# Patient Record
Sex: FEMALE | Race: WHITE | NOT HISPANIC OR LATINO | Employment: OTHER | URBAN - METROPOLITAN AREA
[De-identification: names, ages, dates, MRNs, and addresses within clinical notes are randomized per-mention and may not be internally consistent; named-entity substitution may affect disease eponyms.]

---

## 2017-01-03 ENCOUNTER — HOSPITAL ENCOUNTER (OUTPATIENT)
Dept: MAMMOGRAPHY | Facility: CLINIC | Age: 72
Discharge: HOME | End: 2017-01-03
Attending: FAMILY MEDICINE

## 2017-01-03 DIAGNOSIS — Z12.31 VISIT FOR SCREENING MAMMOGRAM: ICD-10-CM

## 2017-01-06 ENCOUNTER — COMMUNICATION - HEALTHEAST (OUTPATIENT)
Dept: FAMILY MEDICINE | Facility: CLINIC | Age: 72
End: 2017-01-06

## 2017-01-06 DIAGNOSIS — F41.9 ANXIETY: ICD-10-CM

## 2017-01-25 ENCOUNTER — OFFICE VISIT - HEALTHEAST (OUTPATIENT)
Dept: FAMILY MEDICINE | Facility: CLINIC | Age: 72
End: 2017-01-25

## 2017-01-25 DIAGNOSIS — F41.1 ANXIETY STATE: ICD-10-CM

## 2017-01-25 DIAGNOSIS — J02.0 STREP THROAT: ICD-10-CM

## 2017-01-25 DIAGNOSIS — J02.9 SORE THROAT: ICD-10-CM

## 2017-01-25 ASSESSMENT — MIFFLIN-ST. JEOR: SCORE: 1211.44

## 2017-02-13 ENCOUNTER — COMMUNICATION - HEALTHEAST (OUTPATIENT)
Dept: FAMILY MEDICINE | Facility: CLINIC | Age: 72
End: 2017-02-13

## 2017-02-13 DIAGNOSIS — K21.9 ESOPHAGEAL REFLUX: ICD-10-CM

## 2017-03-01 ENCOUNTER — OFFICE VISIT - HEALTHEAST (OUTPATIENT)
Dept: INTERNAL MEDICINE | Facility: CLINIC | Age: 72
End: 2017-03-01

## 2017-03-01 DIAGNOSIS — M81.0 OSTEOPOROSIS, SENILE: ICD-10-CM

## 2017-03-15 ENCOUNTER — COMMUNICATION - HEALTHEAST (OUTPATIENT)
Dept: FAMILY MEDICINE | Facility: CLINIC | Age: 72
End: 2017-03-15

## 2017-03-15 DIAGNOSIS — R10.9 STOMACH CRAMPS: ICD-10-CM

## 2017-04-23 ENCOUNTER — COMMUNICATION - HEALTHEAST (OUTPATIENT)
Dept: FAMILY MEDICINE | Facility: CLINIC | Age: 72
End: 2017-04-23

## 2017-04-23 DIAGNOSIS — F41.1 ANXIETY STATE: ICD-10-CM

## 2017-04-23 DIAGNOSIS — F33.9 MAJOR DEPRESSIVE DISORDER, RECURRENT EPISODE (H): ICD-10-CM

## 2017-05-14 ENCOUNTER — COMMUNICATION - HEALTHEAST (OUTPATIENT)
Dept: FAMILY MEDICINE | Facility: CLINIC | Age: 72
End: 2017-05-14

## 2017-05-14 DIAGNOSIS — K21.9 ESOPHAGEAL REFLUX: ICD-10-CM

## 2017-05-22 ENCOUNTER — COMMUNICATION - HEALTHEAST (OUTPATIENT)
Dept: FAMILY MEDICINE | Facility: CLINIC | Age: 72
End: 2017-05-22

## 2017-05-22 DIAGNOSIS — M79.89 LIMB SWELLING: ICD-10-CM

## 2017-06-09 ENCOUNTER — RECORDS - HEALTHEAST (OUTPATIENT)
Dept: ADMINISTRATIVE | Facility: OTHER | Age: 72
End: 2017-06-09

## 2017-06-26 ENCOUNTER — OFFICE VISIT - HEALTHEAST (OUTPATIENT)
Dept: FAMILY MEDICINE | Facility: CLINIC | Age: 72
End: 2017-06-26

## 2017-06-26 DIAGNOSIS — M79.89 LIMB SWELLING: ICD-10-CM

## 2017-06-26 DIAGNOSIS — M79.671 RIGHT FOOT PAIN: ICD-10-CM

## 2017-06-26 DIAGNOSIS — F41.9 ANXIETY: ICD-10-CM

## 2017-06-26 ASSESSMENT — MIFFLIN-ST. JEOR: SCORE: 1230.5

## 2017-07-20 ENCOUNTER — OFFICE VISIT - HEALTHEAST (OUTPATIENT)
Dept: PODIATRY | Age: 72
End: 2017-07-20

## 2017-07-20 DIAGNOSIS — M20.5X1 HALLUX LIMITUS, RIGHT: ICD-10-CM

## 2017-07-20 ASSESSMENT — MIFFLIN-ST. JEOR: SCORE: 1207.82

## 2017-07-26 ENCOUNTER — RECORDS - HEALTHEAST (OUTPATIENT)
Dept: ADMINISTRATIVE | Facility: OTHER | Age: 72
End: 2017-07-26

## 2017-07-27 ENCOUNTER — COMMUNICATION - HEALTHEAST (OUTPATIENT)
Dept: FAMILY MEDICINE | Facility: CLINIC | Age: 72
End: 2017-07-27

## 2017-07-27 DIAGNOSIS — F33.9 MAJOR DEPRESSIVE DISORDER, RECURRENT EPISODE (H): ICD-10-CM

## 2017-07-27 DIAGNOSIS — F41.1 ANXIETY STATE: ICD-10-CM

## 2017-08-22 ENCOUNTER — COMMUNICATION - HEALTHEAST (OUTPATIENT)
Dept: FAMILY MEDICINE | Facility: CLINIC | Age: 72
End: 2017-08-22

## 2017-08-22 DIAGNOSIS — M79.89 LIMB SWELLING: ICD-10-CM

## 2017-09-07 ENCOUNTER — AMBULATORY - HEALTHEAST (OUTPATIENT)
Dept: NURSING | Facility: CLINIC | Age: 72
End: 2017-09-07

## 2017-09-07 DIAGNOSIS — M81.0 OSTEOPOROSIS, SENILE: ICD-10-CM

## 2017-10-31 ENCOUNTER — AMBULATORY - HEALTHEAST (OUTPATIENT)
Dept: NURSING | Facility: CLINIC | Age: 72
End: 2017-10-31

## 2017-11-21 ENCOUNTER — COMMUNICATION - HEALTHEAST (OUTPATIENT)
Dept: FAMILY MEDICINE | Facility: CLINIC | Age: 72
End: 2017-11-21

## 2017-11-21 DIAGNOSIS — M79.89 LIMB SWELLING: ICD-10-CM

## 2017-12-13 ENCOUNTER — OFFICE VISIT - HEALTHEAST (OUTPATIENT)
Dept: PHYSICAL THERAPY | Facility: REHABILITATION | Age: 72
End: 2017-12-13

## 2017-12-13 ENCOUNTER — OFFICE VISIT - HEALTHEAST (OUTPATIENT)
Dept: FAMILY MEDICINE | Facility: CLINIC | Age: 72
End: 2017-12-13

## 2017-12-13 DIAGNOSIS — M25.511 ACUTE PAIN OF RIGHT SHOULDER: ICD-10-CM

## 2017-12-13 DIAGNOSIS — M79.672 LEFT FOOT PAIN: ICD-10-CM

## 2017-12-13 DIAGNOSIS — M25.511 RIGHT SHOULDER PAIN: ICD-10-CM

## 2017-12-13 DIAGNOSIS — R53.1 WEAKNESS: ICD-10-CM

## 2017-12-13 DIAGNOSIS — R29.3 POOR POSTURE: ICD-10-CM

## 2017-12-13 ASSESSMENT — MIFFLIN-ST. JEOR: SCORE: 1237.3

## 2017-12-15 ENCOUNTER — OFFICE VISIT - HEALTHEAST (OUTPATIENT)
Dept: PHYSICAL THERAPY | Facility: REHABILITATION | Age: 72
End: 2017-12-15

## 2017-12-15 DIAGNOSIS — R29.3 POOR POSTURE: ICD-10-CM

## 2017-12-15 DIAGNOSIS — R53.1 WEAKNESS: ICD-10-CM

## 2017-12-15 DIAGNOSIS — M25.511 ACUTE PAIN OF RIGHT SHOULDER: ICD-10-CM

## 2017-12-22 ENCOUNTER — OFFICE VISIT - HEALTHEAST (OUTPATIENT)
Dept: PHYSICAL THERAPY | Facility: REHABILITATION | Age: 72
End: 2017-12-22

## 2017-12-22 DIAGNOSIS — R53.1 WEAKNESS: ICD-10-CM

## 2017-12-22 DIAGNOSIS — R29.3 POOR POSTURE: ICD-10-CM

## 2017-12-22 DIAGNOSIS — M25.511 ACUTE PAIN OF RIGHT SHOULDER: ICD-10-CM

## 2017-12-29 ENCOUNTER — OFFICE VISIT - HEALTHEAST (OUTPATIENT)
Dept: PHYSICAL THERAPY | Facility: REHABILITATION | Age: 72
End: 2017-12-29

## 2017-12-29 DIAGNOSIS — M25.511 ACUTE PAIN OF RIGHT SHOULDER: ICD-10-CM

## 2017-12-29 DIAGNOSIS — R53.1 WEAKNESS: ICD-10-CM

## 2017-12-29 DIAGNOSIS — R29.3 POOR POSTURE: ICD-10-CM

## 2018-01-03 ENCOUNTER — OFFICE VISIT - HEALTHEAST (OUTPATIENT)
Dept: PHYSICAL THERAPY | Facility: REHABILITATION | Age: 73
End: 2018-01-03

## 2018-01-03 DIAGNOSIS — M25.511 ACUTE PAIN OF RIGHT SHOULDER: ICD-10-CM

## 2018-01-03 DIAGNOSIS — R53.1 WEAKNESS: ICD-10-CM

## 2018-01-03 DIAGNOSIS — R29.3 POOR POSTURE: ICD-10-CM

## 2018-01-04 ENCOUNTER — OFFICE VISIT - HEALTHEAST (OUTPATIENT)
Dept: PODIATRY | Age: 73
End: 2018-01-04

## 2018-01-04 DIAGNOSIS — G57.92 NEURITIS OF LEFT FOOT: ICD-10-CM

## 2018-01-04 ASSESSMENT — MIFFLIN-ST. JEOR: SCORE: 1207.82

## 2018-01-09 ENCOUNTER — HOSPITAL ENCOUNTER (OUTPATIENT)
Dept: MAMMOGRAPHY | Facility: CLINIC | Age: 73
Discharge: HOME | End: 2018-01-09
Attending: FAMILY MEDICINE

## 2018-01-09 DIAGNOSIS — Z12.31 VISIT FOR SCREENING MAMMOGRAM: ICD-10-CM

## 2018-01-12 ENCOUNTER — OFFICE VISIT - HEALTHEAST (OUTPATIENT)
Dept: PHYSICAL THERAPY | Facility: REHABILITATION | Age: 73
End: 2018-01-12

## 2018-01-12 DIAGNOSIS — M25.511 ACUTE PAIN OF RIGHT SHOULDER: ICD-10-CM

## 2018-01-12 DIAGNOSIS — R29.3 POOR POSTURE: ICD-10-CM

## 2018-01-12 DIAGNOSIS — R53.1 WEAKNESS: ICD-10-CM

## 2018-01-23 ENCOUNTER — COMMUNICATION - HEALTHEAST (OUTPATIENT)
Dept: FAMILY MEDICINE | Facility: CLINIC | Age: 73
End: 2018-01-23

## 2018-01-23 DIAGNOSIS — F41.9 ANXIETY: ICD-10-CM

## 2018-02-19 ENCOUNTER — COMMUNICATION - HEALTHEAST (OUTPATIENT)
Dept: FAMILY MEDICINE | Facility: CLINIC | Age: 73
End: 2018-02-19

## 2018-02-19 DIAGNOSIS — M79.89 LIMB SWELLING: ICD-10-CM

## 2018-03-07 ENCOUNTER — OFFICE VISIT - HEALTHEAST (OUTPATIENT)
Dept: INTERNAL MEDICINE | Facility: CLINIC | Age: 73
End: 2018-03-07

## 2018-03-07 ENCOUNTER — OFFICE VISIT - HEALTHEAST (OUTPATIENT)
Dept: FAMILY MEDICINE | Facility: CLINIC | Age: 73
End: 2018-03-07

## 2018-03-07 DIAGNOSIS — R63.5 WEIGHT GAIN: ICD-10-CM

## 2018-03-07 DIAGNOSIS — F33.9 MAJOR DEPRESSIVE DISORDER, RECURRENT EPISODE (H): ICD-10-CM

## 2018-03-07 DIAGNOSIS — M81.0 OSTEOPOROSIS, SENILE: ICD-10-CM

## 2018-03-07 DIAGNOSIS — M79.89 SWELLING OF LIMB: ICD-10-CM

## 2018-03-07 DIAGNOSIS — E83.42 HYPOMAGNESEMIA: ICD-10-CM

## 2018-03-07 DIAGNOSIS — F41.1 ANXIETY STATE: ICD-10-CM

## 2018-03-07 DIAGNOSIS — K21.9 ESOPHAGEAL REFLUX: ICD-10-CM

## 2018-03-07 DIAGNOSIS — G60.9 HEREDITARY AND IDIOPATHIC PERIPHERAL NEUROPATHY: ICD-10-CM

## 2018-03-07 LAB
ALBUMIN SERPL-MCNC: 4 G/DL (ref 3.5–5)
ALP SERPL-CCNC: 64 U/L (ref 45–120)
ALT SERPL W P-5'-P-CCNC: 22 U/L (ref 0–45)
ANION GAP SERPL CALCULATED.3IONS-SCNC: 10 MMOL/L (ref 5–18)
AST SERPL W P-5'-P-CCNC: 22 U/L (ref 0–40)
BILIRUB SERPL-MCNC: 1.4 MG/DL (ref 0–1)
BUN SERPL-MCNC: 13 MG/DL (ref 8–28)
CALCIUM SERPL-MCNC: 10.1 MG/DL (ref 8.5–10.5)
CHLORIDE BLD-SCNC: 102 MMOL/L (ref 98–107)
CO2 SERPL-SCNC: 28 MMOL/L (ref 22–31)
CREAT SERPL-MCNC: 0.69 MG/DL (ref 0.6–1.1)
GFR SERPL CREATININE-BSD FRML MDRD: >60 ML/MIN/1.73M2
GLUCOSE BLD-MCNC: 75 MG/DL (ref 70–125)
MAGNESIUM SERPL-MCNC: 2.3 MG/DL (ref 1.8–2.6)
POTASSIUM BLD-SCNC: 4.2 MMOL/L (ref 3.5–5)
PROT SERPL-MCNC: 7.6 G/DL (ref 6–8)
SODIUM SERPL-SCNC: 140 MMOL/L (ref 136–145)
TSH SERPL DL<=0.005 MIU/L-ACNC: 1.82 UIU/ML (ref 0.3–5)
VIT B12 SERPL-MCNC: 846 PG/ML (ref 213–816)

## 2018-03-08 LAB
25(OH)D3 SERPL-MCNC: 55.5 NG/ML (ref 30–80)
25(OH)D3 SERPL-MCNC: 55.5 NG/ML (ref 30–80)

## 2018-03-09 ENCOUNTER — COMMUNICATION - HEALTHEAST (OUTPATIENT)
Dept: INTERNAL MEDICINE | Facility: CLINIC | Age: 73
End: 2018-03-09

## 2018-04-09 ENCOUNTER — RECORDS - HEALTHEAST (OUTPATIENT)
Dept: ADMINISTRATIVE | Facility: OTHER | Age: 73
End: 2018-04-09

## 2018-04-12 ENCOUNTER — COMMUNICATION - HEALTHEAST (OUTPATIENT)
Dept: FAMILY MEDICINE | Facility: CLINIC | Age: 73
End: 2018-04-12

## 2018-04-12 DIAGNOSIS — K21.9 ESOPHAGEAL REFLUX: ICD-10-CM

## 2018-04-26 ENCOUNTER — RECORDS - HEALTHEAST (OUTPATIENT)
Dept: ADMINISTRATIVE | Facility: OTHER | Age: 73
End: 2018-04-26

## 2018-05-08 ENCOUNTER — OFFICE VISIT - HEALTHEAST (OUTPATIENT)
Dept: FAMILY MEDICINE | Facility: CLINIC | Age: 73
End: 2018-05-08

## 2018-05-08 DIAGNOSIS — F33.9 MAJOR DEPRESSIVE DISORDER, RECURRENT EPISODE (H): ICD-10-CM

## 2018-05-08 DIAGNOSIS — K58.9 IRRITABLE BOWEL SYNDROME: ICD-10-CM

## 2018-05-08 DIAGNOSIS — Z13.220 ENCOUNTER FOR SCREENING FOR LIPOID DISORDERS: ICD-10-CM

## 2018-05-08 DIAGNOSIS — R79.89 ELEVATED VITAMIN B12 LEVEL: ICD-10-CM

## 2018-05-08 DIAGNOSIS — K21.9 ESOPHAGEAL REFLUX: ICD-10-CM

## 2018-05-08 DIAGNOSIS — E83.42 HYPOMAGNESEMIA: ICD-10-CM

## 2018-05-08 DIAGNOSIS — M79.89 SWELLING OF LIMB: ICD-10-CM

## 2018-05-08 DIAGNOSIS — H91.90 HEARING LOSS: ICD-10-CM

## 2018-05-08 DIAGNOSIS — F41.1 ANXIETY STATE: ICD-10-CM

## 2018-05-08 DIAGNOSIS — R17 ELEVATED BILIRUBIN: ICD-10-CM

## 2018-05-08 DIAGNOSIS — M81.0 OSTEOPOROSIS, SENILE: ICD-10-CM

## 2018-05-08 DIAGNOSIS — Z00.00 ROUTINE GENERAL MEDICAL EXAMINATION AT A HEALTH CARE FACILITY: ICD-10-CM

## 2018-05-08 LAB
ALBUMIN SERPL-MCNC: 3.9 G/DL (ref 3.5–5)
ALBUMIN UR-MCNC: NEGATIVE MG/DL
ALP SERPL-CCNC: 60 U/L (ref 45–120)
ALT SERPL W P-5'-P-CCNC: 18 U/L (ref 0–45)
ANION GAP SERPL CALCULATED.3IONS-SCNC: 9 MMOL/L (ref 5–18)
APPEARANCE UR: CLEAR
AST SERPL W P-5'-P-CCNC: 24 U/L (ref 0–40)
BILIRUB SERPL-MCNC: 1.5 MG/DL (ref 0–1)
BILIRUB UR QL STRIP: NEGATIVE
BUN SERPL-MCNC: 10 MG/DL (ref 8–28)
CALCIUM SERPL-MCNC: 9.8 MG/DL (ref 8.5–10.5)
CHLORIDE BLD-SCNC: 101 MMOL/L (ref 98–107)
CHOLEST SERPL-MCNC: 205 MG/DL
CO2 SERPL-SCNC: 30 MMOL/L (ref 22–31)
COLOR UR AUTO: YELLOW
CREAT SERPL-MCNC: 0.65 MG/DL (ref 0.6–1.1)
ERYTHROCYTE [DISTWIDTH] IN BLOOD BY AUTOMATED COUNT: 12.2 % (ref 11–14.5)
FASTING STATUS PATIENT QL REPORTED: YES
GFR SERPL CREATININE-BSD FRML MDRD: >60 ML/MIN/1.73M2
GLUCOSE BLD-MCNC: 93 MG/DL (ref 70–125)
GLUCOSE UR STRIP-MCNC: NEGATIVE MG/DL
HCT VFR BLD AUTO: 43.2 % (ref 35–47)
HDLC SERPL-MCNC: 69 MG/DL
HGB BLD-MCNC: 14.5 G/DL (ref 12–16)
HGB UR QL STRIP: NEGATIVE
KETONES UR STRIP-MCNC: NEGATIVE MG/DL
LDLC SERPL CALC-MCNC: 112 MG/DL
LEUKOCYTE ESTERASE UR QL STRIP: NEGATIVE
MAGNESIUM SERPL-MCNC: 2 MG/DL (ref 1.8–2.6)
MCH RBC QN AUTO: 30.7 PG (ref 27–34)
MCHC RBC AUTO-ENTMCNC: 33.5 G/DL (ref 32–36)
MCV RBC AUTO: 92 FL (ref 80–100)
NITRATE UR QL: NEGATIVE
PH UR STRIP: 8.5 [PH] (ref 5–8)
PLATELET # BLD AUTO: 237 THOU/UL (ref 140–440)
PMV BLD AUTO: 7.3 FL (ref 7–10)
POTASSIUM BLD-SCNC: 4.3 MMOL/L (ref 3.5–5)
PROT SERPL-MCNC: 7.1 G/DL (ref 6–8)
RBC # BLD AUTO: 4.7 MILL/UL (ref 3.8–5.4)
SODIUM SERPL-SCNC: 140 MMOL/L (ref 136–145)
SP GR UR STRIP: 1.01 (ref 1–1.03)
TRIGL SERPL-MCNC: 120 MG/DL
UROBILINOGEN UR STRIP-ACNC: ABNORMAL
VIT B12 SERPL-MCNC: 688 PG/ML (ref 213–816)
WBC: 5 THOU/UL (ref 4–11)

## 2018-05-08 ASSESSMENT — MIFFLIN-ST. JEOR: SCORE: 1236.17

## 2018-05-09 LAB — HCV AB SERPL QL IA: NEGATIVE

## 2018-05-10 LAB — BILIRUB DIRECT SERPL-MCNC: 0.4 MG/DL

## 2018-05-20 ENCOUNTER — COMMUNICATION - HEALTHEAST (OUTPATIENT)
Dept: FAMILY MEDICINE | Facility: CLINIC | Age: 73
End: 2018-05-20

## 2018-05-20 DIAGNOSIS — M79.89 LIMB SWELLING: ICD-10-CM

## 2018-05-22 ENCOUNTER — OFFICE VISIT - HEALTHEAST (OUTPATIENT)
Dept: AUDIOLOGY | Facility: CLINIC | Age: 73
End: 2018-05-22

## 2018-05-22 DIAGNOSIS — Z71.1 PERSON WITH FEARED COMPLAINT IN WHOM NO DIAGNOSIS WAS MADE: ICD-10-CM

## 2018-07-11 ENCOUNTER — COMMUNICATION - HEALTHEAST (OUTPATIENT)
Dept: FAMILY MEDICINE | Facility: CLINIC | Age: 73
End: 2018-07-11

## 2018-07-11 DIAGNOSIS — F33.9 MAJOR DEPRESSIVE DISORDER, RECURRENT EPISODE (H): ICD-10-CM

## 2018-07-11 DIAGNOSIS — F41.1 ANXIETY STATE: ICD-10-CM

## 2018-07-31 ENCOUNTER — OFFICE VISIT - HEALTHEAST (OUTPATIENT)
Dept: FAMILY MEDICINE | Facility: CLINIC | Age: 73
End: 2018-07-31

## 2018-07-31 DIAGNOSIS — J06.9 URI (UPPER RESPIRATORY INFECTION): ICD-10-CM

## 2018-07-31 LAB
BASOPHILS # BLD AUTO: 0.1 THOU/UL (ref 0–0.2)
BASOPHILS NFR BLD AUTO: 1 % (ref 0–2)
EOSINOPHIL # BLD AUTO: 0.2 THOU/UL (ref 0–0.4)
EOSINOPHIL NFR BLD AUTO: 3 % (ref 0–6)
ERYTHROCYTE [DISTWIDTH] IN BLOOD BY AUTOMATED COUNT: 12.1 % (ref 11–14.5)
HCT VFR BLD AUTO: 45.9 % (ref 35–47)
HGB BLD-MCNC: 15 G/DL (ref 12–16)
LYMPHOCYTES # BLD AUTO: 2.4 THOU/UL (ref 0.8–4.4)
LYMPHOCYTES NFR BLD AUTO: 27 % (ref 20–40)
MCH RBC QN AUTO: 30.2 PG (ref 27–34)
MCHC RBC AUTO-ENTMCNC: 32.8 G/DL (ref 32–36)
MCV RBC AUTO: 92 FL (ref 80–100)
MONOCYTES # BLD AUTO: 0.6 THOU/UL (ref 0–0.9)
MONOCYTES NFR BLD AUTO: 6 % (ref 2–10)
NEUTROPHILS # BLD AUTO: 5.6 THOU/UL (ref 2–7.7)
NEUTROPHILS NFR BLD AUTO: 63 % (ref 50–70)
PLATELET # BLD AUTO: 307 THOU/UL (ref 140–440)
PMV BLD AUTO: 6.9 FL (ref 7–10)
RBC # BLD AUTO: 4.99 MILL/UL (ref 3.8–5.4)
WBC: 8.9 THOU/UL (ref 4–11)

## 2018-08-15 ENCOUNTER — RECORDS - HEALTHEAST (OUTPATIENT)
Dept: ADMINISTRATIVE | Facility: OTHER | Age: 73
End: 2018-08-15

## 2018-09-07 ENCOUNTER — AMBULATORY - HEALTHEAST (OUTPATIENT)
Dept: NURSING | Facility: CLINIC | Age: 73
End: 2018-09-07

## 2018-09-19 ENCOUNTER — RECORDS - HEALTHEAST (OUTPATIENT)
Dept: ADMINISTRATIVE | Facility: OTHER | Age: 73
End: 2018-09-19

## 2018-09-19 ENCOUNTER — RECORDS - HEALTHEAST (OUTPATIENT)
Dept: BONE DENSITY | Facility: CLINIC | Age: 73
End: 2018-09-19

## 2018-09-19 DIAGNOSIS — M81.0 AGE-RELATED OSTEOPOROSIS WITHOUT CURRENT PATHOLOGICAL FRACTURE: ICD-10-CM

## 2018-10-17 ENCOUNTER — AMBULATORY - HEALTHEAST (OUTPATIENT)
Dept: NURSING | Facility: CLINIC | Age: 73
End: 2018-10-17

## 2018-10-17 DIAGNOSIS — Z23 FLU VACCINE NEED: ICD-10-CM

## 2018-11-14 ENCOUNTER — OFFICE VISIT - HEALTHEAST (OUTPATIENT)
Dept: FAMILY MEDICINE | Facility: CLINIC | Age: 73
End: 2018-11-14

## 2018-11-14 DIAGNOSIS — F41.1 ANXIETY STATE: ICD-10-CM

## 2018-11-14 DIAGNOSIS — E78.00 ELEVATED CHOLESTEROL: ICD-10-CM

## 2018-11-14 DIAGNOSIS — M79.89 SWELLING OF LIMB: ICD-10-CM

## 2018-11-14 DIAGNOSIS — F33.9 EPISODE OF RECURRENT MAJOR DEPRESSIVE DISORDER, UNSPECIFIED DEPRESSION EPISODE SEVERITY (H): ICD-10-CM

## 2018-11-14 DIAGNOSIS — R10.9 STOMACH CRAMPS: ICD-10-CM

## 2018-11-14 DIAGNOSIS — K21.9 GASTROESOPHAGEAL REFLUX DISEASE WITHOUT ESOPHAGITIS: ICD-10-CM

## 2018-11-14 LAB
ALBUMIN SERPL-MCNC: 4.2 G/DL (ref 3.5–5)
ALP SERPL-CCNC: 60 U/L (ref 45–120)
ALT SERPL W P-5'-P-CCNC: 19 U/L (ref 0–45)
ANION GAP SERPL CALCULATED.3IONS-SCNC: 10 MMOL/L (ref 5–18)
AST SERPL W P-5'-P-CCNC: 25 U/L (ref 0–40)
BILIRUB SERPL-MCNC: 1.4 MG/DL (ref 0–1)
BUN SERPL-MCNC: 10 MG/DL (ref 8–28)
CALCIUM SERPL-MCNC: 10.4 MG/DL (ref 8.5–10.5)
CHLORIDE BLD-SCNC: 101 MMOL/L (ref 98–107)
CHOLEST SERPL-MCNC: 210 MG/DL
CO2 SERPL-SCNC: 28 MMOL/L (ref 22–31)
CREAT SERPL-MCNC: 0.69 MG/DL (ref 0.6–1.1)
FASTING STATUS PATIENT QL REPORTED: YES
GFR SERPL CREATININE-BSD FRML MDRD: >60 ML/MIN/1.73M2
GLUCOSE BLD-MCNC: 96 MG/DL (ref 70–125)
HDLC SERPL-MCNC: 81 MG/DL
LDLC SERPL CALC-MCNC: 107 MG/DL
POTASSIUM BLD-SCNC: 4.7 MMOL/L (ref 3.5–5)
PROT SERPL-MCNC: 7.5 G/DL (ref 6–8)
SODIUM SERPL-SCNC: 139 MMOL/L (ref 136–145)
TRIGL SERPL-MCNC: 111 MG/DL

## 2018-12-05 ENCOUNTER — OFFICE VISIT - HEALTHEAST (OUTPATIENT)
Dept: FAMILY MEDICINE | Facility: CLINIC | Age: 73
End: 2018-12-05

## 2018-12-05 DIAGNOSIS — F41.9 ANXIETY: ICD-10-CM

## 2018-12-05 DIAGNOSIS — M79.18 BUTTOCK PAIN: ICD-10-CM

## 2019-01-04 ENCOUNTER — OFFICE VISIT - HEALTHEAST (OUTPATIENT)
Dept: FAMILY MEDICINE | Facility: CLINIC | Age: 74
End: 2019-01-04

## 2019-01-04 DIAGNOSIS — F41.1 ANXIETY STATE: ICD-10-CM

## 2019-01-11 ENCOUNTER — HOSPITAL ENCOUNTER (OUTPATIENT)
Dept: MAMMOGRAPHY | Facility: CLINIC | Age: 74
Discharge: HOME | End: 2019-01-11
Attending: FAMILY MEDICINE

## 2019-01-11 DIAGNOSIS — Z12.31 VISIT FOR SCREENING MAMMOGRAM: ICD-10-CM

## 2019-01-21 ENCOUNTER — OFFICE VISIT - HEALTHEAST (OUTPATIENT)
Dept: FAMILY MEDICINE | Facility: CLINIC | Age: 74
End: 2019-01-21

## 2019-01-21 DIAGNOSIS — J40 BRONCHITIS: ICD-10-CM

## 2019-02-22 ENCOUNTER — OFFICE VISIT - HEALTHEAST (OUTPATIENT)
Dept: FAMILY MEDICINE | Facility: CLINIC | Age: 74
End: 2019-02-22

## 2019-02-22 ENCOUNTER — RECORDS - HEALTHEAST (OUTPATIENT)
Dept: GENERAL RADIOLOGY | Facility: CLINIC | Age: 74
End: 2019-02-22

## 2019-02-22 DIAGNOSIS — M79.674 GREAT TOE PAIN, RIGHT: ICD-10-CM

## 2019-02-22 DIAGNOSIS — M19.071 ARTHRITIS OF FIRST METATARSOPHALANGEAL (MTP) JOINT OF RIGHT FOOT: ICD-10-CM

## 2019-02-22 DIAGNOSIS — M79.674 PAIN IN RIGHT TOE(S): ICD-10-CM

## 2019-03-05 ENCOUNTER — OFFICE VISIT - HEALTHEAST (OUTPATIENT)
Dept: RHEUMATOLOGY | Facility: CLINIC | Age: 74
End: 2019-03-05

## 2019-03-05 DIAGNOSIS — M19.071 ARTHRITIS OF FIRST METATARSOPHALANGEAL (MTP) JOINT OF RIGHT FOOT: ICD-10-CM

## 2019-03-05 DIAGNOSIS — M15.0 PRIMARY OSTEOARTHRITIS INVOLVING MULTIPLE JOINTS: ICD-10-CM

## 2019-03-05 ASSESSMENT — MIFFLIN-ST. JEOR: SCORE: 1240.71

## 2019-03-07 ENCOUNTER — OFFICE VISIT - HEALTHEAST (OUTPATIENT)
Dept: INTERNAL MEDICINE | Facility: CLINIC | Age: 74
End: 2019-03-07

## 2019-03-07 DIAGNOSIS — M81.0 OSTEOPOROSIS, SENILE: ICD-10-CM

## 2019-03-20 ENCOUNTER — OFFICE VISIT - HEALTHEAST (OUTPATIENT)
Dept: FAMILY MEDICINE | Facility: CLINIC | Age: 74
End: 2019-03-20

## 2019-03-20 DIAGNOSIS — F33.0 MILD EPISODE OF RECURRENT MAJOR DEPRESSIVE DISORDER (H): ICD-10-CM

## 2019-03-20 DIAGNOSIS — F41.1 ANXIETY STATE: ICD-10-CM

## 2019-03-20 ASSESSMENT — MIFFLIN-ST. JEOR: SCORE: 1227.1

## 2019-04-07 ENCOUNTER — COMMUNICATION - HEALTHEAST (OUTPATIENT)
Dept: FAMILY MEDICINE | Facility: CLINIC | Age: 74
End: 2019-04-07

## 2019-04-07 DIAGNOSIS — K21.9 ESOPHAGEAL REFLUX: ICD-10-CM

## 2019-04-07 DIAGNOSIS — F33.9 MAJOR DEPRESSIVE DISORDER, RECURRENT EPISODE (H): ICD-10-CM

## 2019-04-07 DIAGNOSIS — F41.1 ANXIETY STATE: ICD-10-CM

## 2019-04-25 ENCOUNTER — COMMUNICATION - HEALTHEAST (OUTPATIENT)
Dept: SCHEDULING | Facility: CLINIC | Age: 74
End: 2019-04-25

## 2019-04-25 DIAGNOSIS — Z20.828 EXPOSURE TO THE FLU: ICD-10-CM

## 2019-05-06 ENCOUNTER — COMMUNICATION - HEALTHEAST (OUTPATIENT)
Dept: FAMILY MEDICINE | Facility: CLINIC | Age: 74
End: 2019-05-06

## 2019-05-13 ENCOUNTER — COMMUNICATION - HEALTHEAST (OUTPATIENT)
Dept: FAMILY MEDICINE | Facility: CLINIC | Age: 74
End: 2019-05-13

## 2019-05-13 DIAGNOSIS — R10.9 STOMACH CRAMPS: ICD-10-CM

## 2019-05-15 ENCOUNTER — COMMUNICATION - HEALTHEAST (OUTPATIENT)
Dept: FAMILY MEDICINE | Facility: CLINIC | Age: 74
End: 2019-05-15

## 2019-05-15 DIAGNOSIS — M79.89 LIMB SWELLING: ICD-10-CM

## 2019-05-31 ENCOUNTER — OFFICE VISIT - HEALTHEAST (OUTPATIENT)
Dept: FAMILY MEDICINE | Facility: CLINIC | Age: 74
End: 2019-05-31

## 2019-05-31 DIAGNOSIS — R20.2 PARESTHESIA: ICD-10-CM

## 2019-05-31 DIAGNOSIS — F41.1 ANXIETY STATE: ICD-10-CM

## 2019-05-31 DIAGNOSIS — G60.9 HEREDITARY AND IDIOPATHIC PERIPHERAL NEUROPATHY: ICD-10-CM

## 2019-06-17 ENCOUNTER — RECORDS - HEALTHEAST (OUTPATIENT)
Dept: ADMINISTRATIVE | Facility: OTHER | Age: 74
End: 2019-06-17

## 2019-06-21 ENCOUNTER — HOSPITAL ENCOUNTER (OUTPATIENT)
Dept: CT IMAGING | Facility: CLINIC | Age: 74
Discharge: HOME | End: 2019-06-21

## 2019-06-21 DIAGNOSIS — M54.81 OCCIPITAL NEURALGIA: ICD-10-CM

## 2019-06-21 DIAGNOSIS — M79.2 NEURALGIA: ICD-10-CM

## 2019-06-21 LAB
CREAT BLD-MCNC: 0.6 MG/DL (ref 0.6–1.1)
GFR SERPL CREATININE-BSD FRML MDRD: >60 ML/MIN/1.73M2

## 2019-07-01 ENCOUNTER — RECORDS - HEALTHEAST (OUTPATIENT)
Dept: ADMINISTRATIVE | Facility: OTHER | Age: 74
End: 2019-07-01

## 2019-07-15 ENCOUNTER — RECORDS - HEALTHEAST (OUTPATIENT)
Dept: ADMINISTRATIVE | Facility: OTHER | Age: 74
End: 2019-07-15

## 2019-07-15 ENCOUNTER — OFFICE VISIT - HEALTHEAST (OUTPATIENT)
Dept: RHEUMATOLOGY | Facility: CLINIC | Age: 74
End: 2019-07-15

## 2019-07-15 DIAGNOSIS — M19.071 ARTHRITIS OF FIRST METATARSOPHALANGEAL (MTP) JOINT OF RIGHT FOOT: ICD-10-CM

## 2019-07-15 DIAGNOSIS — M15.0 PRIMARY OSTEOARTHRITIS INVOLVING MULTIPLE JOINTS: ICD-10-CM

## 2019-08-05 ENCOUNTER — OFFICE VISIT - HEALTHEAST (OUTPATIENT)
Dept: FAMILY MEDICINE | Facility: CLINIC | Age: 74
End: 2019-08-05

## 2019-08-05 DIAGNOSIS — J02.9 SORE THROAT: ICD-10-CM

## 2019-08-05 DIAGNOSIS — J06.9 UPPER RESPIRATORY TRACT INFECTION, UNSPECIFIED TYPE: ICD-10-CM

## 2019-08-05 LAB
ANION GAP SERPL CALCULATED.3IONS-SCNC: 12 MMOL/L (ref 5–18)
BUN SERPL-MCNC: 11 MG/DL (ref 8–28)
CALCIUM SERPL-MCNC: 10.1 MG/DL (ref 8.5–10.5)
CHLORIDE BLD-SCNC: 101 MMOL/L (ref 98–107)
CO2 SERPL-SCNC: 26 MMOL/L (ref 22–31)
CREAT SERPL-MCNC: 0.72 MG/DL (ref 0.6–1.1)
DEPRECATED S PYO AG THROAT QL EIA: NORMAL
ERYTHROCYTE [DISTWIDTH] IN BLOOD BY AUTOMATED COUNT: 13 % (ref 11–14.5)
GFR SERPL CREATININE-BSD FRML MDRD: >60 ML/MIN/1.73M2
GLUCOSE BLD-MCNC: 89 MG/DL (ref 70–125)
HCT VFR BLD AUTO: 42 % (ref 35–47)
HGB BLD-MCNC: 14.5 G/DL (ref 12–16)
MCH RBC QN AUTO: 31.6 PG (ref 27–34)
MCHC RBC AUTO-ENTMCNC: 34.5 G/DL (ref 32–36)
MCV RBC AUTO: 92 FL (ref 80–100)
PLATELET # BLD AUTO: 276 THOU/UL (ref 140–440)
PMV BLD AUTO: 7.8 FL (ref 7–10)
POTASSIUM BLD-SCNC: 4.1 MMOL/L (ref 3.5–5)
RBC # BLD AUTO: 4.58 MILL/UL (ref 3.8–5.4)
SODIUM SERPL-SCNC: 139 MMOL/L (ref 136–145)
WBC: 10.3 THOU/UL (ref 4–11)

## 2019-08-05 RX ORDER — MV/FA/DHA/EPA/FISH OIL/SAW/GNK 400MCG-200
1 COMBINATION PACKAGE (EA) ORAL DAILY
Status: SHIPPED | COMMUNITY
Start: 2019-08-05

## 2019-08-06 LAB — GROUP A STREP BY PCR: NORMAL

## 2019-08-10 ENCOUNTER — OFFICE VISIT - HEALTHEAST (OUTPATIENT)
Dept: FAMILY MEDICINE | Facility: CLINIC | Age: 74
End: 2019-08-10

## 2019-08-10 DIAGNOSIS — J06.9 UPPER RESPIRATORY TRACT INFECTION, UNSPECIFIED TYPE: ICD-10-CM

## 2019-08-10 DIAGNOSIS — H69.93 DYSFUNCTION OF BOTH EUSTACHIAN TUBES: ICD-10-CM

## 2019-08-10 ASSESSMENT — MIFFLIN-ST. JEOR: SCORE: 1208.96

## 2019-08-21 ENCOUNTER — RECORDS - HEALTHEAST (OUTPATIENT)
Dept: ADMINISTRATIVE | Facility: OTHER | Age: 74
End: 2019-08-21

## 2019-09-10 ENCOUNTER — AMBULATORY - HEALTHEAST (OUTPATIENT)
Dept: NURSING | Facility: CLINIC | Age: 74
End: 2019-09-10

## 2019-10-10 ENCOUNTER — AMBULATORY - HEALTHEAST (OUTPATIENT)
Dept: NURSING | Facility: CLINIC | Age: 74
End: 2019-10-10

## 2019-10-10 DIAGNOSIS — Z23 FLU VACCINE NEED: ICD-10-CM

## 2019-11-01 ENCOUNTER — OFFICE VISIT - HEALTHEAST (OUTPATIENT)
Dept: FAMILY MEDICINE | Facility: CLINIC | Age: 74
End: 2019-11-01

## 2019-11-01 DIAGNOSIS — K21.9 GASTROESOPHAGEAL REFLUX DISEASE WITHOUT ESOPHAGITIS: ICD-10-CM

## 2019-11-01 DIAGNOSIS — F33.0 MILD EPISODE OF RECURRENT MAJOR DEPRESSIVE DISORDER (H): ICD-10-CM

## 2019-11-01 DIAGNOSIS — H26.9 CATARACT OF BOTH EYES, UNSPECIFIED CATARACT TYPE: ICD-10-CM

## 2019-11-01 DIAGNOSIS — F41.1 ANXIETY STATE: ICD-10-CM

## 2019-11-01 DIAGNOSIS — Z01.818 PREOPERATIVE EXAMINATION: ICD-10-CM

## 2019-11-01 DIAGNOSIS — M79.89 SWELLING OF LIMB: ICD-10-CM

## 2019-11-01 LAB
ALBUMIN SERPL-MCNC: 4 G/DL (ref 3.5–5)
ALBUMIN UR-MCNC: NEGATIVE MG/DL
ALP SERPL-CCNC: 59 U/L (ref 45–120)
ALT SERPL W P-5'-P-CCNC: 18 U/L (ref 0–45)
ANION GAP SERPL CALCULATED.3IONS-SCNC: 8 MMOL/L (ref 5–18)
APPEARANCE UR: CLEAR
AST SERPL W P-5'-P-CCNC: 23 U/L (ref 0–40)
BILIRUB SERPL-MCNC: 1.1 MG/DL (ref 0–1)
BILIRUB UR QL STRIP: NEGATIVE
BUN SERPL-MCNC: 12 MG/DL (ref 8–28)
CALCIUM SERPL-MCNC: 9.8 MG/DL (ref 8.5–10.5)
CHLORIDE BLD-SCNC: 103 MMOL/L (ref 98–107)
CO2 SERPL-SCNC: 28 MMOL/L (ref 22–31)
COLOR UR AUTO: YELLOW
CREAT SERPL-MCNC: 0.66 MG/DL (ref 0.6–1.1)
GFR SERPL CREATININE-BSD FRML MDRD: >60 ML/MIN/1.73M2
GLUCOSE BLD-MCNC: 79 MG/DL (ref 70–125)
GLUCOSE UR STRIP-MCNC: NEGATIVE MG/DL
HGB BLD-MCNC: 13.2 G/DL (ref 12–16)
HGB UR QL STRIP: NEGATIVE
KETONES UR STRIP-MCNC: NEGATIVE MG/DL
LEUKOCYTE ESTERASE UR QL STRIP: NEGATIVE
NITRATE UR QL: NEGATIVE
PH UR STRIP: 6.5 [PH] (ref 5–8)
POTASSIUM BLD-SCNC: 4.5 MMOL/L (ref 3.5–5)
PROT SERPL-MCNC: 7.2 G/DL (ref 6–8)
SODIUM SERPL-SCNC: 139 MMOL/L (ref 136–145)
SP GR UR STRIP: 1.01 (ref 1–1.03)
UROBILINOGEN UR STRIP-ACNC: NORMAL

## 2019-11-01 ASSESSMENT — PATIENT HEALTH QUESTIONNAIRE - PHQ9: SUM OF ALL RESPONSES TO PHQ QUESTIONS 1-9: 2

## 2019-11-01 ASSESSMENT — ANXIETY QUESTIONNAIRES
7. FEELING AFRAID AS IF SOMETHING AWFUL MIGHT HAPPEN: NOT AT ALL
5. BEING SO RESTLESS THAT IT IS HARD TO SIT STILL: NOT AT ALL
6. BECOMING EASILY ANNOYED OR IRRITABLE: SEVERAL DAYS
GAD7 TOTAL SCORE: 3
4. TROUBLE RELAXING: SEVERAL DAYS
3. WORRYING TOO MUCH ABOUT DIFFERENT THINGS: NOT AT ALL
1. FEELING NERVOUS, ANXIOUS, OR ON EDGE: SEVERAL DAYS
IF YOU CHECKED OFF ANY PROBLEMS ON THIS QUESTIONNAIRE, HOW DIFFICULT HAVE THESE PROBLEMS MADE IT FOR YOU TO DO YOUR WORK, TAKE CARE OF THINGS AT HOME, OR GET ALONG WITH OTHER PEOPLE: NOT DIFFICULT AT ALL
2. NOT BEING ABLE TO STOP OR CONTROL WORRYING: NOT AT ALL

## 2019-11-01 ASSESSMENT — MIFFLIN-ST. JEOR: SCORE: 1240.59

## 2019-11-11 ENCOUNTER — COMMUNICATION - HEALTHEAST (OUTPATIENT)
Dept: FAMILY MEDICINE | Facility: CLINIC | Age: 74
End: 2019-11-11

## 2019-11-11 DIAGNOSIS — M79.89 LIMB SWELLING: ICD-10-CM

## 2019-11-20 ENCOUNTER — COMMUNICATION - HEALTHEAST (OUTPATIENT)
Dept: SCHEDULING | Facility: CLINIC | Age: 74
End: 2019-11-20

## 2020-01-11 ENCOUNTER — OFFICE VISIT - HEALTHEAST (OUTPATIENT)
Dept: FAMILY MEDICINE | Facility: CLINIC | Age: 75
End: 2020-01-11

## 2020-01-11 DIAGNOSIS — R07.0 THROAT PAIN: ICD-10-CM

## 2020-01-11 DIAGNOSIS — R05.9 COUGH: ICD-10-CM

## 2020-01-11 LAB
DEPRECATED S PYO AG THROAT QL EIA: NORMAL
FLUAV AG SPEC QL IA: NORMAL
FLUBV AG SPEC QL IA: NORMAL

## 2020-01-11 ASSESSMENT — MIFFLIN-ST. JEOR: SCORE: 1239.23

## 2020-01-12 LAB — GROUP A STREP BY PCR: NORMAL

## 2020-01-13 ENCOUNTER — OFFICE VISIT - HEALTHEAST (OUTPATIENT)
Dept: RHEUMATOLOGY | Facility: CLINIC | Age: 75
End: 2020-01-13

## 2020-01-13 DIAGNOSIS — M19.071 ARTHRITIS OF FIRST METATARSOPHALANGEAL (MTP) JOINT OF RIGHT FOOT: ICD-10-CM

## 2020-01-13 DIAGNOSIS — M15.0 PRIMARY OSTEOARTHRITIS INVOLVING MULTIPLE JOINTS: ICD-10-CM

## 2020-01-13 ASSESSMENT — MIFFLIN-ST. JEOR: SCORE: 1237.87

## 2020-01-17 ENCOUNTER — COMMUNICATION - HEALTHEAST (OUTPATIENT)
Dept: SCHEDULING | Facility: CLINIC | Age: 75
End: 2020-01-17

## 2020-01-22 ENCOUNTER — OFFICE VISIT - HEALTHEAST (OUTPATIENT)
Dept: FAMILY MEDICINE | Facility: CLINIC | Age: 75
End: 2020-01-22

## 2020-01-22 DIAGNOSIS — S39.012A BACK STRAIN, INITIAL ENCOUNTER: ICD-10-CM

## 2020-01-22 DIAGNOSIS — M81.0 OSTEOPOROSIS, SENILE: ICD-10-CM

## 2020-01-22 ASSESSMENT — MIFFLIN-ST. JEOR: SCORE: 1246.94

## 2020-01-23 ENCOUNTER — HOSPITAL ENCOUNTER (OUTPATIENT)
Dept: MAMMOGRAPHY | Facility: CLINIC | Age: 75
Discharge: HOME | End: 2020-01-23
Attending: FAMILY MEDICINE

## 2020-01-23 DIAGNOSIS — Z12.31 VISIT FOR SCREENING MAMMOGRAM: ICD-10-CM

## 2020-02-04 ENCOUNTER — COMMUNICATION - HEALTHEAST (OUTPATIENT)
Dept: FAMILY MEDICINE | Facility: CLINIC | Age: 75
End: 2020-02-04

## 2020-02-04 DIAGNOSIS — S39.012A BACK STRAIN, INITIAL ENCOUNTER: ICD-10-CM

## 2020-03-01 ENCOUNTER — COMMUNICATION - HEALTHEAST (OUTPATIENT)
Dept: FAMILY MEDICINE | Facility: CLINIC | Age: 75
End: 2020-03-01

## 2020-03-01 DIAGNOSIS — K21.9 ESOPHAGEAL REFLUX: ICD-10-CM

## 2020-03-04 ENCOUNTER — OFFICE VISIT - HEALTHEAST (OUTPATIENT)
Dept: FAMILY MEDICINE | Facility: CLINIC | Age: 75
End: 2020-03-04

## 2020-03-04 DIAGNOSIS — Z13.220 ENCOUNTER FOR SCREENING FOR LIPOID DISORDERS: ICD-10-CM

## 2020-03-04 DIAGNOSIS — G89.29 CHRONIC RIGHT-SIDED LOW BACK PAIN WITHOUT SCIATICA: ICD-10-CM

## 2020-03-04 DIAGNOSIS — F41.1 ANXIETY STATE: ICD-10-CM

## 2020-03-04 DIAGNOSIS — M79.89 SWELLING OF LIMB: ICD-10-CM

## 2020-03-04 DIAGNOSIS — M81.0 OSTEOPOROSIS, SENILE: ICD-10-CM

## 2020-03-04 DIAGNOSIS — K21.9 GASTROESOPHAGEAL REFLUX DISEASE WITHOUT ESOPHAGITIS: ICD-10-CM

## 2020-03-04 DIAGNOSIS — M54.50 CHRONIC RIGHT-SIDED LOW BACK PAIN WITHOUT SCIATICA: ICD-10-CM

## 2020-03-04 DIAGNOSIS — G60.9 HEREDITARY AND IDIOPATHIC PERIPHERAL NEUROPATHY: ICD-10-CM

## 2020-03-04 DIAGNOSIS — F33.0 MILD EPISODE OF RECURRENT MAJOR DEPRESSIVE DISORDER (H): ICD-10-CM

## 2020-03-04 DIAGNOSIS — Z00.01 ENCOUNTER FOR GENERAL ADULT MEDICAL EXAMINATION WITH ABNORMAL FINDINGS: ICD-10-CM

## 2020-03-04 LAB
ALBUMIN SERPL-MCNC: 4.1 G/DL (ref 3.5–5)
ALP SERPL-CCNC: 67 U/L (ref 45–120)
ALT SERPL W P-5'-P-CCNC: 23 U/L (ref 0–45)
ANION GAP SERPL CALCULATED.3IONS-SCNC: 13 MMOL/L (ref 5–18)
AST SERPL W P-5'-P-CCNC: 25 U/L (ref 0–40)
BILIRUB SERPL-MCNC: 1.1 MG/DL (ref 0–1)
BUN SERPL-MCNC: 15 MG/DL (ref 8–28)
CALCIUM SERPL-MCNC: 10.4 MG/DL (ref 8.5–10.5)
CHLORIDE BLD-SCNC: 101 MMOL/L (ref 98–107)
CHOLEST SERPL-MCNC: 203 MG/DL
CO2 SERPL-SCNC: 28 MMOL/L (ref 22–31)
CREAT SERPL-MCNC: 0.73 MG/DL (ref 0.6–1.1)
FASTING STATUS PATIENT QL REPORTED: YES
GFR SERPL CREATININE-BSD FRML MDRD: >60 ML/MIN/1.73M2
GLUCOSE BLD-MCNC: 103 MG/DL (ref 70–125)
HDLC SERPL-MCNC: 69 MG/DL
LDLC SERPL CALC-MCNC: 109 MG/DL
POTASSIUM BLD-SCNC: 4.8 MMOL/L (ref 3.5–5)
PROT SERPL-MCNC: 7.5 G/DL (ref 6–8)
SODIUM SERPL-SCNC: 142 MMOL/L (ref 136–145)
TRIGL SERPL-MCNC: 124 MG/DL

## 2020-03-04 ASSESSMENT — PATIENT HEALTH QUESTIONNAIRE - PHQ9: SUM OF ALL RESPONSES TO PHQ QUESTIONS 1-9: 6

## 2020-03-04 ASSESSMENT — ANXIETY QUESTIONNAIRES
4. TROUBLE RELAXING: NOT AT ALL
IF YOU CHECKED OFF ANY PROBLEMS ON THIS QUESTIONNAIRE, HOW DIFFICULT HAVE THESE PROBLEMS MADE IT FOR YOU TO DO YOUR WORK, TAKE CARE OF THINGS AT HOME, OR GET ALONG WITH OTHER PEOPLE: NOT DIFFICULT AT ALL
6. BECOMING EASILY ANNOYED OR IRRITABLE: MORE THAN HALF THE DAYS
5. BEING SO RESTLESS THAT IT IS HARD TO SIT STILL: NOT AT ALL
7. FEELING AFRAID AS IF SOMETHING AWFUL MIGHT HAPPEN: NOT AT ALL
3. WORRYING TOO MUCH ABOUT DIFFERENT THINGS: SEVERAL DAYS

## 2020-03-04 ASSESSMENT — MIFFLIN-ST. JEOR: SCORE: 1224.6

## 2020-03-05 ENCOUNTER — AMBULATORY - HEALTHEAST (OUTPATIENT)
Dept: INTERNAL MEDICINE | Facility: CLINIC | Age: 75
End: 2020-03-05

## 2020-03-05 DIAGNOSIS — M81.0 OSTEOPOROSIS, SENILE: ICD-10-CM

## 2020-03-05 DIAGNOSIS — Z92.29 PERSONAL HISTORY OF OTHER DRUG THERAPY: ICD-10-CM

## 2020-03-06 ENCOUNTER — HOSPITAL ENCOUNTER (OUTPATIENT)
Dept: PHYSICAL MEDICINE AND REHAB | Facility: CLINIC | Age: 75
Discharge: HOME | End: 2020-03-06
Attending: FAMILY MEDICINE

## 2020-03-06 DIAGNOSIS — G89.29 CHRONIC BILATERAL LOW BACK PAIN WITH RIGHT-SIDED SCIATICA: ICD-10-CM

## 2020-03-06 DIAGNOSIS — F40.240 CLAUSTROPHOBIA: ICD-10-CM

## 2020-03-06 DIAGNOSIS — M54.16 LUMBAR RADICULITIS: ICD-10-CM

## 2020-03-06 DIAGNOSIS — M54.41 CHRONIC BILATERAL LOW BACK PAIN WITH RIGHT-SIDED SCIATICA: ICD-10-CM

## 2020-03-12 ENCOUNTER — OFFICE VISIT - HEALTHEAST (OUTPATIENT)
Dept: INTERNAL MEDICINE | Facility: CLINIC | Age: 75
End: 2020-03-12

## 2020-03-12 DIAGNOSIS — M81.0 OSTEOPOROSIS, SENILE: ICD-10-CM

## 2020-03-13 ENCOUNTER — HOSPITAL ENCOUNTER (OUTPATIENT)
Dept: MRI IMAGING | Facility: CLINIC | Age: 75
Discharge: HOME | End: 2020-03-13
Attending: PHYSICIAN ASSISTANT

## 2020-03-13 DIAGNOSIS — M54.16 LUMBAR RADICULITIS: ICD-10-CM

## 2020-03-17 ENCOUNTER — COMMUNICATION - HEALTHEAST (OUTPATIENT)
Dept: PHYSICAL MEDICINE AND REHAB | Facility: CLINIC | Age: 75
End: 2020-03-17

## 2020-03-18 ENCOUNTER — OFFICE VISIT - HEALTHEAST (OUTPATIENT)
Dept: PHYSICAL THERAPY | Facility: REHABILITATION | Age: 75
End: 2020-03-18

## 2020-03-18 DIAGNOSIS — M54.41 CHRONIC BILATERAL LOW BACK PAIN WITH BILATERAL SCIATICA: ICD-10-CM

## 2020-03-18 DIAGNOSIS — M54.17 LUMBOSACRAL RADICULITIS: ICD-10-CM

## 2020-03-18 DIAGNOSIS — M54.42 CHRONIC BILATERAL LOW BACK PAIN WITH BILATERAL SCIATICA: ICD-10-CM

## 2020-03-18 DIAGNOSIS — G89.29 CHRONIC BILATERAL LOW BACK PAIN WITH BILATERAL SCIATICA: ICD-10-CM

## 2020-03-20 ENCOUNTER — OFFICE VISIT - HEALTHEAST (OUTPATIENT)
Dept: PHYSICAL THERAPY | Facility: REHABILITATION | Age: 75
End: 2020-03-20

## 2020-03-20 DIAGNOSIS — M54.17 LUMBOSACRAL RADICULITIS: ICD-10-CM

## 2020-03-20 DIAGNOSIS — G89.29 CHRONIC BILATERAL LOW BACK PAIN WITH BILATERAL SCIATICA: ICD-10-CM

## 2020-03-20 DIAGNOSIS — M54.41 CHRONIC BILATERAL LOW BACK PAIN WITH BILATERAL SCIATICA: ICD-10-CM

## 2020-03-20 DIAGNOSIS — M54.42 CHRONIC BILATERAL LOW BACK PAIN WITH BILATERAL SCIATICA: ICD-10-CM

## 2020-03-23 ENCOUNTER — OFFICE VISIT - HEALTHEAST (OUTPATIENT)
Dept: PHYSICAL THERAPY | Facility: REHABILITATION | Age: 75
End: 2020-03-23

## 2020-03-23 DIAGNOSIS — M54.41 CHRONIC BILATERAL LOW BACK PAIN WITH BILATERAL SCIATICA: ICD-10-CM

## 2020-03-23 DIAGNOSIS — M54.42 CHRONIC BILATERAL LOW BACK PAIN WITH BILATERAL SCIATICA: ICD-10-CM

## 2020-03-23 DIAGNOSIS — G89.29 CHRONIC BILATERAL LOW BACK PAIN WITH BILATERAL SCIATICA: ICD-10-CM

## 2020-03-23 DIAGNOSIS — M54.17 LUMBOSACRAL RADICULITIS: ICD-10-CM

## 2020-05-12 ENCOUNTER — OFFICE VISIT - HEALTHEAST (OUTPATIENT)
Dept: PHYSICAL THERAPY | Facility: REHABILITATION | Age: 75
End: 2020-05-12

## 2020-05-12 DIAGNOSIS — M54.41 CHRONIC BILATERAL LOW BACK PAIN WITH BILATERAL SCIATICA: ICD-10-CM

## 2020-05-12 DIAGNOSIS — M54.42 CHRONIC BILATERAL LOW BACK PAIN WITH BILATERAL SCIATICA: ICD-10-CM

## 2020-05-12 DIAGNOSIS — G89.29 CHRONIC BILATERAL LOW BACK PAIN WITH BILATERAL SCIATICA: ICD-10-CM

## 2020-05-12 DIAGNOSIS — M54.17 LUMBOSACRAL RADICULITIS: ICD-10-CM

## 2020-05-27 ENCOUNTER — OFFICE VISIT - HEALTHEAST (OUTPATIENT)
Dept: PHYSICAL THERAPY | Facility: REHABILITATION | Age: 75
End: 2020-05-27

## 2020-05-27 DIAGNOSIS — M54.42 CHRONIC BILATERAL LOW BACK PAIN WITH BILATERAL SCIATICA: ICD-10-CM

## 2020-05-27 DIAGNOSIS — M54.41 CHRONIC BILATERAL LOW BACK PAIN WITH BILATERAL SCIATICA: ICD-10-CM

## 2020-05-27 DIAGNOSIS — M54.17 LUMBOSACRAL RADICULITIS: ICD-10-CM

## 2020-05-27 DIAGNOSIS — G89.29 CHRONIC BILATERAL LOW BACK PAIN WITH BILATERAL SCIATICA: ICD-10-CM

## 2020-05-28 ENCOUNTER — HOSPITAL ENCOUNTER (OUTPATIENT)
Dept: PHYSICAL MEDICINE AND REHAB | Facility: CLINIC | Age: 75
Discharge: HOME | End: 2020-05-28
Attending: PHYSICIAN ASSISTANT

## 2020-05-28 DIAGNOSIS — M71.38 SYNOVIAL CYST OF LUMBAR FACET JOINT: ICD-10-CM

## 2020-05-28 DIAGNOSIS — M54.16 LUMBAR RADICULITIS: ICD-10-CM

## 2020-06-03 ENCOUNTER — OFFICE VISIT - HEALTHEAST (OUTPATIENT)
Dept: PHYSICAL THERAPY | Facility: REHABILITATION | Age: 75
End: 2020-06-03

## 2020-06-03 DIAGNOSIS — M54.42 CHRONIC BILATERAL LOW BACK PAIN WITH BILATERAL SCIATICA: ICD-10-CM

## 2020-06-03 DIAGNOSIS — M54.41 CHRONIC BILATERAL LOW BACK PAIN WITH BILATERAL SCIATICA: ICD-10-CM

## 2020-06-03 DIAGNOSIS — G89.29 CHRONIC BILATERAL LOW BACK PAIN WITH BILATERAL SCIATICA: ICD-10-CM

## 2020-06-03 DIAGNOSIS — M54.17 LUMBOSACRAL RADICULITIS: ICD-10-CM

## 2020-06-03 DIAGNOSIS — M79.89 SWELLING OF LIMB: ICD-10-CM

## 2020-06-04 ENCOUNTER — HOSPITAL ENCOUNTER (OUTPATIENT)
Dept: PHYSICAL MEDICINE AND REHAB | Facility: CLINIC | Age: 75
Discharge: HOME | End: 2020-06-04
Attending: PHYSICIAN ASSISTANT

## 2020-06-04 ENCOUNTER — COMMUNICATION - HEALTHEAST (OUTPATIENT)
Dept: PHYSICAL MEDICINE AND REHAB | Facility: CLINIC | Age: 75
End: 2020-06-04

## 2020-06-04 DIAGNOSIS — M54.16 LUMBAR RADICULITIS: ICD-10-CM

## 2020-06-09 ENCOUNTER — OFFICE VISIT - HEALTHEAST (OUTPATIENT)
Dept: PHYSICAL THERAPY | Facility: REHABILITATION | Age: 75
End: 2020-06-09

## 2020-06-09 DIAGNOSIS — M54.41 CHRONIC BILATERAL LOW BACK PAIN WITH BILATERAL SCIATICA: ICD-10-CM

## 2020-06-09 DIAGNOSIS — M54.17 LUMBOSACRAL RADICULITIS: ICD-10-CM

## 2020-06-09 DIAGNOSIS — G89.29 CHRONIC BILATERAL LOW BACK PAIN WITH BILATERAL SCIATICA: ICD-10-CM

## 2020-06-09 DIAGNOSIS — M79.89 SWELLING OF LIMB: ICD-10-CM

## 2020-06-09 DIAGNOSIS — M54.42 CHRONIC BILATERAL LOW BACK PAIN WITH BILATERAL SCIATICA: ICD-10-CM

## 2020-06-17 ENCOUNTER — OFFICE VISIT - HEALTHEAST (OUTPATIENT)
Dept: PHYSICAL THERAPY | Facility: REHABILITATION | Age: 75
End: 2020-06-17

## 2020-06-17 DIAGNOSIS — M79.89 SWELLING OF LIMB: ICD-10-CM

## 2020-06-17 DIAGNOSIS — M54.42 CHRONIC BILATERAL LOW BACK PAIN WITH BILATERAL SCIATICA: ICD-10-CM

## 2020-06-17 DIAGNOSIS — M54.17 LUMBOSACRAL RADICULITIS: ICD-10-CM

## 2020-06-17 DIAGNOSIS — R60.0 LOCALIZED EDEMA: ICD-10-CM

## 2020-06-17 DIAGNOSIS — G89.29 CHRONIC BILATERAL LOW BACK PAIN WITH BILATERAL SCIATICA: ICD-10-CM

## 2020-06-17 DIAGNOSIS — M54.41 CHRONIC BILATERAL LOW BACK PAIN WITH BILATERAL SCIATICA: ICD-10-CM

## 2020-06-18 ENCOUNTER — COMMUNICATION - HEALTHEAST (OUTPATIENT)
Dept: PHYSICAL MEDICINE AND REHAB | Facility: CLINIC | Age: 75
End: 2020-06-18

## 2020-06-23 ENCOUNTER — HOSPITAL ENCOUNTER (OUTPATIENT)
Dept: PHYSICAL MEDICINE AND REHAB | Facility: CLINIC | Age: 75
Discharge: HOME | End: 2020-06-23
Attending: PHYSICIAN ASSISTANT

## 2020-06-23 ENCOUNTER — OFFICE VISIT - HEALTHEAST (OUTPATIENT)
Dept: PHYSICAL THERAPY | Facility: REHABILITATION | Age: 75
End: 2020-06-23

## 2020-06-23 DIAGNOSIS — M54.16 LUMBAR RADICULITIS: ICD-10-CM

## 2020-06-23 DIAGNOSIS — M71.38 SYNOVIAL CYST OF LUMBAR FACET JOINT: ICD-10-CM

## 2020-06-23 DIAGNOSIS — R60.0 LOCALIZED EDEMA: ICD-10-CM

## 2020-06-23 DIAGNOSIS — M54.42 CHRONIC BILATERAL LOW BACK PAIN WITH BILATERAL SCIATICA: ICD-10-CM

## 2020-06-23 DIAGNOSIS — M54.17 LUMBOSACRAL RADICULITIS: ICD-10-CM

## 2020-06-23 DIAGNOSIS — T88.7XXA MEDICATION SIDE EFFECTS: ICD-10-CM

## 2020-06-23 DIAGNOSIS — G89.29 CHRONIC BILATERAL LOW BACK PAIN WITH BILATERAL SCIATICA: ICD-10-CM

## 2020-06-23 DIAGNOSIS — M79.89 SWELLING OF LIMB: ICD-10-CM

## 2020-06-23 DIAGNOSIS — M54.41 CHRONIC BILATERAL LOW BACK PAIN WITH BILATERAL SCIATICA: ICD-10-CM

## 2020-07-06 ENCOUNTER — COMMUNICATION - HEALTHEAST (OUTPATIENT)
Dept: FAMILY MEDICINE | Facility: CLINIC | Age: 75
End: 2020-07-06

## 2020-07-06 DIAGNOSIS — F41.1 ANXIETY STATE: ICD-10-CM

## 2020-07-09 ENCOUNTER — OFFICE VISIT - HEALTHEAST (OUTPATIENT)
Dept: PHYSICAL THERAPY | Facility: REHABILITATION | Age: 75
End: 2020-07-09

## 2020-07-09 DIAGNOSIS — G89.29 CHRONIC BILATERAL LOW BACK PAIN WITH BILATERAL SCIATICA: ICD-10-CM

## 2020-07-09 DIAGNOSIS — M54.41 CHRONIC BILATERAL LOW BACK PAIN WITH BILATERAL SCIATICA: ICD-10-CM

## 2020-07-09 DIAGNOSIS — R60.0 LOCALIZED EDEMA: ICD-10-CM

## 2020-07-09 DIAGNOSIS — M54.42 CHRONIC BILATERAL LOW BACK PAIN WITH BILATERAL SCIATICA: ICD-10-CM

## 2020-07-09 DIAGNOSIS — M54.17 LUMBOSACRAL RADICULITIS: ICD-10-CM

## 2020-07-09 DIAGNOSIS — M79.89 SWELLING OF LIMB: ICD-10-CM

## 2020-07-15 ENCOUNTER — OFFICE VISIT - HEALTHEAST (OUTPATIENT)
Dept: PHYSICAL THERAPY | Facility: REHABILITATION | Age: 75
End: 2020-07-15

## 2020-07-15 DIAGNOSIS — M54.17 LUMBOSACRAL RADICULITIS: ICD-10-CM

## 2020-07-15 DIAGNOSIS — G89.29 CHRONIC BILATERAL LOW BACK PAIN WITH BILATERAL SCIATICA: ICD-10-CM

## 2020-07-15 DIAGNOSIS — M54.41 CHRONIC BILATERAL LOW BACK PAIN WITH BILATERAL SCIATICA: ICD-10-CM

## 2020-07-15 DIAGNOSIS — M81.0 OSTEOPOROSIS, SENILE: ICD-10-CM

## 2020-07-15 DIAGNOSIS — M54.42 CHRONIC BILATERAL LOW BACK PAIN WITH BILATERAL SCIATICA: ICD-10-CM

## 2020-07-15 DIAGNOSIS — M79.89 SWELLING OF LIMB: ICD-10-CM

## 2020-07-22 ENCOUNTER — OFFICE VISIT - HEALTHEAST (OUTPATIENT)
Dept: PHYSICAL THERAPY | Facility: REHABILITATION | Age: 75
End: 2020-07-22

## 2020-07-22 ENCOUNTER — HOSPITAL ENCOUNTER (OUTPATIENT)
Dept: PHYSICAL MEDICINE AND REHAB | Facility: CLINIC | Age: 75
Discharge: HOME | End: 2020-07-22
Attending: PHYSICIAN ASSISTANT

## 2020-07-22 DIAGNOSIS — M54.16 LUMBAR RADICULITIS: ICD-10-CM

## 2020-07-22 DIAGNOSIS — M54.42 CHRONIC BILATERAL LOW BACK PAIN WITH BILATERAL SCIATICA: ICD-10-CM

## 2020-07-22 DIAGNOSIS — M71.38 SYNOVIAL CYST OF LUMBAR FACET JOINT: ICD-10-CM

## 2020-07-22 DIAGNOSIS — M79.89 SWELLING OF LIMB: ICD-10-CM

## 2020-07-22 DIAGNOSIS — G89.29 CHRONIC BILATERAL LOW BACK PAIN WITH BILATERAL SCIATICA: ICD-10-CM

## 2020-07-22 DIAGNOSIS — M54.41 CHRONIC BILATERAL LOW BACK PAIN WITH BILATERAL SCIATICA: ICD-10-CM

## 2020-07-22 DIAGNOSIS — M54.17 LUMBOSACRAL RADICULITIS: ICD-10-CM

## 2020-07-22 RX ORDER — ACETAMINOPHEN 500 MG
1000 TABLET ORAL EVERY 6 HOURS PRN
Status: SHIPPED | COMMUNITY
Start: 2020-07-22 | End: 2023-05-09

## 2020-07-29 ENCOUNTER — OFFICE VISIT - HEALTHEAST (OUTPATIENT)
Dept: PHYSICAL THERAPY | Facility: REHABILITATION | Age: 75
End: 2020-07-29

## 2020-07-29 DIAGNOSIS — R60.0 LOCALIZED EDEMA: ICD-10-CM

## 2020-07-29 DIAGNOSIS — G89.29 CHRONIC BILATERAL LOW BACK PAIN WITH BILATERAL SCIATICA: ICD-10-CM

## 2020-07-29 DIAGNOSIS — M54.42 CHRONIC BILATERAL LOW BACK PAIN WITH BILATERAL SCIATICA: ICD-10-CM

## 2020-07-29 DIAGNOSIS — M54.41 CHRONIC BILATERAL LOW BACK PAIN WITH BILATERAL SCIATICA: ICD-10-CM

## 2020-07-29 DIAGNOSIS — M54.17 LUMBOSACRAL RADICULITIS: ICD-10-CM

## 2020-07-29 DIAGNOSIS — M81.0 OSTEOPOROSIS, SENILE: ICD-10-CM

## 2020-08-05 ENCOUNTER — OFFICE VISIT - HEALTHEAST (OUTPATIENT)
Dept: PHYSICAL THERAPY | Facility: REHABILITATION | Age: 75
End: 2020-08-05

## 2020-08-05 DIAGNOSIS — G89.29 CHRONIC BILATERAL LOW BACK PAIN WITH BILATERAL SCIATICA: ICD-10-CM

## 2020-08-05 DIAGNOSIS — M79.89 SWELLING OF LIMB: ICD-10-CM

## 2020-08-05 DIAGNOSIS — M54.17 LUMBOSACRAL RADICULITIS: ICD-10-CM

## 2020-08-05 DIAGNOSIS — R60.0 LOCALIZED EDEMA: ICD-10-CM

## 2020-08-05 DIAGNOSIS — M54.42 CHRONIC BILATERAL LOW BACK PAIN WITH BILATERAL SCIATICA: ICD-10-CM

## 2020-08-05 DIAGNOSIS — M54.41 CHRONIC BILATERAL LOW BACK PAIN WITH BILATERAL SCIATICA: ICD-10-CM

## 2020-08-10 ENCOUNTER — OFFICE VISIT - HEALTHEAST (OUTPATIENT)
Dept: PHYSICAL THERAPY | Facility: REHABILITATION | Age: 75
End: 2020-08-10

## 2020-08-10 DIAGNOSIS — G89.29 CHRONIC BILATERAL LOW BACK PAIN WITH BILATERAL SCIATICA: ICD-10-CM

## 2020-08-10 DIAGNOSIS — M54.42 CHRONIC BILATERAL LOW BACK PAIN WITH BILATERAL SCIATICA: ICD-10-CM

## 2020-08-10 DIAGNOSIS — R60.0 LOCALIZED EDEMA: ICD-10-CM

## 2020-08-10 DIAGNOSIS — M54.17 LUMBOSACRAL RADICULITIS: ICD-10-CM

## 2020-08-10 DIAGNOSIS — M54.41 CHRONIC BILATERAL LOW BACK PAIN WITH BILATERAL SCIATICA: ICD-10-CM

## 2020-08-17 ENCOUNTER — OFFICE VISIT - HEALTHEAST (OUTPATIENT)
Dept: PHYSICAL THERAPY | Facility: REHABILITATION | Age: 75
End: 2020-08-17

## 2020-08-17 DIAGNOSIS — G60.9 HEREDITARY AND IDIOPATHIC PERIPHERAL NEUROPATHY: ICD-10-CM

## 2020-08-17 DIAGNOSIS — M54.42 CHRONIC BILATERAL LOW BACK PAIN WITH BILATERAL SCIATICA: ICD-10-CM

## 2020-08-17 DIAGNOSIS — M54.17 LUMBOSACRAL RADICULITIS: ICD-10-CM

## 2020-08-17 DIAGNOSIS — R60.0 LOCALIZED EDEMA: ICD-10-CM

## 2020-08-17 DIAGNOSIS — M54.41 CHRONIC BILATERAL LOW BACK PAIN WITH BILATERAL SCIATICA: ICD-10-CM

## 2020-08-17 DIAGNOSIS — G89.29 CHRONIC BILATERAL LOW BACK PAIN WITH BILATERAL SCIATICA: ICD-10-CM

## 2020-08-24 ENCOUNTER — OFFICE VISIT - HEALTHEAST (OUTPATIENT)
Dept: PHYSICAL THERAPY | Facility: REHABILITATION | Age: 75
End: 2020-08-24

## 2020-08-24 DIAGNOSIS — M79.89 SWELLING OF LIMB: ICD-10-CM

## 2020-08-24 DIAGNOSIS — G89.29 CHRONIC BILATERAL LOW BACK PAIN WITH BILATERAL SCIATICA: ICD-10-CM

## 2020-08-24 DIAGNOSIS — M54.41 CHRONIC BILATERAL LOW BACK PAIN WITH BILATERAL SCIATICA: ICD-10-CM

## 2020-08-24 DIAGNOSIS — M54.17 LUMBOSACRAL RADICULITIS: ICD-10-CM

## 2020-08-24 DIAGNOSIS — G60.9 HEREDITARY AND IDIOPATHIC PERIPHERAL NEUROPATHY: ICD-10-CM

## 2020-08-24 DIAGNOSIS — R60.0 LOCALIZED EDEMA: ICD-10-CM

## 2020-08-24 DIAGNOSIS — M54.42 CHRONIC BILATERAL LOW BACK PAIN WITH BILATERAL SCIATICA: ICD-10-CM

## 2020-08-26 ENCOUNTER — HOSPITAL ENCOUNTER (OUTPATIENT)
Dept: PHYSICAL MEDICINE AND REHAB | Facility: CLINIC | Age: 75
Discharge: HOME | End: 2020-08-26
Attending: PHYSICIAN ASSISTANT

## 2020-08-26 DIAGNOSIS — M48.061 SPINAL STENOSIS OF LUMBAR REGION, UNSPECIFIED WHETHER NEUROGENIC CLAUDICATION PRESENT: ICD-10-CM

## 2020-08-26 DIAGNOSIS — M71.38 SYNOVIAL CYST OF LUMBAR FACET JOINT: ICD-10-CM

## 2020-08-26 ASSESSMENT — MIFFLIN-ST. JEOR: SCORE: 1242.75

## 2020-08-27 ENCOUNTER — COMMUNICATION - HEALTHEAST (OUTPATIENT)
Dept: FAMILY MEDICINE | Facility: CLINIC | Age: 75
End: 2020-08-27

## 2020-08-28 ENCOUNTER — HOSPITAL ENCOUNTER (OUTPATIENT)
Dept: PHYSICAL MEDICINE AND REHAB | Facility: CLINIC | Age: 75
Discharge: HOME | End: 2020-08-28
Attending: PHYSICIAN ASSISTANT

## 2020-08-28 DIAGNOSIS — M48.061 SPINAL STENOSIS OF LUMBAR REGION, UNSPECIFIED WHETHER NEUROGENIC CLAUDICATION PRESENT: ICD-10-CM

## 2020-08-31 ENCOUNTER — OFFICE VISIT - HEALTHEAST (OUTPATIENT)
Dept: PHYSICAL THERAPY | Facility: REHABILITATION | Age: 75
End: 2020-08-31

## 2020-08-31 DIAGNOSIS — M54.42 CHRONIC BILATERAL LOW BACK PAIN WITH BILATERAL SCIATICA: ICD-10-CM

## 2020-08-31 DIAGNOSIS — M54.17 LUMBOSACRAL RADICULITIS: ICD-10-CM

## 2020-08-31 DIAGNOSIS — M79.89 SWELLING OF LIMB: ICD-10-CM

## 2020-08-31 DIAGNOSIS — G89.29 CHRONIC BILATERAL LOW BACK PAIN WITH BILATERAL SCIATICA: ICD-10-CM

## 2020-08-31 DIAGNOSIS — R60.0 LOCALIZED EDEMA: ICD-10-CM

## 2020-08-31 DIAGNOSIS — F41.1 ANXIETY STATE: ICD-10-CM

## 2020-08-31 DIAGNOSIS — M54.41 CHRONIC BILATERAL LOW BACK PAIN WITH BILATERAL SCIATICA: ICD-10-CM

## 2020-09-04 ENCOUNTER — OFFICE VISIT - HEALTHEAST (OUTPATIENT)
Dept: FAMILY MEDICINE | Facility: CLINIC | Age: 75
End: 2020-09-04

## 2020-09-04 DIAGNOSIS — G60.9 HEREDITARY AND IDIOPATHIC PERIPHERAL NEUROPATHY: ICD-10-CM

## 2020-09-04 DIAGNOSIS — M79.89 SWELLING OF LIMB: ICD-10-CM

## 2020-09-04 DIAGNOSIS — R63.5 WEIGHT GAIN: ICD-10-CM

## 2020-09-04 DIAGNOSIS — F41.1 ANXIETY STATE: ICD-10-CM

## 2020-09-04 DIAGNOSIS — M54.50 LOW BACK PAIN, UNSPECIFIED BACK PAIN LATERALITY, UNSPECIFIED CHRONICITY, UNSPECIFIED WHETHER SCIATICA PRESENT: ICD-10-CM

## 2020-09-04 DIAGNOSIS — M15.0 PRIMARY OSTEOARTHRITIS INVOLVING MULTIPLE JOINTS: ICD-10-CM

## 2020-09-04 DIAGNOSIS — K21.9 GASTROESOPHAGEAL REFLUX DISEASE WITHOUT ESOPHAGITIS: ICD-10-CM

## 2020-09-04 DIAGNOSIS — F33.0 MILD EPISODE OF RECURRENT MAJOR DEPRESSIVE DISORDER (H): ICD-10-CM

## 2020-09-04 LAB
ALBUMIN SERPL-MCNC: 4.2 G/DL (ref 3.5–5)
ALP SERPL-CCNC: 64 U/L (ref 45–120)
ALT SERPL W P-5'-P-CCNC: 20 U/L (ref 0–45)
ANION GAP SERPL CALCULATED.3IONS-SCNC: 12 MMOL/L (ref 5–18)
AST SERPL W P-5'-P-CCNC: 20 U/L (ref 0–40)
BILIRUB SERPL-MCNC: 1.2 MG/DL (ref 0–1)
BUN SERPL-MCNC: 15 MG/DL (ref 8–28)
CALCIUM SERPL-MCNC: 10 MG/DL (ref 8.5–10.5)
CHLORIDE BLD-SCNC: 100 MMOL/L (ref 98–107)
CO2 SERPL-SCNC: 27 MMOL/L (ref 22–31)
CREAT SERPL-MCNC: 0.74 MG/DL (ref 0.6–1.1)
GFR SERPL CREATININE-BSD FRML MDRD: >60 ML/MIN/1.73M2
GLUCOSE BLD-MCNC: 85 MG/DL (ref 70–125)
POTASSIUM BLD-SCNC: 4.7 MMOL/L (ref 3.5–5)
PROT SERPL-MCNC: 7.5 G/DL (ref 6–8)
SODIUM SERPL-SCNC: 139 MMOL/L (ref 136–145)
TSH SERPL DL<=0.005 MIU/L-ACNC: 1.52 UIU/ML (ref 0.3–5)

## 2020-09-04 ASSESSMENT — ANXIETY QUESTIONNAIRES
7. FEELING AFRAID AS IF SOMETHING AWFUL MIGHT HAPPEN: NOT AT ALL
6. BECOMING EASILY ANNOYED OR IRRITABLE: NOT AT ALL
2. NOT BEING ABLE TO STOP OR CONTROL WORRYING: NOT AT ALL
4. TROUBLE RELAXING: SEVERAL DAYS
IF YOU CHECKED OFF ANY PROBLEMS ON THIS QUESTIONNAIRE, HOW DIFFICULT HAVE THESE PROBLEMS MADE IT FOR YOU TO DO YOUR WORK, TAKE CARE OF THINGS AT HOME, OR GET ALONG WITH OTHER PEOPLE: NOT DIFFICULT AT ALL
5. BEING SO RESTLESS THAT IT IS HARD TO SIT STILL: SEVERAL DAYS
GAD7 TOTAL SCORE: 4
1. FEELING NERVOUS, ANXIOUS, OR ON EDGE: SEVERAL DAYS
3. WORRYING TOO MUCH ABOUT DIFFERENT THINGS: SEVERAL DAYS

## 2020-09-04 ASSESSMENT — PATIENT HEALTH QUESTIONNAIRE - PHQ9: SUM OF ALL RESPONSES TO PHQ QUESTIONS 1-9: 7

## 2020-09-11 ENCOUNTER — HOSPITAL ENCOUNTER (OUTPATIENT)
Dept: PHYSICAL MEDICINE AND REHAB | Facility: CLINIC | Age: 75
Discharge: HOME | End: 2020-09-11
Attending: PHYSICIAN ASSISTANT

## 2020-09-11 DIAGNOSIS — M48.061 SPINAL STENOSIS OF LUMBAR REGION, UNSPECIFIED WHETHER NEUROGENIC CLAUDICATION PRESENT: ICD-10-CM

## 2020-09-11 DIAGNOSIS — M71.38 SYNOVIAL CYST OF LUMBAR FACET JOINT: ICD-10-CM

## 2020-09-14 ENCOUNTER — OFFICE VISIT - HEALTHEAST (OUTPATIENT)
Dept: PHYSICAL THERAPY | Facility: REHABILITATION | Age: 75
End: 2020-09-14

## 2020-09-14 DIAGNOSIS — G89.29 CHRONIC BILATERAL LOW BACK PAIN WITH BILATERAL SCIATICA: ICD-10-CM

## 2020-09-14 DIAGNOSIS — M54.41 CHRONIC BILATERAL LOW BACK PAIN WITH BILATERAL SCIATICA: ICD-10-CM

## 2020-09-14 DIAGNOSIS — R60.0 LOCALIZED EDEMA: ICD-10-CM

## 2020-09-14 DIAGNOSIS — M54.17 LUMBOSACRAL RADICULITIS: ICD-10-CM

## 2020-09-14 DIAGNOSIS — M79.89 SWELLING OF LIMB: ICD-10-CM

## 2020-09-14 DIAGNOSIS — M15.0 PRIMARY OSTEOARTHRITIS INVOLVING MULTIPLE JOINTS: ICD-10-CM

## 2020-09-14 DIAGNOSIS — M54.42 CHRONIC BILATERAL LOW BACK PAIN WITH BILATERAL SCIATICA: ICD-10-CM

## 2020-09-14 DIAGNOSIS — M19.071 ARTHRITIS OF FIRST METATARSOPHALANGEAL (MTP) JOINT OF RIGHT FOOT: ICD-10-CM

## 2020-09-21 ENCOUNTER — AMBULATORY - HEALTHEAST (OUTPATIENT)
Dept: NURSING | Facility: CLINIC | Age: 75
End: 2020-09-21

## 2020-09-21 ENCOUNTER — RECORDS - HEALTHEAST (OUTPATIENT)
Dept: BONE DENSITY | Facility: CLINIC | Age: 75
End: 2020-09-21

## 2020-09-21 ENCOUNTER — RECORDS - HEALTHEAST (OUTPATIENT)
Dept: ADMINISTRATIVE | Facility: OTHER | Age: 75
End: 2020-09-21

## 2020-09-21 DIAGNOSIS — M81.0 AGE-RELATED OSTEOPOROSIS WITHOUT CURRENT PATHOLOGICAL FRACTURE: ICD-10-CM

## 2020-09-23 ENCOUNTER — OFFICE VISIT - HEALTHEAST (OUTPATIENT)
Dept: PHYSICAL THERAPY | Facility: REHABILITATION | Age: 75
End: 2020-09-23

## 2020-09-23 DIAGNOSIS — M15.0 PRIMARY OSTEOARTHRITIS INVOLVING MULTIPLE JOINTS: ICD-10-CM

## 2020-09-23 DIAGNOSIS — R60.0 LOCALIZED EDEMA: ICD-10-CM

## 2020-09-23 DIAGNOSIS — G89.29 CHRONIC BILATERAL LOW BACK PAIN WITH BILATERAL SCIATICA: ICD-10-CM

## 2020-09-23 DIAGNOSIS — M54.42 CHRONIC BILATERAL LOW BACK PAIN WITH BILATERAL SCIATICA: ICD-10-CM

## 2020-09-23 DIAGNOSIS — M54.41 CHRONIC BILATERAL LOW BACK PAIN WITH BILATERAL SCIATICA: ICD-10-CM

## 2020-09-23 DIAGNOSIS — M79.89 SWELLING OF LIMB: ICD-10-CM

## 2020-09-23 DIAGNOSIS — M54.17 LUMBOSACRAL RADICULITIS: ICD-10-CM

## 2020-09-26 ENCOUNTER — AMBULATORY - HEALTHEAST (OUTPATIENT)
Dept: NURSING | Facility: CLINIC | Age: 75
End: 2020-09-26

## 2020-09-28 ENCOUNTER — OFFICE VISIT - HEALTHEAST (OUTPATIENT)
Dept: PHYSICAL THERAPY | Facility: REHABILITATION | Age: 75
End: 2020-09-28

## 2020-09-28 DIAGNOSIS — M79.89 SWELLING OF LIMB: ICD-10-CM

## 2020-09-28 DIAGNOSIS — M54.17 LUMBOSACRAL RADICULITIS: ICD-10-CM

## 2020-09-28 DIAGNOSIS — M54.41 CHRONIC BILATERAL LOW BACK PAIN WITH BILATERAL SCIATICA: ICD-10-CM

## 2020-09-28 DIAGNOSIS — M54.42 CHRONIC BILATERAL LOW BACK PAIN WITH BILATERAL SCIATICA: ICD-10-CM

## 2020-09-28 DIAGNOSIS — G89.29 CHRONIC BILATERAL LOW BACK PAIN WITH BILATERAL SCIATICA: ICD-10-CM

## 2020-10-05 ENCOUNTER — OFFICE VISIT - HEALTHEAST (OUTPATIENT)
Dept: PHYSICAL THERAPY | Facility: REHABILITATION | Age: 75
End: 2020-10-05

## 2020-10-05 DIAGNOSIS — G60.9 HEREDITARY AND IDIOPATHIC PERIPHERAL NEUROPATHY: ICD-10-CM

## 2020-10-05 DIAGNOSIS — M79.89 SWELLING OF LIMB: ICD-10-CM

## 2020-10-05 DIAGNOSIS — M15.0 PRIMARY OSTEOARTHRITIS INVOLVING MULTIPLE JOINTS: ICD-10-CM

## 2020-10-12 ENCOUNTER — COMMUNICATION - HEALTHEAST (OUTPATIENT)
Dept: PHYSICAL MEDICINE AND REHAB | Facility: CLINIC | Age: 75
End: 2020-10-12

## 2020-10-13 ENCOUNTER — COMMUNICATION - HEALTHEAST (OUTPATIENT)
Dept: FAMILY MEDICINE | Facility: CLINIC | Age: 75
End: 2020-10-13

## 2020-10-19 ENCOUNTER — OFFICE VISIT - HEALTHEAST (OUTPATIENT)
Dept: FAMILY MEDICINE | Facility: CLINIC | Age: 75
End: 2020-10-19

## 2020-10-19 ENCOUNTER — OFFICE VISIT - HEALTHEAST (OUTPATIENT)
Dept: PHYSICAL THERAPY | Facility: REHABILITATION | Age: 75
End: 2020-10-19

## 2020-10-19 DIAGNOSIS — F41.1 ANXIETY STATE: ICD-10-CM

## 2020-10-19 DIAGNOSIS — G89.29 CHRONIC BILATERAL LOW BACK PAIN WITH SCIATICA, SCIATICA LATERALITY UNSPECIFIED: ICD-10-CM

## 2020-10-19 DIAGNOSIS — M54.41 CHRONIC BILATERAL LOW BACK PAIN WITH BILATERAL SCIATICA: ICD-10-CM

## 2020-10-19 DIAGNOSIS — G89.29 CHRONIC BILATERAL LOW BACK PAIN WITH BILATERAL SCIATICA: ICD-10-CM

## 2020-10-19 DIAGNOSIS — M54.17 LUMBOSACRAL RADICULITIS: ICD-10-CM

## 2020-10-19 DIAGNOSIS — M54.40 CHRONIC BILATERAL LOW BACK PAIN WITH SCIATICA, SCIATICA LATERALITY UNSPECIFIED: ICD-10-CM

## 2020-10-19 DIAGNOSIS — R29.6 RECURRENT FALLS: ICD-10-CM

## 2020-10-19 DIAGNOSIS — M54.42 CHRONIC BILATERAL LOW BACK PAIN WITH BILATERAL SCIATICA: ICD-10-CM

## 2020-10-19 ASSESSMENT — ANXIETY QUESTIONNAIRES
6. BECOMING EASILY ANNOYED OR IRRITABLE: SEVERAL DAYS
GAD7 TOTAL SCORE: 3
3. WORRYING TOO MUCH ABOUT DIFFERENT THINGS: NOT AT ALL
5. BEING SO RESTLESS THAT IT IS HARD TO SIT STILL: NOT AT ALL
2. NOT BEING ABLE TO STOP OR CONTROL WORRYING: NOT AT ALL
IF YOU CHECKED OFF ANY PROBLEMS ON THIS QUESTIONNAIRE, HOW DIFFICULT HAVE THESE PROBLEMS MADE IT FOR YOU TO DO YOUR WORK, TAKE CARE OF THINGS AT HOME, OR GET ALONG WITH OTHER PEOPLE: SOMEWHAT DIFFICULT
1. FEELING NERVOUS, ANXIOUS, OR ON EDGE: SEVERAL DAYS
7. FEELING AFRAID AS IF SOMETHING AWFUL MIGHT HAPPEN: SEVERAL DAYS
4. TROUBLE RELAXING: NOT AT ALL

## 2020-10-19 ASSESSMENT — PATIENT HEALTH QUESTIONNAIRE - PHQ9: SUM OF ALL RESPONSES TO PHQ QUESTIONS 1-9: 8

## 2020-10-23 ENCOUNTER — COMMUNICATION - HEALTHEAST (OUTPATIENT)
Dept: FAMILY MEDICINE | Facility: CLINIC | Age: 75
End: 2020-10-23

## 2020-10-23 DIAGNOSIS — F41.1 ANXIETY STATE: ICD-10-CM

## 2020-10-23 DIAGNOSIS — F33.9 MAJOR DEPRESSIVE DISORDER, RECURRENT EPISODE (H): ICD-10-CM

## 2020-11-04 ENCOUNTER — OFFICE VISIT - HEALTHEAST (OUTPATIENT)
Dept: PHYSICAL THERAPY | Facility: REHABILITATION | Age: 75
End: 2020-11-04

## 2020-11-04 DIAGNOSIS — M15.0 PRIMARY OSTEOARTHRITIS INVOLVING MULTIPLE JOINTS: ICD-10-CM

## 2020-11-04 DIAGNOSIS — M54.17 LUMBOSACRAL RADICULITIS: ICD-10-CM

## 2020-11-04 DIAGNOSIS — M54.41 CHRONIC BILATERAL LOW BACK PAIN WITH BILATERAL SCIATICA: ICD-10-CM

## 2020-11-04 DIAGNOSIS — G89.29 CHRONIC BILATERAL LOW BACK PAIN WITH BILATERAL SCIATICA: ICD-10-CM

## 2020-11-04 DIAGNOSIS — M54.42 CHRONIC BILATERAL LOW BACK PAIN WITH BILATERAL SCIATICA: ICD-10-CM

## 2020-11-05 ENCOUNTER — COMMUNICATION - HEALTHEAST (OUTPATIENT)
Dept: PHYSICAL MEDICINE AND REHAB | Facility: CLINIC | Age: 75
End: 2020-11-05

## 2020-11-06 ENCOUNTER — HOSPITAL ENCOUNTER (OUTPATIENT)
Dept: PHYSICAL MEDICINE AND REHAB | Facility: CLINIC | Age: 75
Discharge: HOME | End: 2020-11-06
Attending: PHYSICIAN ASSISTANT

## 2020-11-06 DIAGNOSIS — M48.061 SPINAL STENOSIS OF LUMBAR REGION, UNSPECIFIED WHETHER NEUROGENIC CLAUDICATION PRESENT: ICD-10-CM

## 2020-11-06 ASSESSMENT — MIFFLIN-ST. JEOR: SCORE: 1283.57

## 2020-11-09 ENCOUNTER — AMBULATORY - HEALTHEAST (OUTPATIENT)
Dept: NEUROSURGERY | Facility: CLINIC | Age: 75
End: 2020-11-09

## 2020-11-09 ENCOUNTER — COMMUNICATION - HEALTHEAST (OUTPATIENT)
Dept: PHYSICAL MEDICINE AND REHAB | Facility: CLINIC | Age: 75
End: 2020-11-09

## 2020-11-09 DIAGNOSIS — M54.9 BACK PAIN: ICD-10-CM

## 2020-11-09 DIAGNOSIS — M54.16 LUMBAR RADICULOPATHY: ICD-10-CM

## 2020-11-12 ENCOUNTER — OFFICE VISIT - HEALTHEAST (OUTPATIENT)
Dept: PHYSICAL THERAPY | Facility: REHABILITATION | Age: 75
End: 2020-11-12

## 2020-11-12 ENCOUNTER — COMMUNICATION - HEALTHEAST (OUTPATIENT)
Dept: FAMILY MEDICINE | Facility: CLINIC | Age: 75
End: 2020-11-12

## 2020-11-12 DIAGNOSIS — G89.29 CHRONIC BILATERAL LOW BACK PAIN WITH BILATERAL SCIATICA: ICD-10-CM

## 2020-11-12 DIAGNOSIS — M15.0 PRIMARY OSTEOARTHRITIS INVOLVING MULTIPLE JOINTS: ICD-10-CM

## 2020-11-12 DIAGNOSIS — R60.0 LOCALIZED EDEMA: ICD-10-CM

## 2020-11-12 DIAGNOSIS — M79.89 LIMB SWELLING: ICD-10-CM

## 2020-11-12 DIAGNOSIS — M54.17 LUMBOSACRAL RADICULITIS: ICD-10-CM

## 2020-11-12 DIAGNOSIS — M54.41 CHRONIC BILATERAL LOW BACK PAIN WITH BILATERAL SCIATICA: ICD-10-CM

## 2020-11-12 DIAGNOSIS — M54.42 CHRONIC BILATERAL LOW BACK PAIN WITH BILATERAL SCIATICA: ICD-10-CM

## 2020-11-16 RX ORDER — FUROSEMIDE 20 MG
TABLET ORAL
Qty: 90 TABLET | Refills: 2 | Status: SHIPPED | OUTPATIENT
Start: 2020-11-16 | End: 2021-08-03

## 2020-11-17 ENCOUNTER — RECORDS - HEALTHEAST (OUTPATIENT)
Dept: GENERAL RADIOLOGY | Facility: CLINIC | Age: 75
End: 2020-11-17

## 2020-11-17 ENCOUNTER — OFFICE VISIT - HEALTHEAST (OUTPATIENT)
Dept: NEUROSURGERY | Facility: CLINIC | Age: 75
End: 2020-11-17

## 2020-11-17 DIAGNOSIS — M54.9 DORSALGIA, UNSPECIFIED: ICD-10-CM

## 2020-11-17 DIAGNOSIS — M71.30 SYNOVIAL CYST: ICD-10-CM

## 2020-11-17 DIAGNOSIS — M54.16 LUMBAR RADICULOPATHY: ICD-10-CM

## 2020-11-17 ASSESSMENT — MIFFLIN-ST. JEOR: SCORE: 1251.82

## 2020-11-18 ENCOUNTER — OFFICE VISIT - HEALTHEAST (OUTPATIENT)
Dept: PHYSICAL THERAPY | Facility: REHABILITATION | Age: 75
End: 2020-11-18

## 2020-11-18 DIAGNOSIS — M54.42 CHRONIC BILATERAL LOW BACK PAIN WITH BILATERAL SCIATICA: ICD-10-CM

## 2020-11-18 DIAGNOSIS — M54.41 CHRONIC BILATERAL LOW BACK PAIN WITH BILATERAL SCIATICA: ICD-10-CM

## 2020-11-18 DIAGNOSIS — R60.0 LOCALIZED EDEMA: ICD-10-CM

## 2020-11-18 DIAGNOSIS — G89.29 CHRONIC BILATERAL LOW BACK PAIN WITH BILATERAL SCIATICA: ICD-10-CM

## 2020-11-18 DIAGNOSIS — M15.0 PRIMARY OSTEOARTHRITIS INVOLVING MULTIPLE JOINTS: ICD-10-CM

## 2020-11-18 DIAGNOSIS — M54.17 LUMBOSACRAL RADICULITIS: ICD-10-CM

## 2020-11-18 DIAGNOSIS — M79.89 SWELLING OF LIMB: ICD-10-CM

## 2020-11-20 ENCOUNTER — HOSPITAL ENCOUNTER (OUTPATIENT)
Dept: PHYSICAL MEDICINE AND REHAB | Facility: CLINIC | Age: 75
Discharge: HOME | End: 2020-11-20
Attending: PHYSICIAN ASSISTANT

## 2020-11-20 DIAGNOSIS — M54.16 LUMBAR RADICULOPATHY: ICD-10-CM

## 2020-12-04 ENCOUNTER — HOSPITAL ENCOUNTER (OUTPATIENT)
Dept: PHYSICAL MEDICINE AND REHAB | Facility: CLINIC | Age: 75
Discharge: HOME | End: 2020-12-04
Attending: PHYSICIAN ASSISTANT

## 2020-12-04 DIAGNOSIS — M48.061 SPINAL STENOSIS OF LUMBAR REGION, UNSPECIFIED WHETHER NEUROGENIC CLAUDICATION PRESENT: ICD-10-CM

## 2020-12-04 DIAGNOSIS — M71.38 SYNOVIAL CYST OF LUMBAR FACET JOINT: ICD-10-CM

## 2020-12-14 ENCOUNTER — SURGERY - HEALTHEAST (OUTPATIENT)
Dept: NEUROSURGERY | Facility: CLINIC | Age: 75
End: 2020-12-14

## 2020-12-14 ENCOUNTER — RECORDS - HEALTHEAST (OUTPATIENT)
Dept: ADMINISTRATIVE | Facility: OTHER | Age: 75
End: 2020-12-14

## 2020-12-14 DIAGNOSIS — M51.26 LUMBAR DISC HERNIATION: ICD-10-CM

## 2020-12-14 DIAGNOSIS — M48.062 SPINAL STENOSIS OF LUMBAR REGION WITH NEUROGENIC CLAUDICATION: ICD-10-CM

## 2020-12-14 DIAGNOSIS — M71.38 SYNOVIAL CYST OF LUMBAR FACET JOINT: ICD-10-CM

## 2020-12-23 ENCOUNTER — RECORDS - HEALTHEAST (OUTPATIENT)
Dept: ADMINISTRATIVE | Facility: OTHER | Age: 75
End: 2020-12-23

## 2020-12-24 ENCOUNTER — AMBULATORY - HEALTHEAST (OUTPATIENT)
Dept: SURGERY | Facility: HOSPITAL | Age: 75
End: 2020-12-24

## 2020-12-24 DIAGNOSIS — Z11.59 ENCOUNTER FOR SCREENING FOR OTHER VIRAL DISEASES: ICD-10-CM

## 2020-12-30 ENCOUNTER — AMBULATORY - HEALTHEAST (OUTPATIENT)
Dept: NEUROSURGERY | Facility: CLINIC | Age: 75
End: 2020-12-30

## 2020-12-30 DIAGNOSIS — Z01.818 PRE-OP EXAM: ICD-10-CM

## 2021-01-06 ENCOUNTER — COMMUNICATION - HEALTHEAST (OUTPATIENT)
Dept: NEUROSURGERY | Facility: CLINIC | Age: 76
End: 2021-01-06

## 2021-01-11 ENCOUNTER — OFFICE VISIT - HEALTHEAST (OUTPATIENT)
Dept: FAMILY MEDICINE | Facility: CLINIC | Age: 76
End: 2021-01-11

## 2021-01-11 DIAGNOSIS — R10.9 STOMACH CRAMPS: ICD-10-CM

## 2021-01-11 DIAGNOSIS — M81.0 OSTEOPOROSIS, SENILE: ICD-10-CM

## 2021-01-11 DIAGNOSIS — Z85.3 PERSONAL HISTORY OF MALIGNANT NEOPLASM OF BREAST: ICD-10-CM

## 2021-01-11 DIAGNOSIS — Z01.818 PREOPERATIVE EXAMINATION: ICD-10-CM

## 2021-01-11 DIAGNOSIS — M79.89 SWELLING OF LIMB: ICD-10-CM

## 2021-01-11 DIAGNOSIS — M71.38 SYNOVIAL CYST OF LUMBAR FACET JOINT: ICD-10-CM

## 2021-01-11 DIAGNOSIS — F41.1 ANXIETY STATE: ICD-10-CM

## 2021-01-11 DIAGNOSIS — R82.90 ABNORMAL URINALYSIS: ICD-10-CM

## 2021-01-11 DIAGNOSIS — M48.062 SPINAL STENOSIS OF LUMBAR REGION WITH NEUROGENIC CLAUDICATION: ICD-10-CM

## 2021-01-11 DIAGNOSIS — E83.42 HYPOMAGNESEMIA: ICD-10-CM

## 2021-01-11 DIAGNOSIS — F33.9 MAJOR DEPRESSIVE DISORDER, RECURRENT EPISODE (H): ICD-10-CM

## 2021-01-11 DIAGNOSIS — M51.26 LUMBAR DISC HERNIATION: ICD-10-CM

## 2021-01-11 DIAGNOSIS — K21.9 GASTROESOPHAGEAL REFLUX DISEASE WITHOUT ESOPHAGITIS: ICD-10-CM

## 2021-01-11 LAB
ALBUMIN UR-MCNC: NEGATIVE MG/DL
ANION GAP SERPL CALCULATED.3IONS-SCNC: 12 MMOL/L (ref 5–18)
APPEARANCE UR: CLEAR
APTT PPP: 26 SECONDS (ref 24–37)
ATRIAL RATE - MUSE: 87 BPM
BILIRUB UR QL STRIP: NEGATIVE
BUN SERPL-MCNC: 9 MG/DL (ref 8–28)
CALCIUM SERPL-MCNC: 9.7 MG/DL (ref 8.5–10.5)
CHLORIDE BLD-SCNC: 101 MMOL/L (ref 98–107)
CLOSURE TME COLL+EPINEP BLD: 121 SEC (ref 1–180)
CO2 SERPL-SCNC: 27 MMOL/L (ref 22–31)
COLOR UR AUTO: YELLOW
CREAT SERPL-MCNC: 0.69 MG/DL (ref 0.6–1.1)
DIASTOLIC BLOOD PRESSURE - MUSE: NORMAL
ERYTHROCYTE [DISTWIDTH] IN BLOOD BY AUTOMATED COUNT: 12 % (ref 11–14.5)
GFR SERPL CREATININE-BSD FRML MDRD: >60 ML/MIN/1.73M2
GLUCOSE BLD-MCNC: 94 MG/DL (ref 70–125)
GLUCOSE UR STRIP-MCNC: NEGATIVE MG/DL
HCT VFR BLD AUTO: 43.8 % (ref 35–47)
HGB BLD-MCNC: 14.4 G/DL (ref 12–16)
HGB UR QL STRIP: NEGATIVE
INR PPP: 0.91 (ref 0.9–1.1)
INTERPRETATION ECG - MUSE: NORMAL
KETONES UR STRIP-MCNC: NEGATIVE MG/DL
LEUKOCYTE ESTERASE UR QL STRIP: ABNORMAL
MAGNESIUM SERPL-MCNC: 2.1 MG/DL (ref 1.8–2.6)
MCH RBC QN AUTO: 31.3 PG (ref 27–34)
MCHC RBC AUTO-ENTMCNC: 32.9 G/DL (ref 32–36)
MCV RBC AUTO: 95 FL (ref 80–100)
NITRATE UR QL: NEGATIVE
P AXIS - MUSE: 1 DEGREES
PH UR STRIP: 7 [PH] (ref 5–8)
PLATELET # BLD AUTO: 294 THOU/UL (ref 140–440)
PMV BLD AUTO: 7.7 FL (ref 7–10)
POTASSIUM BLD-SCNC: 5 MMOL/L (ref 3.5–5)
PR INTERVAL - MUSE: 156 MS
QRS DURATION - MUSE: 76 MS
QT - MUSE: 382 MS
QTC - MUSE: 459 MS
R AXIS - MUSE: 16 DEGREES
RBC # BLD AUTO: 4.6 MILL/UL (ref 3.8–5.4)
SODIUM SERPL-SCNC: 140 MMOL/L (ref 136–145)
SP GR UR STRIP: 1.01 (ref 1–1.03)
SYSTOLIC BLOOD PRESSURE - MUSE: NORMAL
T AXIS - MUSE: 44 DEGREES
UROBILINOGEN UR STRIP-ACNC: ABNORMAL
VENTRICULAR RATE- MUSE: 87 BPM
WBC: 6.1 THOU/UL (ref 4–11)

## 2021-01-11 RX ORDER — DICYCLOMINE HYDROCHLORIDE 10 MG/1
CAPSULE ORAL
Qty: 30 CAPSULE | Refills: 1 | Status: SHIPPED | OUTPATIENT
Start: 2021-01-11 | End: 2021-08-04

## 2021-01-11 ASSESSMENT — ANXIETY QUESTIONNAIRES
2. NOT BEING ABLE TO STOP OR CONTROL WORRYING: SEVERAL DAYS
3. WORRYING TOO MUCH ABOUT DIFFERENT THINGS: SEVERAL DAYS
GAD7 TOTAL SCORE: 4
5. BEING SO RESTLESS THAT IT IS HARD TO SIT STILL: NOT AT ALL
1. FEELING NERVOUS, ANXIOUS, OR ON EDGE: SEVERAL DAYS
4. TROUBLE RELAXING: NOT AT ALL
6. BECOMING EASILY ANNOYED OR IRRITABLE: SEVERAL DAYS
IF YOU CHECKED OFF ANY PROBLEMS ON THIS QUESTIONNAIRE, HOW DIFFICULT HAVE THESE PROBLEMS MADE IT FOR YOU TO DO YOUR WORK, TAKE CARE OF THINGS AT HOME, OR GET ALONG WITH OTHER PEOPLE: NOT DIFFICULT AT ALL
7. FEELING AFRAID AS IF SOMETHING AWFUL MIGHT HAPPEN: NOT AT ALL

## 2021-01-11 ASSESSMENT — PATIENT HEALTH QUESTIONNAIRE - PHQ9: SUM OF ALL RESPONSES TO PHQ QUESTIONS 1-9: 9

## 2021-01-11 ASSESSMENT — MIFFLIN-ST. JEOR: SCORE: 1257.71

## 2021-01-12 LAB
25(OH)D3 SERPL-MCNC: 50.1 NG/ML (ref 30–80)
25(OH)D3 SERPL-MCNC: 50.1 NG/ML (ref 30–80)
BACTERIA SPEC CULT: NO GROWTH

## 2021-01-25 ENCOUNTER — AMBULATORY - HEALTHEAST (OUTPATIENT)
Dept: LAB | Facility: CLINIC | Age: 76
End: 2021-01-25

## 2021-01-25 ENCOUNTER — HOSPITAL ENCOUNTER (OUTPATIENT)
Dept: MAMMOGRAPHY | Facility: CLINIC | Age: 76
Discharge: HOME | End: 2021-01-25
Attending: FAMILY MEDICINE

## 2021-01-25 DIAGNOSIS — Z11.59 ENCOUNTER FOR SCREENING FOR OTHER VIRAL DISEASES: ICD-10-CM

## 2021-01-25 DIAGNOSIS — N63.0 BREAST LUMP: ICD-10-CM

## 2021-01-25 DIAGNOSIS — Z12.31 VISIT FOR SCREENING MAMMOGRAM: ICD-10-CM

## 2021-01-26 ENCOUNTER — COMMUNICATION - HEALTHEAST (OUTPATIENT)
Dept: SCHEDULING | Facility: CLINIC | Age: 76
End: 2021-01-26

## 2021-01-26 LAB
SARS-COV-2 PCR COMMENT: NORMAL
SARS-COV-2 RNA SPEC QL NAA+PROBE: NEGATIVE
SARS-COV-2 VIRUS SPECIMEN SOURCE: NORMAL

## 2021-01-28 ENCOUNTER — COMMUNICATION - HEALTHEAST (OUTPATIENT)
Dept: NEUROSURGERY | Facility: CLINIC | Age: 76
End: 2021-01-28

## 2021-01-28 ENCOUNTER — ANESTHESIA - HEALTHEAST (OUTPATIENT)
Dept: SURGERY | Facility: HOSPITAL | Age: 76
End: 2021-01-28

## 2021-01-29 ENCOUNTER — SURGERY - HEALTHEAST (OUTPATIENT)
Dept: SURGERY | Facility: HOSPITAL | Age: 76
End: 2021-01-29

## 2021-01-29 ASSESSMENT — MIFFLIN-ST. JEOR: SCORE: 1265.65

## 2021-01-30 ASSESSMENT — MIFFLIN-ST. JEOR: SCORE: 1262.02

## 2021-02-02 ENCOUNTER — COMMUNICATION - HEALTHEAST (OUTPATIENT)
Dept: NEUROSURGERY | Facility: CLINIC | Age: 76
End: 2021-02-02

## 2021-02-02 DIAGNOSIS — Z98.1 S/P LUMBAR FUSION: ICD-10-CM

## 2021-02-05 ENCOUNTER — COMMUNICATION - HEALTHEAST (OUTPATIENT)
Dept: NEUROSURGERY | Facility: CLINIC | Age: 76
End: 2021-02-05

## 2021-02-12 ENCOUNTER — AMBULATORY - HEALTHEAST (OUTPATIENT)
Dept: NEUROSURGERY | Facility: CLINIC | Age: 76
End: 2021-02-12

## 2021-02-12 ENCOUNTER — COMMUNICATION - HEALTHEAST (OUTPATIENT)
Dept: NEUROSURGERY | Facility: CLINIC | Age: 76
End: 2021-02-12

## 2021-02-23 ENCOUNTER — COMMUNICATION - HEALTHEAST (OUTPATIENT)
Dept: FAMILY MEDICINE | Facility: CLINIC | Age: 76
End: 2021-02-23

## 2021-02-23 DIAGNOSIS — N63.0 LUMP OR MASS IN BREAST: ICD-10-CM

## 2021-02-25 ENCOUNTER — COMMUNICATION - HEALTHEAST (OUTPATIENT)
Dept: FAMILY MEDICINE | Facility: CLINIC | Age: 76
End: 2021-02-25

## 2021-03-09 ENCOUNTER — HOSPITAL ENCOUNTER (OUTPATIENT)
Dept: SURGERY | Facility: CLINIC | Age: 76
Discharge: HOME | End: 2021-03-09
Attending: FAMILY MEDICINE

## 2021-03-09 DIAGNOSIS — Z85.3 PERSONAL HISTORY OF BREAST CANCER: ICD-10-CM

## 2021-03-09 DIAGNOSIS — N63.0 LUMP OR MASS IN BREAST: ICD-10-CM

## 2021-03-09 ASSESSMENT — MIFFLIN-ST. JEOR: SCORE: 1261.11

## 2021-03-16 ENCOUNTER — OFFICE VISIT - HEALTHEAST (OUTPATIENT)
Dept: NEUROSURGERY | Facility: CLINIC | Age: 76
End: 2021-03-16

## 2021-03-16 ENCOUNTER — RECORDS - HEALTHEAST (OUTPATIENT)
Dept: GENERAL RADIOLOGY | Facility: CLINIC | Age: 76
End: 2021-03-16

## 2021-03-16 DIAGNOSIS — Z98.1 ARTHRODESIS STATUS: ICD-10-CM

## 2021-03-16 DIAGNOSIS — Z98.1 S/P LUMBAR FUSION: ICD-10-CM

## 2021-03-16 ASSESSMENT — MIFFLIN-ST. JEOR: SCORE: 1242.97

## 2021-03-17 ENCOUNTER — COMMUNICATION - HEALTHEAST (OUTPATIENT)
Dept: FAMILY MEDICINE | Facility: CLINIC | Age: 76
End: 2021-03-17

## 2021-03-17 DIAGNOSIS — K21.9 GASTROESOPHAGEAL REFLUX DISEASE WITHOUT ESOPHAGITIS: ICD-10-CM

## 2021-03-18 ENCOUNTER — COMMUNICATION - HEALTHEAST (OUTPATIENT)
Dept: SURGERY | Facility: CLINIC | Age: 76
End: 2021-03-18

## 2021-03-29 ENCOUNTER — COMMUNICATION - HEALTHEAST (OUTPATIENT)
Dept: FAMILY MEDICINE | Facility: CLINIC | Age: 76
End: 2021-03-29

## 2021-03-29 DIAGNOSIS — F33.9 MAJOR DEPRESSIVE DISORDER, RECURRENT EPISODE (H): ICD-10-CM

## 2021-03-29 DIAGNOSIS — F41.1 ANXIETY STATE: ICD-10-CM

## 2021-04-01 ENCOUNTER — AMBULATORY - HEALTHEAST (OUTPATIENT)
Dept: INTERNAL MEDICINE | Facility: CLINIC | Age: 76
End: 2021-04-01

## 2021-04-01 DIAGNOSIS — M81.0 OSTEOPOROSIS, SENILE: ICD-10-CM

## 2021-04-01 DIAGNOSIS — Z92.29 PERSONAL HISTORY OF OTHER DRUG THERAPY: ICD-10-CM

## 2021-04-06 ENCOUNTER — OFFICE VISIT - HEALTHEAST (OUTPATIENT)
Dept: INTERNAL MEDICINE | Facility: CLINIC | Age: 76
End: 2021-04-06

## 2021-04-06 DIAGNOSIS — M81.0 OSTEOPOROSIS, SENILE: ICD-10-CM

## 2021-05-11 ENCOUNTER — OFFICE VISIT - HEALTHEAST (OUTPATIENT)
Dept: NEUROSURGERY | Facility: CLINIC | Age: 76
End: 2021-05-11

## 2021-05-11 ENCOUNTER — RECORDS - HEALTHEAST (OUTPATIENT)
Dept: GENERAL RADIOLOGY | Facility: CLINIC | Age: 76
End: 2021-05-11

## 2021-05-11 DIAGNOSIS — M48.062 SPINAL STENOSIS OF LUMBAR REGION WITH NEUROGENIC CLAUDICATION: ICD-10-CM

## 2021-05-11 DIAGNOSIS — M71.38 SYNOVIAL CYST OF LUMBAR FACET JOINT: ICD-10-CM

## 2021-05-11 DIAGNOSIS — Z98.1 S/P LUMBAR FUSION: ICD-10-CM

## 2021-05-11 DIAGNOSIS — Z98.1 ARTHRODESIS STATUS: ICD-10-CM

## 2021-05-11 ASSESSMENT — MIFFLIN-ST. JEOR: SCORE: 1256.58

## 2021-05-25 ENCOUNTER — RECORDS - HEALTHEAST (OUTPATIENT)
Dept: ADMINISTRATIVE | Facility: CLINIC | Age: 76
End: 2021-05-25

## 2021-05-26 ENCOUNTER — RECORDS - HEALTHEAST (OUTPATIENT)
Dept: ADMINISTRATIVE | Facility: CLINIC | Age: 76
End: 2021-05-26

## 2021-05-26 ASSESSMENT — PATIENT HEALTH QUESTIONNAIRE - PHQ9
SUM OF ALL RESPONSES TO PHQ QUESTIONS 1-9: 7
SUM OF ALL RESPONSES TO PHQ QUESTIONS 1-9: 2

## 2021-05-27 ENCOUNTER — RECORDS - HEALTHEAST (OUTPATIENT)
Dept: ADMINISTRATIVE | Facility: CLINIC | Age: 76
End: 2021-05-27

## 2021-05-27 VITALS
RESPIRATION RATE: 16 BRPM | HEART RATE: 92 BPM | HEIGHT: 64 IN | DIASTOLIC BLOOD PRESSURE: 80 MMHG | OXYGEN SATURATION: 98 % | BODY MASS INDEX: 28.85 KG/M2 | SYSTOLIC BLOOD PRESSURE: 122 MMHG | WEIGHT: 169 LBS

## 2021-05-27 ASSESSMENT — PATIENT HEALTH QUESTIONNAIRE - PHQ9
SUM OF ALL RESPONSES TO PHQ QUESTIONS 1-9: 8
SUM OF ALL RESPONSES TO PHQ QUESTIONS 1-9: 9
SUM OF ALL RESPONSES TO PHQ QUESTIONS 1-9: 6

## 2021-05-27 NOTE — PROGRESS NOTES
PROGRESS NOTE   3/20/2019    SUBJECTIVE:  Adelina Espinoza is a 73 y.o. female  who presents for   Chief Complaint   Patient presents with     Follow-up     anxiety , med check      Patient comes in today for recheck of her anxiety and depression.  She notes that she is had a lot more anxiety over this past month or so.  It started when she was in [place] on vacation.  She notes that they traveled with one another couple and that woman needed to take control of their entire vacation and that was very difficult for her.  She needs to feel like she is in control of everything and thus her anxiety increased quite significantly.  She knows that this loss of control caused her to have much more anxiety over the entire visit and the entire vacation and when she came home the increased anxiety has not improved.  She notes that she wants everything in its place and thus anything that is out of place is difficult for her at this point and her  is noticing that she is having a lot more anxiety and a lot more tension because things are somewhat out of place.  She notes that last week her  had a lot of obligations with the organizations that he belongs to and it was very very difficult for her.  She sat him down and told him that she needed him they are more and he and she needed him to assume more of the responsibilities around the house and that is been helpful but is also very anxiety provoking and create some tension.  She also notes that she feels like she is being treated like she is old and she does not feel old.  This is difficult for her as well.  Her  was hospitalized last week for an acute illness and they never did figure out what was wrong with him and that has been bothering her as well.  She was able to take control and drive him to the emergency room which helped a lot but still they never found out what was wrong.  She continues on Celexa 40 mg a day.  She also has lorazepam at home.  She  uses that very infrequently.  She has used 7 tablets since the beginning of January.  She notes that she did get a shot in her big toe from Dr. Borges last week and that seems to have helped quite a lot.  Please see PHQ 9 and DERIK which are both completed in their entirety today.  She is not suicidal or homicidal.    Patient Active Problem List   Diagnosis     Peripheral Neuropathy     Urticaria     Major Depression, Recurrent     Lump Or Mass In Breast     Anxiety     Limb Swelling     Irritable Bowel Syndrome     Primary osteoarthritis of right knee     Primary osteoarthritis of knee, left     Osteoporosis, senile     Esophageal reflux     Primary osteoarthritis involving multiple joints     Arthritis of first metatarsophalangeal (MTP) joint of right foot       Current Outpatient Medications   Medication Sig Dispense Refill     calcium citrate-vitamin D (CITRACAL+D) 315-200 mg-unit per tablet Take 1 tablet by mouth 2 (two) times a day.       citalopram (CELEXA) 40 MG tablet TAKE 1 TABLET DAILY 90 tablet 2     dicyclomine (BENTYL) 10 MG capsule Take 1 capsule (10 mg total) by mouth 4 (four) times a day as needed For cramps. 30 capsule 0     furosemide (LASIX) 20 MG tablet Take 1 tablet (20 mg total) by mouth daily. 90 tablet 3     magnesium 250 mg Tab Take 250 mg by mouth daily.        melatonin 5 mg Tab tablet Take 5 mg by mouth at bedtime as needed.       MULTIVIT &MINERALS/FERROUS FUM (MULTI VITAMIN ORAL) Take 1 tablet by mouth daily.       omeprazole (PRILOSEC) 20 MG capsule Take 1 capsule (20 mg total) by mouth daily before breakfast. 90 capsule 3     POLYETHYLENE GLYCOL 3350 (MIRALAX ORAL) Take 17 g by mouth daily.              LORazepam (ATIVAN) 0.5 MG tablet TAKE ONE TABLET BY MOUTH THREE TIMES DAILY as needed for anxiety 10 tablet 0     Current Facility-Administered Medications   Medication Dose Route Frequency Provider Last Rate Last Dose     denosumab 60 mg (PROLIA 60 mg/ml)  60 mg Subcutaneous Q6 Months  "Mone Blanchard MD   60 mg at 03/07/19 1334       Allergies   Allergen Reactions     Adhesive Rash     Green Pepper      Stomach upset     Lactose Diarrhea     Monosodium Glutamate      diarrhea       Past Medical History:   Diagnosis Date     Anxiety      Arthritis      Breast cancer (H) 2003     Depression      Esophageal reflux      History of anesthesia complications     goofy     Hx antineoplastic chemotherapy 2003    for breast cancer     Hx of radiation therapy 2004    for breast cancer     IBS (irritable bowel syndrome)      Neuropathy (H)      Osteopenia        Past Surgical History:   Procedure Laterality Date     APPENDECTOMY  1967     BACK SURGERY      cervical fusion     BREAST BIOPSY Right 2003     BREAST LUMPECTOMY  12/2003    breast cancer     CHOLECYSTECTOMY  1994     HYSTERECTOMY  1997    desmoid cyst     NISSEN FUNDOPLICATION       OOPHORECTOMY  1997     ME TOTAL KNEE ARTHROPLASTY Right 10/29/2015    Procedure: RIGHT TOTAL KNEE  ARTHROPLASTY;  Surgeon: Nitin White MD;  Location: [facility] Main OR;  Service: Orthopedics     ME TOTAL KNEE ARTHROPLASTY Left 4/21/2016    Procedure: LEFT TOTAL KNEE ARTHROPLASTY;  Surgeon: Nitin White MD;  Location: [facility] Main OR;  Service: Orthopedics       Social History     Tobacco Use   Smoking Status Never Smoker   Smokeless Tobacco Never Used       OBJECTIVE:     /68   Pulse 76   Ht 5' 3\" (1.6 m)   Wt 166 lb (75.3 kg)   LMP 01/11/1997   Breastfeeding? No   BMI 29.41 kg/m      Physical Exam:  GENERAL APPEARANCE: A&A, NAD, well hydrated, well nourished  SKIN:  Normal skin turgor, no lesions/rashes   CV: RRR, no M/G/R   LUNGS: CTAB   EXTREMITY: no swelling noted.  Full range of motion of all 4 extremities.   NEURO: no gross deficits   PSYCHIATRIC;  Mood appropriate, memory intact  A&O x3, thought processes congruent, non-tangential. No hallucinations/delusions. Insight/judgment: intact. Denies suicidal/homicidal " ideations.      ASSESSMENT/PLAN:     Anxiety state [F41.1]      1. Anxiety  - LORazepam (ATIVAN) 0.5 MG tablet; TAKE ONE TABLET BY MOUTH THREE TIMES DAILY as needed for anxiety  Dispense: 10 tablet; Refill: 0  - Ambulatory referral to Psychology    2. Mild episode of recurrent major depressive disorder (H)  - Ambulatory referral to Psychology    Patient overall seems to be doing okay.  She has noticed an increase in her anxiety recently.  She thinks is because of losing control in [place] and also she feels like she is a little bit more out of control with things at home recently.  She was supposed to work at the [employer] this next weekend but she backed out of that and she finds that she is backing out of several of her obligations.  She is having a hard time getting out and about and doing the extra things.  She is functioning just fine she is doing things that are critical but she is not doing the extras.  This is a bit distressing to her and she is wondering what we should do about it.  We talked about the possibility of changing to a different medication as she is on the maximum dose of Celexa versus having her see the counselor.  From discussion of what is going on currently I think is a situational depression.  There is nothing really that happened this made this worse in terms of her anxiety and I suspect that once the situations resolved that this will improve.  She is in total agreement with that.  I think she would be best served by seeing a counselor.  She has seen a counselor in the past but has not seen a counselor recently.  I am going to place an order for the psychologist and someone should help her get that appointment made.  I did refill the prescription for Ativan is fine for her to take on an occasional basis.  She should definitely continue on the Celexa 40 mg a day.  I like to see her back in about 2 months for follow-up.  If she has additional questions or concerns or is worsening of her  symptoms will let me know.  She can contract for safety today.  Migdalia Delvalle MD

## 2021-05-27 NOTE — TELEPHONE ENCOUNTER
Refill Approved    Rx renewed per Medication Renewal Policy. Medication was last renewed on 7/11/18. 4/12/18    Vicky De Souza, [facility] Triage/Med Refill 4/8/2019     Requested Prescriptions   Pending Prescriptions Disp Refills     citalopram (CELEXA) 40 MG tablet [Pharmacy Med Name: CITALOPRAM HBR TABS 40MG] 90 tablet 2     Sig: TAKE 1 TABLET DAILY       [facility] Refill Protocol  Passed - 4/7/2019  5:03 AM        Passed - PCP or prescribing provider visit in last year     Last office visit with prescriber/PCP: 3/20/2019 Migdalia Delvalle MD OR same dept: 3/20/2019 Migdalia Delvalle MD OR same specialty: 3/20/2019 Migdalia Delvalle MD  Last physical: 5/8/2018 Last MTM visit: Visit date not found   Next visit within 3 mo: Visit date not found  Next physical within 3 mo: Visit date not found  Prescriber OR PCP: Migdalia Delvalle MD  Last diagnosis associated with med order: 1. Major depressive disorder, recurrent episode (H)  - citalopram (CELEXA) 40 MG tablet [Pharmacy Med Name: CITALOPRAM HBR TABS 40MG]; TAKE 1 TABLET DAILY  Dispense: 90 tablet; Refill: 2    2. Anxiety  - citalopram (CELEXA) 40 MG tablet [Pharmacy Med Name: CITALOPRAM HBR TABS 40MG]; TAKE 1 TABLET DAILY  Dispense: 90 tablet; Refill: 2    3. Esophageal reflux  - omeprazole (PRILOSEC) 20 MG capsule [Pharmacy Med Name: OMEPRAZOLE DR CAPS 20MG]; TAKE 1 CAPSULE DAILY BEFORE BREAKFAST  Dispense: 90 capsule; Refill: 3    If protocol passes may refill for 12 months if within 3 months of last provider visit (or a total of 15 months).             omeprazole (PRILOSEC) 20 MG capsule [Pharmacy Med Name: OMEPRAZOLE DR CAPS 20MG] 90 capsule 3     Sig: TAKE 1 CAPSULE DAILY BEFORE BREAKFAST       GI Medications Refill Protocol Passed - 4/7/2019  5:03 AM        Passed - PCP or prescribing provider visit in last 12 or next 3 months.     Last office visit with prescriber/PCP: 3/20/2019 Migdalia Delvalle MD OR same dept: 3/20/2019 Migdalia Delvalle  MD OR same specialty: 3/20/2019 Migdalia Delvalle MD  Last physical: 5/8/2018 Last MTM visit: Visit date not found   Next visit within 3 mo: Visit date not found  Next physical within 3 mo: Visit date not found  Prescriber OR PCP: Migdalia Delvalle MD  Last diagnosis associated with med order: 1. Major depressive disorder, recurrent episode (H)  - citalopram (CELEXA) 40 MG tablet [Pharmacy Med Name: CITALOPRAM HBR TABS 40MG]; TAKE 1 TABLET DAILY  Dispense: 90 tablet; Refill: 2    2. Anxiety  - citalopram (CELEXA) 40 MG tablet [Pharmacy Med Name: CITALOPRAM HBR TABS 40MG]; TAKE 1 TABLET DAILY  Dispense: 90 tablet; Refill: 2    3. Esophageal reflux  - omeprazole (PRILOSEC) 20 MG capsule [Pharmacy Med Name: OMEPRAZOLE DR CAPS 20MG]; TAKE 1 CAPSULE DAILY BEFORE BREAKFAST  Dispense: 90 capsule; Refill: 3    If protocol passes may refill for 12 months if within 3 months of last provider visit (or a total of 15 months).

## 2021-05-28 ENCOUNTER — COMMUNICATION - HEALTHEAST (OUTPATIENT)
Dept: ADMINISTRATIVE | Facility: CLINIC | Age: 76
End: 2021-05-28

## 2021-05-28 ENCOUNTER — RECORDS - HEALTHEAST (OUTPATIENT)
Dept: ADMINISTRATIVE | Facility: CLINIC | Age: 76
End: 2021-05-28

## 2021-05-28 ASSESSMENT — ANXIETY QUESTIONNAIRES
GAD7 TOTAL SCORE: 4
GAD7 TOTAL SCORE: 4
GAD7 TOTAL SCORE: 3
GAD7 TOTAL SCORE: 3

## 2021-05-28 NOTE — TELEPHONE ENCOUNTER
Patient calling.  States she visited friends last night and found out they both have been diagnosed with influenza B today.    She currently has no symptoms but is asking about getting Tamiflu RX as prophylaxis.    Provider please advise.  Pharmacy confirmed with caller.  Ok to leave detailed message.    Cindy Oshea RN  Triage Nurse Advisor      Reason for Disposition    Influenza EXPOSURE within last 72 hours (3 days) and exposed person is HIGH RISK (e.g., age > 64 years, pregnant, HIV+, chronic medical condition)    Protocols used: INFLUENZA EXPOSURE-A-OH

## 2021-05-28 NOTE — TELEPHONE ENCOUNTER
Refill Approved    Rx renewed per Medication Renewal Policy. Medication was last renewed on 5/20/18.    Vicky De Souza, [facility] Triage/Med Refill 5/15/2019     Requested Prescriptions   Pending Prescriptions Disp Refills     furosemide (LASIX) 20 MG tablet [Pharmacy Med Name: FUROSEMIDE TABS 20MG] 90 tablet 3     Sig: TAKE 1 TABLET DAILY       Diuretics/Combination Diuretics Refill Protocol  Passed - 5/15/2019  2:12 AM        Passed - Visit with PCP or prescribing provider visit in past 12 months     Last office visit with prescriber/PCP: 3/20/2019 Migdalia Delvalle MD OR same dept: 3/20/2019 Migdalia Delvalle MD OR same specialty: 3/20/2019 Migdalia Delvalle MD  Last physical: 5/8/2018 Last MTM visit: Visit date not found   Next visit within 3 mo: Visit date not found  Next physical within 3 mo: Visit date not found  Prescriber OR PCP: Migdalia Delvalle MD  Last diagnosis associated with med order: 1. Limb swelling  - furosemide (LASIX) 20 MG tablet [Pharmacy Med Name: FUROSEMIDE TABS 20MG]; TAKE 1 TABLET DAILY  Dispense: 90 tablet; Refill: 3    If protocol passes may refill for 12 months if within 3 months of last provider visit (or a total of 15 months).             Passed - Serum Potassium in past 12 months      Lab Results   Component Value Date    Potassium 4.7 11/14/2018             Passed - Serum Sodium in past 12 months      Lab Results   Component Value Date    Sodium 139 11/14/2018             Passed - Blood pressure on file in past 12 months     BP Readings from Last 1 Encounters:   03/20/19 115/68             Passed - Serum Creatinine in past 12 months      Creatinine   Date Value Ref Range Status   11/14/2018 0.69 0.60 - 1.10 mg/dL Final

## 2021-05-28 NOTE — TELEPHONE ENCOUNTER
Patient Returning Call  Reason for call:  Patient calling again about the call she states to have received on 05/06/19  Information relayed to patient:  None.    Patient has additional questions:  Yes  If YES, what are your questions/concerns:  Patient called stating that someone had called her on Monday, 05/06 and left her a message to schedule an appointment with Dr. Delvalle.  Patient states that at the time she'd received this alleged call, she had, and still has, an appointment scheduled to see Dr. Delvalle on 05/20/19.  Patient doesn't know if clinic was calling to make a sooner or different appointment.  Writer can find no documentation of message left for patient from 05/06/19.  Please contact patient and document encounter.  Okay to leave a detailed message?: Yes

## 2021-05-28 NOTE — TELEPHONE ENCOUNTER
Refill Approved    Rx renewed per Medication Renewal Policy. Medication was last renewed on 11/14/18.    Vicky De Souza, [facility] Triage/Med Refill 5/14/2019     Requested Prescriptions   Pending Prescriptions Disp Refills     dicyclomine (BENTYL) 10 MG capsule [Pharmacy Med Name: DICYCLOMINE HCL CAPS 10MG] 30 capsule 0     Sig: TAKE 1 CAPSULE FOUR TIMES A DAY AS NEEDED FOR CRAMPS       GI Medications Refill Protocol Passed - 5/13/2019  6:56 PM        Passed - PCP or prescribing provider visit in last 12 or next 3 months.     Last office visit with prescriber/PCP: 3/20/2019 Migdalia Delvalle MD OR same dept: 3/20/2019 Migdalia Delvalle MD OR same specialty: 3/20/2019 Migdalia Delvalle MD  Last physical: 5/8/2018 Last MTM visit: Visit date not found   Next visit within 3 mo: Visit date not found  Next physical within 3 mo: Visit date not found  Prescriber OR PCP: Migdalia Delvalle MD  Last diagnosis associated with med order: 1. Stomach cramps  - dicyclomine (BENTYL) 10 MG capsule [Pharmacy Med Name: DICYCLOMINE HCL CAPS 10MG]; TAKE 1 CAPSULE FOUR TIMES A DAY AS NEEDED FOR CRAMPS  Dispense: 30 capsule; Refill: 0    If protocol passes may refill for 12 months if within 3 months of last provider visit (or a total of 15 months).

## 2021-05-28 NOTE — TELEPHONE ENCOUNTER
Patient Returning Call  Reason for call:  Missed call a few minutes ago  Information relayed to patient:  None to relay  Patient has additional questions:  No  If YES, what are your questions/concerns:  NA  Okay to leave a detailed message?: Yes   Results reviewed and treatment is appropriate.

## 2021-05-28 NOTE — TELEPHONE ENCOUNTER
Pt was on recall list. Pt contacted.   She will use this 5/20/19 appt for her anxiety f/u as well as ear pain.   H.DeGree, CMA

## 2021-05-29 ENCOUNTER — RECORDS - HEALTHEAST (OUTPATIENT)
Dept: ADMINISTRATIVE | Facility: CLINIC | Age: 76
End: 2021-05-29

## 2021-05-29 NOTE — PROGRESS NOTES
PROGRESS NOTE   5/31/2019    SUBJECTIVE:  Adelina Espinoza is a 73 y.o. female  who presents for   Chief Complaint   Patient presents with     Follow-up     anxiety and depression, left ear pain- wakes her up at night      Patient comes in today for follow-up of her anxiety and depression as well as pain behind her left ear.  In terms of her anxiety and depression she feels like she is overall doing better.  She is done a lot of reflecting recently and feels like that is been very helpful for her.  She is getting more help from her  at home and that is been helpful as well.  She has been seeing Dr. Guzman and that has been a good relationship.  She continues on the Celexa by Dr. Guzman would like to get her off of Celexa or at least decrease her dose of Celexa.  She is on Klonopin and she takes 0.5 mg of that once at night.  That is been helping her sleep better and she is feeling more like herself.  She has more energy and she is having fun again.  She is enjoying new appliances in the kitchen and is seeing a therapist and that seems to be going well as well.  Dr. Guzman has talked to her about the fact that he like me to take over prescribing her medications.  She has realized that seem to much of her middle sister causes her a lot of distress and causes her a lot of increased anxiety.  Her  apparently has been mentioning this for quite some time but now her friend also mentioned it in the last couple of months how when she sees her spends too much time with her middle sister she seems to get much more anxious.  Patient has reflected on this quite a lot and realizes that they are right and that she needs to try to control that more and she is working very hard to doing that.  She would like to continue to see the therapist that she has been seeing as is a very good relationship.  Times the pain that she has had behind her left ear is been there for quite some time.  At least the last few months that she  has had it.  When she does get this is a sharp shooting pain that comes in with about 3 cm behind the left ear.  Is really at the base of the scalp and is not in the ear itself.  Is not consistent it comes and goes that shoots and then it goes away.  She definitely gets it at night when she lies down in the pillow and sometimes it happens during the day as well.  She has no other symptoms that go on at the time of this.  The pain is so significant that it does stop her in her tracks when she gets it.  She does not have any ear pain as per se.  The entire episode lasts 20 to 25 seconds at the most.  She has no other symptoms that go along with this other than the zing of pain.    Patient Active Problem List   Diagnosis     Peripheral Neuropathy     Urticaria     Major Depression, Recurrent     Lump Or Mass In Breast     Anxiety     Limb Swelling     Irritable Bowel Syndrome     Primary osteoarthritis of right knee     Primary osteoarthritis of knee, left     Osteoporosis, senile     Esophageal reflux     Primary osteoarthritis involving multiple joints     Arthritis of first metatarsophalangeal (MTP) joint of right foot       Current Outpatient Medications   Medication Sig Dispense Refill     calcium citrate-vitamin D (CITRACAL+D) 315-200 mg-unit per tablet Take 1 tablet by mouth 2 (two) times a day.       citalopram (CELEXA) 40 MG tablet TAKE 1 TABLET DAILY 90 tablet 3     clonazePAM (KLONOPIN) 0.5 MG tablet        dicyclomine (BENTYL) 10 MG capsule TAKE 1 CAPSULE FOUR TIMES A DAY AS NEEDED FOR CRAMPS 30 capsule 3     furosemide (LASIX) 20 MG tablet TAKE 1 TABLET DAILY 90 tablet 1     magnesium 250 mg Tab Take 250 mg by mouth daily.        MULTIVIT &MINERALS/FERROUS FUM (MULTI VITAMIN ORAL) Take 1 tablet by mouth daily.       omeprazole (PRILOSEC) 20 MG capsule TAKE 1 CAPSULE DAILY BEFORE BREAKFAST 90 capsule 3     POLYETHYLENE GLYCOL 3350 (MIRALAX ORAL) Take 17 g by mouth daily.              melatonin 5 mg Tab  tablet Take 5 mg by mouth at bedtime as needed.       Current Facility-Administered Medications   Medication Dose Route Frequency Provider Last Rate Last Dose     denosumab 60 mg (PROLIA 60 mg/ml)  60 mg Subcutaneous Q6 Months Mone Blanchard MD   60 mg at 03/07/19 1334       Allergies   Allergen Reactions     Adhesive Rash     Green Pepper      Stomach upset     Lactose Diarrhea     Monosodium Glutamate      diarrhea       Past Medical History:   Diagnosis Date     Anxiety      Arthritis      Breast cancer (H) 2003     Depression      Esophageal reflux      History of anesthesia complications     goofy     Hx antineoplastic chemotherapy 2003    for breast cancer     Hx of radiation therapy 2004    for breast cancer     IBS (irritable bowel syndrome)      Neuropathy (H)      Osteopenia        Past Surgical History:   Procedure Laterality Date     APPENDECTOMY  1967     BACK SURGERY      cervical fusion     BREAST BIOPSY Right 2003     BREAST LUMPECTOMY  12/2003    breast cancer     CHOLECYSTECTOMY  1994     HYSTERECTOMY  1997    desmoid cyst     NISSEN FUNDOPLICATION       OOPHORECTOMY  1997     IL TOTAL KNEE ARTHROPLASTY Right 10/29/2015    Procedure: RIGHT TOTAL KNEE  ARTHROPLASTY;  Surgeon: Nitin White MD;  Location: [facility] Main OR;  Service: Orthopedics     IL TOTAL KNEE ARTHROPLASTY Left 4/21/2016    Procedure: LEFT TOTAL KNEE ARTHROPLASTY;  Surgeon: Nitin White MD;  Location: [facility] Main OR;  Service: Orthopedics       Social History     Tobacco Use   Smoking Status Never Smoker   Smokeless Tobacco Never Used       OBJECTIVE:     /78 (Patient Position: Sitting, Cuff Size: Adult Regular)   Pulse 99   Wt 166 lb (75.3 kg)   LMP 01/11/1997   SpO2 97%   BMI 29.41 kg/m      Physical Exam:  GENERAL APPEARANCE: A&A, NAD, well hydrated, well nourished  SKIN:  Normal skin turgor, no lesions/rashes   HEENT: moist mucous membranes, no rhinorrhea, PERRLA, TM's clear bilaterally, Throat  without significant erythema or exudate.  Patient points to the lower scalp region behind the left ear as to where the sharp shooting pains are coming from.  There is no redness warmth to the touch or other abnormality in that area.  The area that she is pointing to is at least 3 cm away from the back of the ear.  NECK: Supple, full ROM, no significant lymphadenopathy or thyromegaly  CV: RRR, no M/G/R   LUNGS: CTAB  EXTREMITY: no swelling noted.  Full range of motion of all 4 extremities.   NEURO: no gross deficits   PSYCHIATRIC;  Mood appropriate, memory intact  A&O x3, thought processes congruent, non-tangential. No hallucinations/delusions. Insight/judgment: intact. Denies suicidal/homicidal ideations.       ASSESSMENT/PLAN:     Anxiety state [F41.1]      1. Anxiety    2. Peripheral Neuropathy  - Ambulatory referral to Neurology    3. Paresthesia  - Ambulatory referral to Neurology    Patient overall seems to be doing well.  In terms of her anxiety she seems to be much improved.  She is on Klonopin 0.5 mg and she takes 1 tablet at night.  She does not use it any other time during the day.  She got this prescription for Dr. Guzman.  She is also on Celexa 40 mg a day.  She is hoping that she will be able to decrease the amount of her Celexa with time by working with Dr. Guzman.  She mentioned to me today that Dr. Guzman would like me to take over her medications.  We discussed at length today that I need her to be very stable on her medications before I can even think about taking over her medications.  I discussed with her that I am not incredibly comfortable with keeping her on a benzodiazepine on a nightly basis and if Dr. Guzman feels that that is necessary then I would feel more comfortable if he can prescribe that medication.  She voiced understanding of that.  At this point she is not stable on her medications they are still trying to make some medication changes and so certainly following up with Dr. Guzman is  appropriate at this time.  Please see PHQ 9 and DERIK which are both complete in their entirety today.  She is not suicidal or homicidal.  She does not need a refill of her Celexa today but when she does she will let me know.  In terms of the pain that she gets behind her left ear I think it would be best for her to be evaluated by the neurologist.  This sounds like this is a nerve irritation that comes with is eating in her scalp area and then goes away fairly quickly.  She has mentioned it now on several different visits and because it was not lasting all that long I was not overly concerned about it but now that is been there for probably the last 6 months or so I do think we need to evaluate further.  Referral was placed to neurology and someone from our office should help her get that scheduled.  If she has additional problems or concerns will let me know.  We should see her back in about 6 months for follow-up of her medications.  All of her questions were answered today. Total time spent with patient was at least 25 minutes,  of which greater than fifty percent was spent in counselling and coordination of care of the above medical problems.  Migdalia Delvalle MD

## 2021-05-30 ENCOUNTER — RECORDS - HEALTHEAST (OUTPATIENT)
Dept: ADMINISTRATIVE | Facility: CLINIC | Age: 76
End: 2021-05-30

## 2021-05-30 VITALS — BODY MASS INDEX: 27.45 KG/M2 | HEIGHT: 64 IN | WEIGHT: 160.8 LBS

## 2021-05-30 VITALS — WEIGHT: 162.3 LBS | BODY MASS INDEX: 28.3 KG/M2

## 2021-05-30 NOTE — PROGRESS NOTES
ASSESSMENT AND PLAN:  Adelina Espinoza 73 y.o. female is seen here on 07/15/19 for follow-up of osteoarthritis affecting predominantly and most symptomatically in her first MTPs worse on the right side, with appreciable improvement secondary to corticosteroid injections.  We discussed next steps.  She rather not take anything by mouth.  Like to proceed with local injection which is down to 20 mg of Kenalog under ultrasound guidance into the right first MTP.  She is to.  For follow-up here in the next 3 to 4 months or sooner.      Diagnoses and all orders for this visit:    Arthritis of first metatarsophalangeal (MTP) joint of right foot  -     triamcinolone acetonide 40 mg/mL injection 20 mg (KENALOG-40)    Primary osteoarthritis involving multiple joints      HISTORY OF PRESENTING ILLNESS:  Adelina Espinoza, 73 y.o., female is here for follow-up of osteoarthritis, she had corticosteroid injection into her right first MTP nearly 4 months ago, with good response, she has had recurrence of pain, she rather not take anything orally as she has had significant side effects with nonsteroidals in the past.  She noted the burning sharp pain has improved.  She still gets discomfort with now progressive with activity.  She is not woken up from sleep however because of the right first MTP area pain.  No other joint areas similarly affected.  Acetaminophen and ibuprofen have been a very little help..  She is also had peripheral neuropathy, secondary to chemotherapy for breast cancer 15 years ago.  At no point did she have features suggestive of an acute inflammatory arthropathy affecting her first MTPs.  She rated the pain level lower at 4.0/10.  Morning stiffness no more than 15 minutes.  There is no family or personal history of psoriasis except grandfather.  She is not a smoker alcohol 2-3 drinks per week.  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  Accompanied by her .  Further historical  information and ADL limitations as noted in the multidimensional health assessment questionnaire attached in the EMR. Rest of the 13 system ROS is negative.     ALLERGIES:Adhesive; Green pepper; Lactose; and Monosodium glutamate    PAST MEDICAL/ACTIVE PROBLEMS/MEDICATION/ FAMILY HISTORY/SOCIAL DATA:  The patient has a family history of  Past Medical History:   Diagnosis Date     Anxiety      Arthritis      Breast cancer (H) 2003     Depression      Esophageal reflux      History of anesthesia complications     goofy     Hx antineoplastic chemotherapy 2003    for breast cancer     Hx of radiation therapy 2004    for breast cancer     IBS (irritable bowel syndrome)      Neuropathy      Osteopenia      Social History     Tobacco Use   Smoking Status Never Smoker   Smokeless Tobacco Never Used     Patient Active Problem List   Diagnosis     Peripheral Neuropathy     Urticaria     Major Depression, Recurrent     Lump Or Mass In Breast     Anxiety     Limb Swelling     Irritable Bowel Syndrome     Primary osteoarthritis of right knee     Primary osteoarthritis of knee, left     Osteoporosis, senile     Esophageal reflux     Primary osteoarthritis involving multiple joints     Arthritis of first metatarsophalangeal (MTP) joint of right foot     Current Outpatient Medications   Medication Sig Dispense Refill     calcium citrate-vitamin D (CITRACAL+D) 315-200 mg-unit per tablet Take 1 tablet by mouth 2 (two) times a day.       citalopram (CELEXA) 40 MG tablet TAKE 1 TABLET DAILY 90 tablet 3     clonazePAM (KLONOPIN) 0.5 MG tablet        dicyclomine (BENTYL) 10 MG capsule TAKE 1 CAPSULE FOUR TIMES A DAY AS NEEDED FOR CRAMPS 30 capsule 3     furosemide (LASIX) 20 MG tablet TAKE 1 TABLET DAILY 90 tablet 1     magnesium 250 mg Tab Take 250 mg by mouth daily.        melatonin 5 mg Tab tablet Take 5 mg by mouth at bedtime as needed.       MULTIVIT &MINERALS/FERROUS FUM (MULTI VITAMIN ORAL) Take 1 tablet by mouth daily.        omeprazole (PRILOSEC) 20 MG capsule TAKE 1 CAPSULE DAILY BEFORE BREAKFAST 90 capsule 3     Current Facility-Administered Medications   Medication Dose Route Frequency Provider Last Rate Last Dose     denosumab 60 mg (PROLIA 60 mg/ml)  60 mg Subcutaneous Q6 Months Mone Blanchard MD   60 mg at 03/07/19 1334     DETAILED EXAMINATION  07/15/19  :  Vitals:    07/15/19 1225   BP: 118/70   Patient Site: Left Arm   Patient Position: Sitting   Cuff Size: Adult Regular   Pulse: 72   Weight: 166 lb (75.3 kg)     Alert oriented. Head including the face is examined for malar rash, heliotropes, scarring, lupus pernio. Eyes examined for redness such as in episcleritis/scleritis, periorbital lesions.   Neck/ Face examined for parotid gland swelling, range of motion of neck.  Left upper and lower and right upper and lower extremities examined for tenderness, swelling, warmth of the appendicular joints, range of motion, edema, rash.  Some of the important findings included:    She has tenderness in the first MTPs worse on the right side.  Bilateral TKAs.  She has minimal [place] such as in the index finger.  She has tiny squatting of the first CMC more so on the right side.  She does not have synovitis in any of the palpable joints.    LAB / IMAGING DATA:  ALT   Date Value Ref Range Status   11/14/2018 19 0 - 45 U/L Final   05/08/2018 18 0 - 45 U/L Final   03/07/2018 22 0 - 45 U/L Final     Albumin   Date Value Ref Range Status   11/14/2018 4.2 3.5 - 5.0 g/dL Final   05/08/2018 3.9 3.5 - 5.0 g/dL Final   03/07/2018 4.0 3.5 - 5.0 g/dL Final     Creatinine   Date Value Ref Range Status   11/14/2018 0.69 0.60 - 1.10 mg/dL Final   05/08/2018 0.65 0.60 - 1.10 mg/dL Final   03/07/2018 0.69 0.60 - 1.10 mg/dL Final       WBC   Date Value Ref Range Status   07/31/2018 8.9 4.0 - 11.0 thou/uL Final   05/08/2018 5.0 4.0 - 11.0 thou/uL Final     Hemoglobin   Date Value Ref Range Status   07/31/2018 15.0 12.0 - 16.0 g/dL Final   05/08/2018 14.5  12.0 - 16.0 g/dL Final   04/23/2016 11.9 (L) 12.0 - 16.0 g/dL Final     Platelets   Date Value Ref Range Status   07/31/2018 307 140 - 440 thou/uL Final   05/08/2018 237 140 - 440 thou/uL Final   04/21/2016 292 140 - 440 thou/uL Final       Lab Results   Component Value Date    SEDRATE 28 (H) 02/26/2010

## 2021-05-31 VITALS — BODY MASS INDEX: 28.42 KG/M2 | WEIGHT: 166.5 LBS | HEIGHT: 64 IN

## 2021-05-31 VITALS — HEIGHT: 64 IN | BODY MASS INDEX: 27.31 KG/M2 | WEIGHT: 160 LBS

## 2021-05-31 VITALS — BODY MASS INDEX: 28.17 KG/M2 | WEIGHT: 165 LBS | HEIGHT: 64 IN

## 2021-05-31 VITALS — BODY MASS INDEX: 29.68 KG/M2 | WEIGHT: 170.2 LBS

## 2021-05-31 VITALS — BODY MASS INDEX: 29.57 KG/M2 | WEIGHT: 169.6 LBS

## 2021-05-31 VITALS — WEIGHT: 160 LBS | HEIGHT: 64 IN | BODY MASS INDEX: 27.31 KG/M2

## 2021-05-31 NOTE — PROGRESS NOTES
SUBJECTIVE: Adelina Espinoza is a 73 y.o. White or  female who presents today with a complaint of cold symptoms for the last 6 days.  At first she had sore throat and she was feeling hot but did not have a fever.  She started with a dry cough which is now a bit more improved and is productive.  She has a frontal headache and buzzing in her ears.  She is very fatigued.  She has been taking NyQuil for the last for 5 days which helps her sore throat.  She tells me her  was diagnosed with pneumonia when [facility].  She is concerned she will have the same.    OBJECTIVE: /64 (Patient Site: Left Arm, Patient Position: Sitting, Cuff Size: Adult Regular)   Pulse 96   Wt 161 lb 11.2 oz (73.3 kg)   LMP 01/11/1997   SpO2 94%   Breastfeeding? No   BMI 28.64 kg/m    General: Mildly ill-appearing elderly female in no acute distress  HEENT: Eyes clear, nose without rhinorrhea, ears with fluid behind the TM but no sign of infection  Heart: Regular rate and rhythm without murmur  Lungs: Clear bilaterally  Abdomen: Soft  Extremities: Warm, dry    Rapid strep is negative    ASSESSMENT & PLAN:    1. Upper respiratory tract infection, unspecified type  Basic Metabolic Panel    HM2(CBC w/o Differential)   2. Sore throat  Rapid Strep A Screen- Throat Swab    Group A Strep, RNA Direct Detection, Throat     We discussed that this is likely viral in nature.  Since she seems to be improving as of the last day or so she might be able to get through this without antibiotic which would be more desirable.  I can check her electrolytes hemogram to make sure nothing is off with these things.  I will get back to her on those results by my chart.  We discussed that she can call if she is worsening or if there are other signs and symptoms of infection.  For now I suggested she treat herself symptomatically.  If all is well, she can return to clinic in November for her annual wellness visit.    Patient Active  Problem List   Diagnosis     Peripheral Neuropathy     Urticaria     Major Depression, Recurrent     Lump Or Mass In Breast     Anxiety     Limb Swelling     Irritable Bowel Syndrome     Primary osteoarthritis of right knee     Primary osteoarthritis of knee, left     Osteoporosis, senile     Esophageal reflux     Primary osteoarthritis involving multiple joints     Arthritis of first metatarsophalangeal (MTP) joint of right foot       Current Outpatient Medications on File Prior to Visit   Medication Sig Dispense Refill     calcium citrate-vitamin D (CITRACAL+D) 315-200 mg-unit per tablet Take 1 tablet by mouth 2 (two) times a day.       citalopram (CELEXA) 40 MG tablet TAKE 1 TABLET DAILY 90 tablet 3     clonazePAM (KLONOPIN) 0.5 MG tablet Take 0.5 mg by mouth at bedtime.              dicyclomine (BENTYL) 10 MG capsule TAKE 1 CAPSULE FOUR TIMES A DAY AS NEEDED FOR CRAMPS 30 capsule 3     furosemide (LASIX) 20 MG tablet TAKE 1 TABLET DAILY 90 tablet 1     magnesium 250 mg Tab Take 250 mg by mouth daily.        melatonin 5 mg Tab tablet Take 5 mg by mouth at bedtime as needed.       multivit-min-iron-FA-lutein (CENTRUM SILVER WOMEN) 8 mg iron-400 mcg-300 mcg Tab Take one tablet by mouth daily       omeprazole (PRILOSEC) 20 MG capsule TAKE 1 CAPSULE DAILY BEFORE BREAKFAST 90 capsule 3     Current Facility-Administered Medications on File Prior to Visit   Medication Dose Route Frequency Provider Last Rate Last Dose     denosumab 60 mg (PROLIA 60 mg/ml)  60 mg Subcutaneous Q6 Months Mone Blanchard MD   60 mg at 03/07/19 2424

## 2021-06-01 ENCOUNTER — RECORDS - HEALTHEAST (OUTPATIENT)
Dept: ADMINISTRATIVE | Facility: CLINIC | Age: 76
End: 2021-06-01

## 2021-06-01 VITALS — BODY MASS INDEX: 29.41 KG/M2 | WEIGHT: 166 LBS

## 2021-06-01 VITALS — WEIGHT: 168 LBS | BODY MASS INDEX: 29.77 KG/M2 | HEIGHT: 63 IN

## 2021-06-01 NOTE — PROGRESS NOTES
"Prolia Injection Phone Screen      Screening questions have been asked 2-3 days prior to administration visit for Prolia. If any questions are answered with \"Yes,\" this phone encounter were will routed to ordering provider for further evaluation.     1.  When was the last injection?  3/7/19    2.  Has insurance for this injection been verified?  Yes    3.  Did you experience any new onset achiness or rashes that lasted for over a month with your previous Prolia injection?   No    4.  Do you have a fever over 101?F or a new deep cough that is unusual for you today? No    5.  Have you started any new medications in the last 6 months that you were told could affect your immune system? These may have been prescribed by oncologist, transplant, rheumatology, or dermatology.   No    6.  In the last 6 months have you have gastric bypass or parathyroid surgery?   No    7.  Do you plan dental work requiring drilling into the bone such as implants/extractions or oral surgery in the next 2-3 months?   No    8. Do you have new insurance since the last injection? No    Patient informed if symptoms discussed above present prior to their administration appointment, they are to notify clinic immediately.     Dasia Stafford            "

## 2021-06-02 ENCOUNTER — RECORDS - HEALTHEAST (OUTPATIENT)
Dept: ADMINISTRATIVE | Facility: CLINIC | Age: 76
End: 2021-06-02

## 2021-06-02 VITALS — BODY MASS INDEX: 30.11 KG/M2 | WEIGHT: 170 LBS

## 2021-06-02 VITALS — WEIGHT: 168.9 LBS | BODY MASS INDEX: 29.92 KG/M2

## 2021-06-02 VITALS — BODY MASS INDEX: 29.95 KG/M2 | HEIGHT: 63 IN | WEIGHT: 169 LBS

## 2021-06-02 VITALS — BODY MASS INDEX: 30.06 KG/M2 | WEIGHT: 169.7 LBS

## 2021-06-02 VITALS — BODY MASS INDEX: 29.41 KG/M2 | WEIGHT: 166 LBS

## 2021-06-02 VITALS — WEIGHT: 166 LBS | BODY MASS INDEX: 29.41 KG/M2 | HEIGHT: 63 IN

## 2021-06-02 VITALS — BODY MASS INDEX: 30.29 KG/M2 | WEIGHT: 171 LBS

## 2021-06-02 VITALS — WEIGHT: 169 LBS | BODY MASS INDEX: 29.94 KG/M2

## 2021-06-02 NOTE — PROGRESS NOTES
Assessment/Plan:      Visit for Preoperative Exam.     Patient approved for surgery with general or local anesthesia. Postoperative pain to be managed by surgeon during post-operative [employer] [facility] timeframe, typically 30-60 days for major surgery, and less for others. Labs will be done as indicated. Above recommendations were reviewed with the patient. Copy of the pre-op was given to the patient to bring along on the day of surgery. Follow up as needed.     1. Preoperative examination  - Hemoglobin  - Comprehensive Metabolic Panel  - Urinalysis Macroscopic; Future  - Urinalysis Macroscopic    2. Cataract of both eyes, unspecified cataract type  - Hemoglobin  - Comprehensive Metabolic Panel  - Urinalysis Macroscopic; Future  - Urinalysis Macroscopic    3. Mild episode of recurrent major depressive disorder (H)    4. Anxiety    5. Gastroesophageal reflux disease without esophagitis  - Comprehensive Metabolic Panel    6. Limb Swelling  - Comprehensive Metabolic Panel          Assessment: Adelina Espinoza is a 73 y.o. female who presents for pre-op history and physical. She is scheduled to undergo a bilateral cataract surgery, left eye on 11/7/19 right eye on 11/21/19      Plan:    She appears to be medically optimized for the planned procedure.   . Labs were done as indicated below.  Recommendations were reviewed with the patient. Copy of the pre-op was given to the patient to bring along on the day of surgery.     The patient is approved for surgery and is considered to be low anesthetic risk.   Follow up as needed.  Patient has been taking 81 mg of aspirin on a daily basis. Will stop this now until both surgeries are done.   Patient will stop vitamins and supplements until after both surgeries are done.   PATIENT HAS HAD NAUSEA  AFTER ANESTHESIA IN THE PAST.   Patient does have a history of GE reflux and has had a Nissen fundoplication procedure done in the past.  Patient continues on Celexa as well as  Klonopin for her anxiety and depression.  She has been seeing a psychiatrist and asked today if I would assume prescribing these medications for her.  She has had lots of difficulty with anxiety in the past but lately these seem like they are doing a little bit better.  Please see PHQ 9 and DERIK which are both completed in their entirety today.  She is not suicidal or homicidal.  She feels like overall her anxiety is under fairly good control now.  She was referred to the psychiatrist because she was having a lot of problems with anxiety.  Psychiatrist started her on Klonopin and she takes 0.5 mg on a daily basis in addition to the Celexa 40 mg a day and that seems to be controlling her symptoms quite well.  She has not escalated the use of her Klonopin.  We did talk about the fact that I be happy to start prescribing this but if her symptoms seems to get worse or if this medication does not seem like it is controlling her symptoms she definitely will need to go back to see the psychiatrist.  She has previously seen Dr. Guzman.   Patient has had limb swelling in the past and uses Lasix.  We will check electrolytes due to the Lasix usage as well is continuing Prilosec usage.     Little interest or pleasure in doing things: Not at all  Feeling down, depressed, or hopeless: Not at all  Trouble falling or staying asleep, or sleeping too much: Several days  Feeling tired or having little energy: Not at all  Poor appetite or overeating: Not at all  Feeling bad about yourself - or that you are a failure or have let yourself or your family down: Not at all  Trouble concentrating on things, such as reading the newspaper or watching television: Not at all  Moving or speaking so slowly that other people could have noticed. Or the opposite - being so fidgety or restless that you have been moving around a lot more than usual: Not at all  Thoughts that you would be better off dead, or of hurting yourself in some way: Several  days  PHQ-9 Total Score: 2  If you checked off any problems, how difficult have these problems made it for you to do your work, take care of things at home, or get along with other people?: Somewhat difficult    Total DERIK 7 Score       11/1/2019             DERIK 7 Total Score:  3          Please page me at [phone] if you have any questions or concerns regarding the care of this patient.     APPROVAL GIVEN to proceed with proposed procedure, without further diagnostic evaluation      Subjective:     Scheduled Procedure: Bilateral Cataract Surgery  Surgery Date:  Left 11/7/19 and Right 11/21/19  Surgery Location:  [facility] Fax # [phone]  Surgeon:  Dr Walsh    Surgical Procedure Risk: Low (reported cardiac risk generally < 1%)  Have you had prior anesthesia?: Yes  Have you or any family members had a previous anesthesia reaction:  Yes: nausea  Do you or any family members have a history of a clotting or bleeding disorder?: No  Cardiac Risk Assessment: no increased risk for major cardiac complications    Pertinent History  Do you have difficulty breathing or chest pain after walking up a flight of stairs: no  History of obstructive sleep apnea: No  Steroid use in the last 6 months: Yes: predisone, february 2019  Frequent Aspirin/NSAID use: asa 81 mg daily, will stop now in anticipation of upcoming surgery  Prior Blood Transfusion: No  Prior Blood Transfusion Reaction: No  If for some reason prior to, during or after the procedure, if it is medically indicated, would you be willing to have a blood transfusion?:  There is no transfusion refusal.    Current Outpatient Medications   Medication Sig Dispense Refill     aspirin 81 MG EC tablet Take 81 mg by mouth daily.       calcium citrate-vitamin D (CITRACAL+D) 315-200 mg-unit per tablet Take 1 tablet by mouth 2 (two) times a day.       citalopram (CELEXA) 40 MG tablet TAKE 1 TABLET DAILY 90 tablet 3     clonazePAM (KLONOPIN) 0.5 MG tablet  Take 0.5 mg by mouth at bedtime.              dicyclomine (BENTYL) 10 MG capsule TAKE 1 CAPSULE FOUR TIMES A DAY AS NEEDED FOR CRAMPS 30 capsule 3     furosemide (LASIX) 20 MG tablet TAKE 1 TABLET DAILY 90 tablet 1     guaifenesin/pseudoephedrne HCl (MUCINEX D ORAL) Take by mouth.       magnesium 250 mg Tab Take 250 mg by mouth daily.        melatonin 5 mg Tab tablet Take 5 mg by mouth at bedtime as needed.       multivit-min-iron-FA-lutein (CENTRUM SILVER WOMEN) 8 mg iron-400 mcg-300 mcg Tab Take one tablet by mouth daily       omeprazole (PRILOSEC) 20 MG capsule TAKE 1 CAPSULE DAILY BEFORE BREAKFAST 90 capsule 3     vit C/E/Zn/coppr/lutein/zeaxan (PRESERVISION AREDS-2 ORAL) Take 2 tablets by mouth daily.       fluticasone propionate (FLONASE ALLERGY RELIEF) 50 mcg/actuation nasal spray 1 spray into each nostril daily. 16 g 0     Current Facility-Administered Medications   Medication Dose Route Frequency Provider Last Rate Last Dose     denosumab 60 mg (PROLIA 60 mg/ml)  60 mg Subcutaneous Q6 Months Mone Blanchard MD   60 mg at 09/10/19 0938       Allergies   Allergen Reactions     Adhesive Rash     Green Pepper      Stomach upset     Lactose Diarrhea     Monosodium Glutamate      diarrhea       Immunization History   Administered Date(s) Administered     DT (pediatric) 10/01/1993     Hep A, Adult IM (19yr & older) 06/21/2005     Hep A, historic 10/29/2007, 05/06/2008     Hep B, historic 06/05/2013, 08/20/2013, 12/10/2013     Influenza [id number], 01/05/2010     Influenza high dose,seasonal,PF, 65+ yrs 10/14/2016, 10/31/2017, 10/17/2018, 10/10/2019     Influenza, inj, historic,unspecified 09/16/2010     Influenza,seasonal quad, PF 10/01/2014     Influenza,seasonal, Inj IIV3 10/22/2004, 11/02/2005, 10/31/2006, 10/29/2007, 10/28/2008, 10/06/2011, 10/03/2012, 10/04/2013     Influenza,seasonal,quad inj =/> 6months 10/19/2015     Pneumo Conj 13-V (2010&after) 10/14/2016     Pneumo Polysac 23-V 10/03/2012     Td, Adult,  Absorbed 06/21/2005     Tdap 06/22/2012     ZOSTER, LIVE 05/06/2008       Patient Active Problem List   Diagnosis     Peripheral Neuropathy     Urticaria     Major Depression, Recurrent     Lump Or Mass In Breast     Anxiety     Limb Swelling     Irritable Bowel Syndrome     Primary osteoarthritis of right knee     Primary osteoarthritis of knee, left     Osteoporosis, senile     Esophageal reflux     Primary osteoarthritis involving multiple joints     Arthritis of first metatarsophalangeal (MTP) joint of right foot       Past Medical History:   Diagnosis Date     Anxiety      Arthritis      Breast cancer (H) 2003     Depression      Esophageal reflux      History of anesthesia complications     nausea     Hx antineoplastic chemotherapy 2003    for breast cancer     Hx of radiation therapy 2004    for breast cancer     IBS (irritable bowel syndrome)      Lactose intolerance     lactose breath test positive at GI, 7/2019     Macular degeneration     caught early, takes Areds 2 on a daily basis     Neuropathy      Osteopenia        Social History     Socioeconomic History     Marital status:      Spouse name: Fabiano     Number of children: 2     Years of education: Not on file     Highest education level: Not on file   Occupational History     Occupation: retired   Social Needs     Financial resource strain: Not on file     Food insecurity:     Worry: Not on file     Inability: Not on file     Transportation needs:     Medical: Not on file     Non-medical: Not on file   Tobacco Use     Smoking status: Never Smoker     Smokeless tobacco: Never Used   Substance and Sexual Activity     Alcohol use: Yes     Alcohol/week: 3.0 standard drinks     Types: 3 Standard drinks or equivalent per week     Frequency: 2-3 times a week     Drinks per session: 1 or 2     Binge frequency: Never     Comment: Occasional     Drug use: No     Sexual activity: Not Currently   Lifestyle     Physical activity:     Days per week: Not on  file     Minutes per session: Not on file     Stress: Not on file   Relationships     Social connections:     Talks on phone: Not on file     Gets together: Not on file     Attends Restorationism service: Not on file     Active member of club or organization: Not on file     Attends meetings of clubs or organizations: Not on file     Relationship status: Not on file     Intimate partner violence:     Fear of current or ex partner: Not on file     Emotionally abused: Not on file     Physically abused: Not on file     Forced sexual activity: Not on file   Other Topics Concern     Not on file   Social History Narrative     Not on file       Past Surgical History:   Procedure Laterality Date     APPENDECTOMY  1967     BACK SURGERY      cervical fusion     BREAST BIOPSY Right 2003     BREAST LUMPECTOMY  12/2003    breast cancer     CHOLECYSTECTOMY  1994     HYSTERECTOMY  1997    desmoid cyst     NISSEN FUNDOPLICATION       OOPHORECTOMY  1997     AZ TOTAL KNEE ARTHROPLASTY Right 10/29/2015    Procedure: RIGHT TOTAL KNEE  ARTHROPLASTY;  Surgeon: Nitin White MD;  Location: [facility];  Service: Orthopedics     AZ TOTAL KNEE ARTHROPLASTY Left 4/21/2016    Procedure: LEFT TOTAL KNEE ARTHROPLASTY;  Surgeon: Nitin White MD;  Location: [facility];  Service: Orthopedics       HPI: Adelina is a 73 y.o. female here for a pre-operative consultation. The exam is requested by Dr. Walsh in preparation for  Bilateral cataract surgery left on 11/7/19 and right on 11/21/19 to be performed at [facility]. Today s examination on 11/1/2019 is done to review the underlying surgical condition of bilateral cataracts as well as to clear for anesthesia and review her medical problems and make appropriate changes in medications. Patient was found to have cataracts for the past 6-7 years which have not been bothering too much however in the past 4 months she has experienced blurred vision and now presents for  "surgical removal of these cataracts.     Functional Status: Independent  Patient plans to recover at home with family.    History of Present Illness  Recent Health  Fever: no  Chills: no  Fatigue: no  Chest Pain: no  Cough: no  Dyspnea: no  Urinary Frequency: no  Nausea: no  Vomiting: no  Diarrhea: no  Abdominal Pain: no  Easy Bruising: no  Lower Extremity Swelling: no  Poor Exercise Tolerance: no        Pertinent History  Prior Anesthesia: yes  Previous Anesthesia Reaction:  yes, nausea after anesthesia in the past.   Diabetes: no  Cardiovascular Disease: no  Pulmonary Disease: no  Renal Disease: no  GI Disease: yes, esophageal reflux, has had Nissen in the past  Sleep Apnea: no  Thromboembolic Problems: no  Clotting Disorder: no  Bleeding Disorder: no  Transfusion Reaction: no  Impaired Immunity: no  Steroid use in the last 6 months: yes, PREDNISONE in February 2019  Frequent Aspirin use: Patient has been taking aspirin 81 mg a day. Will stop this now in anticipation of upcoming surgeries.     No family history of anesthesia reaction, MI, Aneurysm, sudden death, clotting disorder and bleeding disorder. Patient's father has had a stroke.     Social history of patient does not wear denture or partial plates, there is no transfusion refusal and there are no concerns regarding care after surgery    After surgery, the patient plans to recover at home with family.          Objective:         Vitals:    11/01/19 1123   BP: 92/60   Pulse: 84   Resp: 16   SpO2: 97%   Weight: 164 lb 9.6 oz (74.7 kg)   Height: 5' 4.25\" (1.632 m)          Review of Systems:  Denies fever, chills, visual changes, fatigue, myalgias, nasal congestion, rhinorrhea, ear pain or discharge, sore throat, swollen glands, breast mass, nipple discharge, breast changes, abdominal pain, nausea, vomiting, diarrhea, constipation, cough, shortness of breath, chest pain, weight change, change in bowel habits, melena, rectal bleeding, dysuria, frequency, " "urgency, hematuria, polyuria, polydipsia, polyphagia, joint pain or swelling or erythema, edema, rash, weakness, paresthesias, vaginal discharge or bleeding or mood changes.  Remainder of review of systems was negative.    Positives: feeling well      Objective:   BP 92/60 (Patient Site: Left Arm, Patient Position: Sitting, Cuff Size: Adult Regular)   Pulse 84   Resp 16   Ht 5' 4.25\" (1.632 m)   Wt 164 lb 9.6 oz (74.7 kg)   LMP 01/11/1997   SpO2 97%   BMI 28.03 kg/m    Weight: 164 lb 9.6 oz (74.7 kg)        Physical Exam:  General Appearance: Alert, cooperative, no distress, appears stated age  Head: Normocephalic, without obvious abnormality, atraumatic  Eyes: PERRL, conjunctiva/corneas clear, EOM's intact  Ears: Normal TM's and external ear canals, both ears  Nose: Nares normal, septum midline,mucosa normal, no drainage  Throat: Lips, mucosa, and tongue normal; teeth and gums normal  Neck: Supple, symmetrical, trachea midline, no adenopathy;  thyroid: not enlarged, symmetric, no tenderness/mass/nodules  Back: Symmetric, no curvature, ROM normal, no CVA tenderness  Lungs: Clear to auscultation bilaterally, respirations unlabored  Heart: Regular rate and rhythm, S1 and S2 normal, no murmur, rub, or gallop  Abdomen: Soft, non-tender, bowel sounds active all four quadrants,  no masses, no organomegaly  Extremities: Extremities normal, atraumatic, no cyanosis or edema  Skin: Skin color, texture, turgor normal, no rashes or lesions  Lymph nodes: Cervical and supraclavicular nodes normal  Neurologic: Normal         Recent Results (from the past 240 hour(s))   Hemoglobin   Result Value Ref Range    Hemoglobin 13.2 12.0 - 16.0 g/dL   Comprehensive Metabolic Panel   Result Value Ref Range    Sodium 139 136 - 145 mmol/L    Potassium 4.5 3.5 - 5.0 mmol/L    Chloride 103 98 - 107 mmol/L    CO2 28 22 - 31 mmol/L    Anion Gap, Calculation 8 5 - 18 mmol/L    Glucose 79 70 - 125 mg/dL    BUN 12 8 - 28 mg/dL    Creatinine " 0.66 0.60 - 1.10 mg/dL    GFR MDRD Af Amer >60 >60 mL/min/1.73m2    GFR MDRD Non Af Amer >60 >60 mL/min/1.73m2    Bilirubin, Total 1.1 (H) 0.0 - 1.0 mg/dL    Calcium 9.8 8.5 - 10.5 mg/dL    Protein, Total 7.2 6.0 - 8.0 g/dL    Albumin 4.0 3.5 - 5.0 g/dL    Alkaline Phosphatase 59 45 - 120 U/L    AST 23 0 - 40 U/L    ALT 18 0 - 45 U/L   Urinalysis Macroscopic   Result Value Ref Range    Color, UA Yellow Colorless, Yellow, Straw, Light Yellow    Clarity, UA Clear Clear    Glucose, UA Negative Negative    Bilirubin, UA Negative Negative    Ketones, UA Negative Negative    Specific Gravity, UA 1.015 1.005 - 1.030    Blood, UA Negative Negative    pH, UA 6.5 5.0 - 8.0    Protein, UA Negative Negative mg/dL    Urobilinogen, UA 0.2 E.U./dL 0.2 E.U./dL, 1.0 E.U./dL    Nitrite, UA Negative Negative    Leukocytes, UA Negative Negative       Migdalia Delvalle M.D.  [address]  [id number]  [place] [address]  Ph: [phone] Fax: [phone]  Pager [phone]

## 2021-06-03 VITALS
SYSTOLIC BLOOD PRESSURE: 92 MMHG | RESPIRATION RATE: 16 BRPM | HEART RATE: 84 BPM | HEIGHT: 64 IN | BODY MASS INDEX: 28.1 KG/M2 | WEIGHT: 164.6 LBS | DIASTOLIC BLOOD PRESSURE: 60 MMHG | OXYGEN SATURATION: 97 %

## 2021-06-03 VITALS — WEIGHT: 162 LBS | HEIGHT: 63 IN | BODY MASS INDEX: 28.7 KG/M2

## 2021-06-03 VITALS — BODY MASS INDEX: 29.41 KG/M2 | WEIGHT: 166 LBS

## 2021-06-03 VITALS — WEIGHT: 161.7 LBS | BODY MASS INDEX: 28.64 KG/M2

## 2021-06-03 NOTE — TELEPHONE ENCOUNTER
DXA should be done in 9/2020. Medicare pays for DXA every 2 years. If she wants DXA in March, we cannot guarantee that the Medicare will pay for it.

## 2021-06-03 NOTE — TELEPHONE ENCOUNTER
It appears that her last dexa scan was 9/2018.  Does Dr Blanchard recommend another dexa scan?  I would appreciate Dr Blanchard's opinion on dexa scan and timing of her next imaging given that she is on Prolia for osteoporosis.

## 2021-06-03 NOTE — TELEPHONE ENCOUNTER
Refill Approved    Rx renewed per Medication Renewal Policy. Medication was last renewed on 5/15/19.    Mary Jane Brandon, [facility] Connection Triage/Med Refill 11/11/2019     Requested Prescriptions   Pending Prescriptions Disp Refills     furosemide (LASIX) 20 MG tablet [Pharmacy Med Name: FUROSEMIDE TABS 20MG] 90 tablet 4     Sig: TAKE 1 TABLET DAILY       Diuretics/Combination Diuretics Refill Protocol  Passed - 11/11/2019  1:21 AM        Passed - Visit with PCP or prescribing provider visit in past 12 months     Last office visit with prescriber/PCP: 5/31/2019 Migdalia Delvalle MD OR same dept: 8/5/2019 Lisa Galloway MD OR same specialty: 8/5/2019 Lisa Galloway MD  Last physical: 11/1/2019 Last MTM visit: Visit date not found   Next visit within 3 mo: Visit date not found  Next physical within 3 mo: Visit date not found  Prescriber OR PCP: Migdalia Delvalle MD  Last diagnosis associated with med order: 1. Limb swelling  - furosemide (LASIX) 20 MG tablet [Pharmacy Med Name: FUROSEMIDE TABS 20MG]; TAKE 1 TABLET DAILY  Dispense: 90 tablet; Refill: 4    If protocol passes may refill for 12 months if within 3 months of last provider visit (or a total of 15 months).             Passed - Serum Potassium in past 12 months      Lab Results   Component Value Date    Potassium 4.5 11/01/2019             Passed - Serum Sodium in past 12 months      Lab Results   Component Value Date    Sodium 139 11/01/2019             Passed - Blood pressure on file in past 12 months     BP Readings from Last 1 Encounters:   11/01/19 92/60             Passed - Serum Creatinine in past 12 months      Creatinine   Date Value Ref Range Status   11/01/2019 0.66 0.60 - 1.10 mg/dL Final

## 2021-06-03 NOTE — TELEPHONE ENCOUNTER
Who is calling:  Patient  Reason for Call:  Patient is coming in in 3/2020 for visit, will be scheduling her Dexa Scan hopefully for the same date.  Wanted PCP to know, in case needed to place order in chart.  Date of last appointment with primary care: 11/1/19  Okay to leave a detailed message: Yes

## 2021-06-03 NOTE — TELEPHONE ENCOUNTER
Please call patient and let her know that her last DEXA scan (bone density scan) was in September of 2018 and thus she would not be due to a repeat dexa scan until September 2020. Insurance will likely not cover a dexa scan until then.   I did ask Dr Blanchard and she agrees with the recommendation to repeat this in 2 years which would be 9/2020.

## 2021-06-04 VITALS
WEIGHT: 167.4 LBS | HEART RATE: 87 BPM | OXYGEN SATURATION: 98 % | SYSTOLIC BLOOD PRESSURE: 117 MMHG | BODY MASS INDEX: 29.01 KG/M2 | DIASTOLIC BLOOD PRESSURE: 64 MMHG

## 2021-06-04 VITALS
OXYGEN SATURATION: 97 % | DIASTOLIC BLOOD PRESSURE: 72 MMHG | BODY MASS INDEX: 29.98 KG/M2 | WEIGHT: 173 LBS | SYSTOLIC BLOOD PRESSURE: 112 MMHG | HEART RATE: 87 BPM

## 2021-06-04 VITALS
WEIGHT: 164.3 LBS | TEMPERATURE: 99.8 F | HEIGHT: 64 IN | DIASTOLIC BLOOD PRESSURE: 80 MMHG | HEART RATE: 92 BPM | BODY MASS INDEX: 28.05 KG/M2 | OXYGEN SATURATION: 95 % | SYSTOLIC BLOOD PRESSURE: 126 MMHG | RESPIRATION RATE: 20 BRPM

## 2021-06-04 VITALS
WEIGHT: 163 LBS | OXYGEN SATURATION: 98 % | BODY MASS INDEX: 27.83 KG/M2 | DIASTOLIC BLOOD PRESSURE: 65 MMHG | SYSTOLIC BLOOD PRESSURE: 105 MMHG | HEIGHT: 64 IN | HEART RATE: 103 BPM

## 2021-06-04 VITALS
DIASTOLIC BLOOD PRESSURE: 72 MMHG | HEIGHT: 64 IN | WEIGHT: 164 LBS | BODY MASS INDEX: 28 KG/M2 | SYSTOLIC BLOOD PRESSURE: 124 MMHG

## 2021-06-04 VITALS — WEIGHT: 176 LBS | HEIGHT: 64 IN | BODY MASS INDEX: 30.05 KG/M2

## 2021-06-04 VITALS
OXYGEN SATURATION: 96 % | WEIGHT: 166 LBS | DIASTOLIC BLOOD PRESSURE: 82 MMHG | SYSTOLIC BLOOD PRESSURE: 118 MMHG | HEIGHT: 64 IN | BODY MASS INDEX: 28.34 KG/M2 | TEMPERATURE: 98.1 F | HEART RATE: 90 BPM

## 2021-06-04 VITALS — BODY MASS INDEX: 28.51 KG/M2 | HEIGHT: 64 IN | WEIGHT: 167 LBS

## 2021-06-04 VITALS
SYSTOLIC BLOOD PRESSURE: 136 MMHG | HEIGHT: 64 IN | RESPIRATION RATE: 16 BRPM | OXYGEN SATURATION: 98 % | HEART RATE: 84 BPM | WEIGHT: 169 LBS | DIASTOLIC BLOOD PRESSURE: 80 MMHG | BODY MASS INDEX: 28.85 KG/M2

## 2021-06-04 VITALS
BODY MASS INDEX: 30.5 KG/M2 | DIASTOLIC BLOOD PRESSURE: 70 MMHG | SYSTOLIC BLOOD PRESSURE: 110 MMHG | WEIGHT: 176 LBS | OXYGEN SATURATION: 97 % | HEART RATE: 96 BPM

## 2021-06-04 VITALS — WEIGHT: 166 LBS | BODY MASS INDEX: 28.76 KG/M2

## 2021-06-05 VITALS
HEART RATE: 93 BPM | SYSTOLIC BLOOD PRESSURE: 122 MMHG | BODY MASS INDEX: 29.19 KG/M2 | RESPIRATION RATE: 18 BRPM | DIASTOLIC BLOOD PRESSURE: 74 MMHG | OXYGEN SATURATION: 96 % | WEIGHT: 171 LBS | HEIGHT: 64 IN | TEMPERATURE: 98.8 F

## 2021-06-05 VITALS
BODY MASS INDEX: 28.34 KG/M2 | RESPIRATION RATE: 16 BRPM | HEIGHT: 64 IN | OXYGEN SATURATION: 97 % | DIASTOLIC BLOOD PRESSURE: 88 MMHG | HEART RATE: 100 BPM | SYSTOLIC BLOOD PRESSURE: 120 MMHG | WEIGHT: 166 LBS

## 2021-06-05 VITALS
WEIGHT: 172.9 LBS | SYSTOLIC BLOOD PRESSURE: 124 MMHG | OXYGEN SATURATION: 95 % | HEART RATE: 81 BPM | DIASTOLIC BLOOD PRESSURE: 82 MMHG | BODY MASS INDEX: 29.68 KG/M2

## 2021-06-05 VITALS — WEIGHT: 170 LBS | BODY MASS INDEX: 29.02 KG/M2 | HEIGHT: 64 IN

## 2021-06-05 VITALS — WEIGHT: 170.2 LBS | HEIGHT: 64 IN | BODY MASS INDEX: 29.06 KG/M2

## 2021-06-05 NOTE — PROGRESS NOTES
Chief Complaint   Patient presents with     Back Pain     Started about a week ago on and off. On right lower back side. Then jumps down to the calf. If she lays down or sits for a few mintues it goes away.        HPI: Patient presents today with 1 week of intermittent right lower back pain that is exacerbated with standing and walking and gets better with rest.  Sometimes the pain will radiate down into the calf.  She does have a known history of sciatica, but usually that would occur in her left lower back.  She has been doing physical therapy exercises she has learned in the past.  She did have a slip and fall 2 weeks ago where she slipped and fell forwards bracing with her hands.  Known history of arthritis.  She is going to be heading to [place] in a couple weeks and would like to get this taken care of before then.  She has tried both topical heat and cold.  No saddle anesthesia.  No loss of control of bowel or bladder.  Past surgical history includes a cervical fusion.  No urinary symptoms.    ROS:Review of Systems - negative except for what's listed in the HPI    SH: The Patient's  reports that she has never smoked. She has never used smokeless tobacco. She reports current alcohol use of about 3.0 standard drinks of alcohol per week. She reports that she does not use drugs.      FH: The Patient's family history includes Alzheimer's disease in her father; Aortic stenosis in her father; Atrial fibrillation in her father; Breast cancer (age of onset: 46) in her maternal aunt; Depression in her father; Heart disease in her mother; Hypertension in her mother; Kidney cancer (age of onset: 67) in her mother; Lung cancer in her paternal grandfather; Skin cancer in her father; Stroke in her father.     Meds:    Current Outpatient Medications on File Prior to Visit   Medication Sig Dispense Refill     aspirin 81 MG EC tablet Take 81 mg by mouth daily.       calcium citrate-vitamin D (CITRACAL+D) 315-200 mg-unit per  "tablet Take 1 tablet by mouth 2 (two) times a day.       citalopram (CELEXA) 40 MG tablet TAKE 1 TABLET DAILY 90 tablet 3     clonazePAM (KLONOPIN) 0.5 MG tablet Take 0.5 mg by mouth at bedtime.              furosemide (LASIX) 20 MG tablet TAKE 1 TABLET DAILY 90 tablet 3     guaifenesin/pseudoephedrne HCl (MUCINEX D ORAL) Take by mouth.       magnesium 250 mg Tab Take 250 mg by mouth daily.        multivit-min-iron-FA-lutein (CENTRUM SILVER WOMEN) 8 mg iron-400 mcg-300 mcg Tab Take one tablet by mouth daily       omeprazole (PRILOSEC) 20 MG capsule TAKE 1 CAPSULE DAILY BEFORE BREAKFAST 90 capsule 3     vit C/E/Zn/coppr/lutein/zeaxan (PRESERVISION AREDS-2 ORAL) Take 2 tablets by mouth daily.       dicyclomine (BENTYL) 10 MG capsule TAKE 1 CAPSULE FOUR TIMES A DAY AS NEEDED FOR CRAMPS 30 capsule 3     fluticasone propionate (FLONASE ALLERGY RELIEF) 50 mcg/actuation nasal spray 1 spray into each nostril daily. 16 g 0     melatonin 5 mg Tab tablet Take 5 mg by mouth at bedtime as needed.       Current Facility-Administered Medications on File Prior to Visit   Medication Dose Route Frequency Provider Last Rate Last Dose     denosumab 60 mg (PROLIA 60 mg/ml)  60 mg Subcutaneous Q6 Months Mone Blanchard MD   60 mg at 09/10/19 0938       O:  /82   Pulse 90   Temp 98.1  F (36.7  C) (Oral)   Ht 5' 4.25\" (1.632 m)   Wt 166 lb (75.3 kg)   LMP 01/11/1997   SpO2 96%   BMI 28.27 kg/m      Physical Examination:   General appearance - alert, well appearing, and in no distress  Mental status - alert, oriented to person, place, and time   lymphadenopathy, no hepatosplenomegaly  Chest - clear to auscultation, no wheezes, rales or rhonchi, symmetric air entry  Heart - normal rate and regular rhythm, S1 and S2 normal, no murmurs noted  Abdomen - soft, nontender  Neurological - motor and sensory grossly normal bilaterally, normal muscle tone, no tremors, strength 5/5  Musculoskeletal -no pain with palpation along the " thoracic or lumbar spine.  Left supraspinous muscles normal.  No significant pain with palpation of the right supraspinous muscles.  Increased pain with flexion of the right hip.  Extremities - peripheral pulses normal, no pedal edema, no cyanosis  Skin - normal coloration and turgor.      A/P:     Problem List Items Addressed This Visit     None      Visit Diagnoses     Back strain, initial encounter    -  Primary    Relevant Medications    cyclobenzaprine (FLEXERIL) 5 MG tablet    methylPREDNISolone (MEDROL DOSEPACK) 4 mg tablet            1. Back strain, initial encounter  Continue with exercises.  Utilize topical cold as it seems to help more than anything.  Trial of cyclobenzaprine which she has used in the past.  Start Medrol Dosepak tomorrow.      - cyclobenzaprine (FLEXERIL) 5 MG tablet; Take 1 tablet (5 mg total) by mouth 3 (three) times a day as needed for muscle spasms.  Dispense: 20 tablet; Refill: 0  - methylPREDNISolone (MEDROL DOSEPACK) 4 mg tablet; Take 1 tablet (4 mg total) by mouth daily for 6 days. Follow package directions  Dispense: 21 tablet; Refill: 0    2.  Osteoporosis, senile  Known history of osteoporosis and we discussed how continual steroid use can impact bone health. Continues with prolia injections.    Total time spent with patient was at least 25 minutes, all of which was spent in counseling and coordination of care regarding their back pain and osteoporosis.      Addy Espana, CNP

## 2021-06-05 NOTE — TELEPHONE ENCOUNTER
"    She is calling to report that [facility], and he has a mask on due to a diagnosis of RSV.    She is asking if she needs to wear a mask as well.  She was advsied that , \"yes\" ok to wear a mask to prevent RSV in herself.    JULIANO.    Fany Higgins RN  [facility] Triage/refill nurse    "

## 2021-06-05 NOTE — TELEPHONE ENCOUNTER
Refill sent to hold onto.  Let me know if persisting when getting back and we can re-start physical therapy to make sure she's doing the right exercises.    Addy Espana, CNP

## 2021-06-05 NOTE — PATIENT INSTRUCTIONS - HE
You likely have a viral illness.    Advised fluids, rest, Tylenol or Ibuprofen as needed for pain or fever as you can tolerate.

## 2021-06-05 NOTE — PROGRESS NOTES
ASSESSMENT AND PLAN:  Adelina Espinoza 74 y.o. female is seen here on 01/13/20 for follow-up of osteoarthritis affecting predominantly and most symptomatically in her first MTPs worse on the right side, with appreciable improvement secondary to corticosteroid injections.  We discussed next steps, since there is no significant pain no indications for repeat injection with the corticosteroid in the MTPs #1.  She is planning on going to [place] for the next 4 weeks, topical capsaicin discussed.  She is to follow-up in 6 months or sooner, cancel the next appointment if she is continued to as well as she is doing         Diagnoses and all orders for this visit:    Arthritis of first metatarsophalangeal (MTP) joint of right foot    Primary osteoarthritis involving multiple joints      HISTORY OF PRESENTING ILLNESS:  Adelina Espinoza, 74 y.o., female is here for follow-up of osteoarthritis, she had corticosteroid injection into her right first MTP last year, with good response, she has had recurrence of pain, she rather not take anything orally as she has had significant side effects with nonsteroidals in the past.  She noted the burning sharp pain has improved.  She still gets discomfort with now progressive with activity.  She is not woken up from sleep however because of the right first MTP area pain.  No other joint areas similarly affected.  Acetaminophen and ibuprofen have been a very little help..  She is also had peripheral neuropathy, secondary to chemotherapy for breast cancer 15 years ago.  At no point did she have features suggestive of an acute inflammatory arthropathy affecting her first MTPs.  She rated the pain level lower at 4.0/10.  Morning stiffness no more than 15 minutes.  There is no family or personal history of psoriasis except grandfather.  She is not a smoker alcohol 2-3 drinks per week.  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  Accompanied by her .  Further  historical information and ADL limitations as noted in the multidimensional health assessment questionnaire attached in the EMR. Rest of the 13 system ROS is negative.     ALLERGIES:Adhesive; Green pepper; Lactose; and Monosodium glutamate    PAST MEDICAL/ACTIVE PROBLEMS/MEDICATION/ FAMILY HISTORY/SOCIAL DATA:  The patient has a family history of  Past Medical History:   Diagnosis Date     Anxiety      Arthritis      Breast cancer (H) 2003     Depression      Esophageal reflux      History of anesthesia complications     nausea     Hx antineoplastic chemotherapy 2003    for breast cancer     Hx of radiation therapy 2004    for breast cancer     IBS (irritable bowel syndrome)      Lactose intolerance     lactose breath test positive at GI, 7/2019     Macular degeneration     caught early, takes Areds 2 on a daily basis     Neuropathy      Osteopenia      Social History     Tobacco Use   Smoking Status Never Smoker   Smokeless Tobacco Never Used     Patient Active Problem List   Diagnosis     Peripheral Neuropathy     Urticaria     Major Depression, Recurrent     Lump Or Mass In Breast     Anxiety     Limb Swelling     Irritable Bowel Syndrome     Primary osteoarthritis of right knee     Primary osteoarthritis of knee, left     Osteoporosis, senile     Esophageal reflux     Primary osteoarthritis involving multiple joints     Arthritis of first metatarsophalangeal (MTP) joint of right foot     Current Outpatient Medications   Medication Sig Dispense Refill     aspirin 81 MG EC tablet Take 81 mg by mouth daily.       calcium citrate-vitamin D (CITRACAL+D) 315-200 mg-unit per tablet Take 1 tablet by mouth 2 (two) times a day.       citalopram (CELEXA) 40 MG tablet TAKE 1 TABLET DAILY 90 tablet 3     clonazePAM (KLONOPIN) 0.5 MG tablet Take 0.5 mg by mouth at bedtime.              dicyclomine (BENTYL) 10 MG capsule TAKE 1 CAPSULE FOUR TIMES A DAY AS NEEDED FOR CRAMPS 30 capsule 3     fluticasone propionate (FLONASE  "ALLERGY RELIEF) 50 mcg/actuation nasal spray 1 spray into each nostril daily. 16 g 0     furosemide (LASIX) 20 MG tablet TAKE 1 TABLET DAILY 90 tablet 3     guaifenesin/pseudoephedrne HCl (MUCINEX D ORAL) Take by mouth.       magnesium 250 mg Tab Take 250 mg by mouth daily.        melatonin 5 mg Tab tablet Take 5 mg by mouth at bedtime as needed.       multivit-min-iron-FA-lutein (CENTRUM SILVER WOMEN) 8 mg iron-400 mcg-300 mcg Tab Take one tablet by mouth daily       omeprazole (PRILOSEC) 20 MG capsule TAKE 1 CAPSULE DAILY BEFORE BREAKFAST 90 capsule 3     vit C/E/Zn/coppr/lutein/zeaxan (PRESERVISION AREDS-2 ORAL) Take 2 tablets by mouth daily.       Current Facility-Administered Medications   Medication Dose Route Frequency Provider Last Rate Last Dose     denosumab 60 mg (PROLIA 60 mg/ml)  60 mg Subcutaneous Q6 Months Mone Blanchard MD   60 mg at 09/10/19 0938     DETAILED EXAMINATION  01/13/20  :  Vitals:    01/13/20 1620   BP: 124/72   Patient Site: Right Arm   Patient Position: Sitting   Cuff Size: Adult Regular   Weight: 164 lb (74.4 kg)   Height: 5' 4.25\" (1.632 m)     Alert oriented. Head including the face is examined for malar rash, heliotropes, scarring, lupus pernio. Eyes examined for redness such as in episcleritis/scleritis, periorbital lesions.   Neck/ Face examined for parotid gland swelling, range of motion of neck.  Left upper and lower and right upper and lower extremities examined for tenderness, swelling, warmth of the appendicular joints, range of motion, edema, rash.  Some of the important findings included:   Minimal tenderness of the MTP joint line bilaterally, bilateral TKAs.    LAB / IMAGING DATA:  ALT   Date Value Ref Range Status   11/01/2019 18 0 - 45 U/L Final   11/14/2018 19 0 - 45 U/L Final   05/08/2018 18 0 - 45 U/L Final     Albumin   Date Value Ref Range Status   11/01/2019 4.0 3.5 - 5.0 g/dL Final   11/14/2018 4.2 3.5 - 5.0 g/dL Final   05/08/2018 3.9 3.5 - 5.0 g/dL Final "     Creatinine   Date Value Ref Range Status   11/01/2019 0.66 0.60 - 1.10 mg/dL Final   08/05/2019 0.72 0.60 - 1.10 mg/dL Final   11/14/2018 0.69 0.60 - 1.10 mg/dL Final       WBC   Date Value Ref Range Status   08/05/2019 10.3 4.0 - 11.0 thou/uL Final   07/31/2018 8.9 4.0 - 11.0 thou/uL Final     Hemoglobin   Date Value Ref Range Status   11/01/2019 13.2 12.0 - 16.0 g/dL Final   08/05/2019 14.5 12.0 - 16.0 g/dL Final   07/31/2018 15.0 12.0 - 16.0 g/dL Final     Platelets   Date Value Ref Range Status   08/05/2019 276 140 - 440 thou/uL Final   07/31/2018 307 140 - 440 thou/uL Final   05/08/2018 237 140 - 440 thou/uL Final       Lab Results   Component Value Date    SEDRATE 28 (H) 02/26/2010

## 2021-06-05 NOTE — TELEPHONE ENCOUNTER
Medication Request  Medication name:   cyclobenzaprine (FLEXERIL) 5 MG tablet 20 tablet 0 1/22/2020     Sig - Route: Take 1 tablet (5 mg total) by mouth 3 (three) times a day as needed for muscle spasms. - Oral    Sent to pharmacy as: cyclobenzaprine 5 mg tablet (FLEXERIL)        Requested [facility]: [facility]  Reason for request: Patient states she just was seen by Addy Espana NP on 1/22/2020 for back pain that goes down her right leg to the knee and ankle. Is able to walk for a bit, but has to sit down for a few minutes to have the pain lessen.  Patient is going on vacation this Thursday and would like to bring this medication with her.  Please advise today.    Okay to leave a detailed message: yes

## 2021-06-05 NOTE — PATIENT INSTRUCTIONS - HE
I have sent the muscle relaxant cyclobenzaprine (flexeril) to your pharmacy. Avoid drinking alcohol and driving while using this medicine.    I also sent the steroid called a medrol dose pack. I would start this TOMORROW morning to decrease the risk of sleeping issues tonight.    Keep me updated if things aren't going well.    Thank you for coming in today!    If you receive a survey from [email or web address] about your experience today, it would be very helpful if you could fill it out to let us know what went well and what we can improve!    General Information:    Today you had your appointment with Addy Espana NP    My hours are:    Monday : Out of clinic  Tuesday : 8:00AM - 5:00 PM  Wednesday: 8:00AM - 5:00 PM  Thursday: 8:00AM - 5:00 PM  Friday: 8:00AM - 5:00 PM    I am not in the office Mondays. Therefore non-urgent calls and medical messages received on Monday will be addressed when I am back in the office on Tuesday. Urgent matters will be reviewed and addressed by one of my partners in the office as needed.    If lab work was done today as part of your evaluation you will generally be contacted via [employer], mail, or phone with the results within 1-5 days. If there is an alarming result we will contact you by phone. Lab results come back at varying times, I generally wait until all lab results are available before making comments on the results.     If you need refills please contact your pharmacy. They will send a refill request to me to review. Please allow 3-5 business days for us to process all refill requests.     My Clinical Assistant is Emy. Please call us at [phone] or send a medical message with any questions or concerns.

## 2021-06-05 NOTE — PROGRESS NOTES
Chief Complaint   Patient presents with     Cough     X11 days, gotten worse in the last 2 to 3 days      Fever     Temp of 100.3 this morning 1/11      Headache     Sore Throat     for 2-3 days, better now          HPI      Patient is here for 11 days of nonproductive cough, followed by a few days of moderate sore throat, and headache. Today she had fever to 100.3, treated with Ibuprofen at 9 AM. No chest pain, shortness of breath, body aches.     ROS: Pertinent ROS noted in HPI.     Allergies   Allergen Reactions     Adhesive Rash     Green Pepper      Stomach upset     Lactose Diarrhea     Monosodium Glutamate      diarrhea       Patient Active Problem List   Diagnosis     Peripheral Neuropathy     Urticaria     Major Depression, Recurrent     Lump Or Mass In Breast     Anxiety     Limb Swelling     Irritable Bowel Syndrome     Primary osteoarthritis of right knee     Primary osteoarthritis of knee, left     Osteoporosis, senile     Esophageal reflux     Primary osteoarthritis involving multiple joints     Arthritis of first metatarsophalangeal (MTP) joint of right foot       Family History   Problem Relation Age of Onset     Breast cancer Maternal Aunt 46     Kidney cancer Mother 67     Hypertension Mother      Heart disease Mother      Alzheimer's disease Father      Atrial fibrillation Father      Aortic stenosis Father      Skin cancer Father      Stroke Father      Depression Father      Lung cancer Paternal Grandfather        Social History     Socioeconomic History     Marital status:      Spouse name: Fabiano     Number of children: 2     Years of education: Not on file     Highest education level: Not on file   Occupational History     Occupation: retired   Social Needs     Financial resource strain: Not on file     Food insecurity:     Worry: Not on file     Inability: Not on file     Transportation needs:     Medical: Not on file     Non-medical: Not on file   Tobacco Use     Smoking status: Never  Smoker     Smokeless tobacco: Never Used   Substance and Sexual Activity     Alcohol use: Yes     Alcohol/week: 3.0 standard drinks     Types: 3 Standard drinks or equivalent per week     Frequency: 2-3 times a week     Drinks per session: 1 or 2     Binge frequency: Never     Comment: Occasional     Drug use: No     Sexual activity: Not Currently   Lifestyle     Physical activity:     Days per week: Not on file     Minutes per session: Not on file     Stress: Not on file   Relationships     Social connections:     Talks on phone: Not on file     Gets together: Not on file     Attends Nondenominational service: Not on file     Active member of club or organization: Not on file     Attends meetings of clubs or organizations: Not on file     Relationship status: Not on file     Intimate partner violence:     Fear of current or ex partner: Not on file     Emotionally abused: Not on file     Physically abused: Not on file     Forced sexual activity: Not on file   Other Topics Concern     Not on file   Social History Narrative     Not on file         Objective:    Vitals:    01/11/20 1037   BP: 126/80   Pulse: 92   Resp: 20   Temp: 99.8  F (37.7  C)   SpO2: 95%       Gen:NAD  Throat: oropharynx clear, tonsils normal  Ears: TMs clear without effusions, ear canals normal with small cerumen  Nose: No discharge  Neck: No adenopathy  CV: RRR, normal S1S2, no M, R, G  Pulm: CTAB, normal effort    Recent Results (from the past 24 hour(s))   Rapid Strep A Screen-Throat swab   Result Value Ref Range    Rapid Strep A Antigen No Group A Strep detected, presumptive negative No Group A Strep detected, presumptive negative   Influenza A/B Rapid Test- Nasal Swab   Result Value Ref Range    Influenza  A, Rapid Antigen No Influenza A antigen detected No Influenza A antigen detected    Influenza B, Rapid Antigen No Influenza B antigen detected No Influenza B antigen detected       CXR - no evidence of pneumonia per my interpretation, discussed  during visit.        Cough  -     Influenza A/B Rapid Test- Nasal Swab  -     XR Chest 2 Views    Throat pain  -     Rapid Strep A Screen-Throat swab  -     Group A Strep, RNA Direct Detection, Throat    Likely viral illness. Supportive/symptomatic cares as directed.

## 2021-06-06 NOTE — TELEPHONE ENCOUNTER
Spoke to patient, provided results and recommendations. Patient understood and stated that she would like to proceed with the injection and PT. (Patient cancelled initial PT eval due to current health concerns.)   Informed patient that she would be contacted by our scheduling staff to schedule the injection but due to current conditions she may be scheduled out as far as 3-4 weeks. Patient understood and call was ended.

## 2021-06-06 NOTE — TELEPHONE ENCOUNTER
Refill Approved    Rx renewed per Medication Renewal Policy. Medication was last renewed on 4/8/2019.    Tho Hoff, [facility] Triage/Med Refill 3/2/2020     Requested Prescriptions   Pending Prescriptions Disp Refills     omeprazole (PRILOSEC) 20 MG capsule [Pharmacy Med Name: OMEPRAZOLE DR CAPS 20MG] 90 capsule 4     Sig: TAKE 1 CAPSULE DAILY BEFORE BREAKFAST       GI Medications Refill Protocol Passed - 3/1/2020  7:51 PM        Passed - PCP or prescribing provider visit in last 12 or next 3 months.     Last office visit with prescriber/PCP: 5/31/2019 Migdalia Delvalle MD OR same dept: 1/22/2020 Addy Espana CNP OR same specialty: 1/22/2020 Addy Espana CNP  Last physical: 11/1/2019 Last MTM visit: Visit date not found   Next visit within 3 mo: Visit date not found  Next physical within 3 mo: Visit date not found  Prescriber OR PCP: Migdalia Delvalle MD  Last diagnosis associated with med order: 1. Esophageal reflux  - omeprazole (PRILOSEC) 20 MG capsule [Pharmacy Med Name: OMEPRAZOLE DR CAPS 20MG]; TAKE 1 CAPSULE DAILY BEFORE BREAKFAST  Dispense: 90 capsule; Refill: 4    If protocol passes may refill for 12 months if within 3 months of last provider visit (or a total of 15 months).

## 2021-06-06 NOTE — PATIENT INSTRUCTIONS - HE
Gabapentin 100mg Dosing Chart    DATE  MORNING AFTERNOON BEDTIME    Day 1 0 0 1    Day 2 0 0 1    Day 3 0 0 1    Day 4 1 0 1    Day 5 1 0 1    Day 6 1 0 1    Day 7 1 1 1    Day 8 1 1 1    Day 9 1 1 1    Day 10 1 1 2    Day 11 1 1 2    Day 12 1 1 2    Day 13 2 1 2    Day 14 2 1 2    Day 15 2 1 2    Day 16 2 2 2    Day 17 2 2 2    Day 18 2 2 2    Day 19 2 2 3    Day 20 2 2 3    Day 21 2 2 3    Day 22 3 2 3    Day 23 3 2 3    Day 24 3 2 3    Day 25 3 3 3    Day 26 3 3 3    Day 27 3 3 3     Continue medication, taking 3 capsules three times daily    Please call if you have any questions regarding how to take your medication  Clinic Phone # [phone]

## 2021-06-06 NOTE — PROGRESS NOTES
Assessment and Plan:     ASSESSMENT: 74 y.o. female physical exam.     PLAN:     Encounter for general adult medical examination with abnormal findings [Z00.01]    1. Encounter for general adult medical examination with abnormal findings    2. Mild episode of recurrent major depressive disorder (H)    3. Anxiety  - clonazePAM (KLONOPIN) 0.5 MG tablet; Take 1 tablet (0.5 mg total) by mouth at bedtime.  Dispense: 90 tablet; Refill: 0    4. Peripheral Neuropathy    5. Osteoporosis, senile    6. Gastroesophageal reflux disease without esophagitis  - Comprehensive Metabolic Panel    7. Limb Swelling  - Comprehensive Metabolic Panel    8. Encounter for screening for lipoid disorders  - Lipid Cascade, RANDOM    9. Chronic right-sided low back pain without sciatica  - Ambulatory referral to Spine Care      Patient is a 74 y.o. female who is overall doing well.  She seems to be doing well in terms of her anxiety and depression.  She continues on Celexa 40 mg a day and seems to be tolerating that well.  It does seem to be controlling her anxiety quite well.  Please see PHQ 9 and DERIK which are both complete in their entirety today.  She is not suicidal or homicidal.  She does have enough of the Celexa currently but when she needs more we will certainly let me know.  She does also have some Klonopin at home that she can use on an as-needed basis.  She did need a refill of that which was given.  She is using Klonopin 1 tablet at bedtime every night to help her sleep.  She seems to be relatively stable in terms of her neuropathy.  She will talk with her specialist in regards to the osteoporosis and when another bone density test to be done.  She continues on Prolia and is due for her next dose next week.  She seems to be relatively stable on Prilosec for her gastroesophageal reflux disease.  She continues to take Lasix on a daily basis for her limb swelling.  We will go ahead and check metabolic panel to follow-up on both the  Prilosec as well as Lasix usage.  We will check cholesterol today as that has been borderline in the past.  In terms of her right-sided low back pain and pain in her right thigh I do think she would benefit from being evaluated by the spine clinic.  She may need some physical therapy but at this point I think the spine clinic is the best answer currently.  Referral was placed and someone should contact you regarding getting that appointment set up.  [facility]ingles vaccination as well.  All of her questions and concerns were addressed today.  If she has additional problems or concerns should let me know.    Will contact her with the results of the labs when available.  Patient should follow-up in about 6 months for recheck of her anxiety and depression.  She should certainly follow-up sooner than that if she has additional problems or concerns.  Migdalia Delvalle M.D.       The patient's current medical problems were reviewed.    I have had an Advance Directives discussion with the patient.  The following are part of a depression follow up plan for the patient:  patient follow-up to return when and if necessary  The following health maintenance schedule was reviewed with the patient and provided in printed form in the after visit summary:   Health Maintenance   Topic Date Due     DEPRESSION ACTION PLAN  1945     ZOSTER VACCINES (2 of 3) 07/01/2008     PNEUMOCOCCAL IMMUNIZATION 65+ LOW/MEDIUM RISK (2 of 2 - PPSV23) 10/14/2017     DXA SCAN  09/19/2020     MEDICARE ANNUAL WELLNESS VISIT  03/04/2021     FALL RISK ASSESSMENT  03/04/2021     MAMMOGRAM  01/23/2022     TD 18+ HE  06/22/2022     COLORECTAL CANCER SCREENING  01/13/2024     LIPID  03/04/2025     ADVANCE CARE PLANNING  03/04/2025     HEPATITIS C SCREENING  Completed     INFLUENZA VACCINE RULE BASED  Completed        Subjective:   Chief Complaint: Adelina Espinoza is an 74 y.o. female here for an Annual Wellness visit.      HPI: Patient comes in today for physical exam.  Overall she seems to be doing fine.  She continues on Celexa for anxiety.  That seems to be working fine.  She is on 40 mg of Celexa on a daily basis.  Please see PHQ 9 and DERIK which are both completed in their entirety today.  She is not any side effects to this medication and seems to be working well.  She is not suicidal or homicidal.  She did check with her insurance and needs to get shingles vaccination at [facility].  She will get that set up in the near future.  She does have a history of breast cancer many years ago.  She was following with the oncologist but no longer needs to follow with them because is been long enough since her diagnosis.  She overall is feeling well.  She just recently had a mammogram and declines breast exam today.  She also declines Pap smear and pelvic exam today.  She does have a history of osteoporosis and does see her specialist next week.  She continues on Prolia on a six-month basis.  She will talk with her specialist on when she needs to have a next bone density scan.  It has been about 2 years since she has had her last one that did show stable bone density.  She continues to take Lasix on a daily basis because of limb swelling.  She continues on Prilosec for her esophageal reflux and that seems to be working well.  She does have a history of neuropathy thought to be secondary to the treatment for cancer that she had but again that seems to be relatively stable.  Her main concern today is that she continues to have right-sided back pain.  This was so bad that she had to stop walking when she was in [place] for a month and is wondering what else can be done about that.  She started having low back pain when she fell at the end of December.  She tripped and fell on her right side at that point.  It was a witnessed fall and it was with good reason that she fell.  She has not had any other falls that she is aware of.  She has had  troubles with that right side in her lower back since that time.  She feels like there is a knot in her right thigh often in the right lower back seems to have problems as well.  Sometimes the pain will go down her right leg as well.  She did come in to be seen and she did get a muscle relaxant.  She is been using that occasionally and that seems to help.  She also has been using 600 mg of ibuprofen and then 3 hours later she takes some Tylenol.  That helps but it bothers her stomach and so she is wondering what else can be done for this.  She is tried some exercises and they helped a little bit.  When she was seen in December she did get a prednisone taper but that really did not seem to help at all.        Patient Care Team:  Migdalia Delvalle MD as PCP - General  Geoffrey Ogden MD as Physician (Gastroenterology)  Migdalia Delvalle MD as Assigned PCP  Willis Walsh MD as Physician (Ophthalmology)  Nestor Calzada MD (Ophthalmology)     Patient Active Problem List   Diagnosis     Peripheral Neuropathy     Urticaria     Major Depression, Recurrent     Lump Or Mass In Breast     Anxiety     Limb Swelling     Irritable Bowel Syndrome     Primary osteoarthritis of right knee     Primary osteoarthritis of knee, left     Osteoporosis, senile     Esophageal reflux     Primary osteoarthritis involving multiple joints     Arthritis of first metatarsophalangeal (MTP) joint of right foot     Past Medical History:   Diagnosis Date     Anxiety      Arthritis      Breast cancer (H) 2003     Depression      Esophageal reflux      History of anesthesia complications     nausea     Hx antineoplastic chemotherapy 2003    for breast cancer     Hx of radiation therapy 2004    for breast cancer     IBS (irritable bowel syndrome)      Lactose intolerance     lactose breath test positive at GI, 7/2019     Macular degeneration     caught early, takes Areds 2 on a daily basis, seeing opthamology     Neuropathy       Osteopenia       Past Surgical History:   Procedure Laterality Date     APPENDECTOMY  1967     BACK SURGERY      cervical fusion     BREAST BIOPSY Right 2003     BREAST LUMPECTOMY  12/2003    breast cancer     CHOLECYSTECTOMY  1994     HYSTERECTOMY  1997    desmoid cyst     NISSEN FUNDOPLICATION       OOPHORECTOMY  1997     AR TOTAL KNEE ARTHROPLASTY Right 10/29/2015    Procedure: RIGHT TOTAL KNEE  ARTHROPLASTY;  Surgeon: Nitin White MD;  Location: [facility] Main OR;  Service: Orthopedics     AR TOTAL KNEE ARTHROPLASTY Left 4/21/2016    Procedure: LEFT TOTAL KNEE ARTHROPLASTY;  Surgeon: Nitin White MD;  Location: [facility] Main OR;  Service: Orthopedics      Family History   Problem Relation Age of Onset     Breast cancer Maternal Aunt 46     Kidney cancer Mother 67     Hypertension Mother      Heart disease Mother      Alzheimer's disease Father      Atrial fibrillation Father      Aortic stenosis Father      Skin cancer Father      Stroke Father      Depression Father      Lung cancer Paternal Grandfather       Social History     Socioeconomic History     Marital status:      Spouse name: Fabiano     Number of children: 2     Years of education: Not on file     Highest education level: Not on file   Occupational History     Occupation: retired   Social Needs     Financial resource strain: Not on file     Food insecurity     Worry: Not on file     Inability: Not on file     Transportation needs     Medical: Not on file     Non-medical: Not on file   Tobacco Use     Smoking status: Never Smoker     Smokeless tobacco: Never Used   Substance and Sexual Activity     Alcohol use: Yes     Alcohol/week: 3.0 standard drinks     Types: 3 Standard drinks or equivalent per week     Frequency: 2-3 times a week     Drinks per session: 1 or 2     Binge frequency: Never     Comment: Occasional     Drug use: No     Sexual activity: Not Currently   Lifestyle     Physical activity     Days per week: Not on  file     Minutes per session: Not on file     Stress: Not on file   Relationships     Social connections     Talks on phone: Not on file     Gets together: Not on file     Attends Church service: Not on file     Active member of club or organization: Not on file     Attends meetings of clubs or organizations: Not on file     Relationship status: Not on file     Intimate partner violence     Fear of current or ex partner: Not on file     Emotionally abused: Not on file     Physically abused: Not on file     Forced sexual activity: Not on file   Other Topics Concern     Not on file   Social History Narrative     Not on file      Current Outpatient Medications   Medication Sig Dispense Refill     aspirin 81 MG EC tablet Take 81 mg by mouth daily.       calcium citrate-vitamin D (CITRACAL+D) 315-200 mg-unit per tablet Take 1 tablet by mouth 2 (two) times a day.       citalopram (CELEXA) 40 MG tablet TAKE 1 TABLET DAILY 90 tablet 3     clonazePAM (KLONOPIN) 0.5 MG tablet Take 1 tablet (0.5 mg total) by mouth at bedtime. 90 tablet 0     dicyclomine (BENTYL) 10 MG capsule TAKE 1 CAPSULE FOUR TIMES A DAY AS NEEDED FOR CRAMPS 30 capsule 3     furosemide (LASIX) 20 MG tablet TAKE 1 TABLET DAILY 90 tablet 3     gabapentin (NEURONTIN) 100 MG capsule Take 100 mg by mouth daily. Increase dose as directed by chart.  May increase to 3 tabs 3 times daily 270 capsule 2     LORazepam (ATIVAN) 1 MG tablet Fill at preferred pharmacy and bring to MRI appt. Do not take prior to arriving. You will need a  to bring you home after your appt. 1 tablet 0     magnesium 250 mg Tab Take 250 mg by mouth daily.        melatonin 5 mg Tab tablet Take 5 mg by mouth at bedtime as needed.       multivit-min-iron-FA-lutein (CENTRUM SILVER WOMEN) 8 mg iron-400 mcg-300 mcg Tab Take one tablet by mouth daily       omeprazole (PRILOSEC) 20 MG capsule TAKE 1 CAPSULE DAILY BEFORE BREAKFAST 90 capsule 2     vit C/E/Zn/coppr/lutein/zeaxan (PRESERVISION  "AREDS-2 ORAL) Take 2 tablets by mouth daily.       Current Facility-Administered Medications   Medication Dose Route Frequency Provider Last Rate Last Dose     denosumab 60 mg (PROLIA 60 mg/ml)  60 mg Subcutaneous Q6 Months Mone Blanchard MD   60 mg at 09/10/19 0938     denosumab 60 mg (PROLIA 60 mg/ml)  60 mg Subcutaneous Q6 Months Mone Blanchard MD            Little interest or pleasure in doing things: Several days  Feeling down, depressed, or hopeless: Several days  Trouble falling or staying asleep, or sleeping too much: Not at all  Feeling tired or having little energy: Nearly every day  Poor appetite or overeating: Several days  Feeling bad about yourself - or that you are a failure or have let yourself or your family down: Not at all  Trouble concentrating on things, such as reading the newspaper or watching television: Not at all  Moving or speaking so slowly that other people could have noticed. Or the opposite - being so fidgety or restless that you have been moving around a lot more than usual: Not at all  Thoughts that you would be better off dead, or of hurting yourself in some way: Not at all  PHQ-9 Total Score: 6  If you checked off any problems, how difficult have these problems made it for you to do your work, take care of things at home, or get along with other people?: Not difficult at all      Objective:   Vital Signs:   Visit Vitals  /65   Pulse (!) 103   Ht 5' 3.7\" (1.618 m)   Wt 163 lb (73.9 kg)   LMP 01/11/1997   SpO2 98%   BMI 28.24 kg/m         VisionScreening:  No exam data present     REVIEW OF SYSTEMS:   Denies fever, chills, visual changes, fatigue, myalgias, nasal congestion, rhinorrhea, ear pain or discharge, sore throat, swollen glands, breast mass, nipple discharge, breast changes, abdominal pain, nausea, vomiting, diarrhea, constipation, cough, shortness of breath, chest pain, weight change, change in bowel habits, melena, rectal bleeding, dysuria, frequency, urgency, " "hematuria, polyuria, polydipsia, polyphagia, joint pain or swelling or erythema, edema, rash, weakness, paresthesias, vaginal discharge or bleeding or mood changes.  Remainder of review of systems was negative.      PHYSICAL EXAM:  On exam, patient is a WD, WN 74 y.o. female in NAD.  /65   Pulse (!) 103   Ht 5' 3.7\" (1.618 m)   Wt 163 lb (73.9 kg)   LMP 01/11/1997   SpO2 98%   BMI 28.24 kg/m    Head normocephalic, atraumatic.  Eyes PERRL, ears TM s clear bilaterally.  Throat without significant erythemia or exudate.  Neck was supple, full range of motion. No significant lymphadenopathy or thyromegaly was appreciated.  Lungs clear to auscultation  Heart regular rate and rhythm.  Patient declined breast exam.  She just recently had a mammogram.  Abdomen was soft, nontender, nondistended. No masses or organomegaly were palpated. Positive bowel sounds were appreciated.  Extremities with full range of motion of all 4 extremities were noted.  Deep tendon reflexes were equal and symmetrical. Motor and sensation were intact to both the upper and lower extremities.  Cranial nerves 2 through 12 were grossly intact.  EOM were intact.  Specifically on exam of her right lower back she has no tenderness palpation.  Straight leg lifts were negative bilaterally.  Neurological exam was completely within normal limits.   Patient declined pelvic exam and Pap smear.      Assessment Results 3/4/2020   Activities of Daily Living No help needed   Instrumental Activities of Daily Living 1 - Full function   Get Up and Go Score Less than 12 seconds   Mini Cog Total Score 4   Some recent data might be hidden     A Mini-Cog score of 0-2 suggests the possibility of dementia, score of 3-5 suggests no dementia    Identified Health Risks:     She is at risk for lack of exercise and has been provided with information to increase physical activity for the benefit of her well-being.  The patient's PHQ-9 score is consistent with mild " depression and anxiety.  Patient has a history of anxiety and depression is currently on Celexa 40 mg a day.  This seems to be working well.  She does not need a refill of this today but when she does she certainly will let me know.  She is not suicidal or homicidal.  She is at risk for falling and has been provided with information to reduce the risk of falling at home.  Patient's advanced directive was discussed and I am comfortable with the patient's wishes.

## 2021-06-06 NOTE — PROGRESS NOTES
1. Osteoporosis, senile  DXA Bone Density Scan     Patient was educated on safety of Prolia utilizing Patient Counseling Chart for Healthcare Providers, as outlined by the Prolia REMS progam.     Return in about 6 months (around 9/12/2020) for Prolia injection.    Patient Instructions   Prolia 10th today.  Prolia 11th in 6 months with my nurse. I will see you in 1 year.    DXA in 9/2020 .   Phone number to schedule [phone].    Daily calcium need is 3835-6300 mg a day from the diet and supplements.  Calcium citrate is easier to digest.  Vitamin D 2000 IU daily recommended.      Chief Complaint   Patient presents with     Osteoporosis Follow Up     Osteo F/U       /64   Pulse 87   Wt 167 lb 6.4 oz (75.9 kg)   LMP 01/11/1997   SpO2 98%   BMI 29.01 kg/m        Did you experience any problems with previous Prolia injection? no  Any medication change in the last 6 months? no  Did you take prednisone or other immunosupressant drugs in the last 6 months   (chemo, transplant, rheum, dermatology conditions)? no  Did you have any serious infection in the last 6 months?no  Any recent hospitalizations?no  Do you plan any dental work in the next 2-3 months?no  How much calcium do you take daily from the diet and supplements?1200 mg  How much vit D do you take daily? 2000 IU  Last DXA?  9/2018,Reviewed and discussed      Patient is here today for the 10th Prolia injection. Patient tolerated previous injections well.   We discussed calcium and vit D daily needs today.   Next Prolia injection will be in 6 months.     Patient was educated on safety of Prolia utilizing Patient Counseling Chart for Healthcare Providers, as outlined by the Prolia REMS progam.     15 minutes spent with the patient and more then 50 % of the time in counseling about osteoporosis, available osteoporosis treatment options, medication side effects and contraindications, supplement use and weightbearing exercise.    This note has been  dictated using voice recognition software. Any grammatical or context distortions are unintentional and inherent to the software      Patient Active Problem List   Diagnosis     Peripheral Neuropathy     Urticaria     Major Depression, Recurrent     Lump Or Mass In Breast     Anxiety     Limb Swelling     Irritable Bowel Syndrome     Primary osteoarthritis of right knee     Primary osteoarthritis of knee, left     Osteoporosis, senile     Esophageal reflux     Primary osteoarthritis involving multiple joints     Arthritis of first metatarsophalangeal (MTP) joint of right foot       Current Outpatient Medications on File Prior to Visit   Medication Sig Dispense Refill     aspirin 81 MG EC tablet Take 81 mg by mouth daily.       calcium citrate-vitamin D (CITRACAL+D) 315-200 mg-unit per tablet Take 1 tablet by mouth 2 (two) times a day.       citalopram (CELEXA) 40 MG tablet TAKE 1 TABLET DAILY 90 tablet 3     clonazePAM (KLONOPIN) 0.5 MG tablet Take 1 tablet (0.5 mg total) by mouth at bedtime. 90 tablet 0     dicyclomine (BENTYL) 10 MG capsule TAKE 1 CAPSULE FOUR TIMES A DAY AS NEEDED FOR CRAMPS 30 capsule 3     furosemide (LASIX) 20 MG tablet TAKE 1 TABLET DAILY 90 tablet 3     gabapentin (NEURONTIN) 100 MG capsule Take 100 mg by mouth daily. Increase dose as directed by chart.  May increase to 3 tabs 3 times daily 270 capsule 2     magnesium 250 mg Tab Take 250 mg by mouth daily.        melatonin 5 mg Tab tablet Take 5 mg by mouth at bedtime as needed.       multivit-min-iron-FA-lutein (CENTRUM SILVER WOMEN) 8 mg iron-400 mcg-300 mcg Tab Take one tablet by mouth daily       omeprazole (PRILOSEC) 20 MG capsule TAKE 1 CAPSULE DAILY BEFORE BREAKFAST 90 capsule 2     vit C/E/Zn/coppr/lutein/zeaxan (PRESERVISION AREDS-2 ORAL) Take 2 tablets by mouth daily.       LORazepam (ATIVAN) 1 MG tablet Fill at preferred pharmacy and bring to MRI appt. Do not take prior to arriving. You will need a  to bring you home after  your appt. 1 tablet 0     Current Facility-Administered Medications on File Prior to Visit   Medication Dose Route Frequency Provider Last Rate Last Dose     denosumab 60 mg (PROLIA 60 mg/ml)  60 mg Subcutaneous Q6 Months Mone Blanchard MD   60 mg at 03/12/20 1359     [DISCONTINUED] denosumab 60 mg (PROLIA 60 mg/ml)  60 mg Subcutaneous Q6 Months Mone Blanchard MD   60 mg at 09/10/19 0948

## 2021-06-06 NOTE — PROGRESS NOTES
ASSESSMENT: Adelina Espinoza is a 74 y.o. female with past medical history significant for peripheral neuropathy, depression, anxiety , osteoporosis (on Prolia), GERD who presents today for new patient evaluation of a greater than 2-month history of right greater than left low back pain with radiation into the right lower extremity.  Patient has not had any advanced imaging of the lumbar spine.  I am concerned that the patient may have a disc bulge or foraminal stenosis resulting in right L5 (or possibly L4) compromise.  Pain has been persistent despite medical treatment including corticosteroids, NSAIDs, Tylenol, muscle relaxers.  She was neurologically intact on exam other than numbness and tingling in the feet which is related to peripheral neuropathy from chemotherapy 15 years ago.    BOONE: 26  Who 5:15    PLAN:  A shared decision making model was used.  The patient's values and choices were respected.  The following represents what was discussed and decided upon by the physician assistant and the patient.      1.  DIAGNOSTIC TESTS: I ordered an MRI lumbar spine for further evaluation.    2.  PHYSICAL THERAPY:  Discussed the importance of core strengthening, ROM, stretching exercises with the patient and how each of these entities is important in decreasing pain.  Explained to the patient that the purpose of physical therapy is to teach the patient a home exercise program.  These exercises need to be performed every day in order to decrease pain and prevent future occurrences of pain.  Entered a referral for the patient begin physical therapy.    3.  MEDICATIONS:    -Gabapentin 100 mg was prescribed.  She was given the dosage titration chart.  She may increase her dose up to 300 g 3 times daily.  --Ativan 1 mg, #1 with no refills was prescribed for claustrophobia for the MRI scan.    4.  INTERVENTIONS: No interventions were ordered.  Patient may benefit from interventional pain management if she fails improve  conservative treatment, depending on the results of advanced imaging studies.    5.  PATIENT EDUCATION: Patient is in agreement the above plan.  All questions were answered.    6.  FOLLOW-UP:   A nurse will call the patient with the results of her MRI.  At that time I will likely recommend that she continue with physical therapy and follow-up with me in 4 weeks.  If she has questions or concerns in the meantime, she should not hesitate to call.      SUBJECTIVE:  Adelina Espinoza  Is a 74 y.o. female who presents today in consultation request of Dr. Delvalle for new patient evaluation of low back pain with radiation into the right lower extremity.  Patient reports that she began to have pain after a fall in December 2019.  She states that she was wearing reading glasses at the time and misjudged her step because of her change in depth perception.  She fell, landing on her right side.  She has had persistent pain ever since then.    Patient complains of bilateral low back pain.  Pain spans across low back and a broadband distribution at the belt line.  Pain is worse on the right than the left.  Pain extends into the buttocks and she has mild pain in the groin bilaterally as well.  Pain radiates on the right lateral thigh to the knee.  She symptoms feel significant pain right in the knee.  Pain then radiates down the lateral calf to the ankle.  Pain does not reach the foot.  She describes the back and buttock pain as an aching pain.  She describes the leg pain as a stabbing pain.  Pain is aggravated with walking.  She was recently in [place] and her walking was limited to 1 mile.  She previously could walk 5 to 7 miles without difficulty.  Pain tends to be worse in the morning.  It is also bad at the end of the day.  Patient states the other day the pain became so severe that she was crying out in pain.  Pain is alleviated with applying Biofreeze.  Patient has numbness and tingling in both feet which is chronic and  stable and related to chemotherapy for breast cancer 15 years ago.  She denies weakness on the legs.  She denies any loss of bowel or bladder control.  Denies any recent fevers, chills, or sweats.    Patient has not had any treatments for this.  She has not had any physical therapy for low back.  She does not go to a chiropractor.  She has never had any lumbar spine injections or lumbar spine surgery.  Patient is currently using ibuprofen 600 g twice daily and Tylenol 2 tabs twice daily.  She completed a course of prednisone and did not like the way it made her feel.  She does not recall if it helped.  She used cyclobenzaprine which helped with her pain but upset her stomach.  She applies Biofreeze which does help.  She tried CBD, pain relieving patches, heat, and ice, all without significant improvement.    Current Outpatient Medications on File Prior to Visit   Medication Sig Dispense Refill     aspirin 81 MG EC tablet Take 81 mg by mouth daily.       calcium citrate-vitamin D (CITRACAL+D) 315-200 mg-unit per tablet Take 1 tablet by mouth 2 (two) times a day.       citalopram (CELEXA) 40 MG tablet TAKE 1 TABLET DAILY 90 tablet 3     clonazePAM (KLONOPIN) 0.5 MG tablet Take 1 tablet (0.5 mg total) by mouth at bedtime. 90 tablet 0     cyclobenzaprine (FLEXERIL) 5 MG tablet Take 1 tablet (5 mg total) by mouth 3 (three) times a day as needed for muscle spasms. 20 tablet 0     dicyclomine (BENTYL) 10 MG capsule TAKE 1 CAPSULE FOUR TIMES A DAY AS NEEDED FOR CRAMPS 30 capsule 3     fluticasone propionate (FLONASE ALLERGY RELIEF) 50 mcg/actuation nasal spray 1 spray into each nostril daily. 16 g 0     furosemide (LASIX) 20 MG tablet TAKE 1 TABLET DAILY 90 tablet 3     magnesium 250 mg Tab Take 250 mg by mouth daily.        melatonin 5 mg Tab tablet Take 5 mg by mouth at bedtime as needed.       multivit-min-iron-FA-lutein (CENTRUM SILVER WOMEN) 8 mg iron-400 mcg-300 mcg Tab Take one tablet by mouth daily       omeprazole  (PRILOSEC) 20 MG capsule TAKE 1 CAPSULE DAILY BEFORE BREAKFAST 90 capsule 2     vit C/E/Zn/coppr/lutein/zeaxan (PRESERVISION AREDS-2 ORAL) Take 2 tablets by mouth daily.       Current Facility-Administered Medications on File Prior to Visit   Medication Dose Route Frequency Provider Last Rate Last Dose     denosumab 60 mg (PROLIA 60 mg/ml)  60 mg Subcutaneous Q6 Months Mone Blanchard MD   60 mg at 09/10/19 0938     denosumab 60 mg (PROLIA 60 mg/ml)  60 mg Subcutaneous Q6 Months Mone Blanchard MD           Allergies   Allergen Reactions     Adhesive Rash     Green Pepper      Stomach upset     Lactose Diarrhea     Monosodium Glutamate      diarrhea       Past Medical History:   Diagnosis Date     Anxiety      Arthritis      Breast cancer (H) 2003     Depression      Esophageal reflux      History of anesthesia complications     nausea     Hx antineoplastic chemotherapy 2003    for breast cancer     Hx of radiation therapy 2004    for breast cancer     IBS (irritable bowel syndrome)      Lactose intolerance     lactose breath test positive at GI, 7/2019     Macular degeneration     caught early, takes Areds 2 on a daily basis, seeing opthamology     Neuropathy      Osteopenia       Patient Active Problem List   Diagnosis     Peripheral Neuropathy     Urticaria     Major Depression, Recurrent     Lump Or Mass In Breast     Anxiety     Limb Swelling     Irritable Bowel Syndrome     Primary osteoarthritis of right knee     Primary osteoarthritis of knee, left     Osteoporosis, senile     Esophageal reflux     Primary osteoarthritis involving multiple joints     Arthritis of first metatarsophalangeal (MTP) joint of right foot         Past Surgical History:   Procedure Laterality Date     APPENDECTOMY  1967     BACK SURGERY      cervical fusion     BREAST BIOPSY Right 2003     BREAST LUMPECTOMY  12/2003    breast cancer     CHOLECYSTECTOMY  1994     HYSTERECTOMY  1997    desmoid cyst     NISSEN FUNDOPLICATION        OOPHORECTOMY  1997     PA TOTAL KNEE ARTHROPLASTY Right 10/29/2015    Procedure: RIGHT TOTAL KNEE  ARTHROPLASTY;  Surgeon: Nitin Whtie MD;  Location: [facility] Main OR;  Service: Orthopedics     PA TOTAL KNEE ARTHROPLASTY Left 4/21/2016    Procedure: LEFT TOTAL KNEE ARTHROPLASTY;  Surgeon: Nitin White MD;  Location: [facility] Main OR;  Service: Orthopedics       Family History   Problem Relation Age of Onset     Breast cancer Maternal Aunt 46     Kidney cancer Mother 67     Hypertension Mother      Heart disease Mother      Alzheimer's disease Father      Atrial fibrillation Father      Aortic stenosis Father      Skin cancer Father      Stroke Father      Depression Father      Lung cancer Paternal Grandfather        Social history: Patient is .  She is retired.  She taught  and second grade.  She denies tobacco use.  She drinks 2-3 alcoholic beverages per week.  She denies illicit drug use.      ROS: Positive for hoarseness, difficulty swallowing, change in the vision, feet/leg swelling, diarrhea, constipation, reflux, joint pain, muscle pain, muscle fatigue, sciatica, imbalance, falls, excessive tiredness, anxiety.  Specifically negative for bowel/bladder dysfunction, fevers,chills, appetite changes, unexplained weight loss.   Otherwise 13 systems reviewed are negative.  Please see the patient's intake questionnaire from today for details.      OBJECTIVE:  PHYSICAL EXAMINATION:    CONSTITUTIONAL:  Vital signs as above.  No acute distress.  The patient is well nourished and well groomed.  PSYCHIATRIC:  The patient is awake, alert, oriented to person, place, time and answering questions appropriately with clear speech.    HEENT: Normocephalic, atraumatic.  Sclera clear.  Neck is supple.  SKIN:  Skin over the face, bilateral lower extremities, and posterior torso is clean, dry, intact without rashes.    GAIT:  Gait is non-antalgic.  The patient is able to heel and toe walk without  significant difficulty.    STANDING EXAMINATION: Tender to palpation right lower lumbar paraspinous muscles.  Lumbar flexion mildly restricted.  Lumbar extension mildly restricted.  MUSCLE STRENGTH:  The patient has 5/5 strength for the bilateral hip flexors, knee flexors/extensors, ankle dorsiflexors/plantar flexors, great toe extensors, ankle evertors/invertors.  NEUROLOGICAL: 1+ patellar, and achilles reflexes bilaterally.  Negative Babinski's bilaterally.  No ankle clonus bilaterally.  Subjective diminished sensation both feet in a stocking distribution.  VASCULAR:  2/4 dorsalis pedis pulses bilaterally.  Bilateral lower extremities are warm.  There is 1+ pitting edema of the bilateral lower extremities.  ABDOMINAL:  Soft, non-distended, non-tender throughout all quadrants.  No pulsatile mass palpated in the left lower quadrant.  LYMPH NODES:  No palpable or tender inguinal lymph nodes.  MUSCULOSKELETAL: Straight leg raise positive on the right, negative on the left.

## 2021-06-06 NOTE — TELEPHONE ENCOUNTER
----- Message from Mary Daniels PA-C sent at 3/17/2020  9:56 AM CDT -----  Please call the patient and let her know that I reviewed her MRI lumbar spine.  This does show severe narrowing around the nerves at L4-5 related to a disc bulge as well as a right-sided synovial cyst and advanced arthritis.  Due to the severity of the stenosis, I think it would be reasonable to try a right L4-5, L5-S1 transforaminal epidural steroid injection, along with the physical therapy.  Ultimately, she may require surgery for this problem, but we will trial conservative treatments first.  If she fails to improve, I will refer her to surgery.

## 2021-06-06 NOTE — PATIENT INSTRUCTIONS - HE
Prolia 10th today.  Prolia 11th in 6 months with my nurse. I will see you in 1 year.    DXA in 9/2020 .   Phone number to schedule [phone].    Daily calcium need is 0170-1808 mg a day from the diet and supplements.  Calcium citrate is easier to digest.  Vitamin D 2000 IU daily recommended.

## 2021-06-07 ENCOUNTER — COMMUNICATION - HEALTHEAST (OUTPATIENT)
Dept: FAMILY MEDICINE | Facility: CLINIC | Age: 76
End: 2021-06-07

## 2021-06-07 DIAGNOSIS — F33.9 MAJOR DEPRESSIVE DISORDER, RECURRENT EPISODE (H): ICD-10-CM

## 2021-06-07 DIAGNOSIS — F41.1 ANXIETY STATE: ICD-10-CM

## 2021-06-07 RX ORDER — CITALOPRAM HYDROBROMIDE 40 MG/1
TABLET ORAL
Qty: 90 TABLET | Refills: 3 | Status: SHIPPED | OUTPATIENT
Start: 2021-06-07 | End: 2022-06-03

## 2021-06-07 NOTE — PROGRESS NOTES
[facility] Daily Progress     Patient Name: Adelina Espinoza  Date: 3/20/2020  Visit #:   PTA visit #:0  Referral Diagnosis: Lumbar Radiclitis  Referring provider: Mary Daniels PA-C  Visit Diagnosis:     ICD-10-CM    1. Chronic bilateral low back pain with bilateral sciatica  M54.42     M54.41     G89.29    2. Lumbosacral radiculitis  M54.17          Assessment:     Results of Treatment      Tender points resolved   SI dysf resolved   Pain reduced 2-3/10    HEP/POC compliance is  good .  Patient demonstrates understanding/independence with home program.    Goal Status:  No data recorded  Pt will: be able to change sleep positions without pain  Pt will: beable to bend forward to dress , bath put on shoes and socks without pain  Pt will: Be able to reach above her head to wash, fix hair and reach into a shelf with decreased pain  Pt will: vianey be able to carry groceries, laundry with decreased pain      Plan / Patient Education:     Continue with initial plan of care.  Progress with home program as tolerated.    Subjective:     Pain Ratin/10  LBP    Buttock Pain R  5/10   From pulling off my shoe        Objective:     L SI Downslip    4 sec,   Sphenoid restriction.   Dural restrictions C4,7    Treatment Today     TREATMENT MINUTES COMMENTS   Evaluation     Self-care/ Home management     Manual therapy 60 MFR with OA, L5-SI and SI joint releases, Dural Tube Pull.  Sphenoid release SCS to PGL!-N  R PGLS-N,   SCS to PS2-N R,    R PUD-N,   R OBTL3-N,   R SCIL5-N,   R VNR1,2-N,    SCS to L FEML2-N,   L VNRS2-N,       Neuromuscular Re-education     Therapeutic Activity     Therapeutic Exercises     Gait training     Modality__________________                Total 60    Blank areas are intentional and mean the treatment did not include these items.       Rahul Deutsch  3/20/2020

## 2021-06-07 NOTE — PROGRESS NOTES
[facility] Daily Progress     Patient Name: Adelina Espinoza  Date: 3/23/2020  Visit #: 3/12  PTA visit #:0  Referral Diagnosis: Lumbar Radiculitis  Referring provider: Mary Daniels PA-C  Visit Diagnosis:     ICD-10-CM    1. Chronic bilateral low back pain with bilateral sciatica  M54.42     M54.41     G89.29    2. Lumbosacral radiculitis  M54.17          Assessment:     Results of Treatment     Neuro and Periosteal releases achieved.   Pain levels to be observed by the pt   MF tone to the sacruim , Illums and femurs normalizing     HEP/POC compliance is  good .  Patient demonstrates understanding/independence with home program.    Goal Status:  No data recorded  Pt will: be able to change sleep positions without pain  Pt will: beable to bend forward to dress , bath put on shoes and socks without pain  Pt will: Be able to reach above her head to wash, fix hair and reach into a shelf with decreased pain  Pt will: vianey be able to carry groceries, laundry with decreased pain      Plan / Patient Education:     Continue with initial plan of care.  Progress with home program as tolerated.    Subjective:     Pain Rating: 3/10  LBP    R Buttock Pain R  3/10          Objective:     SI's are level   + Scan Ant Neuro  Pelvis,   Periosteal Pelvis, Sacrum,  LE's    Treatment Today     TREATMENT MINUTES COMMENTS   Evaluation     Self-care/ Home management     Manual therapy 60 MFR with OA, L5-SI and SI joint releases, Dural Tube Pull.  SCS to SACE-P,   BST-P R,   CX1,3,5-P R.   TUBAB-P bilat,   SGL-N L,   ILOF-P,   ISCHO-P,   Temporal Bone release.  FVAR-P L,   FEMF-P  FEMIT-P,    FEMET-P,   Frontal Nasal release.   ISCHO-P R    IHYP-N   Neuromuscular Re-education     Therapeutic Activity     Therapeutic Exercises     Gait training     Modality__________________                Total 60    Blank areas are intentional and mean the treatment did not include these items.       Rahul Deutsch  3/23/2020

## 2021-06-08 NOTE — TELEPHONE ENCOUNTER
"PSP:  Mary DIAZ  Last clinic visit:  5/28/2020 Video visit; Inj 6/4/2020  Reason for call: Gabapentin side effects  Clinical information:  Pt calling to report after increasing her dose to gabapentin 300 2-2-2 she now feels \"off balance and out of it\". Pt reports she lost her balance on Sunday and fell on her neighbors dock. She states she has a large bruise on her thigh. She went to physical therapy yesterday \"and they taped me up\".   Advised pt to decrease her dose of gabapentin. She will go back to 1-1-1 and see how she does at this dose as she was not experiencing these side effects at this dose previously.   Pt also has video visit follow-up appt with Mary DIAZ next week.   Advice given to patient: Advised pt to decrease dose of gabapentin to 1-1-1 and monitor her symptoms. She will call the nurse navigation line with questions or concerns.   Informed her that Mary would be updated with her status and if she has further/different recommendations then pt will be called back.   Provider to address: JULIANO. Pt will be called back if you have further recommendations.     "

## 2021-06-08 NOTE — PROGRESS NOTES
[facility] Daily Progress     Patient Name: Adelina Espinoza  Date: 6/3/2020  Visit #: 6/12   PTA visit #:0    Referral Diagnosis: Lumbar Radiculitis  Referring provider: Mary Daniels PA-C  Visit Diagnosis:     ICD-10-CM    1. Chronic bilateral low back pain with bilateral sciatica  M54.42     M54.41     G89.29    2. Lumbosacral radiculitis  M54.17          Assessment:     Treatment Results  Pain to the R LE and Low Back reducing   Pt scheduled for an lumbar injection 6/4/20  To address the L4 cyst   LBP reduced 3/10 to 2/10    HEP/POC compliance is  good .  Patient demonstrates understanding/independence with home program.    Goal Status:  No data recorded  Pt will: be able to change sleep positions without pain  Pt will: beable to bend forward to dress , bath put on shoes and socks without pain  Pt will: Be able to reach above her head to wash, fix hair and reach into a shelf with decreased pain  Pt will: vianey be able to carry groceries, laundry with decreased pain      Plan / Patient Education:     Continue with initial plan of care.  Progress with home program as tolerated.    Subjective:     Pain Rating:3/10 now but will increase to 8/10 when prolonged standing   Taking 1500 mg of Gabapentin per day    Objective:     + Scan Lymphatics   Ant NeuroPelvis,       LE 4 sec  Edema to lower leg    Treatment Today     TREATMENT MINUTES COMMENTS   Evaluation     Self-care/ Home management     Manual therapy 58 MFR with OA, L5-SI and SI joint releases, Dural Tube Pull  Sphenoid release.  R  EIL-LV,   R LUM-LV   L PUD-A,   SCS to PEPIL4,3,2,1,T12,11,10,9,8,-LV   OI-N,   L SGL-N,   IGL-N,      Neuromuscular Re-education     Therapeutic Activity     Therapeutic Exercises     Gait training     Modality__________________                Total 58    Blank areas are intentional and mean the treatment did not include these items.       Rahul Deutsch  6/3/2020

## 2021-06-08 NOTE — PROGRESS NOTES
PROGRESS NOTE   1/25/2017    SUBJECTIVE:  Adelina Espinoza is a 71 y.o. female  who presents for   Chief Complaint   Patient presents with     Anxiety     Recheck     Sore Throat     Check sore throat, swollen glands, headache x 4 days     Patient comes in today for recheck of her anxiety.  She has been on Celexa for several years and is to working well for her.  Please see PHQ 9 and DERIK which were both completed in their entirety today.  She feels like this medication is working well and has no concerns regarding that.  She continues to be worried about a lot of things but they seem more like the appropriate things for her to be worried about.  She continues on 40 mg of Celexa and does not have any side effects from that.  She does have lorazepam that she uses on an as-needed basis.  She admits that she had use it more in December than she has prior to that but now that [place] is over she is doing better and has not had to use lorazepam again.  She feels like overall things are very stable.  She is not suicidal or homicidal.  They are leaving to go to [place] next week and she is very excited about that.  She would like to continue on this medication.  Her other concern is that she has been sick since Friday.  She started out having some hot flashes in her eyes were watery.  She really did not think that much of it but then on Sunday she started with a sore throat and sore throat is just gradually getting worse.  She is also had a headache as well as the watery eyes and a low-grade fever.  She has had a little bit of a cough which has been nonproductive in nature.  She really has not had any other symptoms.  As mentioned above she is going to [place] next week and wants to make sure that she gets better before then.    Patient Active Problem List   Diagnosis     Peripheral Neuropathy     Urticaria     Major Depression, Recurrent     Ecchymosis     Skin: A Rash     Lump Or Mass In Breast     Anxiety     Limb  Swelling     Irritable Bowel Syndrome     Primary osteoarthritis of right knee     Primary osteoarthritis of knee, left     Osteoporosis, senile       Current Outpatient Prescriptions   Medication Sig Dispense Refill     calcium citrate-vitamin D (CITRACAL+D) 315-200 mg-unit per tablet Take 1 tablet by mouth 2 (two) times a day.       citalopram (CELEXA) 40 MG tablet TAKE 1 TABLET DAILY 90 tablet 1     cyanocobalamin (VITAMIN B-12) 1000 MCG tablet Take 1,000 mcg by mouth daily.       dicyclomine (BENTYL) 10 MG capsule Take 1 capsule (10 mg total) by mouth 4 (four) times a day as needed. For cramps 90 capsule 1     docusate (STOOL SOFTENER) 60 mg/15 mL syrup Take 60 mg by mouth daily.       furosemide (LASIX) 20 MG tablet TAKE 1 TABLET DAILY AS DIRECTED 90 tablet 1     LORazepam (ATIVAN) 0.5 MG tablet TAKE ONE TABLET BY MOUTH THREE TIMES DAILY as needed for anxiety 10 tablet 0     magnesium 250 mg Tab Take 250 mg by mouth daily.        MULTIVIT &MINERALS/FERROUS FUM (MULTI VITAMIN ORAL) Take 1 tablet by mouth daily.       omeprazole (PRILOSEC) 20 MG capsule TAKE 1 CAPSULE DAILY 90 capsule 0     tretinoin (RETIN-A) 0.025 % cream Apply 1 application topically daily as needed.       amoxicillin (AMOXIL) 500 MG tablet Take 1 tablet (500 mg total) by mouth 3 (three) times a day for 10 days. May substitute capsules 30 tablet 0     Current Facility-Administered Medications   Medication Dose Route Frequency Provider Last Rate Last Dose     denosumab 60 mg (PROLIA 60 mg/ml)  60 mg Subcutaneous Q6 Months Anjali Ramires MD   60 mg at 03/10/16 1440       Allergies   Allergen Reactions     Adhesive Rash     Green Pepper      Stomach upset     Monosodium Glutamate      diarrhea     Peanut Other (See Comments)     Sore mouth       Past Medical History   Diagnosis Date     Anxiety      Arthritis      Breast cancer 2003     Depression      Esophageal reflux      History of anesthesia complications      goofy     Hx  "antineoplastic chemotherapy      for breast cancer     Hx of radiation therapy      for breast cancer     IBS (irritable bowel syndrome)      Neuropathy      Osteopenia        Past Surgical History   Procedure Laterality Date     Cholecystectomy  1994     Appendectomy  1967     Hysterectomy  1997     desmoid cyst     Oophorectomy  1997     Breast lumpectomy  12/2003     breast cancer     Breast biopsy Right 2003     Back surgery       cervical fusion     Nissen fundoplication       Pr total knee arthroplasty Right 10/29/2015     Procedure: RIGHT TOTAL KNEE  ARTHROPLASTY;  Surgeon: Nitin White MD;  Location: [facility] Main OR;  Service: Orthopedics     Joint replacement Right      knee     Pr total knee arthroplasty Left 4/21/2016     Procedure: LEFT TOTAL KNEE ARTHROPLASTY;  Surgeon: Nitin White MD;  Location: [facility] Main OR;  Service: Orthopedics       History   Smoking Status     Never Smoker   Smokeless Tobacco     Never Used       OBJECTIVE:     Visit Vitals     /72 (Patient Site: Left Arm, Patient Position: Sitting, Cuff Size: Adult Regular)     Pulse 70  Comment: regular     Temp 98.8  F (37.1  C) (Oral)     Resp 14  Comment: regular     Ht 5' 3.5\" (1.613 m)     Wt 160 lb 12.8 oz (72.9 kg)     BMI 28.04 kg/m2       Physical Exam:  GENERAL APPEARANCE: A&A, NAD, well hydrated, well nourished  SKIN:  Normal skin turgor, no lesions/rashes   HEENT: moist mucous membranes, no rhinorrhea, PERRLA, TM's clear bilaterally, Throat without significant erythema or exudate.  NECK: Supple, full ROM, no significant lymphadenopathy or thyromegaly  CV: RRR, no M/G/R   LUNGS: CTAB  EXTREMITY: no edema   NEURO: no gross deficits   PSYCHIATRIC;  Mood appropriate, memory intact  A&O x3, thought processes congruent, non-tangential. No hallucinations/delusions. Insight/judgment: intact. Denies suicidal/homicidal ideations.      LABS:     Recent Results (from the past 240 hour(s))   Rapid Strep A Screen-Throat "   Result Value Ref Range    Rapid Strep A Antigen Group A Strep detected (!) No Group A Strep detected       ASSESSMENT/PLAN:     Anxiety state [F41.1]      1. Anxiety    2. Strep throat  - amoxicillin (AMOXIL) 500 MG tablet; Take 1 tablet (500 mg total) by mouth 3 (three) times a day for 10 days. May substitute capsules  Dispense: 30 tablet; Refill: 0    3. Sore throat  - Rapid Strep A Screen-Throat    Patient overall seems to be doing okay.  In terms of her anxiety seems like things are doing well.  She is tolerating the 40 mg of Celexa without problems.  She did not need a refill today as she just recently got a small refill as she was due to come in for recheck.  When she does need a refill of her Celexa she certainly will let me know.  It seems to working well and she is not suicidal or homicidal.  She does have lorazepam at home that she can use as needed for increased anxiety but it sounds like she is using that very responsibly and has not had to use that recently.  If she has increasing problems or concerns or has any changes in her symptoms she should let me know.  Otherwise we will see her in about 6 months for follow-up of her anxiety since she is doing so well.  In terms of her sore throat she did test positive for strep.  I am going to go ahead and start her on amoxicillin for 10 days.  If she has increasing problems or concerns or does not have gradual improvement in her symptoms she certainly should let me know.  Otherwise we will see her on an as-needed basis.  She initially made this appointment for some left foot pain that she was having but that seems to have completely resolved.  She has increasing problems with that will let me know as well.  Migdalia Delvalle MD

## 2021-06-08 NOTE — PATIENT INSTRUCTIONS - HE
Please follow up two weeks post procedure with Mary via virtual visit to evaluate your plan of care.       DISCHARGE INSTRUCTIONS    During office hours (8:00 a.m.- 4:00 p.m.) questions or concerns may be answered  by calling [facility] Navigation Nurses at  [phone].  Messages received after hours will be returned the following business day.      In the case of an emergency, please dial 911 or seek assistance at the nearest Emergency Room/Urgent Care facility.     All Patients:    ? You may experience an increase in your symptoms for the first 2 days (It may take anywhere between 2 days- 2 weeks for the steroid to have maximum effect).    ? You may use ice on the injection site, as frequently as 20 minutes each hour if needed.    ? You may take your pain medicine.    ? You may continue taking your regular medication after your injection. If you have had a Medial Branch Block you may resume pain medication once your pain diary is completed.    ? You may shower. No swimming, tub bath or hot tub for 48 hours.  You may remove your bandaid/bandage as soon as you are home.    ? You may resume light activities, as tolerated.    ? Resume your usual diet as tolerated.    ? It is strongly advised that you do not drive for 1-3 hours post injection.    ? If you have had oral sedation:  Do not drive for 8 hours post injection.      ? If you have had IV sedation:  Do not drive for 24 hours post injection.  Do not operate hazardous machinery or make important personal/business decisions for 24 hours.      POSSIBLE STEROID SIDE EFFECTS (If steroid/cortisone was used for your procedure)    -If you experience these symptoms, it should only last for a short period      Swelling of the legs                Skin redness (flushing)       Mouth (oral) irritation     Blood sugar (glucose) levels              Sweats                      Mood changes    Headache    Sleeplessness         POSSIBLE PROCEDURE SIDE EFFECTS  -Call the Spine  Center if you are concerned    Increased Pain             Increased numbness/tingling        Nausea/Vomiting            Bruising/bleeding at site        Redness or swelling                                                Difficulty walking        Weakness             Fever greater than 100.5    *In the event of a severe headache after an epidural steroid injection that is relieved by lying down, please call the [facility] to speak with a clinical staff member*

## 2021-06-08 NOTE — PROGRESS NOTES
[facility] Daily Progress     Patient Name: Adelina Espinoza  Date: 5/12/2020  Visit #: 4/12  PTA visit #:0  Referral Diagnosis: Lumbar Radiculitis  Referring provider: Migdalia Delvalle MD  Visit Diagnosis:     ICD-10-CM    1. Chronic bilateral low back pain with bilateral sciatica  M54.42     M54.41     G89.29    2. Lumbosacral radiculitis  M54.17          Assessment:     There was good release of fascial tensions treated today. This should improve her LBP and and pain into her leg.     HEP/POC compliance is  good .  Patient demonstrates understanding/independence with home program.    Goal Status:  No data recorded  Pt will: be able to change sleep positions without pain  Pt will: beable to bend forward to dress , bath put on shoes and socks without pain  Pt will: Be able to reach above her head to wash, fix hair and reach into a shelf with decreased pain  Pt will: vianey be able to carry groceries, laundry with decreased pain      Plan / Patient Education:     Continue with initial plan of care.  Progress with home program as tolerated.    Subjective:     Pain Rating: 3/10    She did just take the Gabapentin and Advil today and it is feeling pretty good now.   Most of the time there is a sharp pain on the outside of the leg on the R lower leg.   She did feel pretty good after the previous treatments. She did notice that she hadn't had any IBS symptoms until 2 weeks ago.      Objective:     Neural, visc fascial tensions.     Treatment Today     TREATMENT MINUTES COMMENTS   Evaluation     Self-care/ Home management     Manual therapy 55 Fascial release using Strain-Counterstrain of B abd pre-gang-N, B lower thor/lumbar post-gang-N, B lower abd-V   Neuromuscular Re-education     Therapeutic Activity     Therapeutic Exercises     Gait training     Modality__________________                Total 55    Blank areas are intentional and mean the treatment did not include these items.       Carson  Priscilla  5/12/2020

## 2021-06-08 NOTE — PROGRESS NOTES
[facility] Daily Progress     Patient Name: Adelina Espinoza  Date: 2020  Visit #:    PTA visit #:   0     Referral Diagnosis: Lumbar Radiculitis  Referring provider: Mary Daniels PA-C  Visit Diagnosis:     ICD-10-CM    1. Chronic bilateral low back pain with bilateral sciatica  M54.42     M54.41     G89.29    2. Lumbosacral radiculitis  M54.17    3. Swelling of limb  M79.89          Assessment:     Results of Treatment   Lymphatic flow to the T duct, [place] channels, R LE normalized   Pt ind in the wearing of KT.   Handout provided    HEP/POC compliance is  good .  Patient demonstrates understanding/independence with home program.    Goal Status:  No data recorded  No data recorded      Plan / Patient Education:     Continue with initial plan of care.  Progress with home program as tolerated.    Subjective:     Pain Ratin-5/10 to the Low Back.   [place]  Large hematoma to the lateral posterior thigh and medial knee       Objective:     R SI upslip,    4 sec  Lymphatic restrictions T Duct 4 sec   R Groin  And thigh 10 sec.       Treatment Today     TREATMENT MINUTES COMMENTS   Evaluation     Self-care/ Home management     Manual therapy 47 SCS to FEMET-P R,   MFR with OA, L5-SI and SI joint releases, Dural Tube Pull.   SCS to R DLFC-LV,   SAPH-LV,  LUM-LV,   IIL-LV,   Lymphatics of the abdomen and R LE.       Neuromuscular Re-education 15 KTing of R Lat-Post thigh and R Knee hematomas   Therapeutic Activity     Therapeutic Exercises     Gait training     Modality__________________                Total 62    Blank areas are intentional and mean the treatment did not include these items.       Rahul Deutsch  2020

## 2021-06-08 NOTE — PROGRESS NOTES
[facility] Daily Progress     Patient Name: Adelina Espinoza  Date: 6/9/2020  Visit #: 7/12   PTA visit #:         Referral Diagnosis: Lumbar Radiculitis  Referring provider: Migdalia Delvalle MD  Visit Diagnosis:     ICD-10-CM    1. Chronic bilateral low back pain with bilateral sciatica  M54.42     M54.41     G89.29    2. Lumbosacral radiculitis  M54.17    3. Swelling of limb  M79.89          Assessment:     There was good release of fascial tensions with treatment today which should con't to improve her overall sx. There was a good decrease of sx with her injection which has allowed her for function.     HEP/POC compliance is  good .  Patient demonstrates understanding/independence with home program.    Goal Status:  No data recorded  Pt will: be able to change sleep positions without pain  Pt will: beable to bend forward to dress , bath put on shoes and socks without pain  Pt will: Be able to reach above her head to wash, fix hair and reach into a shelf with decreased pain  Pt will: vianey be able to carry groceries, laundry with decreased pain      Plan / Patient Education:     Continue with initial plan of care.  Progress with home program as tolerated.    Subjective:     Pain Rating:3/10   She did have an injection last week. She is feeling very good at the moment. She is sleeping better as well. She is trying to do more in the yard as well.   She is trying not to take Ibuprofen as well.  She is also cutting back on Tylenol as well.     Objective:     Art, visc fascial tensions.     Treatment Today     TREATMENT MINUTES COMMENTS   Evaluation     Self-care/ Home management     Manual therapy 55 Fascial release using Strain-Counterstrain of BLE/lumbar-A, B lower abd-V    Neuromuscular Re-education     Therapeutic Activity     Therapeutic Exercises     Gait training     Modality__________________                Total 55    Blank areas are intentional and mean the treatment did not include these  items.       Carson Wells  6/9/2020

## 2021-06-08 NOTE — TELEPHONE ENCOUNTER
PHONE CONSENT:    Due to the COVID-19 pandemic, consents are being performed by phone in order to decrease face-to-face time in order to protect patients and staff.  The patient has been offered other conservative treatment (physical therapy, medications, etc.) but has opted to proceed with an injection.  The risks and benefits of the injection (L4-5 and L5-S1) were discussed with the patient over the phone on 6-4-20.  The patient was told that the corticosteroid injection will somewhat suppress the immune system.  This could potentially increase the chance of catching the virus if exposed.  If the patient already has the virus but is asymptomatic, having a corticosteroid injection could potentially worsen the course of the virus.  These are theoretical risks.  The patient opted to proceed with the injection at this time.   Other risks of the injection include infection, bleeding, damage to surrounding structures, nerve injury, permanent weakness, permanent paralysis, worsened pain, soreness, headache, allergic reaction, no change in pain.      The patient understands that they cannot have symptoms of an infection or be on antibiotics at the time of the injection as this would increase their risk of infection. If they start antibiotics prior to the procedure, they should call the clinic to see if the procedure will need to be rescheduled.  The patient understands that if they start any blood thinners prior to the injection, the provider must be notified immediately.  The patient denies any allergies to iodine contrast dye or iodine products.  The patient denies any symptoms of an active infection (cough, fevers, myalgias) and denies taking antibiotics.  The patient knows not to come in to clinic if they have any symptoms of an active infection, particularly cough, fevers, myalgias.   The patient denies taking any prescription blood thinning medications. The patient denies any allergies to iodine or iodine contrast.        The patient verbalized understanding of the information given. The patient was given the opportunity to ask questions of the physician.  The patient elected to proceed and gave consent over the phone with Jey Pro DO as a witness.  Informed consent form was signed by the physician and the witness.

## 2021-06-08 NOTE — PATIENT INSTRUCTIONS - HE
Gabapentin 300mg Dosing Chart    DATE  MORNING AFTERNOON BEDTIME                                              Day 7 1 1 1    Day 8 1 1 1    Day 9 1 1 1    Day 10 1 1 2    Day 11 1 1 2    Day 12 1 1 2    Day 13 2 1 2    Day 14 2 1 2    Day 15 2 1 2    Day 16 2 2 2    Day 17 2 2 2    Day 18 2 2 2     Continue medication, taking 2 capsules three times daily    Please call if you have any questions regarding how to take your medication  Clinic Phone # [phone]

## 2021-06-09 NOTE — PROGRESS NOTES
"Adelina Espinoza is a 74 y.o. female who is being evaluated via a billable video visit.      The patient has been notified of following:     \"This video visit will be conducted via a call between you and your physician/provider. We have found that certain health care needs can be provided without the need for an in-person physical exam.  This service lets us provide the care you need with a video conversation.  If a prescription is necessary we can send it directly to your pharmacy.  If lab work is needed we can place an order for that and you can then stop by our lab to have the test done at a later time.    Video visits are billed at different rates depending on your insurance coverage. Please reach out to your insurance provider with any questions.    If during the course of the call the physician/provider feels a video visit is not appropriate, you will not be charged for this service.\"    Patient has given verbal consent to a Video visit? Yes    Patient would like to receive their AVS by AVS Preference: E-Mail (Inform patient AVS not encrypted with this option).    Patient would like the video invitation sent by: Text to cell phone: [phone]    Will anyone else be joining your video visit? Yes: spouse.         Video Start Time: 1:05 PM    Additional provider notes:   Assessment:   Adelina Espinoza is a 74 y.o. y.o. female with past medical history significant for peripheral neuropathy, depression, anxiety, osteoporosis (on Prolia), GERD who presents today for follow-up regarding chronic and recurring left low back pain with radiation into the right lower extremity.  MRI lumbar spine shows severe spinal stenosis at L4-5 with moderate right lateral recess stenosis at this level related to a broad-based disc bulge and a right synovial cyst and advanced facet arthropathy.  Patient is status post a right L4-5, L5-S1 transforaminal epidural steroid injection June 4, 2020 which has provided 70% relief of her " "pain.  -Patient had a fall on June 14, 2020 her having balance difficulties due to increasing her gabapentin dose up to 600 mg 3 times daily.  She fell while stepping onto a deck and landed on her right buttock/hip.  She has some ongoing low back and right lower extremity pain which is likely a combination of residual lumbar radicular pain and pain related to the fall.  She states that she has significant bruising which is resolving.       Plan:     A shared decision making plan was used.  The patient's values and choices were respected.  The following represents what was discussed and decided upon by the physician assistant and the patient.      1.  DIAGNOSTIC TESTS: I reviewed the MRI lumbar spine in detail with the patient with help of a spine model.  No further diagnostic tests were ordered.    2.  PHYSICAL THERAPY: Patient is currently in physical therapy.  She has had 8 sessions of far.  She will continue with physical therapy and the home exercises.    3.  MEDICATIONS:    -Patient experienced excellent pain relief but significant side effects of gabapentin when she was taking 600 mg 3 times daily.  She states that she felt \"off balance and out of it.\"  She had a fall.  She decreased her dose back to gabapentin 300 mg 3 times daily.  She states that she feels \"back to normal\" at this dose.  We will have her continue at this dose for now.  I would not recommend titrating her dose higher than 300 mg 3 times daily in the future.  -Patient can continue on Tylenol 1000 mg daily.  -Patient can continue using ibuprofen 400-600 mg daily.      4.  INTERVENTIONS:    -No further interventions were ordered.  Patient is pleased with the relief that she had following her right L4-5 and L5-S1 transforaminal epidural steroid injection.  I would like to see how her pain is in 1 month after she completes her course of physical therapy and after the soft tissue pain related to her fall has resolved.    -If she still has " "significant pain in 4 weeks, I would recommend trying a right L4-5 facet joint injection/synovial cyst aspiration.  -Otherwise, we could repeat the right L4-5, L5-S1 transforaminal epidural steroid injection when/if pain returns.    5.  PATIENT EDUCATION: surgery  -If the patient fails to have lasting relief with nonsurgical treatments, I would recommend a referral to neurosurgery.  -Patient is in agreement the above plan.  All questions were answered.    6.  FOLLOW-UP: I will have a video follow-up with the patient in 4 weeks.  If she has questions or concerns in the meantime, she should not hesitate to call.    Subjective:     Adelina Espinoza is a 74 y.o. female who presents today for follow-up regarding regarding left low back pain with radiation into the right lower extremity.  Patient status post a right L4-5, L5-S1 transforaminal epidural steroid injection June 4, 2020.  Patient reports 70% relief of her pain overall.  She has some residual right low back and right lower extremity pain, but she attributes at least some of this pain to a fall that happened on June 14, 2020.  Patient states that after our visit on May 28, 2020 she gradually titrated her gabapentin dose from 300 mg 3 times daily up to 600 mg 3 times daily.  When she reached the dose of 600 mg 3 times daily she had excellent relief of her pain (she states that the pain was gone) but she began to experience balance difficulties and she felt \"out of it\" as if there was a fog in front of her face.  She states that on June 14 she was stepping up onto a deck when she lost her balance.  She grabbed for a swivel chair to catch herself, but that caused her to twist and fall onto the ground, landing on her right buttock/hip.  She developed significant bruising from the fall.  She continues to have some right low back, buttock, and thigh pain.  She states the bruising is resolving.  She weaned back down to gabapentin 300 mg 3 times daily.  She states that " "she feels \"back to normal\" in terms of her balance and her cognition.  However, she notes that pain control is not as good as it was when she was taking the higher dose.    Patient complains of centralized low back pain.  She has pain that radiates into the right buttock and proximal posterior lateral thigh.  She states that she also feels an occasional twinge on the right lateral calf, residential between the right knee and right ankle.  She denies any numbness, tingling, or weakness down the leg.  She rates her pain today is a 3 out of 10.  At its worst it is a 6 out of 10.  At its best it is a 1-10.  Pain is aggravated standing too long.  Pain is alleviated with walking.    Patient has had 8 sessions of physical therapy.  She is taking gabapentin 300 mg 3 times daily.  She is ibuprofen 400 to 6 mg daily.  She uses Tylenol 1000 mg daily.    Past medical history is reviewed and is unchanged.    Review of Systems:  Positive for headache, falls.  Negative for numbness/tingling, weakness, loss of bowel/bladder control, inability to urinate, pain much worse at night, difficulty swallowing, difficulty with hand skills, fevers, unintentional weight loss.     Objective:   CONSTITUTIONAL:  Vital signs as above.  No acute distress.  The patient is well nourished and well groomed.    PSYCHIATRIC:  The patient is awake, alert, oriented to person, place and time.  The patient is answering questions appropriately with clear speech.  Normal affect.  HEENT: Normocephalic, atraumatic.  Sclera clear.    SKIN: Exposed skin is clean, dry, intact without rashes.  MUSCULOSKELETAL:  Gait is non-antalgic.  The patient is able to heel and toe walk without any difficulty.    Lumbar range of motion is within normal limits for flexion and extension.       RESULTS: I reviewed the MRI lumbar spine from [facility] dated March 13, 2020.  At L4-5 there is a broad-based disc bulge and superimposed central disc protrusion.  There is a 7.8 x 8.1 mm " medial right-sided synovial cyst and severe bilateral facet arthropathy.  There is also a small amount of edema within the soft tissues adjacent to the facet joints.  There is severe spinal canal stenosis.  There is moderate right lateral recess stenosis.  Patient also has mild bilateral lateral recess gnosis L4-5.  There is severe right and moderate left facet arthropathy at L5-S1 but no neural compromise.  Please see report for further details.          Video-Visit Details    Type of service:  Video Visit    Video End Time (time video stopped): 1:18 PM  Originating Location (pt. Location): Home    Distant Location (provider location):  [facility]     Platform used for Video Visit: Herb Daniels PA-C

## 2021-06-09 NOTE — PROGRESS NOTES
[facility] Daily Progress     Patient Name: Adelina Espinoza  Date: 2020  Visit #:    PTA visit #:   0     Referral Diagnosis: Lumbar Radiculitis  Referring provider: Mary Daniels PA-C  Visit Diagnosis:     ICD-10-CM    1. Chronic bilateral low back pain with bilateral sciatica  M54.42     M54.41     G89.29    2. Lumbosacral radiculitis  M54.17          Assessment:     Results of Treatment   Pt able to releases sphenoid with breathing ex.   Pt ind with ball exercises for postural and core strengthening   Handout provided,   Lymphatic flow to the LE's normalized    HEP/POC compliance is  good .  Patient demonstrates understanding/independence with home program.    Goal Status:  No data recorded  No data recorded      Plan / Patient Education:     Continue with initial plan of care.  Progress with home program as tolerated.    Subjective:     Pain Ratin/10 to the Low Back.  Pain is much improved       Objective:       3 sec,   Dural restrictions T7,12       Treatment Today     TREATMENT MINUTES COMMENTS   Evaluation     Self-care/ Home management     Manual therapy 20 MFR with OA, L5-SI and SI joint releases, Dural Tube Pull,       Neuromuscular Re-education     Therapeutic Activity     Therapeutic Exercises 40 Balloon breathing exercise 2 sets of 4 breaths  Increase frequency to 2x/day   Swiss Ball postural , core strengthening exercises per hand out Lumbar-Thoracic mobs and stretching on ball,   Bridging ex on ball.   Pelvic tilts on ball   Gait training     Modality__________________                Total 60    Blank areas are intentional and mean the treatment did not include these items.       Rahul Deutsch  2020

## 2021-06-09 NOTE — TELEPHONE ENCOUNTER
Last Med Check: 3/4/20.    Next med check due on: 9/2020.    CSA on File: None on file.     Future Appointment Scheduled ? No.    Last Med Refill? 3/4/2020.    Emy Barrera, CMA

## 2021-06-09 NOTE — PROGRESS NOTES
1. Osteoporosis, senile         Return in about 6 months (around 9/1/2017) for Recheck.    Patient Instructions   Prolia 4th today.  Prolia 5th in 6 months with my nurse. I will see you in 1 year.  DXA in Sep 2018.   Phone number to schedule [phone].  Please avoid any extensive dental work as implants and teeth extractions for the next 1-2 months.  Daily calcium need is 5001-2193 mg a day from the diet and supplements.  Calcium citrate is easier to digest.  Vitamin D 2000 IU daily recommended.      Chief Complaint   Patient presents with     Osteoporosis Follow Up       Visit Vitals     /80     Pulse 68     Wt 162 lb 4.8 oz (73.6 kg)     BMI 28.3 kg/m2         Did you experience any problems with previous Prolia injection? no  Any medication change in the last 6 months? no  Did you take prednisone or other immunosupressant drugs in the last 6 months   (chemo, transplant, rheum, dermatology conditions)? no  Did you have any serious infection in the last 6 months?no  Any recent hospitalizations?no  Do you plan any dental work in the next 2-3 months?no  How much calcium do you take daily from the diet and supplements?1200 mg  How much vit D do you take daily? 2000 IU  Last DXA? 9/2016      Patient is here today for the 4th Prolia injection. Patient tolerated previous injections well.   We discussed calcium and vit D daily needs today.   Next Prolia injection will be in 6 months.     15 minutes spent with the patient and more then 50 % of the time in counseling.  This note has been dictated using voice recognition software. Any grammatical or context distortions are unintentional and inherent to the software      Patient Active Problem List   Diagnosis     Peripheral Neuropathy     Urticaria     Major Depression, Recurrent     Ecchymosis     Skin: A Rash     Lump Or Mass In Breast     Anxiety     Limb Swelling     Irritable Bowel Syndrome     Primary osteoarthritis of right knee     Primary osteoarthritis  of knee, left     Osteoporosis, senile     Esophageal reflux       Current Outpatient Prescriptions on File Prior to Visit   Medication Sig Dispense Refill     calcium citrate-vitamin D (CITRACAL+D) 315-200 mg-unit per tablet Take 1 tablet by mouth 2 (two) times a day.       citalopram (CELEXA) 40 MG tablet TAKE 1 TABLET DAILY 90 tablet 1     cyanocobalamin (VITAMIN B-12) 1000 MCG tablet Take 1,000 mcg by mouth daily.       dicyclomine (BENTYL) 10 MG capsule Take 1 capsule (10 mg total) by mouth 4 (four) times a day as needed. For cramps 90 capsule 1     docusate (STOOL SOFTENER) 60 mg/15 mL syrup Take 60 mg by mouth daily.       furosemide (LASIX) 20 MG tablet TAKE 1 TABLET DAILY AS DIRECTED 90 tablet 1     LORazepam (ATIVAN) 0.5 MG tablet TAKE ONE TABLET BY MOUTH THREE TIMES DAILY as needed for anxiety 10 tablet 0     magnesium 250 mg Tab Take 250 mg by mouth daily.        MULTIVIT &MINERALS/FERROUS FUM (MULTI VITAMIN ORAL) Take 1 tablet by mouth daily.       omeprazole (PRILOSEC) 20 MG capsule Take 1 capsule (20 mg total) by mouth Daily before breakfast. 90 capsule 0     tretinoin (RETIN-A) 0.025 % cream Apply 1 application topically daily as needed.       Current Facility-Administered Medications on File Prior to Visit   Medication Dose Route Frequency Provider Last Rate Last Dose     denosumab 60 mg (PROLIA 60 mg/ml)  60 mg Subcutaneous Q6 Months Anjali Ramires MD   60 mg at 03/10/16 1440

## 2021-06-09 NOTE — PROGRESS NOTES
"Adelina Espinoza is a 74 y.o. female who is being evaluated via a billable video visit.      The patient has been notified of following:     \"This video visit will be conducted via a call between you and your physician/provider. We have found that certain health care needs can be provided without the need for an in-person physical exam.  This service lets us provide the care you need with a video conversation.  If a prescription is necessary we can send it directly to your pharmacy.  If lab work is needed we can place an order for that and you can then stop by our lab to have the test done at a later time.    Video visits are billed at different rates depending on your insurance coverage. Please reach out to your insurance provider with any questions.    If during the course of the call the physician/provider feels a video visit is not appropriate, you will not be charged for this service.\"    Patient has given verbal consent to a Video visit? Yes    Patient would like to receive their AVS by AVS Preference: E-Mail (Inform patient AVS not encrypted with this option).    Patient would like the video invitation sent by: Text to cell phone: [phone]    Will anyone else be joining your video visit? Yes: spouse.         Video Start Time: 1:20 PM    Additional provider notes:   Assessment:   Adelina Espinoza is a 74 y.o. y.o. female with past medical history significant for peripheral neuropathy, depression, anxiety, osteoporosis (on Prolia), GERD who presents today for follow-up regarding chronic and recurring left low back pain with radiation into the right lower extremity.  MRI lumbar spine shows severe spinal stenosis at L4-5 with moderate right lateral recess stenosis at this level related to a broad-based disc bulge and a right synovial cyst and advanced facet arthropathy.  Patient is status post a right L4-5, L5-S1 transforaminal epidural steroid injection June 4, 2020 which has provided 70% relief of her pain.  " "She has had additional improvement in her pain over the past 4 weeks with physical therapy.         Plan:     A shared decision making plan was used.  The patient's values and choices were respected.  The following represents what was discussed and decided upon by the physician assistant and the patient.      1.  DIAGNOSTIC TESTS: I reviewed the MRI lumbar spine.  No further diagnostic tests were ordered.    2.  PHYSICAL THERAPY: Patient completed 12 sessions of PT .  She will continue with the home exercises.    3.  MEDICATIONS:    -Patient experienced excellent pain relief but significant side effects of gabapentin when she was taking 600 mg 3 times daily.  She states that she felt \"off balance and out of it.\"  She had a fall.  She decreased her dose back to gabapentin 300 mg 3 times daily.  She states that she feels \"back to normal\" at this dose.  We will have her continue at this dose for now.  I would not recommend titrating her dose higher than 300 mg 3 times daily in the future.  -Patient can continue on Tylenol 1000 mg daily.  -Patient can continue using ibuprofen 400-600 mg daily.      4.  INTERVENTIONS:    -No additional interventions were ordered.  -If pain were to flare back up, would recommend repeating the right L4-5, L5-S1 transforaminal epidural steroid injection.  -If pain returns in the near future, or if an epidural steroid injection fails to provide adequate relief in the future, we could try a right L4-5 facet joint injection/synovial cyst aspiration.    5.  PATIENT EDUCATION:   -If the patient fails to have lasting relief with nonsurgical treatments, I would recommend a referral to neurosurgery.  -Patient is in agreement the above plan.  All questions were answered.    6.  FOLLOW-UP: I will see the patient back in the clinic for an in person follow-up visit in 6 weeks.  If she has questions or concerns in the meantime, she should not hesitate to call.    Subjective:     Adelina Espinoza is a 74 " y.o. female who presents today for follow-up regarding regarding left low back pain with radiation into the right lower extremity.  I last spoke with the patient June 23, 2020.  At that time she reported 70% improvement in her pain following a right L4-5, L5-S1 transforaminal epidural steroid injection performed on June 4, 2020.  She had some residual right low back and lower extremity pain, but only some of that was thought to be related to a fall which occurred on June 14, 2020.  I recommended that the patient continue with physical therapy.  She has had 3 additional sessions of physical therapy.  She reports the pain from the fall has resolved.  She notes that she has had additional improvement in her pain with physical therapy.  She states that over the past 2 days she has essentially no leg pain.    Patient complains of midline pain at the tailbone.  She states the pain radiates into the buttocks to the bottom of the cheeks bilaterally.  She denies any pain radiating further down the leg.  Tailbone pain is aggravated with sitting on hard chairs.  Standing also aggravates her pain.  Pain is alleviated with lying down.  She rates her pain today as a 1 out of 10.  At its worst it is a 7 out of 10.  At its best it is a 1 out of 10.  Patient denies any numbness, tingling, or weakness down the legs.  She denies any new symptoms since she was last seen.    Patient has had 12 sessions of physical therapy.  She is doing home exercises.  She is taking gabapentin 300 mg 3 times daily.  This is helpful and she denies significant side effects.  She is ibuprofen as needed.  She uses Tylenol 1000 mg daily.    Past medical history is reviewed and is unchanged.    Review of Systems:  Positive for headache, falls.  Negative for numbness/tingling, weakness, loss of bowel/bladder control, inability to urinate, pain much worse at night, difficulty swallowing, difficulty with hand skills, fevers, unintentional weight loss.      Objective:   CONSTITUTIONAL:  Vital signs as above.  No acute distress.  The patient is well nourished and well groomed.    PSYCHIATRIC:  The patient is awake, alert, oriented to person, place and time.  The patient is answering questions appropriately with clear speech.  Normal affect.  HEENT: Normocephalic, atraumatic.  Sclera clear.    SKIN: Exposed skin is clean, dry, intact without rashes.  MUSCULOSKELETAL:  Gait is non-antalgic.  The patient is able to heel and toe walk without any difficulty.    Lumbar range of motion is within normal limits for flexion and extension.       RESULTS: I reviewed the MRI lumbar spine from [facility] dated March 13, 2020.  At L4-5 there is a broad-based disc bulge and superimposed central disc protrusion.  There is a 7.8 x 8.1 mm medial right-sided synovial cyst and severe bilateral facet arthropathy.  There is also a small amount of edema within the soft tissues adjacent to the facet joints.  There is severe spinal canal stenosis.  There is moderate right lateral recess stenosis.  Patient also has mild bilateral lateral recess gnosis L4-5.  There is severe right and moderate left facet arthropathy at L5-S1 but no neural compromise.  Please see report for further details.          Video-Visit Details    Type of service:  Video Visit    Video End Time (time video stopped): 1:31 PM  Originating Location (pt. Location): Home    Distant Location (provider location):  [facility]     Platform used for Video Visit: Herb Daniels PA-C

## 2021-06-09 NOTE — PROGRESS NOTES
[facility] Daily Progress     Patient Name: Adelina Espinoza  Date: 7/9/2020  Visit #: 10/12   PTA visit #:   0     Referral Diagnosis: Lumbar Radiculitis  Referring provider: Mary Daniels PA-C  Visit Diagnosis:     ICD-10-CM    1. Chronic bilateral low back pain with bilateral sciatica  M54.42     M54.41     G89.29    2. Lumbosacral radiculitis  M54.17    3. Swelling of limb  M79.89    4. Localized edema  R60.0          Assessment:     Results of Treatment   LBP reduced 3/10 to 0/10   Lymphatic flow to the calves normalized   Pt to observe effect on edema of the lower legs.   SI dysf resolved    HEP/POC compliance is  good .  Patient demonstrates understanding/independence with home program.    Goal Status:  No data recorded  No data recorded      Plan / Patient Education:     Continue with initial plan of care.  Progress with home program as tolerated.    Subjective:     Pain Rating:3/10 to the Low Back.         Objective:       3 sec,   Dural restrictions T7,12       Treatment Today     TREATMENT MINUTES COMMENTS   Evaluation     Self-care/ Home management     Manual therapy 60 MFR with OA, L5-SI and SI joint releases, Dural Tube Pull,   SCS to PGL1,2-N,   R PGS2,3,4-N,    PUD-N,   SCS to OBTL2,3-N,   SCS to SAPH-LV,    LSUR-LV,   MSUR-LV,   POP-LV,   DFEM-LV,      Neuromuscular Re-education     Therapeutic Activity     Therapeutic Exercises     Gait training     Modality__________________                Total 60    Blank areas are intentional and mean the treatment did not include these items.       Rahul Deutsch  7/9/2020

## 2021-06-10 NOTE — PROGRESS NOTES
[facility] Daily Progress     Patient Name: Adelina Espinoza  Date: 8/17/2020  Visit #: 8/12  + 8  PTA visit #:   0     Referral Diagnosis: Lumbar Radiculitis  Referring provider: Mary Daniels PA-C  Visit Diagnosis:     ICD-10-CM    1. Chronic bilateral low back pain with bilateral sciatica  M54.42     M54.41     G89.29    2. Lumbosacral radiculitis  M54.17    3. Localized edema  R60.0          Assessment:     Results of Treatment    LBP reduced to 0/10   Ligamentum Flavum releases achieved to the lumbar spine achieved    HEP/POC compliance is  good .  Patient demonstrates understanding/independence with home program.    Goal Status:  No data recorded  No data recorded      Plan / Patient Education:     Continue with initial plan of care.  Progress with home program as tolerated.    Subjective:   Taping makes a difference.   Notice pain increase day afterward  LBP 2/10    Objective:      + Scan    Arterial  Head, Neck  Thorax,  UE's   Lig Lumbar, Sacral.           Treatment Today     TREATMENT MINUTES COMMENTS   Evaluation     Self-care/ Home management     Manual therapy 50 MFR with OA, L5-SI and SI joint releases, Dural Tube Pull     SCS to R  Med Colateral Lig  LF R T11,12,L1,2,3,4-MS   SCS to L Trochanteric bursa   Neuromuscular Re-education 10 KTing of SI ligament to L 5 Lunbars . SI's and L5-S1      Therapeutic Activity     Therapeutic Exercises     Gait training     Modality__________________                Total 60    Blank areas are intentional and mean the treatment did not include these items.       Rahul Deutsch  8/17/2020

## 2021-06-10 NOTE — PROGRESS NOTES
[facility] Daily Progress     Patient Name: Adelina Espinoza  Date: 8/5/2020  Visit #: 6/12  + 8  PTA visit #:   0     Referral Diagnosis: Lumbar Radiculitis  Referring provider: Mary Daniels PA-C  Visit Diagnosis:     ICD-10-CM    1. Chronic bilateral low back pain with bilateral sciatica  M54.42     M54.41     G89.29    2. Lumbosacral radiculitis  M54.17    3. Localized edema  R60.0    4. Swelling of limb  M79.89          Assessment:     Results of Treatment   SI dysf resolved.   LBP reduced 3/10 to 1/10   Pt able to bend over and tie her shoes    HEP/POC compliance is  good .  Patient demonstrates understanding/independence with home program.    Goal Status:  No data recorded  No data recorded      Plan / Patient Education:     Continue with initial plan of care.  Progress with home program as tolerated.    Subjective:   Had 5 good days and then yesterday after I removed the tape the pain got bad.  Hard to straighten up or bend forward  Pain Rating:3/10 to the Low Back.  Yesterday AM 10/10 after the removal of the tape      Objective:      L SI downslip,    + Scan   Periostium, Discs  Lumbar,   Arterial Lumbars  Ligamentous Lumbar spine  [place]ck    Treatment Today     TREATMENT MINUTES COMMENTS   Evaluation     Self-care/ Home management     Manual therapy 50 MFR with OA, L5-SI and SI joint releases, Dural Tube SCS to SGL4-A,   R RADL4-A   SCSD to LF T12,L1,2,3,4-MS   LTAM-SF  R SML-MS   Neuromuscular Re-education 10 KTing of L1 to T12 Lunbars . SI's and L5-S1   Therapeutic Activity     Therapeutic Exercises     Gait training     Modality__________________                Total 60    Blank areas are intentional and mean the treatment did not include these items.       Rahul Deutsch  8/5/2020

## 2021-06-10 NOTE — TELEPHONE ENCOUNTER
Will send to osteoporosis team. Pt wants to know if she can still have her prolia injection and bone density test next month if she gets her cortisone injection tomorrow.kevan cuevas

## 2021-06-10 NOTE — PROGRESS NOTES
[facility] Daily Progress     Patient Name: Adelina Espinoza  Date: 8/10/2020  Visit #: 7/12  + 8  PTA visit #:   0     Referral Diagnosis: Lumbar Radiculitis  Referring provider: Migdalia Delvalle MD  Visit Diagnosis:     ICD-10-CM    1. Chronic bilateral low back pain with bilateral sciatica  M54.42     M54.41     G89.29    2. Lumbosacral radiculitis  M54.17    3. Localized edema  R60.0          Assessment:     Results of Treatment    Pain to the LB reduced 1/10  To 0/10  Low back feeling looser with Ant Long Ligament releases    HEP/POC compliance is  good .  Patient demonstrates understanding/independence with home program.    Goal Status:  No data recorded  No data recorded      Plan / Patient Education:     Continue with initial plan of care.  Progress with home program as tolerated.    Subjective:   Did well for as long as I wear the tape.        Objective:      L SI downslip,    + Scan   Ant Neuro,  ALL Thoracic spine.       Treatment Today     TREATMENT MINUTES COMMENTS   Evaluation     Self-care/ Home management     Manual therapy 31 MFR with OA, L5-SI and SI joint releases, Dural Tube Pull  SCSD to ALL T6,8,9,12, L1,2,3,4   Neuromuscular Re-education 15 KTing of SI ligament to L 5 Lunbars . SI's and L5-S1   SCS to Temporal Nerve   Therapeutic Activity     Therapeutic Exercises 15 Review pelvic tilt exercise with progression into the mini crunch   Gait training     Modality__________________                Total 61    Blank areas are intentional and mean the treatment did not include these items.       Rahul Deutsch  8/10/2020

## 2021-06-10 NOTE — TELEPHONE ENCOUNTER
Notified patient she should keep her appointment for her DEXA scan and prolia injection per Dr. Blanchard.

## 2021-06-10 NOTE — PROGRESS NOTES
[facility] Daily Progress     Patient Name: Adelina Espinoza  Date: 2020  Visit #:   + 8  PTA visit #:   0     Referral Diagnosis: Lumbar Radiculitis  Referring provider: Mary Daniels PA-C  Visit Diagnosis:     ICD-10-CM    1. Chronic bilateral low back pain with bilateral sciatica  M54.42     M54.41     G89.29    2. Lumbosacral radiculitis  M54.17    3. Osteoporosis, senile  M81.0    4. Localized edema  R60.0          Assessment:     Results of Treatment   Pain levels reduced to 0/10   SI dysf resolved   Pt is having difficulty with performance of closed chain exercise for her core strengthening due to a long period of deconditioning.   Will need to focus on this issue to maintain the reduction in pain that she has achieved in therapy    HEP/POC compliance is  good .  Patient demonstrates understanding/independence with home program.    Goal Status:  No data recorded  No data recorded      Plan / Patient Education:     Continue with initial plan of care.  Progress with home program as tolerated.    Subjective:     Pain Ratin/10 to the Low Back.  Yesterday       Objective:      L SI downslip,    + Scan  Venous Lumbar,  Periosteal  Lumbars   Myochains Lumbar    Treatment Today     TREATMENT MINUTES COMMENTS   Evaluation     Self-care/ Home management     Manual therapy 50 MFR with OA, L5-SI and SI joint releases, Dural Tube Pull,  SCS to Bilat LEL1,2,3,4,5,T12-MS     Neuromuscular Re-education 10 KTing of L1 to T12 Lunbars . SI's and L5-S1   Therapeutic Activity     Therapeutic Exercises     Gait training     Modality__________________                Total 60    Blank areas are intentional and mean the treatment did not include these items.       Rahul Deutsch  2020

## 2021-06-10 NOTE — PROGRESS NOTES
Assessment:   Adelina Espinoza is a 74 y.o. y.o. female with past medical history significant for peripheral neuropathy, depression, anxiety, osteoporosis (on Prolia), GERD who presents today for follow-up regarding a severe flare of chronic low back pain with radiation into the bilateral lower extremities with associated numbness and tingling and weakness.  MRI lumbar spine shows severe spinal stenosis at L4-5 with moderate right lateral recess stenosis at this level related to a broad-based disc bulge and a right synovial cyst and advanced facet arthropathy.  Patient is status post a right L4-5, L5-S1 transforaminal epidural steroid injection June 4, 2020 which has provided 70% relief of her pain and with physical therapy she felt like she is making even more progress.  Unfortunately, pain flared up 6 days ago and is now affecting the bilateral lower extremities.  She is neurologically intact on exam.         Plan:     A shared decision making plan was used.  The patient's values and choices were respected.  The following represents what was discussed and decided upon by the physician assistant and the patient.      1.  DIAGNOSTIC TESTS: I reviewed the MRI lumbar spine.  No further diagnostic tests were ordered.    2.  PHYSICAL THERAPY: Patient completed 17 sessions of PT .  She will continue with the home exercises.    3.  MEDICATIONS:  - I provided a prescription for oxycodone 5 mg twice daily as needed, #14 with no refills.  I checked the [place] prescription monitoring database.  Patient has not had any opiate prescriptions within the past 1 year.  Patient does take clonazepam 0.5 mg at bedtime.  I told the patient that she cannot take the clonazepam and oxycodone together.  She voiced understanding to this.  I reviewed the risks of oxycodone.  I will not provide this medication long-term.  I will not provide telephone refills.  -Patient experienced excellent pain relief but significant side effects of  "gabapentin when she was taking 600 mg 3 times daily.  She states that she felt \"off balance and out of it.\"  She had a fall.  She decreased her dose back to gabapentin 300 mg 3 times daily.  She states that she feels \"back to normal\" at this dose.  We will have her continue at this dose for now.  I would not recommend titrating her dose higher than 300 mg 3 times daily in the future.  -Patient can continue on Tylenol 1000 mg 3 times daily as needed.  -Patient can continue using ibuprofen 400-600 mg 3 times daily as needed.      4.  INTERVENTIONS:    -I offer the patient a bilateral L4-5 transforaminal epidural steroid injection.  I chose this injection because she is now having bilateral lower extremity pain.  Patient indicated she would like to proceed and an order was placed.  -If this does not help, we could try an L5-S1 interlaminar epidural steroid injection.    5.  PATIENT EDUCATION:   -If the patient fails to have lasting relief with nonsurgical treatments, I would recommend a referral to neurosurgery.  -Patient is in agreement the above plan.  All questions were answered.    6.  FOLLOW-UP: I will see the patient back in the clinic for 2-week post procedure follow-up after her bilateral L4-5 transforaminal epidural steroid injection.  If she has questions or concerns in the meantime, she should not hesitate to call.    Subjective:     Adelina Espinoza is a 74 y.o. female who presents today for follow-up regarding regarding left low back pain with radiation into the right lower extremity.  I last spoke with the patient July 22, 2020.  At that time she reported 70% improvement in her pain following a right L4-5, L5-S1 transforaminal epidural steroid injection performed on June 4, 2020 and additional improvement with physical therapy.  I recommended she continue with physical therapy.  She has not had 17 sessions.  Patient reports that she felt like she was doing very well up until 6 days ago.  Patient reports " that there was no injury or event to cause recurrent pain, but 6 days ago pain returned.  It has been severe and is getting progressively worse.    Patient complains of mild bilateral low back pain.  Pain radiates into the right hip area.  The most severe area of pain is at the bottom of the buttocks/proximal posterior thighs.  This area of pain is equally severe on both sides.  She denies any pain radiating distal to this.  However, she has numbness and tingling in the right anterolateral shin.  At  night she has intermittent numbness in both legs where it feels like her legs are not even there.  She rates her pain today as an 8 of 10.  At its worst it is a 10-10.  At its best it is a 1-10.  Pain is aggravated with standing.  Pain is also aggravated with transitioning in and out of bed.  She states that her legs feel weak when she first gets out of bed in the morning and she is unable to stand up straight.  Patient reports that she has had to have her  help her into bed and she screams out in pain because it is so severe.  Sitting provides some relief of her pain.    Patient has had 17 sessions of physical therapy.  She is doing home exercises.  She is taking gabapentin 300 mg 3 times daily.  .  She is ibuprofen as needed.  She uses Tylenol 1000 mg daily.  Patient reports that she feels like she is taking too much Tylenol and ibuprofen.  Medications are somewhat helpful.    Past medical history is reviewed and is unchanged.    Review of Systems:  Positive for numbness/tingling, weakness, wetting pants (chronic, cannot make it to the bathroom in time), headache, pain much worse at night, trip/stumbles, difficulty with hand skills.  Negative for loss of bowel control, inability to urinate, difficulty swallowing, fevers, unintentional weight loss.     Objective:   CONSTITUTIONAL:  Vital signs as above.  No acute distress.  The patient is well nourished and well groomed.    PSYCHIATRIC:  The patient is awake,  alert, oriented to person, place and time.  The patient is answering questions appropriately with clear speech.  Normal affect.  HEENT: Normocephalic, atraumatic.  Sclera clear.    SKIN:  Skin over the face, posterior torso, bilateral upper and lower extremities is clean, dry, intact without rashes.  MUSCULOSKELETAL:  Gait is guarded and antalgic.  She transitions from seated to standing slowly.  Lumbar flexion and extension both mildly restricted.   The patient has 5/5 strength for the bilateral hip flexors, knee flexors/extensors, ankle dorsiflexors/plantar flexors, ankle evertors/invertors.    NEUROLOGICAL: 1-2+ patellar, achilles reflexes which are symmetric bilaterally.  No ankle clonus bilaterally.  Sensation to light touch is intact in the bilateral L4, L5, and S1 dermatomes.       RESULTS: I reviewed the MRI lumbar spine from [facility] dated March 13, 2020.  At L4-5 there is a broad-based disc bulge and superimposed central disc protrusion.  There is a 7.8 x 8.1 mm medial right-sided synovial cyst and severe bilateral facet arthropathy.  There is also a small amount of edema within the soft tissues adjacent to the facet joints.  There is severe spinal canal stenosis.  There is moderate right lateral recess stenosis.  Patient also has mild bilateral lateral recess gnosis L4-5.  There is severe right and moderate left facet arthropathy at L5-S1 but no neural compromise.  Please see report for further details.

## 2021-06-10 NOTE — PROGRESS NOTES
[facility] Daily Progress     Patient Name: Adelina Espinoza  Date: 8/24/2020  Visit # 9/12  + 8  PTA visit #:   0     Referral Diagnosis: Lumbar Radiculitis  Referring provider: Migdalia Delvalle MD  Visit Diagnosis:     ICD-10-CM    1. Chronic bilateral low back pain with bilateral sciatica  M54.42     M54.41     G89.29    2. Lumbosacral radiculitis  M54.17    3. Localized edema  R60.0    4. Hereditary and idiopathic peripheral neuropathy  G60.9    5. Swelling of limb  M79.89          Assessment:     Results of Treatment   Pain to the Lumbar spine reduced 40 % but residual remaining due to lumbar periosteal involvement    HEP/POC compliance is  good .  Patient demonstrates understanding/independence with home program.    Goal Status:  No data recorded  No data recorded      Plan / Patient Education:     Continue with initial plan of care.  Progress with home program as tolerated.    Subjective:   Taping makes a difference.   Notice pain increase day afterward   Pain returned to the R buttocks  LBP 5/10    Objective:      L SI downslip. + Scan   Ant Neuro  Pelvis,             Treatment Today     TREATMENT MINUTES COMMENTS   Evaluation     Self-care/ Home management     Manual therapy 50 MFR with OA, L5-SI and SI joint releases, Dural Tube Sphenoid release.   SCS to the R Lyn Rami R ,8,9,10,11,12-N,  R EPIL1,2,3,4,5-LV    L EPIL4,5-LV   Neuromuscular Re-education 10 KTing of SI ligament to L 5 Lunbars . SI's and L5-S1      Therapeutic Activity     Therapeutic Exercises     Gait training     Modality__________________                Total 60    Blank areas are intentional and mean the treatment did not include these items.       Rahul Deutsch  8/24/2020

## 2021-06-10 NOTE — TELEPHONE ENCOUNTER
Who is calling:  Patient   Reason for Call:  Patient stated she is scheduled to have a prolia injection and bone density 9/25. Patient is questioning if she should keep this appointment or not as she will be getting a cortisone injection in the back tomorrow. Patient would like a call back.  Date of last appointment with primary care: n/a  Okay to leave a detailed message: Yes  [phone]

## 2021-06-11 NOTE — PROGRESS NOTES
"ASSESSMENT: Right first metatarsal phalangeal joint arthritis    PLAN: We reviewed options for managing this including relative rest, anti-inflammatory medication and cortisone injection.  She will consider a shot if pain worsens.  There are surgical options, but she has no interest.  Return to clinic as needed.      SUBJECTIVE: New patient visit at the [facility] regarding pain intermittently at the base of the right great toe.  Generally worse with increased activities.  Sometimes paresthesias there.  Symptoms have been present for a few months.  No known recent injury.  She thinks she might have broken the toe years ago.  Symptoms are gradually improving over the past week or so.  She denies any open wound.    PHYSICAL EXAM:  Pulse 60  Resp 16  Ht 5' 3.5\" (1.613 m)  Wt 160 lb (72.6 kg)  BMI 27.9 kg/m2  General: Pleasant 71 y.o. female in no acute distress.  Vascular: DP pulses are palpable. PT pulses are palpable. Pedal hair is present. Feet are warm to the touch.  Cardiac: Pulse is regular.  Lymphatic: No edema at the ankles.  Neuro: Sensation in the feet is grossly intact to light touch.  Negative Tinel sign over the deep peroneal nerve.  Derm: No open lesions.  Musculoskeletal: Bony ridging around the dorsum of the right first metatarsal phalangeal joint suggesting degenerative arthritic changes.  Decreased plantar flexion at that joint.  No pain with range of motion today.    Past Medical History:   Diagnosis Date     Anxiety      Arthritis      Breast cancer 2003     Depression      Esophageal reflux      History of anesthesia complications      Hx antineoplastic chemotherapy      Hx of radiation therapy      IBS (irritable bowel syndrome)      Neuropathy      Osteopenia         has a past surgical history that includes Cholecystectomy (1994); Appendectomy (1967); Hysterectomy (1997); Oophorectomy (1997); Breast lumpectomy (12/2003); Breast biopsy (Right, 2003); Back surgery; Nissen " fundoplication; total knee arthroplasty (Right, 10/29/2015); and total knee arthroplasty (Left, 4/21/2016).    Allergies   Allergen Reactions     Adhesive Rash     Green Pepper      Stomach upset     Monosodium Glutamate      diarrhea     Peanut Other (See Comments)     Sore mouth       Current Outpatient Prescriptions   Medication Sig Dispense Refill     calcium citrate-vitamin D (CITRACAL+D) 315-200 mg-unit per tablet Take 1 tablet by mouth 2 (two) times a day.       citalopram (CELEXA) 40 MG tablet TAKE 1 TABLET DAILY 90 tablet 0     cyanocobalamin (VITAMIN B-12) 1000 MCG tablet Take 1,000 mcg by mouth daily.       dicyclomine (BENTYL) 10 MG capsule Take 1 capsule (10 mg total) by mouth 4 (four) times a day as needed. For cramps 90 capsule 0     furosemide (LASIX) 20 MG tablet TAKE 1 TABLET DAILY AS DIRECTED 90 tablet 0     LORazepam (ATIVAN) 0.5 MG tablet TAKE ONE TABLET BY MOUTH THREE TIMES DAILY as needed for anxiety 10 tablet 0     magnesium 250 mg Tab Take 250 mg by mouth daily.        MULTIVIT &MINERALS/FERROUS FUM (MULTI VITAMIN ORAL) Take 1 tablet by mouth daily.       omeprazole (PRILOSEC) 20 MG capsule TAKE 1 CAPSULE DAILY BEFORE BREAKFAST 90 capsule 3     POLYETHYLENE GLYCOL 3350 (MIRALAX ORAL) Take by mouth.       tretinoin (RETIN-A) 0.025 % cream Apply 1 application topically daily as needed.       Current Facility-Administered Medications   Medication Dose Route Frequency Provider Last Rate Last Dose     denosumab 60 mg (PROLIA 60 mg/ml)  60 mg Subcutaneous Q6 Months Anjali Ramires MD   60 mg at 03/10/16 1440       Family History:  family history includes Alzheimer's disease in her father; Aortic stenosis in her father; Atrial fibrillation in her father; Breast cancer (age of onset: 46) in her maternal aunt; Depression in her father; Heart disease in her mother; Hypertension in her mother; Kidney cancer (age of onset: 67) in her mother; Lung cancer in her paternal grandfather; Skin cancer in  her father; Stroke in her father.    Social History:  Reviewed, and she reports that she has never smoked. She has never used smokeless tobacco. She reports that she drinks about 1.0 oz of alcohol per week  She reports that she does not use illicit drugs.    Review of Systems:  A 12 point comprehensive review of systems was negative except as noted.

## 2021-06-11 NOTE — PROGRESS NOTES
PROGRESS NOTE   6/26/2017    SUBJECTIVE:  Adelina Espinoza is a 71 y.o. female  who presents for   Chief Complaint   Patient presents with     Anxiety     recheck/refill     Toe Pain     Check right big toe pain, getting worse, no known injury     Patient comes in today because she has been having right toe pain for about the last 6-8 weeks.  She does not remember injuring it at all but it just started hurting.  She tried taping it together thinking that maybe that would help but she is continued to have pain when she walks any significant distance.  She notes that she was on a trip last week with a friend and they were doing a bit more walking than she had been doing at home and she started limping because it was hurting is so bad.  There is no swelling and there is no redness at all that she is noticed.  If she takes ibuprofen it seems like it helps but she does not like to take it very frequently.  She notes that the pain is primarily at the base of the toe but it does not extend into the medial side of the foot at all.  She also notes that she would like a refill of her lorazepam.  I have given her 10 tablets of lorazepam to help with her anxiety in January and she is running out of that now.  She continues on Celexa which seems to working well for her.  Please see PHQ 9 and DERIK which were both completed in their entirety today.  She is not having side effects to the Celexa.  She has may be needed to take lorazepam once every couple of weeks but recently she has been more stressed due to her grandson and her daughter.  Later this week they are going up to the cabin for 2 weeks to relax and get away from everything and she is hopeful that that will help with her anxiety levels.  She does note that she was on vacation last week with her friend and all of her stress went away until she came back and then it was immediately there again.  She just needs to take 1 lorazepam and then she is completely fine.  Her   will ask her to take lorazepam when he feels like her anxiety is getting out of control and again that is not too frequently maybe once every couple of weeks.  She feels like she can get panicky in her chest and definitely her  that identifies it at the same time and if she takes lorazepam although symptoms go away.  She does need a refill of Celexa at the end of July and since she has been here today I will be happy to refill that when that is needed.    Patient Active Problem List   Diagnosis     Peripheral Neuropathy     Urticaria     Major Depression, Recurrent     Ecchymosis     Skin: A Rash     Lump Or Mass In Breast     Anxiety     Limb Swelling     Irritable Bowel Syndrome     Primary osteoarthritis of right knee     Primary osteoarthritis of knee, left     Osteoporosis, senile     Esophageal reflux       Current Outpatient Prescriptions   Medication Sig Dispense Refill     calcium citrate-vitamin D (CITRACAL+D) 315-200 mg-unit per tablet Take 1 tablet by mouth 2 (two) times a day.       citalopram (CELEXA) 40 MG tablet TAKE 1 TABLET DAILY 90 tablet 0     cyanocobalamin (VITAMIN B-12) 1000 MCG tablet Take 1,000 mcg by mouth daily.       dicyclomine (BENTYL) 10 MG capsule Take 1 capsule (10 mg total) by mouth 4 (four) times a day as needed. For cramps 90 capsule 0     docusate (STOOL SOFTENER) 60 mg/15 mL syrup Take 60 mg by mouth daily.       furosemide (LASIX) 20 MG tablet TAKE 1 TABLET DAILY AS DIRECTED 90 tablet 0     LORazepam (ATIVAN) 0.5 MG tablet TAKE ONE TABLET BY MOUTH THREE TIMES DAILY as needed for anxiety 10 tablet 0     magnesium 250 mg Tab Take 250 mg by mouth daily.        MULTIVIT &MINERALS/FERROUS FUM (MULTI VITAMIN ORAL) Take 1 tablet by mouth daily.       omeprazole (PRILOSEC) 20 MG capsule TAKE 1 CAPSULE DAILY BEFORE BREAKFAST 90 capsule 3     tretinoin (RETIN-A) 0.025 % cream Apply 1 application topically daily as needed.       Current Facility-Administered Medications  "  Medication Dose Route Frequency Provider Last Rate Last Dose     denosumab 60 mg (PROLIA 60 mg/ml)  60 mg Subcutaneous Q6 Months Anjali Ramires MD   60 mg at 03/10/16 1440       Allergies   Allergen Reactions     Adhesive Rash     Green Pepper      Stomach upset     Monosodium Glutamate      diarrhea     Peanut Other (See Comments)     Sore mouth       Past Medical History:   Diagnosis Date     Anxiety      Arthritis      Breast cancer 2003     Depression      Esophageal reflux      History of anesthesia complications     goofy     Hx antineoplastic chemotherapy     for breast cancer     Hx of radiation therapy     for breast cancer     IBS (irritable bowel syndrome)      Neuropathy      Osteopenia        Past Surgical History:   Procedure Laterality Date     APPENDECTOMY  1967     BACK SURGERY      cervical fusion     BREAST BIOPSY Right 2003     BREAST LUMPECTOMY  12/2003    breast cancer     CHOLECYSTECTOMY  1994     HYSTERECTOMY  1997    desmoid cyst     NISSEN FUNDOPLICATION       OOPHORECTOMY  1997     OR TOTAL KNEE ARTHROPLASTY Right 10/29/2015    Procedure: RIGHT TOTAL KNEE  ARTHROPLASTY;  Surgeon: Nitin White MD;  Location: [facility] Main OR;  Service: Orthopedics     OR TOTAL KNEE ARTHROPLASTY Left 4/21/2016    Procedure: LEFT TOTAL KNEE ARTHROPLASTY;  Surgeon: Nitin White MD;  Location: [facility] OR;  Service: Orthopedics       History   Smoking Status     Never Smoker   Smokeless Tobacco     Never Used       OBJECTIVE:     /74 (Patient Site: Right Arm, Patient Position: Sitting, Cuff Size: Adult Regular)  Pulse 74 Comment: regular  Temp 98.2  F (36.8  C) (Oral)   Resp 14 Comment: reguar  Ht 5' 3.5\" (1.613 m)  Wt 165 lb (74.8 kg)  BMI 28.77 kg/m2    Physical Exam:  GENERAL APPEARANCE: A&A, NAD, well hydrated, well nourished  SKIN:  Normal skin turgor, no lesions/rashes   CV: RRR, no M/G/R   LUNGS: CTAB  EXTREMITY: no edema noted.  On exam specifically of the right " foot there is no swelling there is no warmth to the touch there is no redness appreciated.  She has tenderness at the base of the first metatarsal bone.  There is no tenderness just simply to mild palpation which would be indicative of gout.  She has full range of motion of the toes without difficulty.  The pain is more located between the first and the second toe then it is toward the medial side of the foot.  NEURO: no gross deficits   PSYCHIATRIC;  Mood appropriate, memory intact  A&O x3, thought processes congruent, non-tangential. No hallucinations/delusions. Insight/judgment: intact. Denies suicidal/homicidal ideations.      ASSESSMENT/PLAN:     Right foot pain [M79.671]      1. Right foot pain  -Dr Hyman  -ibuprofen 600 mg three times a day for 5 days    2. Anxiety  - LORazepam (ATIVAN) 0.5 MG tablet; TAKE ONE TABLET BY MOUTH THREE TIMES DAILY as needed for anxiety  Dispense: 10 tablet; Refill: 0      Patient overall seems to be doing okay.  In terms of the right foot pain I certainly do not see any evidence for any abnormalities based on her toe exam today.  She does not have any redness there is no warmth to the touch there is no swelling.  We did talk about doing an x-ray but at this point she has not had any injury at all and she does not feel like it is broken.  I suspect that she strained this somehow and I am not sure exactly how and is taking a while to get better.  I do think since it has been there for 6-8 weeks that she would be best to be evaluated by the podiatrist.  I did give her the card for Dr. Hyman and she will call and make her own appointment.  She has difficulties between now and then will certainly let me know.  We also discussed trying some ibuprofen 600 mg 3 times a day for the next 5 days to see if consistent use of an anti-inflammatory might be helpful for her as well.  In terms of her anxiety she overall seems to be doing okay.  I did refill her lorazepam today and I did give her  another 10 tablets.  She is used 10 tablets since January so certainly she is not overusing this drug.  She is aware that she needs to use it very sparingly and should not use it unless she absolutely needs to.  The Celexa that she is on seems to working well as well.  When she needs a refill of that will certainly let me know as well. All of her questions were answered today.  If she has difficulty getting in to see Dr. Hyman she should let me know.  If she continues to have difficulties with increased anxiety she should let me know.  Otherwise I would anticipate that we will refill her Celexa at the end of the month in July and will see her in about 6 months for follow-up of the anxiety. Total time spent with patient was at least 25 minutes,  of which greater than fifty percent was spent in counselling and coordination of care of the above medical problems.    Migdalia Delvalle MD

## 2021-06-11 NOTE — PATIENT INSTRUCTIONS - HE
Follow-up visit with Mary Daniels in 2 weeks to discuss injection outcome and determine care plan going forward.       DISCHARGE INSTRUCTIONS    During office hours (8:00 a.m.- 4:00 p.m.) questions or concerns may be answered  by calling [facility] Navigation Nurses at  [phone].  Messages received after hours will be returned the following business day.      In the case of an emergency, please dial 911 or seek assistance at the nearest Emergency Room/Urgent Care facility.     All Patients:    ? You may experience an increase in your symptoms for the first 2 days (It may take anywhere between 2 days- 2 weeks for the steroid to have maximum effect).    ? You may use ice on the injection site, as frequently as 20 minutes each hour if needed.    ? You may take your pain medicine.    ? You may continue taking your regular medication after your injection. If you have had a Medial Branch Block you may resume pain medication once your pain diary is completed.    ? You may shower. No swimming, tub bath or hot tub for 48 hours.  You may remove your bandaid/bandage as soon as you are home.    ? You may resume light activities, as tolerated.    ? Resume your usual diet as tolerated.    ? It is strongly advised that you do not drive for 1-3 hours post injection.    ? If you have had oral sedation:  Do not drive for 8 hours post injection.      ? If you have had IV sedation:  Do not drive for 24 hours post injection.  Do not operate hazardous machinery or make important personal/business decisions for 24 hours.      POSSIBLE STEROID SIDE EFFECTS (If steroid/cortisone was used for your procedure)    -If you experience these symptoms, it should only last for a short period      Swelling of the legs                Skin redness (flushing)       Mouth (oral) irritation     Blood sugar (glucose) levels              Sweats                      Mood changes    Headache    Sleeplessness         POSSIBLE PROCEDURE SIDE EFFECTS  -Call  the Spine Center if you are concerned    Increased Pain             Increased numbness/tingling        Nausea/Vomiting            Bruising/bleeding at site        Redness or swelling                                                Difficulty walking        Weakness             Fever greater than 100.5    *In the event of a severe headache after an epidural steroid injection that is relieved by lying down, please call the [facility] to speak with a clinical staff member*

## 2021-06-11 NOTE — PROGRESS NOTES
PROGRESS NOTE   9/4/2020    SUBJECTIVE:  Adelina Espinoza is a 74 y.o. female  who presents for   Chief Complaint   Patient presents with     Medication Management     Med Check     Patient comes in today for follow-up of her chronic medical problems.  She has a history of anxiety and depression as well as GE reflux limb swelling peripheral neuropathy osteoarthritis low back pain and a new concern today is that of weight gain.  She overall feels like things are doing well.  In terms of her anxiety and depression things seemed a lot better.  She cannot believe how much better she feels after they sold her cabin.  They sold their cabin in 3 weeks and it was about 3 weeks ago that they closed.  This is been a tremendous weight off of her and she is feeling so much better than she has in quite some time.  Please see PHQ 9 and DERIK which are both complete in their entirety today.  She continues on her Celexa which seems to be working well.  Is not any side effects to that medication.  She does have Klonopin at home that she uses on a nightly basis to help her with her sleep.  She is not suicidal or homicidal.  She notes that her  has been congratulating her on how much better she is in terms of her anxiety.  In terms of her heartburn she seems to be doing very well as well.  She has changed her diet and that seems to be helping.  She continues on Prilosec 20 mg a day.  We will go ahead and check metabolic panel today to follow-up on that usage.  She notes that her back pain seems to be doing okay.  She continues to follow with the pain clinic.  She did have an injection last week which seemed to help tremendously.  She was told very clearly not to have a flu shot because she just had this injection and so we will delay the flu vaccination for a few more weeks.  She continues on gabapentin 300 mg on a 3 times a day basis and that seems to be helping.  She continues on Lasix for her limb swelling which seems to be  helping.  She is having no problems or complications as a result of that.  The gabapentin seems to help her with her sleep in addition to the Klonopin.  She feels as if she is gaining weight.  She not really changed anything that she is eating her exercise level but she feels like she is gaining weight.  We will go ahead and check a thyroid level as well as metabolic panel to follow-up and make sure that there is no other obvious source of weight gain.  She admits that surely in the past she has had problems with eating because of her anxiety and depression but currently that does not seem like it is an issue.    Patient Active Problem List   Diagnosis     Peripheral Neuropathy     Urticaria     Major Depression, Recurrent     Lump Or Mass In Breast     Anxiety     Limb Swelling     Irritable Bowel Syndrome     Primary osteoarthritis of right knee     Primary osteoarthritis of knee, left     Osteoporosis, senile     Esophageal reflux     Primary osteoarthritis involving multiple joints     Arthritis of first metatarsophalangeal (MTP) joint of right foot       Current Outpatient Medications   Medication Sig Dispense Refill     acetaminophen (TYLENOL) 500 MG tablet Take 1,000 mg by mouth every 6 (six) hours as needed for pain.       aspirin 81 MG EC tablet Take 81 mg by mouth daily.       calcium citrate-vitamin D (CITRACAL+D) 315-200 mg-unit per tablet Take 1 tablet by mouth 2 (two) times a day.       citalopram (CELEXA) 40 MG tablet TAKE 1 TABLET DAILY 90 tablet 3     clonazePAM (KLONOPIN) 0.5 MG tablet Take 1 tablet (0.5 mg total) by mouth at bedtime. 90 tablet 0     dicyclomine (BENTYL) 10 MG capsule TAKE 1 CAPSULE FOUR TIMES A DAY AS NEEDED FOR CRAMPS 30 capsule 3     furosemide (LASIX) 20 MG tablet TAKE 1 TABLET DAILY 90 tablet 3     gabapentin (NEURONTIN) 300 MG capsule Take 1 cap three times daily, increase by one cap every three days to max dose of 600 mg tid (Patient taking differently: Take 300 mg by mouth  3 (three) times a day. Take 1 cap three times daily, increase by one cap every three days to max dose of 600 mg tid) 540 capsule 0     ibuprofen 200 mg cap Take 800 mg by mouth as needed.       LORazepam (ATIVAN) 1 MG tablet Fill at preferred pharmacy and bring to MRI appt. Do not take prior to arriving. You will need a  to bring you home after your appt. 1 tablet 0     magnesium 250 mg Tab Take 250 mg by mouth daily.        multivit-min-iron-FA-lutein (CENTRUM SILVER WOMEN) 8 mg iron-400 mcg-300 mcg Tab Take one tablet by mouth daily       vit C/E/Zn/coppr/lutein/zeaxan (PRESERVISION AREDS-2 ORAL) Take 2 tablets by mouth daily.       omeprazole (PRILOSEC) 20 MG capsule TAKE 1 CAPSULE DAILY BEFORE BREAKFAST 90 capsule 1     oxyCODONE (ROXICODONE) 5 MG immediate release tablet Take 1 tablet (5 mg total) by mouth 2 (two) times a day as needed for pain. 14 tablet 0     Current Facility-Administered Medications   Medication Dose Route Frequency Provider Last Rate Last Dose     denosumab 60 mg (PROLIA 60 mg/ml)  60 mg Subcutaneous Q6 Months Mone Blanchard MD   60 mg at 03/12/20 1359       Allergies   Allergen Reactions     Adhesive Rash     Green Pepper      Stomach upset     Lactose Diarrhea     Monosodium Glutamate      diarrhea       Past Medical History:   Diagnosis Date     Anxiety      Arthritis      Breast cancer (H) 2003     Depression      Esophageal reflux      History of anesthesia complications     nausea     Hx antineoplastic chemotherapy 2003    for breast cancer     Hx of radiation therapy 2004    for breast cancer     IBS (irritable bowel syndrome)      Lactose intolerance     lactose breath test positive at GI, 7/2019     Macular degeneration     caught early, takes Areds 2 on a daily basis, seeing opthamology     Neuropathy      Osteopenia        Past Surgical History:   Procedure Laterality Date     APPENDECTOMY  1967     BACK SURGERY      cervical fusion     BREAST BIOPSY Right 2003     BREAST  LUMPECTOMY  12/2003    breast cancer     CHOLECYSTECTOMY  1994     HYSTERECTOMY  1997    desmoid cyst     NISSEN FUNDOPLICATION       OOPHORECTOMY  1997     CT TOTAL KNEE ARTHROPLASTY Right 10/29/2015    Procedure: RIGHT TOTAL KNEE  ARTHROPLASTY;  Surgeon: Nitin White MD;  Location: [facility] Main OR;  Service: Orthopedics     CT TOTAL KNEE ARTHROPLASTY Left 4/21/2016    Procedure: LEFT TOTAL KNEE ARTHROPLASTY;  Surgeon: Nitin White MD;  Location: [facility] Main OR;  Service: Orthopedics       Social History     Tobacco Use   Smoking Status Never Smoker   Smokeless Tobacco Never Used       OBJECTIVE:     /72   Pulse 87   Wt 173 lb (78.5 kg)   LMP 01/11/1997   SpO2 97%   BMI 29.98 kg/m      Physical Exam:  GENERAL APPEARANCE: A&A, NAD, well hydrated, well nourished  SKIN:  Normal skin turgor, no lesions/rashes   HEENT: moist mucous membranes, no rhinorrhea, PERRLA, TM's clear bilaterally, Throat without significant erythema or exudate.  NECK: Supple, full ROM, no significant lymphadenopathy or thyromegaly  CV: RRR, no M/G/R   LUNGS: CTAB  ABDOMEN: S&NT, no masses or organomegaly   EXTREMITY: no swelling noted.  Full range of motion of all 4 extremities.   NEURO: no gross deficits   PSYCHIATRIC;  Mood appropriate, memory intact  A&O x3, thought processes congruent, non-tangential. No hallucinations/delusions. Insight/judgment: intact. Denies suicidal/homicidal ideations.    Little interest or pleasure in doing things: Several days  Feeling down, depressed, or hopeless: Not at all  Trouble falling or staying asleep, or sleeping too much: Several days  Feeling tired or having little energy: More than half the days  Poor appetite or overeating: Nearly every day  Feeling bad about yourself - or that you are a failure or have let yourself or your family down: Not at all  Trouble concentrating on things, such as reading the newspaper or watching television: Not at all  Moving or speaking so slowly  that other people could have noticed. Or the opposite - being so fidgety or restless that you have been moving around a lot more than usual: Not at all  Thoughts that you would be better off dead, or of hurting yourself in some way: Not at all  PHQ-9 Total Score: 7  If you checked off any problems, how difficult have these problems made it for you to do your work, take care of things at home, or get along with other people?: Not difficult at all    Total DERIK 7 Score       9/4/2020             DERIK 7 Total Score:  4          LABS:     Recent Results (from the past 240 hour(s))   Comprehensive Metabolic Panel   Result Value Ref Range    Sodium 139 136 - 145 mmol/L    Potassium 4.7 3.5 - 5.0 mmol/L    Chloride 100 98 - 107 mmol/L    CO2 27 22 - 31 mmol/L    Anion Gap, Calculation 12 5 - 18 mmol/L    Glucose 85 70 - 125 mg/dL    BUN 15 8 - 28 mg/dL    Creatinine 0.74 0.60 - 1.10 mg/dL    GFR MDRD Af Amer >60 >60 mL/min/1.73m2    GFR MDRD Non Af Amer >60 >60 mL/min/1.73m2    Bilirubin, Total 1.2 (H) 0.0 - 1.0 mg/dL    Calcium 10.0 8.5 - 10.5 mg/dL    Protein, Total 7.5 6.0 - 8.0 g/dL    Albumin 4.2 3.5 - 5.0 g/dL    Alkaline Phosphatase 64 45 - 120 U/L    AST 20 0 - 40 U/L    ALT 20 0 - 45 U/L   Thyroid Cascade   Result Value Ref Range    TSH 1.52 0.30 - 5.00 uIU/mL       ASSESSMENT/PLAN:     Anxiety state [F41.1]      1. Anxiety    2. Mild episode of recurrent major depressive disorder (H)    3. Gastroesophageal reflux disease without esophagitis  - Comprehensive Metabolic Panel  - omeprazole (PRILOSEC) 20 MG capsule; TAKE 1 CAPSULE DAILY BEFORE BREAKFAST  Dispense: 90 capsule; Refill: 1    4. Limb Swelling  - Comprehensive Metabolic Panel    5. Peripheral Neuropathy    6. Primary osteoarthritis involving multiple joints    7. Low back pain, unspecified back pain laterality, unspecified chronicity, unspecified whether sciatica present    8. Weight gain  - Thyroid Means    She comes in today for follow-up of her chronic  medical problems.  She continues on Celexa for anxiety and depression.  Celexa 40 mg a day seems to be helping tremendously.  She is not suicidal or homicidal.  Please see PHQ 9 and DERIK which are both complete in their entirety today.  Her anxiety level is down significantly because they sold her cabin about 3 weeks ago when she is feeling so good about that.  She feels like a tremendous load has been lifted off of her by selling the cabin and is quite excited about the future for that.  Her  is congratulating her on how well she is doing in terms of her depression and anxiety.  She continues on Prilosec for reflux disease and that seems again to be working well.  We will go ahead and check metabolic panel to follow-up on that.  She continues on Lasix for her limb swelling and will go ahead and check metabolic panel to follow-up on her Lasix use as well.  Feels like this is controlling the symptoms quite well.  She does have peripheral neuropathy from breast cancer treatment but that seems to be relatively stable.  She continues to follow with the pain clinic in terms of her low back pain.  She just had an injection at the spine center last week and is hoping that that will help with some of the low back pain that she has been having.  As a result of the injection they told her not to have a flu shot for several weeks and will go ahead and delay that and she will make an appointment for the end of September for that.  We did check a thyroid level as well as metabolic panel to follow-up on weight gain that she has been experiencing.  We will contact her with the results of those when they return.  I suspect the weight gain is probably due a little bit to age and also to decrease in activity especially due to the pandemic.  We discussed that at length today.  She will continue to monitor her symptoms if she has additional questions or concerns should certainly let me know.  All of her questions were answered  today. Total time spent with patient was at least 25 minutes,  of which greater than fifty percent was spent in counselling and coordination of care of the above medical problems.  Migdalia Delvalle MD

## 2021-06-11 NOTE — PROGRESS NOTES
[facility] Daily Progress     Patient Name: Adelina Espinoza  Date: 8/31/2020  Visit # 10/12  + 8  PTA visit #:   0     Referral Diagnosis: Lumbar Radiculitis  Referring provider: Mary Daniels PA-C  Visit Diagnosis:     ICD-10-CM    1. Chronic bilateral low back pain with bilateral sciatica  M54.42     M54.41     G89.29    2. Lumbosacral radiculitis  M54.17    3. Localized edema  R60.0          Assessment:     Results of Treatment   Pt states she feels more relaxed through the upper and mid thoracic spine after treatment.  Releases to the Pre and post ganglionic nerves achieved.       HEP/POC compliance is  good .  Patient demonstrates understanding/independence with home program.    Goal Status:  No data recorded  No data recorded      Plan / Patient Education:     Continue with initial plan of care.  Progress with home program as tolerated.    Subjective:   Had increased pain on [facility] and [facility] and feel much better 0-1/10 level of pain    Objective:      L SI downslip. + Scan   Ant Neuro  Pelvis,             Treatment Today     TREATMENT MINUTES COMMENTS   Evaluation     Self-care/ Home management     Manual therapy 61 MFR with OA, L5-SI and SI joint releases, Dural Tube Sphenoid release.   SCS to bilat BPA-N,   Right BPP-A   SCS to CULN-N,   CRAD-N,   CMED-N   SCS to R  HYPO-N,  IMES-N,   AREN-N,   SMES-N,   SCS to PG T3,5,6,7-N   Neuromuscular Re-education     Therapeutic Activity     Therapeutic Exercises     Gait training     Modality__________________                Total 61    Blank areas are intentional and mean the treatment did not include these items.       Rahul Deutsch  8/31/2020

## 2021-06-11 NOTE — PROGRESS NOTES
[facility] Daily Progress     Patient Name: Adelina Espinoza  Date: 9/28/2020  Visit # 3/12  + 18   Recert 09/14/20  PTA visit #:   0     Referral Diagnosis: Lumbar Radiculitis  Referring provider: Mary Daniels PA-C  Visit Diagnosis:     ICD-10-CM    1. Chronic bilateral low back pain with bilateral sciatica  M54.42     M54.41     G89.29    2. Lumbosacral radiculitis  M54.17    3. Swelling of limb  M79.89          Assessment:     Results of Treatment   Pain to low back and sacrum reduced to 0-1/10   SI tension resolved    HEP/POC compliance is  good .  Patient demonstrates understanding/independence with home program.    Goal Status:  No data recorded  No data recorded      Plan / Patient Education:     Continue with initial plan of care.  Progress with home program as tolerated.    Subjective:   Gluteal and posterior thigh pain is better.   Pain reduced 4-5/10.     SI pain is less   Last session pain reduction laste Wed through Sat    Objective:     RISA's level       45 sec,   Dural restrictions at C3,T4,12, L3.    + Scan  Ligaments  LF Lumbar - sacral          Treatment Today     TREATMENT MINUTES COMMENTS   Evaluation     Self-care/ Home management     Manual therapy 53 MFR with OA, L5-SI and SI joint releases, Dural Tube Pull.  SCS to  R  LFT11,12,L1,2,3,4,SR 1,2,3,5.  L L5     Neuromuscular Re-education 10 KTing of  SI's,   L5-S1   Therapeutic Activity     Therapeutic Exercises     Gait training     Modality__________________                Total 63    Blank areas are intentional and mean the treatment did not include these items.       Rahul Deutsch  9/28/2020

## 2021-06-11 NOTE — PROGRESS NOTES
"Adelina Espinoza is a 74 y.o. female who is being evaluated via a billable video visit.      The patient has been notified of following:     \"This video visit will be conducted via a call between you and your physician/provider. We have found that certain health care needs can be provided without the need for an in-person physical exam.  This service lets us provide the care you need with a video conversation.  If a prescription is necessary we can send it directly to your pharmacy.  If lab work is needed we can place an order for that and you can then stop by our lab to have the test done at a later time.    Video visits are billed at different rates depending on your insurance coverage. Please reach out to your insurance provider with any questions.    If during the course of the call the physician/provider feels a video visit is not appropriate, you will not be charged for this service.\"    Patient has given verbal consent to a Video visit? Yes  How would you like to obtain your AVS? AVS Preference: [email or web address].  If dropped by the video visit, the video invitation should be sent to: Text to cell phone: [phone]  Will anyone else be joining your video visit? Yes: spouse.        Video Start Time: 10:56 AM    Additional provider notes:   Assessment:   Adelina Espinoza is a 74 y.o. y.o. female with past medical history significant for peripheral neuropathy, depression, anxiety, osteoporosis (on Prolia), GERD who presents today for follow-up regarding a severe flare of chronic low back pain with radiation into the bilateral lower extremities with associated numbness and tingling and weakness.  MRI lumbar spine shows severe spinal stenosis at L4-5 with moderate right lateral recess stenosis at this level related to a broad-based disc bulge and a right synovial cyst and advanced facet arthropathy.  Patient is status post bilateral L4-5 transforaminal epidural steroid injections August 28, 2020 which provided 80% relief " "of her pain.  She has only mild residual axial low back pain which likely is related to lumbar facet arthropathy.        Plan:      A shared decision making plan was used.  The patient's values and choices were respected.  The following represents what was discussed and decided upon by the physician assistant and the patient.       1.  DIAGNOSTIC TESTS: I reviewed the MRI lumbar spine.  No further diagnostic tests were ordered.     2.  PHYSICAL THERAPY: Patient completed 18 sessions of PT .  She has 3 sessions remaining. She will continue with physical therapy and the home exercises.     3.  MEDICATIONS:  -Patient experienced excellent pain relief but significant side effects of gabapentin when she was taking 600 mg 3 times daily.  She states that she felt \"off balance and out of it.\"  She had a fall.  She decreased her dose back to gabapentin 300 mg 3 times daily.    Earlier this week she had an episode where she fell out of bed.  She then had an episode where she woke up and she was standing next to her bed.  She wonders if this could be related to gabapentin.  We will have her wean off the medication.  She can on a 1 capsule every other day.  I think we should use this medication with caution in the future due to side effects.  -Patient can continue on Tylenol 1000 mg 3 times daily as needed.  -Patient can continue using ibuprofen 400-600 mg 3 times daily as needed.       4.  INTERVENTIONS:   No further interventions were ordered.  If pain were to return, we could repeat the bilateral L4-5 transforaminal epidural steroid injection.  I did tell the patient that she can have up to 4 injections/year.    5.  PATIENT EDUCATION:   -If the patient fails to have lasting relief with nonsurgical treatments, I would recommend a referral to neurosurgery.  -Patient asked if she could try riding in a boat.  I suggested that the patient avoid choppy water which could cause a jarring motion to her back.  She should also be " cautious with getting on and off the boat to limit twisting and avoid falls.  I think she is safe to ride on the boat if she can do these things.  -Patient is in agreement the above plan.  All questions were answered.     6.  FOLLOW-UP:  Patient will follow-up with me as needed.  If she has questions or concerns, she should not hesitate to call.     Subjective:      Adelina Espinoza is a 74 y.o. female who presents today for follow-up regarding regarding bilateral low back pain and bilateral lower extremity pain.   Patient is status post a bilateral L4-5 transforaminal epidural steroid injection August 28, 2020.  Patient reports the injection provided 80% relief of her pain.     Patient complains of  only mild residual bilateral low back pain.  She denies any significant pain radiating down the legs.  Back pain is aggravated with gardening.  Back pain is alleviated with taking ibuprofen.  She rates her pain today as a 3 out of 10.  At its worst it is a 4 or 5 out of 10.  At its worst it is a 2 out of 10.    Patient did have a fall out of bed earlier this week.  She hit her head on her nightstand and had a bump on her head.  She has had some neck stiffness since then.  She reports that she also had an incident where she woke up and she was standing next to her bed.  She wonders if the gabapentin may be affecting her sleep.     Patient has had 18 sessions of physical therapy.  She is doing home exercises.  She is taking gabapentin 300 mg 3 times daily.    Again, she is concerned this is causing side effects at night. She is ibuprofen as needed.  She uses Tylenol 1000 mg daily.    Medications are helpful.     Past medical history is reviewed and is unchanged.     Review of Systems:  Positive for numbness/tingling (chronic in fingers and toes), headache (from fall 9 8), trip/stumble/falls.  Negative for weakness, loss of bowel/bladder control, inability to urinate, pain much worse in neck, difficulty swallowing,  difficulty with hand skills, fevers, unintentional weight loss.     Objective:   CONSTITUTIONAL:  Vital signs as above.  No acute distress.  The patient is well nourished and well groomed.    PSYCHIATRIC:  The patient is awake, alert, oriented to person, place and time.  The patient is answering questions appropriately with clear speech.  Normal affect.  HEENT: Normocephalic, atraumatic.  Sclera clear.    SKIN:   Exposed skin is clean, dry, intact without rashes.  MUSCULOSKELETAL:   Patient ambulates with a narrow-base, nonantalgic gait.  Able to ambulate on toes and heels bilaterally without difficulty.  Lumbar range of motion is full.     RESULTS: I reviewed the MRI lumbar spine from [facility] dated March 13, 2020.  At L4-5 there is a broad-based disc bulge and superimposed central disc protrusion.  There is a 7.8 x 8.1 mm medial right-sided synovial cyst and severe bilateral facet arthropathy.  There is also a small amount of edema within the soft tissues adjacent to the facet joints.  There is severe spinal canal stenosis.  There is moderate right lateral recess stenosis.  Patient also has mild bilateral lateral recess gnosis L4-5.  There is severe right and moderate left facet arthropathy at L5-S1 but no neural compromise.  Please see report for further details.           Video-Visit Details    Type of service:  Video Visit    Video End Time (time video stopped): 11:07 AM  Originating Location (pt. Location): Home    Distant Location (provider location):  [facility]     Platform used for Video Visit: Doximjhoana (Amwell failed)      Mary Daniels PA-C

## 2021-06-11 NOTE — PROGRESS NOTES
[facility] Daily Progress     Patient Name: Adelina Espinoza  Date: 9/23/2020  Visit # 2/12  + 18   Recert 09/14/20  PTA visit #:   0     Referral Diagnosis: Lumbar Radiculitis  Referring provider: Mary Daniels PA-C  Visit Diagnosis:     ICD-10-CM    1. Chronic bilateral low back pain with bilateral sciatica  M54.42     M54.41     G89.29    2. Lumbosacral radiculitis  M54.17    3. Localized edema  R60.0    4. Swelling of limb  M79.89          Assessment:     Results of Treatment    Leg and low Back pain reduced 5/10 to 1/10   SI dysf resolved   Pain when walking greatly reduced    HEP/POC compliance is  good .  Patient demonstrates understanding/independence with home program.    Goal Status:  No data recorded  No data recorded      Plan / Patient Education:     Continue with initial plan of care.  Progress with home program as tolerated.    Subjective:   My back of the thighs hurt  7/10   The pain has reduced after advil 5/10   Sleeping is OK   Walking is the worst    Objective:      L SI downslip.  5 sec  + Scan Lig,   Neuro  Coccyx,                  Treatment Today     TREATMENT MINUTES COMMENTS   Evaluation     Self-care/ Home management     Manual therapy 58 SCS to SASCF-P,   SSS-P,   ISS-P,   CX L1,R2, L3,4,5-P.   AR3-MS,  MCLUN-N R,   PS2,3-N L,      Neuromuscular Re-education     Therapeutic Activity     Therapeutic Exercises     Gait training     Modality__________________                Total 58    Blank areas are intentional and mean the treatment did not include these items.       Rahul Deutsch  9/23/2020

## 2021-06-11 NOTE — PROGRESS NOTES
"Prolia Injection Phone Screen      Screening questions have been asked 2-3 days prior to administration visit for Prolia. If any questions are answered with \"Yes,\" this phone encounter were will routed to ordering provider for further evaluation.     1.  When was the last injection?  3/12/20    2.  Has insurance for this injection been verified?  Yes    3.  Did you experience any new onset achiness or rashes that lasted for over a month with your previous Prolia injection?   No    4.  Do you have a fever over 101?F or a new deep cough that is unusual for you today? No    5.  Have you started any new medications in the last 6 months that you were told could affect your immune system? These may have been prescribed by oncologist, transplant, rheumatology, or dermatology.   Yes Prednisone.     6.  In the last 6 months have you have gastric bypass or parathyroid surgery?   No    7.  Do you plan dental work requiring drilling into the bone such as implants/extractions or oral surgery in the next 2-3 months?   No    8. Do you have new insurance since the last injection? no    Patient informed if symptoms discussed above present prior to their administration appointment, they are to notify clinic immediately. yes  [place]            "

## 2021-06-12 NOTE — TELEPHONE ENCOUNTER
PSP:  Mary DIAZ  Last clinic visit:  9/11/2020 Video visit  Reason for call: New symptoms  Clinical information:  Pt calling to report she has been experiencing new symptoms that began a couple of weeks ago. She has been doing more around the house as her  recently had hip surgery so she feels this may have caused this.   Pt is experiencing bilateral buttock pain. Her pain radiates down her right leg to her ankle, and down her leg leg just below the knee. She experiences pain when sitting down and her pain is relieved when she sits for a long period of time.   She has been going to PT as ordered and they have been working with her on this, however, she feels that things are worsening for her.   Advice given to patient: Advised follow-up with provider to evaluate new symptoms. Pt agreeable to this plan. Assisted pt with scheduling appt.   Provider to address: JULIANO

## 2021-06-12 NOTE — TELEPHONE ENCOUNTER
Last Med Check: 10/19/20    Next med check due on: 10/26/20     CSA on File: NO     Future Appointment Scheduled ? YES     Last Med Refill? 7/7/20     Nia Fox, CMA

## 2021-06-12 NOTE — TELEPHONE ENCOUNTER
Refill Request  Did you contact pharmacy: Yes  Medication name:   Requested Prescriptions     Pending Prescriptions Disp Refills     clonazePAM (KLONOPIN) 0.5 MG tablet 90 tablet 0     Sig: Take 1 tablet (0.5 mg total) by mouth at bedtime.     Who prescribed the medication: Dr. Delvalle  Requested Pharmacy: [facility]  Is patient out of medication: No  Patient notified refills processed in 3 business days:  n/a  Okay to leave a detailed message: yes      Dr. Delvalle is weaning patient off this medication.  She needs 10 more tablets to complete weaning process.  Please send 10 tablets to [facility].

## 2021-06-12 NOTE — TELEPHONE ENCOUNTER
FYI - Status Update  Who is Calling: Patient  Update: She will also need citalopram sent to [facility] while she waits from her supply from [facility].  Please send 10 tablets to [facility].  Okay to leave a detailed message?:  No return call needed

## 2021-06-12 NOTE — TELEPHONE ENCOUNTER
----- Message from Mary Daniels PA-C sent at 11/9/2020  3:36 PM CST -----  Regarding: injection  Please call the patient let her know that Dr. Mcnair and I reviewed her case.  We recommend an L4-5 interlaminar epidural steroid injection as the next step. Thanks!

## 2021-06-12 NOTE — PROGRESS NOTES
Assessment:   Adelina Espinoza is a 74 y.o. y.o. female with past medical history significant for peripheral neuropathy, depression, anxiety, osteoporosis (on Prolia), GERD who presents today for follow-up regarding  acute on chronic low back pain with radiation into the bilateral lower extremities with associated numbness and tingling.  MRI lumbar spine shows severe spinal stenosis at L4-5 with moderate right lateral recess stenosis at this level related to a broad-based disc bulge and a right synovial cyst and advanced facet arthropathy.  Patient is status post bilateral L4-5 transforaminal epidural steroid injections August 28, 2020 which provided 80% relief of her pain but only lasted about 4 weeks.  Over the past 6 weeks, the same pain has returned.        Plan:      A shared decision making plan was used.  The patient's values and choices were respected.  The following represents what was discussed and decided upon by the physician assistant and the patient.       1.  DIAGNOSTIC TESTS: I reviewed the MRI lumbar spine.  No further diagnostic tests were ordered.     2.  PHYSICAL THERAPY: Patient is currently in physical therapy.  She has had 24 sessions.  She does find physical therapy beneficial.  She will continue with the therapy and the home exercises.     3.  MEDICATIONS:  -Patient did not tolerate gabapentin due to cognitive changes and falls.  -Patient can continue on Tylenol 1000 mg 3 times daily as needed.  -Patient can continue using ibuprofen 400-600 mg 3 times daily as needed.       4.  INTERVENTIONS:   I told the patient that based on the severity of her stenosis I am not overly optimistic that another epidural steroid injection would provide lasting relief of her pain.  However, I did offer an interlaminar epidural steroid injection to see if that may last longer than the transforaminal epidural steroid injections.  She would like to try this.  I am going to review the case with Dr. Mcnair to  determine if she thinks she would have better access at the L4-5 versus L5-S1 level.  A nurse will call the patient with the injection plan after Dr. Mcnair has reviewed her case.    5.  REFERRALS: I did enter a referral to neurosurgery as well.     6.  FOLLOW-UP:  A nurse will call the patient with the plan for the injection after I have spoken with Dr. Mcnair.  If she has questions or concerns in the meantime, she should not hesitate to call.     Subjective:      Adelina Espinoza is a 74 y.o. female who presents today for follow-up regarding regarding an acute flare of chronic bilateral low back pain and bilateral lower extremity pain.   Patient is status post a bilateral L4-5 transforaminal epidural steroid injection August 28, 2020.  Patient reports the injection provided 80% relief of her pain but only lasted about 4 weeks.  Patient states about 6 weeks ago she began to experience pain again.  It is getting gradually worse.  She denies any injury or event to cause the pain.     Patient complains of  pain which begins in the tailbone area.  On the left the pain radiates into the lateral hip and down the lateral thigh to the knee.  She describes a stabbing type pain in the left lateral thigh which can be so severe that it takes her breath away.  On the right, pain radiates down the posterior lateral thigh to the knee and then down the lateral calf to the ankle.  She has numbness and tingling in the toes of the left foot.  She denies weakness.  She rates her pain today as a 6 out of 10.  At its worst it is a 9 out of 10.  At its best it is a 2 out of 10.  Pain is aggravated with rolling over in bed, prolonged sitting.  Pain is alleviated with applying icy hot and applying Biofreeze.     Patient has had 24 sessions of physical therapy.  She is doing home exercises.  She is taking ibuprofen as needed.  She uses Tylenol 1000 mg daily.    Medications are helpful.     Past medical history is reviewed and is  unchanged.     Review of Systems:  Positive for numbness/tingling (chronic in fingers and toes), wetting pants, pain worse at night, trip/stumble/falls.  Negative for weakness, loss of bowel/bladder control, inability to urinate, headache, difficulty swallowing, difficulty with hand skills, fevers, unintentional weight loss.     Objective:   CONSTITUTIONAL:  Vital signs as above.  No acute distress.  The patient is well nourished and well groomed.    PSYCHIATRIC:  The patient is awake, alert, oriented to person, place and time.  The patient is answering questions appropriately with clear speech.  Normal affect.  HEENT: Normocephalic, atraumatic.  Sclera clear.    SKIN:  Skin over the face, posterior torso, bilateral upper and lower extremities is clean, dry, intact without rashes.  MUSCULOSKELETAL:  Gait is guarded and antalgic.  She transitions from seated to standing slowly.  Lumbar flexion and extension both mildly restricted.   No significant tenderness palpation bilateral lower lumbar paraspinous muscles.  Patient did have mild tenderness palpation bilateral sacroiliac joints.  The patient has 5/5 strength for the bilateral hip flexors, knee flexors/extensors, ankle dorsiflexors/plantar flexors, ankle evertors/invertors.    NEUROLOGICAL: 1-2+ patellar, achilles reflexes which are symmetric bilaterally.  No ankle clonus bilaterally.  Sensation to light touch is intact in the bilateral L4, L5, and S1 dermatomes.       RESULTS: I reviewed the MRI lumbar spine from [facility] dated March 13, 2020.  At L4-5 there is a broad-based disc bulge and superimposed central disc protrusion.  There is a 7.8 x 8.1 mm medial right-sided synovial cyst and severe bilateral facet arthropathy.  There is also a small amount of edema within the soft tissues adjacent to the facet joints.  There is severe spinal canal stenosis.  There is moderate right lateral recess stenosis.  Patient also has mild bilateral lateral recess gnosis L4-5.   There is severe right and moderate left facet arthropathy at L5-S1 but no neural compromise.  Please see report for further details.               Mary Daniels PA-C

## 2021-06-12 NOTE — PROGRESS NOTES
[facility] Daily Progress     Patient Name: Adelina Espinoza  Date: 10/5/2020  Visit # 4/12  + 18   Recert 09/14/20  PTA visit #:   0     Referral Diagnosis: Lumbar Radiculitis  Referring provider: Mary Daniels PA-C  Visit Diagnosis:   No diagnosis found.      Assessment:     Results of Treatment   R LBP reduced 4/10 to 2/10.   Ant pelvic and distal sciatic nerve pain reduced    HEP/POC compliance is  good .  Patient demonstrates understanding/independence with home program.    Goal Status:  No data recorded  No data recorded      Plan / Patient Education:     Continue with initial plan of care.  Progress with home program as tolerated.    Subjective:   Gluteal and posterior thigh pain is better.   Pain at 4/10    Objective:     SI's level       6 sec,      + Scan  Ligaments  Ant Pelvis          Treatment Today     TREATMENT MINUTES COMMENTS   Evaluation     Self-care/ Home management     Manual therapy 59 MFR with OA, L5-SI and SI joint releases, Dural Tube   SCS to R OBT-N   HAML-SF,   CORNELIUS-SF,  AOBT-N,   LFC-N,  IHYP-N GFEM-N,   ABELINO-N   DSCI-N,   CFIB-N,   SAPH-LV,   LFL1-MS,   TIB-N,   SFIB-N,   DFIB-N.     Neuromuscular Re-education     Therapeutic Activity     Therapeutic Exercises     Gait training     Modality__________________                Total 59    Blank areas are intentional and mean the treatment did not include these items.       Rahul Deutsch  10/5/2020

## 2021-06-12 NOTE — TELEPHONE ENCOUNTER
Recommend she discuss with PCP.  Recent fall would not be related to gabapentin since it was discontinued last month.

## 2021-06-12 NOTE — PROGRESS NOTES
PROGRESS NOTE   10/19/2020    SUBJECTIVE:  Adelina Espinoza is a 74 y.o. female  who presents for   Chief Complaint   Patient presents with     Fall     Has fallen out of bed 4 times in the last 3 months      Patient comes in today for evaluation and discussion regarding falls that she is taken recently.  She has been seen in the spine clinic for her low back pain.  She was started on gabapentin by them and when she was on gabapentin she had several falls.  It was thought that it was probably due to the gabapentin and so she has been weaned off of the gabapentin by the spine clinic and now has been off of it for about the last month or so but she had a fall again and the spine clinic thought that she better come in for further evaluation by myself.  She notes that initially she was on gabapentin 100 mg 3 times a day.  They then went up to 300 mg 3 times a day and then up to 600 mg 3 times a day.  That is when she started rolling out of bed and falling on the floor.  It happened 3 times when she was on gabapentin.  She had no idea that that had happened at all.  She just woke up and the only thing she remembers is being on the floor.  She also notes that while she was on this medication her sister-in-law as well as her  both said that she was taking unnecessary chances and they would not let her drive because of their concern over the unnecessary chances that she was having.  They also noted that her memory and her concentration were very bad as well.  She notes that she gradually weaned off the gabapentin because of the above issues.  This past week now she fell of the bed again and so that that is when she called and they asked her to come in for evaluation.  She also notes that she is fell in the [facility] weather up [place] at their cabin in [place].  She went to grab for a chair and it was a swing a chair and so that could not stabilize her and she fell.  She notes that she never has any problems during the day at  all and she feels absolutely fine.  She continues to have back pain issues.  She has been going to physical therapy and it is helping some but now her back pain seems to have moved to a different area as well.  She feels like her cognition is fine since she went off of the gabapentin and does not feel like she is having any other issues.  She does have a history of anxiety and that seems to be doing well.  She noticed that she had more anxiety when she was on the gabapentin but now that is gone she is feeling more like her normal self again.  Her  is having surgery tomorrow for a hip replacement and so she notes that she has a bit more anxiety but overall she feels like her anxiety is doing much better.  She was previously seen Dr. Guzman for her anxiety however he has retired.  Dr. Guzman had given her Klonopin that she has been using at night and has been on that about the last year and a half or so.  Patient also notes that she has been walking into walls if she moves too fast or she tries to get to many things done at one time.    Patient Active Problem List   Diagnosis     Peripheral Neuropathy     Urticaria     Major Depression, Recurrent     Lump Or Mass In Breast     Anxiety     Limb Swelling     Irritable Bowel Syndrome     Primary osteoarthritis of right knee     Primary osteoarthritis of knee, left     Osteoporosis, senile     Esophageal reflux     Primary osteoarthritis involving multiple joints     Arthritis of first metatarsophalangeal (MTP) joint of right foot       Current Outpatient Medications   Medication Sig Dispense Refill     acetaminophen (TYLENOL) 500 MG tablet Take 1,000 mg by mouth every 6 (six) hours as needed for pain.       aspirin 81 MG EC tablet Take 81 mg by mouth daily.       calcium citrate-vitamin D (CITRACAL+D) 315-200 mg-unit per tablet Take 1 tablet by mouth 2 (two) times a day.       furosemide (LASIX) 20 MG tablet TAKE 1 TABLET DAILY 90 tablet 3     ibuprofen 200 mg cap  Take 800 mg by mouth as needed.       magnesium 250 mg Tab Take 250 mg by mouth daily.        multivit-min-iron-FA-lutein (CENTRUM SILVER WOMEN) 8 mg iron-400 mcg-300 mcg Tab Take one tablet by mouth daily       omeprazole (PRILOSEC) 20 MG capsule TAKE 1 CAPSULE DAILY BEFORE BREAKFAST 90 capsule 1     vit C/E/Zn/coppr/lutein/zeaxan (PRESERVISION AREDS-2 ORAL) Take 2 tablets by mouth daily.       citalopram (CELEXA) 40 MG tablet TAKE 1 TABLET DAILY 90 tablet 1     citalopram (CELEXA) 40 MG tablet Take 1 tablet (40 mg total) by mouth daily. 30 tablet 0     clonazePAM (KLONOPIN) 0.5 MG tablet Take 1 tablet (0.5 mg total) by mouth at bedtime. 10 tablet 0     dicyclomine (BENTYL) 10 MG capsule TAKE 1 CAPSULE FOUR TIMES A DAY AS NEEDED FOR CRAMPS 30 capsule 3     No current facility-administered medications for this visit.        Allergies   Allergen Reactions     Adhesive Rash     Gabapentin      Has dizziness, spaced out when on it. Has been on it twice and both times had this type of reaction. Should not be on it in the future.      Green Pepper      Stomach upset     Lactose Diarrhea     Monosodium Glutamate      diarrhea       Past Medical History:   Diagnosis Date     Anxiety      Arthritis      Breast cancer (H) 2003     Depression      Esophageal reflux      History of anesthesia complications     nausea     Hx antineoplastic chemotherapy 2003    for breast cancer     Hx of radiation therapy 2004    for breast cancer     IBS (irritable bowel syndrome)      Lactose intolerance     lactose breath test positive at GI, 7/2019     Macular degeneration     caught early, takes Areds 2 on a daily basis, seeing opthamology     Neuropathy      Osteopenia        Past Surgical History:   Procedure Laterality Date     APPENDECTOMY  1967     BACK SURGERY      cervical fusion     BREAST BIOPSY Right 2003     BREAST LUMPECTOMY  12/2003    breast cancer     CHOLECYSTECTOMY  1994     HYSTERECTOMY  1997    desmoid cyst     NISSEN  FUNDOPLICATION       OOPHORECTOMY  1997     ID TOTAL KNEE ARTHROPLASTY Right 10/29/2015    Procedure: RIGHT TOTAL KNEE  ARTHROPLASTY;  Surgeon: Nitin White MD;  Location: [facility] Main OR;  Service: Orthopedics     ID TOTAL KNEE ARTHROPLASTY Left 4/21/2016    Procedure: LEFT TOTAL KNEE ARTHROPLASTY;  Surgeon: Nitin Whiet MD;  Location: [facility] Main OR;  Service: Orthopedics       Social History     Tobacco Use   Smoking Status Never Smoker   Smokeless Tobacco Never Used       OBJECTIVE:     /70   Pulse 96   Wt 176 lb (79.8 kg)   LMP 01/11/1997   SpO2 97%   BMI 30.50 kg/m      Physical Exam:  GENERAL APPEARANCE: A&A, NAD, well hydrated, well nourished  SKIN:  Normal skin turgor, no lesions/rashes   CV: RRR, no M/G/R   LUNGS: CTAB  EXTREMITY: no swelling noted.  Full range of motion of all 4 extremities.   NEURO: no gross deficits.  Neurological exam was complete within normal limits.  Deep tendon reflexes were equal and symmetrical.  Motor and sensation were intact about the upper and lower extremities.  Cranial nerves II through XII are grossly intact.  PSYCHIATRIC;  Mood appropriate, memory intact  A&O x3, thought processes congruent, non-tangential. No hallucinations/delusions. Insight/judgment: intact. Denies suicidal/homicidal ideations.    Little interest or pleasure in doing things: Several days  Feeling down, depressed, or hopeless: Several days  Trouble falling or staying asleep, or sleeping too much: More than half the days  Feeling tired or having little energy: More than half the days  Poor appetite or overeating: Not at all  Feeling bad about yourself - or that you are a failure or have let yourself or your family down: Not at all  Trouble concentrating on things, such as reading the newspaper or watching television: Not at all  Moving or speaking so slowly that other people could have noticed. Or the opposite - being so fidgety or restless that you have been moving around a  lot more than usual: More than half the days  Thoughts that you would be better off dead, or of hurting yourself in some way: Not at all  PHQ-9 Total Score: 8  If you checked off any problems, how difficult have these problems made it for you to do your work, take care of things at home, or get along with other people?: Not difficult at all    Total DERIK 7 Score       10/19/2020             DERIK 7 Total Score:  3          ASSESSMENT/PLAN:     Recurrent falls [R29.6]      1. Recurrent falls    2. Chronic bilateral low back pain with sciatica, sciatica laterality unspecified    3. Anxiety    Patient comes in today because of recurrent falls.  Initially when she started to have recurrent falls they thought it was probably due to the gabapentin.  She is weaned off the gabapentin per the spine clinic and her cognition is better and she seems to be functioning better and she is not taking the unnecessary chances that she was while she was on the medication but she still had troubles with falling out of bed.  She is now fallen out of bed 3 times in the past 3 months and most recently was last week and she had been off of the gabapentin for a month.  The spine clinic rightly so said that it probably was not due to the gabapentin because she has been off of it for a month and asked her to come in for evaluation.  I reviewed all of her medications with her.  She is on aspirin Tylenol Celexa Klonopin and Lasix.  She notes that she takes the Klonopin in the evening every night before she goes to bed.  I am wondering if perhaps the Klonopin is causing her difficulties.  She has been on this for the last year and a half and did not really have any problems with it however now she is having these issues where she is falling out of bed and she does not remember falling out of bed the only thing she remembers is hitting the floor.  She has not yet significantly injured herself although she has had some minor injuries as a result of  these falls.  This morning to have her stop the Klonopin and see if that is helpful.  She has been on the Klonopin for the last year and a half and so I have asked her to slowly wean off of that.  She is to take a half a tablet a day for the next 2 weeks and then a half a tablet every other day for a week then a half a tablet every third day for a week and then a half a tablet every fifth day for a week then she can take 2 doses once a week.  That should be a very slow taper for her and hopefully she will react to being off of the Klonopin.  I am hopeful that that will help with her cognition and with her ability to control her body at night.  She is absolutely not having any problems during the day is only at night that she has these falls or that she falls out of bed.  We will need to monitor how she does in terms of her anxiety when she is off the Klonopin but she already is on Celexa and that seems to be working fairly well.  She overall feels like her anxiety is under fairly good control which is great.  She is already had a flu vaccination this season.  In terms of the low back pain she feels like the back pain is moved to a different area and is wondering what else she can be doing for that.  We talked about using ibuprofen 600 mg 3 times a day consistently and then occasionally using some Tylenol in between and seeing if that will be more helpful for her in terms of her back pain.  She did just recently have metabolic panel drawn and her kidney functions as well as her liver functions are normal.  She will keep me up-to-date as to how things are going in terms of the falling as well as her back pain.  All of her questions were answered today.  We will see her back in about 4 months for follow-up of all of her chronic medical problems.Total time spent with patient was at least 25 minutes,  of which greater than fifty percent was spent in counselling and coordination of care of the above medical  problems.  Migdalia Delvalle MD    This note was dictated using voice recognition software. Any grammatical errors, context distortions, or spelling errors are not intentional

## 2021-06-12 NOTE — TELEPHONE ENCOUNTER
Please assist patient with scheduling an appointment for evaluation of falling.   This should be a long appointment (40 minute) so we have adequate time to discuss this.   I think she needs to be seen soonish and thus she may need to see one of my partners for evaluation as I am not sure I have any appointments available currently.

## 2021-06-12 NOTE — TELEPHONE ENCOUNTER
RN cannot approve Refill Request    RN can NOT refill this medication allergy/contraindication. Last office visit: 10/19/2020 Migdalia Delvalle MD Last Physical: 3/4/2020 Last MTM visit: Visit date not found Last visit same specialty: 10/19/2020 Migdalia Delvalle MD.  Next visit within 3 mo: Visit date not found  Next physical within 3 mo: Visit date not found      Alaina Araya, [facility] Triage/Med Refill 10/25/2020    Requested Prescriptions   Pending Prescriptions Disp Refills     citalopram (CELEXA) 40 MG tablet 90 tablet 3     Si tablet (40 mg total) daily.       SSRI Refill Protocol  Passed - 10/23/2020  8:57 AM        Passed - PCP or prescribing provider visit in last year     Last office visit with prescriber/PCP: 10/19/2020 Migdalia Delvalle MD OR same dept: 10/19/2020 Migdaila Delvalle MD OR same specialty: 10/19/2020 Migdalia Delvalle MD  Last physical: 3/4/2020 Last MTM visit: Visit date not found   Next visit within 3 mo: Visit date not found  Next physical within 3 mo: Visit date not found  Prescriber OR PCP: Migdalia Delvalle MD  Last diagnosis associated with med order: 1. Major depressive disorder, recurrent episode (H)  - citalopram (CELEXA) 40 MG tablet; 1 tablet (40 mg total) daily.  Dispense: 90 tablet; Refill: 3    2. Anxiety  - citalopram (CELEXA) 40 MG tablet; 1 tablet (40 mg total) daily.  Dispense: 90 tablet; Refill: 3    If protocol passes may refill for 12 months if within 3 months of last provider visit (or a total of 15 months).

## 2021-06-12 NOTE — TELEPHONE ENCOUNTER
Call placed to pt with provider's response. Pt stated understanding. Advised pt to get in contact with her PCP's office to be further evaluated. Will send message to Dr. Delvalle's care team pool as an FYI.     Pt stated understanding and will reach out to her PCP.

## 2021-06-12 NOTE — PROGRESS NOTES
Chief Complaint   Patient presents with     Prolia #6     1. Did you experience any problems with previous Prolia injection? NO  2. Do you feel sick today?(fever, RS, GI,  issues)? NO  3. Any medication changes in the last 6 months? NO  4. Did you take prednisone or other immunosuppressant drugs in the last 6 months?(chemo, transplant, rheu, dermatology conditions)? NO  5. Did you have any serious infection in the last 6 months? (pancreatitis) NO  6. Any recent hospitalizations/ surgeries (especially gastric bypass, thyroid, parathyroid)? NO  7. Do you plan any dental work?(especially implants and extractions) in the next 2-3 months? NO  8. Did you have any fractures in the last year? NO     Yearly F/U appointment  with Dr. Blanchard is made.     Irene Sky CMA [facility] 9/7/2017 1:10 PM

## 2021-06-12 NOTE — TELEPHONE ENCOUNTER
I just saw her on 10/26/2020 and this medication was discontinued. We are currently doing a wean of the medication over the next few weeks. Does she need a refill of the medication to get her through this taper and if so, how much does she need?

## 2021-06-12 NOTE — TELEPHONE ENCOUNTER
"PSP:  Mary DIAZ  Last clinic visit:  9/11/2020  Reason for call: Fall  Clinical information: Pt calling to report she has been off the gabapentin for about 1 month and on Saturday she fell out of bed again. She states she was advised to come off the gabapentin due to falling but now she thinks this may not be related to the gabapentin. Pt inquiring if she should see her PCP, Mary, or someone else for this.   She reports she fell on Saturday night and hit the side of her ear. She did not lose consciousness. Denies headache. Denies dizziness. Denies other symptoms.   Pt is continuing to go to PT for myofascial release which she feels does help. She has noticed her pain lower now than previously. \"It seems to be more in my sacroiliac joint area now\".   Advice given to patient: Informed pt that Mary will be updated. She will be called with further recommendations.   Provider to address: Please advise.     "

## 2021-06-12 NOTE — TELEPHONE ENCOUNTER
F/u fall appt scheduled per spine care request. Pt off gabapentin and still fell out of bed.   See previous notes from Mary Daniels PA-C

## 2021-06-12 NOTE — PROGRESS NOTES
[facility] Daily Progress     Patient Name: Adelina Espinoza  Date: 10/19/2020  Visit # 5/12  + 18   Recert 09/14/20  PTA visit #:   0     Referral Diagnosis: Lumbar Radiculitis  Referring provider: Mary Daniels PA-C  Visit Diagnosis:     ICD-10-CM    1. Chronic bilateral low back pain with bilateral sciatica  M54.42     M54.41     G89.29    2. Lumbosacral radiculitis  M54.17          Assessment:     Results of Treatment  Pelvic and Hip tender points resolved   Pain levels to the pelvis and post thighs 0/10.  Pt continues with home ex program    HEP/POC compliance is  good .  Patient demonstrates understanding/independence with home program.    Goal Status:  No data recorded  No data recorded      Plan / Patient Education:     Continue with initial plan of care.  Progress with home program as tolerated.    Subjective:   Gluteal and posterior thigh pain is better.   Pain at 0/10   Balloon breathing exercise is the best one.   Helps me the most.   Fell out of bed Sat.  Dr is going to wean me off of Cylonazapan.   She feels I'm sleeping too soundly   Pain is at 0/10.        Objective:     SI's level       6 sec,      + Scan  Ligaments  Ant Pelvis.   Visceral Pelvis   Ant Neuro Pelvis,   Lymphatic  Pelvis    Treatment Today     TREATMENT MINUTES COMMENTS   Evaluation     Self-care/ Home management     Manual therapy 57 MFR with OA, L5-SI and SI joint releases,    SCS to R LEL3-MS,   Bilat  MUL-V,  PUBC-MS   BSPH-V    R IHYP-N   R SVEL-LV   bilat Broad Ligament,   R FVARP-MS,   Bilat ISCHOP-MS,   R FVARP-MS,   PSOAS-LV,        Neuromuscular Re-education     Therapeutic Activity     Therapeutic Exercises     Gait training     Modality__________________                Total 57    Blank areas are intentional and mean the treatment did not include these items.       Rahul Deutsch  10/19/2020

## 2021-06-12 NOTE — TELEPHONE ENCOUNTER
Spoke with pt. She [facility]. But she only has enough medication to last her up until Tuesday. Pt is requesting a bridge for medication to get her up until she received her [facility] medication.    Emy Barrera, CMA

## 2021-06-12 NOTE — TELEPHONE ENCOUNTER
Phone call to patient to discuss the recommended injection. Patient would like to move forward with this. Order placed in Epic for L4-5 interlaminar epidural steroid injection. Injection requirements reviewed with patient. Discussed steroids have immunosuppressant properties which could potentially put patient at a higher risk for a worsened course of any infection including COVID. Stated understanding. Also discussed number of injections as well as the safe amount of steroid in a 12 month period. She would like to proceed with this injection. Transferred to scheduling to make appointment.

## 2021-06-12 NOTE — TELEPHONE ENCOUNTER
Who is calling:  Patient  Reason for Call:  Called [facility]. Physician there suggested that she schedule an appointment with PCP regarding falling from bed issues.   Patient stated most recent fall with this past Saturday and has been off of Gabapentin for one month.  Date of last appointment with primary care: 9/04/2020  Okay to leave a detailed message: Yes

## 2021-06-12 NOTE — PROGRESS NOTES
[facility] Daily Progress     Patient Name: Adelina Espinoza  Date: 11/4/2020  Visit # 6/12  + 18   Recert 09/14/20  PTA visit #:   0     Referral Diagnosis: Lumbar Radiculitis  Referring provider: Mary Daniels PA-C  Visit Diagnosis:     ICD-10-CM    1. Chronic bilateral low back pain with bilateral sciatica  M54.42     M54.41     G89.29    2. Lumbosacral radiculitis  M54.17          Assessment:     Results of Treatment  SI dysf resolved,   Pain to the low back and R Lateral thigh resolved 3/10 to 0/10.  R Calf tension  Tenderness remain   L 5 disc pain resolved    HEP/POC compliance is  good .  Patient demonstrates understanding/independence with home program.    Goal Status:  No data recorded  No data recorded      Plan / Patient Education:     Continue with initial plan of care.  Progress with home program as tolerated.    Subjective:     Pain to the Posterior thighs and R Calf   3/10 level of pain    Objective:     SI's level     R SI upslip,   Lateral Thigh pain    Treatment Today     TREATMENT MINUTES COMMENTS   Evaluation     Self-care/ Home management     Manual therapy 58 MFR with OA, L5-SI and SI joint releases,  Sphenoid, Parietal bone releases,   SCS to MESL5,4,3-MS bilat,   L L5-S1 disc  SCS to FEME-P R.    R GRCT7,8,9,10,11,12,L1,2,3,4-N       Neuromuscular Re-education     Therapeutic Activity     Therapeutic Exercises     Gait training     Modality__________________                Total 58    Blank areas are intentional and mean the treatment did not include these items.       Rahul Deutsch  11/4/2020

## 2021-06-12 NOTE — TELEPHONE ENCOUNTER
10 tablets of medication sent to pharmacy to complete wean.     Please call her and let her know this.

## 2021-06-13 NOTE — PROGRESS NOTES
adoNEUROSURGERY CONSULTATION NOTE    11/17/2020     Adelina Espinoza is a 74 y.o. female who is sent to us in consultation by Mary Daniels for evaluation of lumbar synovial cyst, stenosis          CONSULTATION ASSESSMENT AND PLAN:     75 yo female who presents with low back and posterior leg pain.  MRI of her lumbar spine shows severe spinal canal narrowing at lumbar 4-5 level due in part to a broad-based disc herniation, a large right synovial cyst, as well as advanced facet arthropathy.  On dynamic x-rays patient has 2 mm anterolisthesis of lumbar 4 on 5 which increases to 3 mm on flexion but is unchanged on extension.  However given the amount of degeneration at this level on her MRI in addition to the synovial cyst recommend lumbar 4-5 laminectomy with synovial cyst resection and microdiscectomy as well as instrumented fusion at this level.  Discussed in detail risk and benefits of doing the decompression alone versus with an instrumented fusion.  Osteopenia on recent DEXA. She agreed to proceed.    I spent more than 45 minutes in this apt, examining the pt, reviewing the scans, reviewing notes from chart, discussing treatment options with risks and benefits and coordinating care. >50 % clinic time was spent in face to face counseling and coordinating care    Lisa Thomas     HPI:  75 yo female who presents with low back and posterior leg pain. Has been present for over a year. Pain travels down posterolateral legs to the ankle on the right and just below the knee on the left. Sitting worsens her pain. Laying down on her side or taking a bath can improve her pain. Numbness and tingling in her toes only which was present after chemotherapy. Denies leg weakness, bowel or bladder dysfunction, saddle anesthesia.     Cannot tolerate gabapentin to side effects including falls and cognitive. PT 1 x a week. Can increase pain with no overall long-term relief. Some relief with first right L4-5, L5-S1 TFEESI on  6/4/2020. Second on 8/28 without relief.Also taking NSAIDs 600 mg three times a day. Intermittent tylenol.     Prior Spine Surgery:  s/p cervical surgery.     Past Medical History:   Diagnosis Date     Anxiety      Arthritis      Breast cancer (H) 2003     Depression      Esophageal reflux      History of anesthesia complications     nausea     Hx antineoplastic chemotherapy 2003    for breast cancer     Hx of radiation therapy 2004    for breast cancer     IBS (irritable bowel syndrome)      Lactose intolerance     lactose breath test positive at , 7/2019     Macular degeneration     caught early, takes Areds 2 on a daily basis, seeing opthamology     Neuropathy      Osteopenia      Past Surgical History:   Procedure Laterality Date     APPENDECTOMY  1967     BACK SURGERY      cervical fusion     BREAST BIOPSY Right 2003     BREAST LUMPECTOMY  12/2003    breast cancer     CHOLECYSTECTOMY  1994     HYSTERECTOMY  1997    desmoid cyst     NISSEN FUNDOPLICATION       OOPHORECTOMY  1997     [place] TOTAL KNEE ARTHROPLASTY Right 10/29/2015    Procedure: RIGHT TOTAL KNEE  ARTHROPLASTY;  Surgeon: Nitin White MD;  Location: [facility];  Service: Orthopedics     [place] TOTAL KNEE ARTHROPLASTY Left 4/21/2016    Procedure: LEFT TOTAL KNEE ARTHROPLASTY;  Surgeon: Nitin White MD;  Location: [facility];  Service: Orthopedics       REVIEW OF SYSTEMS:  ROS reviewed with pt as documented on pt health form of 11/17/2020.           MEDICATIONS:  Current Outpatient Medications   Medication Sig Dispense Refill     acetaminophen (TYLENOL) 500 MG tablet Take 1,000 mg by mouth every 6 (six) hours as needed for pain.       aspirin 81 MG EC tablet Take 81 mg by mouth daily.       calcium citrate-vitamin D (CITRACAL+D) 315-200 mg-unit per tablet Take 1 tablet by mouth 2 (two) times a day.       citalopram (CELEXA) 40 MG tablet Take 1 tablet (40 mg total) by mouth daily. 30 tablet 0     clonazePAM (KLONOPIN) 0.5 MG tablet  Take 1 tablet (0.5 mg total) by mouth at bedtime. 10 tablet 0     dicyclomine (BENTYL) 10 MG capsule TAKE 1 CAPSULE FOUR TIMES A DAY AS NEEDED FOR CRAMPS 30 capsule 3     furosemide (LASIX) 20 MG tablet TAKE 1 TABLET DAILY 90 tablet 2     ibuprofen 200 mg cap Take 800 mg by mouth as needed.       magnesium 250 mg Tab Take 250 mg by mouth daily.        multivit-min-iron-FA-lutein (CENTRUM SILVER WOMEN) 8 mg iron-400 mcg-300 mcg Tab Take one tablet by mouth daily       omeprazole (PRILOSEC) 20 MG capsule TAKE 1 CAPSULE DAILY BEFORE BREAKFAST 90 capsule 1     vit C/E/Zn/coppr/lutein/zeaxan (PRESERVISION AREDS-2 ORAL) Take 2 tablets by mouth daily.       No current facility-administered medications for this visit.          ALLERGIES/SENSITIVITIES:     Allergies   Allergen Reactions     Adhesive Rash     Gabapentin      Has dizziness, spaced out when on it. Has been on it twice and both times had this type of reaction. Should not be on it in the future.      Green Pepper      Stomach upset     Lactose Diarrhea     Monosodium Glutamate      diarrhea       PERTINENT SOCIAL HISTORY:   Social History     Socioeconomic History     Marital status:      Spouse name: Fabiano     Number of children: 2     Years of education: None     Highest education level: None   Occupational History     Occupation: retired   Social Needs     Financial resource strain: None     Food insecurity     Worry: None     Inability: None     Transportation needs     Medical: None     Non-medical: None   Tobacco Use     Smoking status: Never Smoker     Smokeless tobacco: Never Used   Substance and Sexual Activity     Alcohol use: Yes     Alcohol/week: 3.0 standard drinks     Types: 3 Standard drinks or equivalent per week     Frequency: 2-3 times a week     Drinks per session: 1 or 2     Binge frequency: Never     Comment: Occasional     Drug use: No     Sexual activity: Not Currently   Lifestyle     Physical activity     Days per week: None      "Minutes per session: None     Stress: None   Relationships     Social connections     Talks on phone: None     Gets together: None     Attends Shinto service: None     Active member of club or organization: None     Attends meetings of clubs or organizations: None     Relationship status: None     Intimate partner violence     Fear of current or ex partner: None     Emotionally abused: None     Physically abused: None     Forced sexual activity: None   Other Topics Concern     None   Social History Narrative     None         FAMILY HISTORY:  Family History   Problem Relation Age of Onset     Breast cancer Maternal Aunt 46     Kidney cancer Mother 67     Hypertension Mother      Heart disease Mother      Alzheimer's disease Father      Atrial fibrillation Father      Aortic stenosis Father      Skin cancer Father      Stroke Father      Depression Father      Lung cancer Paternal Grandfather         PHYSICAL EXAM:   Constitutional: /80   Pulse 84   Resp 16   Ht 5' 3.7\" (1.618 m)   Wt 169 lb (76.7 kg)   LMP 01/11/1997   SpO2 98%   BMI 29.28 kg/m       Mental Status: A & O in no acute distress.  Affect is appropriate.  Speech is fluent.  Recent and remote memory are intact.  Attention span and concentration are normal.     Motor: Normal bulk and tone all muscle groups of upper and lower extremities.      Sensory: Sensation intact bilaterally to light touch.      Coordination:  Heel/toe/  gait intact.  intact  tandem gait      Reflexes; supinator, biceps, triceps, knee/ ankle jerk intact- hypo throughout, absent b/l knee no hoffmans/ no    babinski/ clonus.    IMAGING: I personally reviewed all radiographic images      Cc:   Migdalia Delvalle MD  [facility]  [address] [address]  "

## 2021-06-13 NOTE — PROGRESS NOTES
Chief Complaint   Patient presents with     Flu Vaccine     Flu consent and contraindication forms are given/ signed/ reviewed and sent to medical records to scan.     Irene Sky CMA [facility] 10/31/2017 2:29 PM

## 2021-06-13 NOTE — PATIENT INSTRUCTIONS - HE
L4-5 Decompression. Possible Fusion.    Provided complete information about approaching surgery.  Discussed discharge planning and expected outcomes after surgery as well as follow-ups and restrictions.  Emphasized on stop taking ASA, NSAID's, vitamins and /or OTC herbal supplements within 10 days and any anticoagulant meds within 7 days prior to surgery.  Reminded patient to bring all pertinent films to hospital the day of surgery.    NPO after midnight, Please arrive 2.5 hours prior to scheduled surgery.    Using a washcloth and a bottle of provided Hibiclens, wash your body, avoiding your face and genitals. Preferably, shower the night before surgery and the morning of surgery using a half a bottle each time for your whole body shower. If you are unable to take a shower in the morning of surgery, please discuss your options with the nurse at your readiness visit.     Provided written pamphlets about surgery and Hibiclens bottle.  Answered all questions.  The  will call patient with all pre-op orders and instructions.  Patient to complete all required diagnostic tests prior to surgery.  If test are not completed this will cancel the surgery; contact the clinic nurses at [phone] if unable to complete test.

## 2021-06-13 NOTE — PROGRESS NOTES
[facility] Daily Progress   [facility] Discharge Summary  Patient Name: Adelina Espinoza  Date: 3/8/2021  Referral Diagnosis: [unfilled]  Referring provider: Mary Daniels PA-C  Visit Diagnosis:   1. Chronic bilateral low back pain with bilateral sciatica     2. Lumbosacral radiculitis     3. Localized edema     4. Primary osteoarthritis involving multiple joints     5. Swelling of limb         Goals:    Pt will: be able to change sleep positions without pain   Met  Pt will: beable to bend forward to dress , bath put on shoes and socks without pain   Partially Met  Pt will: Be able to reach above her head to wash, fix hair and reach into a shelf with decreased pain    Met  Pt will: vianey be able to carry groceries, laundry with decreased pain   Partially met    Patient was seen for 26 visits from 03-18 to 11-18-20 with 0 missed appointments.  The patient met goals and has demonstrated understanding of and independence in the home program for self-care, and progression to next steps.  She will initiate contact if questions or concerns arise.    Pt's pain levels vary 0-3/10  She was to see Dr Dietrich 11/20/20 for an injection.  She was to discuss the future removal of a lumbar cyst.    Therapy will be discontinued at this time.  The patient will need a new referral to resume.    Thank you for your referral.  Rahul Deutsch  3/8/2021  3:32 PM  Patient Name: Adelina Espinoza  Date: 11/18/2020  Visit # 8/12  + 18   Recert 09/14/20  PTA visit #:   0     Referral Diagnosis: Lumbar Radiculitis  Referring provider: Mary Daniels PA-C  Visit Diagnosis:     ICD-10-CM    1. Chronic bilateral low back pain with bilateral sciatica  M54.42     M54.41     G89.29    2. Lumbosacral radiculitis  M54.17    3. Localized edema  R60.0    4. Primary osteoarthritis involving multiple joints  M89.49    5. Swelling of limb  M79.89          Assessment:     Results of Treatment  LBP reduced 2-3/10 to  0-1/10\.     Pt better able to perform supine to sit and transfers after treatment moving faster, smoother.    HEP/POC compliance is  good .  Patient demonstrates understanding/independence with home program.    Goal Status:  No data recorded  No data recorded      Plan / Patient Education:     Continue with initial plan of care.  Progress with home program as tolerated.    Subjective:     Pain 2-3/10  Improved.   Saw Dr Dietrich  And want to do injection on Fri 11-20.  Possible lumbar cyst removal in the near future    Objective:     SI's level     Pain to the R posterior calf at times    Treatment Today     TREATMENT MINUTES COMMENTS   Evaluation     Self-care/ Home management     Manual therapy 57 MFR with OA, Sphenoid release,   SCS to R  EPIL2,3,4,5-LV,   R PEPIL2,3,4-LV   R SMEDL3,4,5-LV    R SVFTL3,4,5-LV,   SVEL5-LV     Neuromuscular Re-education     Therapeutic Activity     Therapeutic Exercises     Gait training     Modality__________________                Total 57    Blank areas are intentional and mean the treatment did not include these items.       Rahul Deutsch  11/18/2020

## 2021-06-13 NOTE — TELEPHONE ENCOUNTER
Refill Approved    Rx renewed per Medication Renewal Policy. Medication was last renewed on 11/11/19.    Vicky De Souza, [facility] Triage/Med Refill 11/16/2020     Requested Prescriptions   Pending Prescriptions Disp Refills     furosemide (LASIX) 20 MG tablet [Pharmacy Med Name: FUROSEMIDE TABS 20MG] 90 tablet 3     Sig: TAKE 1 TABLET DAILY       Diuretics/Combination Diuretics Refill Protocol  Passed - 11/12/2020  7:21 AM        Passed - Visit with PCP or prescribing provider visit in past 12 months     Last office visit with prescriber/PCP: 10/19/2020 Migdalia Delvalle MD OR same dept: 10/19/2020 Migdalia Delvalle MD OR same specialty: 10/19/2020 Migdalia Delvalle MD  Last physical: 3/4/2020 Last MTM visit: Visit date not found   Next visit within 3 mo: Visit date not found  Next physical within 3 mo: Visit date not found  Prescriber OR PCP: Migdalia Delvalle MD  Last diagnosis associated with med order: 1. Limb swelling  - furosemide (LASIX) 20 MG tablet [Pharmacy Med Name: FUROSEMIDE TABS 20MG]; TAKE 1 TABLET DAILY  Dispense: 90 tablet; Refill: 3    If protocol passes may refill for 12 months if within 3 months of last provider visit (or a total of 15 months).             Passed - Serum Potassium in past 12 months      Lab Results   Component Value Date    Potassium 4.7 09/04/2020             Passed - Serum Sodium in past 12 months      Lab Results   Component Value Date    Sodium 139 09/04/2020             Passed - Blood pressure on file in past 12 months     BP Readings from Last 1 Encounters:   11/06/20 134/80             Passed - Serum Creatinine in past 12 months      Creatinine   Date Value Ref Range Status   09/04/2020 0.74 0.60 - 1.10 mg/dL Final

## 2021-06-13 NOTE — PROGRESS NOTES
[facility] Daily Progress     Patient Name: Adelina Espinoza  Date: 11/12/2020  Visit # 7/12  + 18   Recert 09/14/20  PTA visit #:   0     Referral Diagnosis: Lumbar Radiculitis  Referring provider: Mary Daniels PA-C  Visit Diagnosis:     ICD-10-CM    1. Chronic bilateral low back pain with bilateral sciatica  M54.42     M54.41     G89.29    2. Lumbosacral radiculitis  M54.17    3. Localized edema  R60.0          Assessment:     Results of Treatment  SI dysf resolved,   Pain to the LB reduced 5/10 to 2-3/10,   Radicular pain into the posterior thighs persists   Pt sees pain clinic tomorrow  Possible injection      HEP/POC compliance is  good .  Patient demonstrates understanding/independence with home program.    Goal Status:  No data recorded  No data recorded      Plan / Patient Education:     Continue with initial plan of care.  Progress with home program as tolerated.    Subjective:     Pain to the Posterior thighs and R Calf   3/10 level of pain    Objective:     SI's level     R SI upslip,   Lateral Thigh pain    Treatment Today     TREATMENT MINUTES COMMENTS   Evaluation     Self-care/ Home management     Manual therapy 43 MFR with OA, L5-SI and SI joint releases,  Parietal bone releases,   SCS to MEST7,8,9,10,11,12,L1,2,3,4,5-V   bilat      Neuromuscular Re-education     Therapeutic Activity     Therapeutic Exercises 15 Dural tube rock and glide demo and instructions on setup of rocking cvhair to produce this release with exercise 2-3 min increased to 10 min  / day   Gait training     Modality__________________                Total 58    Blank areas are intentional and mean the treatment did not include these items.       Rahul Deutsch  11/12/2020

## 2021-06-13 NOTE — PROGRESS NOTES
"Adelina Espinoza is a 75 y.o. female who is being evaluated via a billable video visit.      The patient has been notified of following:     \"This video visit will be conducted via a call between you and your physician/provider. We have found that certain health care needs can be provided without the need for an in-person physical exam.  This service lets us provide the care you need with a video conversation.  If a prescription is necessary we can send it directly to your pharmacy.  If lab work is needed we can place an order for that and you can then stop by our lab to have the test done at a later time.    Video visits are billed at different rates depending on your insurance coverage. Please reach out to your insurance provider with any questions.    If during the course of the call the physician/provider feels a video visit is not appropriate, you will not be charged for this service.\"    Patient has given verbal consent to a Video visit? Yes  How would you like to obtain your AVS? AVS Preference: Media Battleshart.    Will anyone else be joining your video visit? No        Video Start Time: 10:54 AM    Additional provider notes:     Assessment:   Adelina Espinoza is a 74 y.o. y.o. female with past medical history significant for peripheral neuropathy, depression, anxiety, GERD who presents today for follow-up regarding  acute on chronic low back pain with radiation into the bilateral lower extremities with associated numbness and tingling.  MRI lumbar spine shows severe spinal stenosis at L4-5 with moderate right lateral recess stenosis at this level related to a broad-based disc bulge and a right synovial cyst and advanced facet arthropathy.  Patient is status post an L4-5 interlaminar epidural steroid injection November 20, 2020 which has provided 98% relief of her pain.  -Patient saw Dr. Thomas November 17, 2020.  Dr. Thomas recommended L4-5 laminectomy with synovial cyst resection and microdiscectomy as well as " instrumented fusion.  Surgery is scheduled for January 29, 2021.        Plan:      A shared decision making plan was used.  The patient's values and choices were respected.  The following represents what was discussed and decided upon by the physician assistant and the patient.       1.  DIAGNOSTIC TESTS: I reviewed the MRI lumbar spine.  I also reviewed lumbar spine flexion-extension x-rays.  No further diagnostic tests were ordered.     2.  PHYSICAL THERAPY: Patient is currently in physical therapy.  She has had 26 sessions.  I told the patient that as long as she is doing well I do not think she needs any additional physical therapy.  She should continue with home exercises.      3.  MEDICATIONS:  -Patient did not tolerate gabapentin due to cognitive changes and falls.  -Patient can continue on Tylenol 1000 mg 3 times daily as needed.  -Patient can continue using ibuprofen 400-600 mg 3 times daily as needed.       4.  INTERVENTIONS:   No additional interventions were ordered. If for whatever reason her surgery is delayed and she is having significant pain, we could repeat the L4-5 interlaminar epidural steroid injection.  Patient notes that this injection was more effective than the bilateral L4-5 transforaminal epidural steroid injections.    5.  PATIENT EDUCATION: Patient is in agreement the above plan.  All questions were answered.     6.  FOLLOW-UP:  Patient will follow up with me as needed.  Postsurgical care will be with the neurosurgery team.  If she has questions or concerns, she should not hesitate to call.     Subjective:      Adelina Espinoza is a 74 y.o. female who presents today for follow-up regarding regarding an acute flare of chronic bilateral low back pain and bilateral lower extremity pain.   Patient is status post an L4-5 interlaminar epidural steroid injection November 20, 2020.  Patient reports the injection provided 98% relief of her pain.     Patient complains of  only mild residual pain  in the right buttock.  She denies any pain further down the leg.  She rates her pain today as a 1 or 2 out of 10.  At its worst it is a 4-10.  At its best it is a 1 out of 10.  Pain is aggravated bending over to put on shoes.  Pain is alleviated with taking Advil.  She denies any new symptoms since she was last seen.  She denies any numbness, tingling, or weakness down the legs.  She has history of occasional urinary incontinence which is chronic and stable.     Patient has had 26 sessions of physical therapy.  She is doing home exercises.  She is taking ibuprofen as needed.   Medications are helpful.     Past medical history is reviewed and is pertinent for having consultation with Dr. Thomas November 17, 2020. Dr. Thomas recommended L4-5 laminectomy with synovial cyst resection and microdiscectomy as well as instrumented fusion.  Surgery is scheduled for January 29, 2021.     Review of Systems:  Positive for  wetting pants.  Negative for numbness/tingling, weakness, loss of bowel/bladder control, inability to urinate, headache, pain much worse at night, difficulty swallowing, difficulty with hand skills, fevers, unintentional weight loss.     Objective:   CONSTITUTIONAL:  Vital signs as above.  No acute distress.  The patient is well nourished and well groomed.    PSYCHIATRIC:  The patient is awake, alert, oriented to person, place and time.  The patient is answering questions appropriately with clear speech.  Normal affect.  HEENT: Normocephalic, atraumatic.  Sclera clear.    SKIN:   Exposed skin is clean, dry, intact without rashes.  MUSCULOSKELETAL:   Patient is observed ambulating.  Gait does not appear antalgic.  She transitions from seated to standing and vice versa without difficulty.       RESULTS: I reviewed the MRI lumbar spine from [facility] dated March 13, 2020.  At L4-5 there is a broad-based disc bulge and superimposed central disc protrusion.  There is a 7.8 x 8.1 mm medial right-sided synovial  cyst and severe bilateral facet arthropathy.  There is also a small amount of edema within the soft tissues adjacent to the facet joints.  There is severe spinal canal stenosis.  There is moderate right lateral recess stenosis.  Patient also has mild bilateral lateral recess gnosis L4-5.  There is severe right and moderate left facet arthropathy at L5-S1 but no neural compromise.  Please see report for further details.       EXAM: XR LUMBAR SPINE 4 OR MORE VWS  LOCATION: [facility]  DATE/TIME: 11/17/2020 9:05 AM     INDICATION: Dorsalgia, unspecified.  COMPARISON: 03/13/2020 lumbar spine MRI.     IMPRESSION:   Five nonrib-bearing lumbar-type vertebrae. Mildly exaggerated lumbar lordosis with 2 mm retrolisthesis of L3 on L4 and 2 mm anterolisthesis of L4 on L5. Retrolisthesis of L3 on L4 does not change significantly with flexion or extension.   Anterolisthesis of L4 on L5 minimally increases to 3 mm with flexion but is unchanged at 2 mm on extension. No findings for dynamic instability. Unchanged vertebral body heights with a mild diffuse osteopenia. Multilevel mild degenerative disc disease at   T12-L1 through L3-L4. Additional mild L5-S1 degenerative disc disease. Moderate L4-L5 and L5-S1 facet arthropathy with a multilevel mild facet arthropathy elsewhere. Surgical clips in the right upper quadrant. Mild degenerative change at the bilateral   sacroiliac joints.        Mary Daniels PA-C      Video-Visit Details    Type of service:  Video Visit    Video End Time (time video stopped): 11:02 AM  Originating Location (pt. Location): Home    Distant Location (provider location):  [facility]     Platform used for Video Visit: June Daniels PA-C

## 2021-06-13 NOTE — PATIENT INSTRUCTIONS - HE
Follow-up virtual visit with JOE Jackman in 2 weeks to discuss injection outcome and determine care plan going forward.       DISCHARGE INSTRUCTIONS    During office hours (8:00 a.m.- 4:00 p.m.) questions or concerns may be answered  by calling [facility] Navigation Nurses at  [phone].  Messages received after hours will be returned the following business day.      In the case of an emergency, please dial 911 or seek assistance at the nearest Emergency Room/Urgent Care facility.     All Patients:    ? You may experience an increase in your symptoms for the first 2 days (It may take anywhere between 2 days- 2 weeks for the steroid to have maximum effect).    ? You may use ice on the injection site, as frequently as 20 minutes each hour if needed.    ? You may take your pain medicine.    ? You may continue taking your regular medication after your injection. If you have had a Medial Branch Block you may resume pain medication once your pain diary is completed.    ? You may shower. No swimming, tub bath or hot tub for 48 hours.  You may remove your bandaid/bandage as soon as you are home.    ? You may resume light activities, as tolerated.    ? Resume your usual diet as tolerated.    ? It is strongly advised that you do not drive for 1-3 hours post injection.    ? If you have had oral sedation:  Do not drive for 8 hours post injection.      ? If you have had IV sedation:  Do not drive for 24 hours post injection.  Do not operate hazardous machinery or make important personal/business decisions for 24 hours.      POSSIBLE STEROID SIDE EFFECTS (If steroid/cortisone was used for your procedure)    -If you experience these symptoms, it should only last for a short period      Swelling of the legs                Skin redness (flushing)       Mouth (oral) irritation     Blood sugar (glucose) levels              Sweats                      Mood changes    Headache    Sleeplessness    Weakened immune system for up to  14 days, which could increase the risk of krista the COVID-19 virus and/or experiencing more severe symptoms of the disease, if exposed.    Decreased effectiveness of the flu vaccine if given within 2 weeks of the steroid.         POSSIBLE PROCEDURE SIDE EFFECTS  -Call the Spine Center if you are concerned    Increased Pain             Increased numbness/tingling        Nausea/Vomiting            Bruising/bleeding at site        Redness or swelling                                                Difficulty walking        Weakness             Fever greater than 100.5    *In the event of a severe headache after an epidural steroid injection that is relieved by lying down, please call the [facility] to speak with a clinical staff member*

## 2021-06-13 NOTE — PROGRESS NOTES
Neurosurgery consultation was requested by: Mary Daniels   Pain: Back pain   Radicular Pain is present: Back of both legs   Motor complaints: No weakness   Sensory complaints: None   Gait and balance issues: None   Bowel or bladder issues: None   Duration of SX is: 1 year   The symptoms are worse with: Standing   The symptoms are better with: Warm bath   Injury: No   Fall Dec 2019  Severity is:   Patient has tried the following conservative measures: PT, injections   Antonia Merritt,[facility],9:15 AM     Modified Oswestry Low back Pain Disability Questionnaire    1. PAIN INTENSITY  4- Pain medication provides me with little relief from pain  2. PERSONAL CARE  1- I can take care of myself normally, but it increases my pain.   3. LIFTING  4- I can only lift very light weights  4. WALKING  2- Pain prevents me from walking more than   mile.  5. SITTING  3- Pain prevents me from sitting more than 30 minutes.  6. STANDING  2- Pain prevents me from standing for more than 1 hour.  7. SLEEPING  2- Even when I take medication, I sleep less than 6 hours.  8. SOCIAL LIFE  1- My social life is normal, but it increases my level of pain.             9. TRAVELING  3- My pain restricts my travel over 1 hour.  10. EMPLOYMENT/HOMEMAKING  2- I can perform most of my homemaking/job activities, but pain prevents me from performing more physically stressful activities (e.g. lifting, vacuuming).    SCORE:24 x2=48%

## 2021-06-14 NOTE — TELEPHONE ENCOUNTER
ORDER FROM: Dr. Thomas     PRE AUTHORIZATION: No PA needed. Ok to schedule.    METHOD OF PATIENT CONTACT: Spoke with Pat on the phone. Best number to reach: [phone]    PROCEDURE: Bilateral lumbar 4-lumbar 5 laminectomies for right synovial cyst resection; left, possible right microdiscectomy lumbar 4-5, lumbar 4-5 posterior instrumented fusion and arthrodesis, use of allograft, use of autograft, stealth navigation    SURGICAL DATE: 21 @ 7:30 AM - [facility]    READINESS VISIT: PLEASE CALL    PCP, CLINIC, PHONE #: Dr. Migdalia Delvalle, [facility], [phone]    PRE-OP PHYSICAL: 21 @ 10:00 AM with Dr. Delvalle    COVID- TESTIN21 @ 11:00 AM at the [facility] lab    FILM INFO: XRAY LUMBAR: 20 @ [facility]          MRI LUMBAR: 3/13/20 @ [facility]    SURGERY CONFIRMATION LETTER: MAILED TO PATIENT ON 21

## 2021-06-14 NOTE — ANESTHESIA PREPROCEDURE EVALUATION
Anesthesia Evaluation      Patient summary reviewed   History of anesthetic complications (PONV after gall bladder surgery)     Airway   Mallampati: II  Neck ROM: full   Pulmonary - negative ROS and normal exam                          Cardiovascular   ECG reviewed (NSR)  Rhythm: regular  Rate: normal,         Neuro/Psych    (+) neuromuscular disease (idopathic peripheral neuropathy),  depression,     Endo/Other - negative ROS   (+) arthritis,      Comments: Spinal stenosis  Knee replacements    GI/Hepatic/Renal    (+) GERD intermittent,       Comments: IBS     Other findings: COV Neg 1/25  CBC wnl eyeglasses      Dental - normal exam                        Anesthesia Plan  Planned anesthetic: general endotracheal and total IV anesthesia  TIVA please  Decadron 10  zofran 4  ASA 2   Induction: intravenous   Anesthetic plan and risks discussed with: patient and spouse  Anesthesia plan special considerations: antiemetics,   Post-op plan: routine recovery

## 2021-06-14 NOTE — PROGRESS NOTES
PROGRESS NOTE   12/13/2017    SUBJECTIVE:  Adelina Espinoza is a 72 y.o. female  who presents for   Chief Complaint   Patient presents with     Neck Injury     DOI-7/15/17-fell, check neck pain radiating into right shoulder, feels like a pinched nerve     Foot Injury     Check left foot pain, burning in the ball, worse with walking x 3 months     Patient comes in with a couple of different concerns.  She has had left foot pain for about the last 3 months or so.  She complains of pain over the ball of her foot that wakes her up at night.  At night is a stabbing pain.  He has not noticed any redness or warmth to the touch in the foot at all.  She has full range of motion of the foot and is able to do what she needs to do but she definitely notices that if she walks this seems to get worse.  When she walks initially she does not have any problems but as she walks a bit more she will start to get pain over the ball of her foot.  It started after she had her knee replacement surgery last year.  She was in [place] for a month in February and she noticed that if she walks too far (and sometimes they walked 7 or 8 miles a day) she would get a burning sensation in her foot.  She thought it was probably secondary to the neuropathy she had in the foot after her chemotherapy for breast cancer 15 years ago but it has gradually gotten worse over time.  It does definitely seem like it is better if she wears shoes or even if she wears flip-flops that can find that part of her foot and put a little pressure just at the ball of the foot it seems like it is better.  She describes it as a generalized aching pain with walking and then a stabbing pain at night.  Her second concern is that of right shoulder pain.  She notes that she fell very hard on her kitchen floor in July.  She did not really suffer any injuries at that time but about a month later she noticed that she was having right shoulder and neck pain.  It seems like this has  persisted and therefore she comes in for evaluation.  It seems like it started in the shoulder region and then went up into the neck region on that side.  The pain is not there all the time and when she gets the pain she takes some ibuprofen 400-600 mg twice a day and it seems like that helps.  She has been using ibuprofen on a pretty consistent basis and now it seems to be hurting her stomach and therefore she was concerned and comes in for evaluation.    Patient Active Problem List   Diagnosis     Peripheral Neuropathy     Urticaria     Major Depression, Recurrent     Ecchymosis     Skin: A Rash     Lump Or Mass In Breast     Anxiety     Limb Swelling     Irritable Bowel Syndrome     Primary osteoarthritis of right knee     Primary osteoarthritis of knee, left     Osteoporosis, senile     Esophageal reflux       Current Outpatient Prescriptions   Medication Sig Dispense Refill     calcium citrate-vitamin D (CITRACAL+D) 315-200 mg-unit per tablet Take 1 tablet by mouth 2 (two) times a day.       citalopram (CELEXA) 40 MG tablet TAKE 1 TABLET DAILY 90 tablet 3     cyanocobalamin (VITAMIN B-12) 1000 MCG tablet Take 1,000 mcg by mouth daily.       dicyclomine (BENTYL) 10 MG capsule Take 1 capsule (10 mg total) by mouth 4 (four) times a day as needed. For cramps 90 capsule 0     furosemide (LASIX) 20 MG tablet TAKE 1 TABLET DAILY AS DIRECTED 90 tablet 0     LORazepam (ATIVAN) 0.5 MG tablet TAKE ONE TABLET BY MOUTH THREE TIMES DAILY as needed for anxiety 10 tablet 0     magnesium 250 mg Tab Take 250 mg by mouth daily.        MULTIVIT &MINERALS/FERROUS FUM (MULTI VITAMIN ORAL) Take 1 tablet by mouth daily.       omeprazole (PRILOSEC) 20 MG capsule TAKE 1 CAPSULE DAILY BEFORE BREAKFAST 90 capsule 3     POLYETHYLENE GLYCOL 3350 (MIRALAX ORAL) Take by mouth.       psyllium (METAMUCIL) 0.52 gram capsule Take 0.52 g by mouth daily.       tretinoin (RETIN-A) 0.025 % cream Apply 1 application topically daily as needed.    "    Current Facility-Administered Medications   Medication Dose Route Frequency Provider Last Rate Last Dose     denosumab 60 mg (PROLIA 60 mg/ml)  60 mg Subcutaneous Q6 Months Anjali Ramires MD   60 mg at 03/10/16 1440       Allergies   Allergen Reactions     Adhesive Rash     Green Pepper      Stomach upset     Lactose Diarrhea     Monosodium Glutamate      diarrhea       Past Medical History:   Diagnosis Date     Anxiety      Arthritis      Breast cancer 2003     Depression      Esophageal reflux      History of anesthesia complications     goofy     Hx antineoplastic chemotherapy     for breast cancer     Hx of radiation therapy     for breast cancer     IBS (irritable bowel syndrome)      Neuropathy      Osteopenia        Past Surgical History:   Procedure Laterality Date     APPENDECTOMY  1967     BACK SURGERY      cervical fusion     BREAST BIOPSY Right 2003     BREAST LUMPECTOMY  12/2003    breast cancer     CHOLECYSTECTOMY  1994     HYSTERECTOMY  1997    desmoid cyst     NISSEN FUNDOPLICATION       OOPHORECTOMY  1997     W[place] TOTAL KNEE ARTHROPLASTY Right 10/29/2015    Procedure: RIGHT TOTAL KNEE  ARTHROPLASTY;  Surgeon: Nitin White MD;  Location: [facility] OR;  Service: Orthopedics     [place] TOTAL KNEE ARTHROPLASTY Left 4/21/2016    Procedure: LEFT TOTAL KNEE ARTHROPLASTY;  Surgeon: Nitin White MD;  Location: [facility] OR;  Service: Orthopedics       History   Smoking Status     Never Smoker   Smokeless Tobacco     Never Used       OBJECTIVE:     /76 (Patient Site: Right Arm, Patient Position: Sitting, Cuff Size: Adult Regular)  Pulse 80 Comment: regular  Temp 97.7  F (36.5  C) (Oral)   Resp 14 Comment: regular  Ht 5' 3.5\" (1.613 m)  Wt 166 lb 8 oz (75.5 kg)  BMI 29.03 kg/m2    Physical Exam:  GENERAL APPEARANCE: A&A, NAD, well hydrated, well nourished  SKIN:  Normal skin turgor, no lesions/rashes   NECK: Supple, full ROM, no significant lymphadenopathy or " thyromegaly  CV: RRR, no M/G/R   LUNGS: CTAB   EXTREMITY: no swelling noted.  Full range of motion of all 4 extremities.  On exam for left foot there is no swelling there is no warmth to the touch there is no other abnormality appreciated.  There is no evidence for any infection to present.  She points to the area at the base of the second and third toe near the ball of the foot test where the pain is.  There is absolutely no tenderness to palpation she is able to move her foot fully on exam today.  Exam of her right shoulder she has full range of motion of the shoulder without any difficulties as well.  Again there is no warmth to the touch redness or other evidence for infection.  She points to the area in the midclavicular region just adjacent to the shoulder joint itself as to where the pain is when she gets it.  That specific area is not excruciatingly painful to palpation.  NEURO: no gross deficits   PSYCHIATRIC;  Mood appropriate, memory intact  A&O x3, thought processes congruent, non-tangential. No hallucinations/delusions. Insight/judgment: intact. Denies suicidal/homicidal ideations.      ASSESSMENT/PLAN:     Left foot pain [M79.672]      1. Left foot pain  - Ambulatory referral to Podiatry    2. Right shoulder pain  - Ambulatory referral to PT/OT    Patient overall seems to be doing okay.  In terms of her left foot pain I do think that she needs to be seen by the podiatrist for further evaluation of this.  She is having help when she takes ibuprofen on a regular basis for this however the generalized stabbing pain that she is getting in the middle the night is concerning.  She also points to an area in the middle portion of her foot just below the toes which makes me think that perhaps a neuroma is causing some of this pain as well.  I did give her the phone number for the podiatrist and placed referral podiatry and she will call and make her own appointment.  If she has difficulty make that appointment  she certainly will let me know.  In terms of her right shoulder pain I am going to refer to therapist for further evaluation.  I did put in a referral and she will call and make her own appointment to the physical therapy as well.  I would suspect that this will gradually improve and if it does not she should let us know.  We otherwise will see her on an as-needed basis.  She is going to [place] for the month of February again and would really like to be able to walk on a more regular basis and therefore will hopefully get in to see the podiatrist in the near future.  Migdalia Delvalle MD

## 2021-06-14 NOTE — PROGRESS NOTES
[facility] Daily Progress     Patient Name: Adelina Espinoza  Date: 2017  Visit #: 3/10  12/13/17-18  Referral Diagnosis:  Right Shoulder Pain  Referring provider: Migdalia Delvalle MD  Visit Diagnosis:     ICD-10-CM    1. Acute pain of right shoulder M25.511    2. Poor posture R29.3    3. Weakness R53.1          Assessment:     Patient is benefitting from skilled physical therapy and is making steady progress toward functional goals.  Patient is appropriate to continue with skilled physical therapy intervention, as indicated by initial plan of care.  Patient felt the KT has been helpful so reapplied today  Pt had good tissue response and  felt her tightness and pain resolved after the MRT and MFR today    Goal Status:  Pt. will be independent with home exercise program in : 6 weeks  Pt will: Able to sleep thorugh the night without waking due to pain within 8 weeks  Pt will: Able to lift objects, > 8# such as milk or laundry within 8 weeks  Pt will: Able to reach up into cupboard to grab a dish within 8 weeks    Plan / Patient Education:     Posture edu  MRT scalenes, pectoralis  MFR UT, LS, infraspinatus and supraspinatus     Prefers Pat  Subjective:     Pain Ratin 1/2    I had a few days of a lot of pain.  Due to this I cut back to only doing the stretches a couple times per day.  This has helped a little  Think the KT was helpful  The pain is mostly in my neck (points to UT)      Objective:     Pain 0/10 after treatment today    Exercises:  Exercise #1: supine thoracic towel roll stretch  Comment #1: hold 30 seconds up to t minutes  Exercise #2: scapular retractin, chin tuck  Comment #2: 10 seconds X 10 reps  Exercise #3: shoulder IR/Ext,   shoulder abd      with cane  Comment #3: 10    Treatment Today     TREATMENT MINUTES COMMENTS   Evaluation     Self-care/ Home management     Manual therapy 17 Pt sitting in chair, bilateral UT MFR  Pt supine with bolster under knees;  MRT  bilateral scalene, right pec   Neuromuscular Re-education 8 KT, 5 1/2 blocks, bilateral UT, paper off tension, distal to proximal   Therapeutic Activity     Therapeutic Exercises 5 See above or flow sheet-discussed   Gait training     Modality__________________                Total 30    Blank areas are intentional and mean the treatment did not include these items.       Kaylyn Wright, PT  12/22/2017

## 2021-06-14 NOTE — ANESTHESIA POSTPROCEDURE EVALUATION
Patient: Adelina Espinoza  Procedure(s):  BILATERAL LUMBAR 4-5 LAMINECTOMIES FOR RIGHT SYNOVIAL CYST RESECTION, LUMBAR 4-5, LUMBAR 4-5 POSTERIOR INSTRUMENTED FUSION AND ARTHRODESIS, USE OF ALLOGRAFT, USE OF AUTOGRAFT, STEALTH NAVIGATION  Anesthesia type: general    Patient location: PACU  Last vitals:   Vitals Value Taken Time   /88 01/29/21 1345   Temp 36.4  C (97.6  F) 01/29/21 1250   Pulse 75 01/29/21 1351   Resp 15 01/29/21 1351   SpO2 96 % 01/29/21 1351   Vitals shown include unvalidated device data.  Post vital signs: stable  Level of consciousness: awake and responds to simple questions  Post-anesthesia pain: pain controlled  Post-anesthesia nausea and vomiting: no  Pulmonary: unassisted, return to baseline  Cardiovascular: stable and blood pressure at baseline  Hydration: adequate  Anesthetic events: no    QCDR Measures:  ASA# 11 - Regine-op Cardiac Arrest: ASA11B - Patient did NOT experience unanticipated cardiac arrest  ASA# 12 - Regine-op Mortality Rate: ASA12B - Patient did NOT die  ASA# 13 - PACU Re-Intubation Rate: ASA13B - Patient did NOT require a new airway mgmt  ASA# 10 - Composite Anes Safety: ASA10A - No serious adverse event    Additional Notes:

## 2021-06-14 NOTE — PROGRESS NOTES
[facility] Daily Progress     Patient Name: Adelina Espinoza  Date: 12/15/2017  Visit #: 2/10  12/13/17-18  Referral Diagnosis:  Right Shoulder Pain  Referring provider: Migdalia Delvalle MD  Visit Diagnosis:     ICD-10-CM    1. Acute pain of right shoulder M25.511    2. Poor posture R29.3    3. Weakness R53.1          Assessment:     Patient is benefitting from skilled physical therapy and is making steady progress toward functional goals.  Patient is appropriate to continue with skilled physical therapy intervention, as indicated by initial plan of care.    Goal Status:  Pt. will be independent with home exercise program in : 6 weeks  Pt will: Able to sleep thorugh the night without waking due to pain within 8 weeks  Pt will: Able to lift objects, > 8# such as milk or laundry within 8 weeks  Pt will: Able to reach up into cupboard to grab a dish within 8 weeks    Plan / Patient Education:     Posture edu  MRT scalenes, pectoralis  MFR UT, LS, infraspinatus and supraspinatus     Prefers Pat  Subjective:     Pain Ratin 1/2    The KT felt great the rest of the day   This AM I was more stiff.  (pt kept the KT on so took it off)    Objective:     Exercises:  Exercise #1: supine thoracic towel roll stretch  Comment #1: hold 30 seconds up to t minutes  Exercise #2: scapular retractin, chin tuck  Comment #2: 10 seconds X 10 reps  Exercise #3: shoulder IR/Ext,   shoulder abd      with cane  Comment #3: 10    Treatment Today     TREATMENT MINUTES COMMENTS   Evaluation     Self-care/ Home management     Manual therapy 7 Pt sitting in chair, bilateral UT MFR   Neuromuscular Re-education 8 KT, 5 1/2 blocks, bilateral UT, paper off tension, distal to proximal   Therapeutic Activity     Therapeutic Exercises 15 See above or flow sheet   Gait training     Modality__________________                Total 30    Blank areas are intentional and mean the treatment did not include these items.       Kaylyn RUBIO  Adriana, PT  12/15/2017

## 2021-06-14 NOTE — ANESTHESIA CARE TRANSFER NOTE
Last vitals:   Vitals:    01/29/21 1250   BP: 175/87   Pulse: 77   Resp: 12   Temp: 36.4  C (97.6  F)   SpO2: 99%     Patient's level of consciousness is drowsy  Spontaneous respirations: yes  Maintains airway independently: yes  Dentition unchanged: yes  Oropharynx: oropharynx clear of all foreign objects    QCDR Measures:  ASA# 20 - Surgical Safety Checklist: WHO surgical safety checklist completed prior to induction    PQRS# 430 - Adult PONV Prevention: 4558F - Pt received => 2 anti-emetic agents (different classes) preop & intraop  ASA# 8 - Peds PONV Prevention: NA - Not pediatric patient, not GA or 2 or more risk factors NOT present  PQRS# 424 - Regine-op Temp Management: 4559F - At least one body temp DOCUMENTED => 35.5C or 95.9F within required timeframe  PQRS# 426 - PACU Transfer Protocol: - Transfer of care checklist used  ASA# 14 - Acute Post-op Pain: ASA14B - Patient did NOT experience pain >= 7 out of 10

## 2021-06-14 NOTE — TELEPHONE ENCOUNTER
Called patient, discussed surgery, post-op course, expectations, follow up plan.    Reviewed H&P from 01/11/2021- cleared for surgery  Labs, EKG - WNL    MRI done on 03/13/2020 - in [place]    To OR as planned.     Check in - 0500    Nothing to eat or drink after midnight the night before surgery.     Reviewed with patient: Arrive 2.5 -3 hours prior to scheduled surgery.    Bring all pertinent films to hospital the day of surgery.     Continue to refrain from NSAIDS (Ibuprofen, Aleve, Naprosyn), ASA, Over the counter herbal medications or supplements, anti-coagulants and blood thinners.     Patient confirmed they have help/assistance in place at home upon discharge    Instructions: using a washcloth and a bottle of provided Hibiclens, wash your body, avoiding your face and genitals. Preferably, shower the night before surgery and the morning of surgery using a half a bottle each time for your whole body shower.        Patient was informed that we will provide up to 1 week prescription of pain medication for post-operative pain.      Instructed patient about the following: After your surgery, if you will be staying in-patient, a nursing provider team will be monitoring you closely throughout your stay and communicate your health status to your surgeon and other providers.  You will be seen by Advanced Practice Providers (e.g., nurse practitioners, clinic nurse specialist, and physician assistants) who will check on you regularly to assess the status of your surgery.     Yuliya Bauer RN, CNRN

## 2021-06-14 NOTE — PROGRESS NOTES
[facility]   Shoulder Initial Evaluation        [facility] Certification Request    December 13, 2017      Patient: Adelina Espinoza  MR Number: [id number]  YOB: 1945  Date of Visit: 12/13/2017      Dear Dr. Delvalle    Thank you for this referral.   We are seeing Adelina Espinoza for Physical Therapy of her right shoulder.    Medicare and/or Medicaid requires physician review and approval of the treatment plan. Please review the plan of care and verify that you agree with the therapy plan of care by co-signing this note.      If you have any questions or concerns, please don't hesitate to call.    Sincerely,      Kaylyn Wright        Physician recommendation:     __x_ Follow therapist's recommendation        ___ Modify therapy      *Physician co-signature indicates they certify the need for these services furnished within this plan and while under their care.        Patient Name: Adelina Espinoza  Date of evaluation: 12/13/2017  Referral Diagnosis: Right shoulder pain  Referring provider: Migdalia Delvalle MD  Visit Diagnosis:     ICD-10-CM    1. Acute pain of right shoulder M25.511    2. Poor posture R29.3    3. Weakness R53.1        Assessment:      Pt. is appropriate for skilled PT intervention as outlined in the Plan of Care (POC).  Pt. is a good candidate for skilled PT services to improve pain levels and function.  Patient presents with right shoulder pain/neck pain after falling this past summer.  She did not start getting symptoms until a month or two after the fall.  Functional limitations are described as occurring with: Lift, carry, reach up and grab object, sleep.  Her neck motion is normal but will get a little pain referring into the neck at times.  Feel most of her pain is muscular in nature and will benefit from physical therapy.    Goals:  Pt. will be independent with home exercise program in : 6 weeks  Pt will: Able to sleep thorugh the night without  waking due to pain within 8 weeks  Pt will: Able to lift objects, > 8# such as milk or laundry within 8 weeks  Pt will: Able to reach up into cupboard to grab a dish within 8 weeks    Patient's expectations/goals are realistic.    Barriers to Learning or Achieving Goals:  TKA R & L       Plan / Patient Instructions:        Plan of Care:   Authorization / Certification Start Date: 12/13/17  Authorization / Certification End Date: 02/12/18  Authorization / Certification Number of Visits: 10  Communication with: Referral Source  Patient Related Instruction: Nature of Condition;Treatment plan and rationale;Self Care instruction;Basis of treatment  Times per Week: 2  Number of Weeks: 8  Number of Visits: 10  Therapeutic Exercise: ROM;Stretching;Strengthening  Neuromuscular Reeducation: kinesio tape;posture  Manual Therapy: myofascial release;joint mobilization;muscle energy  Modalities: ultrasound    POC and pathology of condition were reviewed with patient.  Pt. is in agreement with the Plan of Care     Plan for next visit: Intro stretches-thoracic towel roll, chin tuck, scap retraction, shoulder abduction, ext/IR                                MFR                                KT if helpful                                Posture edu  .   Subjective:       Social information:   Living Situation:single family home, lives with others  and stairs  with railing   Occupation:retired   Equipment Available: None    History of Present Illness:    Adelina is a 72 y.o. female who presents to therapy today with complaints of right. Date of onset/duration of symptoms is July 2017. Onset was from a fall, but pain did not start until September.   Went to answer the phone, her slippers got caught and she fell.  Symptoms are intermittent and not improving.  She has been managing it with ibuprofen but the symptoms have been getting a little worse. She denies having any imaging.  Denies having any shoulder pain in the past but did have  a one level fusion in her neck 15+ years ago but has not had any issues since.    Pain Rating:3  Pain rating at best: 0  Pain rating at worst: 4  Pain description: aching and soreness, stiffness    Functional limitations are described as occurring with:   Lift, carry, reach up and grab object, sleep    Patient reports benefit from:  rest  , Advil, hot bath or heat       Objective:      Note: Items left blank indicates the item was not performed or not indicated at the time of the evaluation.    Patient Outcome Measures :    Shoulder Pain and Disability Index (SPADI) in %: 28   Scores range from 0-100%, where a score of 0% represents minimal pain and maximal function. The minimal clincically important difference is a score reduction of 10%.    Shoulder Examination  1. Acute pain of right shoulder     2. Poor posture     3. Weakness       Involved side: Right  Dominant side: Right  Posture Observation:      General standing posture is fair with mild to moderate forward shoulders, mild to moderate forward head  Cervical Clearing: Normal    Shoulder/Elbow ROM           within normal limits unless noted  Date: 12/13/17     Shoulder and Elbow ROM ( )   AROM in degrees AROM in degrees AROM in degrees    Right Left Right Left Right Left   Shoulder Flexion (0-180 )         Shoulder Abduction (0-180 ) Pain at end        Shoulder Extension (0-60 )         Shoulder ER (0-90 )         Shoulder IR (0-70 ) Pain at end        Shoulder IR/Ext T6 T4       Elbow Flexion (150 )         Elbow Extension (0 )          PROM in degrees PROM in degrees PROM in degrees    Right Left Right Left Right Left   Shoulder Flexion (0-180 )         Shoulder Abduction (0-180 )         Shoulder Extension (0-60 )         Shoulder ER (0-90 )         Shoulder IR (0-70 )         Elbow Flexion (150 )         Elbow Extension (0 )           Shoulder/Elbow Strength   Date: 12/13/17     Shoulder/Elbow Strength (/5)  Manual Muscle Test (MMT) MMT MMT MMT    Right  Left Right Left Right Left   Shoulder Flexion 4+ 4+       Supraspinatus 4 4+       Shoulder Abduction 4+, pain 4+       Shoulder Extension 5 5       Shoulder External Rotation 4 pain 4+       Shoulder Internal Rotation 4, pain 4+       Elbow Flexion 5 5       Elbow Extension 5 5       Other:         Other:           Flexibility: good    Palpation: tender over bilateral scalene, UT, LS, right pectoralis, AC joint, supraspinatus in muscle belly and insertion, infraspinatus-in mm belly and insertion    Joint Assessment:  Sternoclavicular Joint Assessment: [facility]  Acromioclavicular Joint Assessment: [facility]  Scapulothoracic Joint Assessment: [facility].  Glenohumeral Joint Assessment:[facility].       Shoulder Special Tests     Impingement Cluster Right (+/-) Left (+/-) Rotator Cuff Tests Right (+/-) Left (+/-)   PatyLaith - - Drop Arm Sign     Painful Arc - - [place]     Infraspinatus Test   ERLS     AC Tests Right (+/-) Left (+/-) IRLS     Active Compression - - Labral Tests Right (+/-) Left (+/-)   Crossbody Adduction   Biceps Load Test II     AC Resisted Extension   Jerk Test     GH Instability Tests Right (+/-) Left (+/-) Yumi Test     Sulcus Sign - - Biceps Tests Right (+/-) Left (+/-)   Apprehension   Speed     Relocation   Ellie louis     Other:   Other:     Other:   Other:         Treatment Today    TREATMENT MINUTES COMMENTS   Evaluation 30 Shoulder/neck   Self-care/ Home management     Manual therapy     Neuromuscular Re-education 8+4 KT bilateral UT, 5 1/2 blocks, paper off tension, distal to proximal,   edu on KT, benefits, uses, pros, cons   Therapeutic Activity     Therapeutic Exercises 15 Edu on DX, POC, many questions   Gait training     Modality__________________                Total 57    Blank areas are intentional and mean the treatment did not include these items.     PT Evaluation Code: (Please list factors)  Patient History/Comorbidities: TKA L & R  Examination: pain with AROM, weakness, decrease in  functional activities-see above  Clinical Presentation: stable  Clinical Decision Making: low    Patient History/  Comorbidities Examination  (body structures and functions, activity limitations, and/or participation restrictions) Clinical Presentation Clinical Decision Making (Complexity)   No documented Comorbidities or personal factors 1-2 Elements Stable and/or uncomplicated Low   1-2 documented comorbidities or personal factor 3 Elements Evolving clinical presentation with changing characteristics Moderate   3-4 documented comorbidities or personal factors 4 or more Unstable and unpredictable High              Kaylyn Wright, PT  12/13/2017  1:52 PM

## 2021-06-14 NOTE — TELEPHONE ENCOUNTER
PATIENT NAME:  Adelina Espinoza  YOB: 1945  MRN: [id number]  SURGEON: Dr. Thomas  DATE of SURGERY: 01/29/2021  PROCEDURE: Bilateral L4-L5 laminectomy and fusion      FOLLOW-UP PLAN:  Wound Check:  n/a  Staples/Sutures Out : 2 weeks  Post Op Visit: 6 weeks  Post-op Provider: NP on Dr. Thomas clinic day  DIAGNOSTICS:  XR  DISPOSITION:  Home on 12/31/2020      ADDITIONAL INSTRUCTIONS FOR MEDICAL STAFF:      Yuliya Bauer RN, CNRN

## 2021-06-14 NOTE — PROGRESS NOTES
[facility]  [address],   [place] [facility] [address]  Dept: [phone]  Dept Fax: [phone]  Primary Provider: Migdalia Delvalle MD      PREOPERATIVE EVALUATION:  Today's date: 1/11/2021    Adelina Espinoza is a 75 y.o. female who presents for a preoperative evaluation.    Surgical Information:  Surgery/Procedure: BILATERAL LUMBAR 4-5 LAMINECTOMIES FOR RIGHT SYNOVIAL CYST RESECTION, LEFT POSSIBLE RIGHT MICRODISCECTOMY LUMBAR 4-5, LUMBAR 4-5 POSTERIOR INSTRUMENTED FUSION AND ARTHRODESIS, USE OF ALLOGRAFT, USE OF AUTOGRAFT, STEALTH NAVIGATION  Surgery Location: [place]  Surgeon: Dr. Lisa Thomas  Surgery Date: 1/29/2021  Time of Surgery: 7:30 am  Where patient plans to recover: At home with family  Fax number for surgical facility: Note does not need to be faxed, will be available electronically in Epic.    Type of Anesthesia Anticipated: General    Subjective     HPI related to upcoming procedure:   HPI: Adelina is a 75 y.o. female here for a pre-operative consultation. The exam is requested by Dr. Thomas in preparation for bilateral L4-5 laminectomies with synovial cyst removal, possible microdiscectomy, L4-5 spinal fusion to be performed at [facility] on 1/29/2021. Today s examination on 1/11/2021 is done to review the underlying surgical condition of chronic back pain as well as to clear for anesthesia and review her medical problems and make appropriate changes in medications. Patient has had low back and posterior leg pain for well over a year. Pain travels down posterolateral legs to the ankle on the right and just below the knee on the left. Sitting worsens her pain. Laying down on her side or taking a bath can improve her pain.  MRI of her lumbar spine shows severe spinal canal narrowing at lumbar 4-5 level due in part to a broad-based disc herniation, a large right synovial cyst, as well as advanced facet arthropathy. She has  been tried on numerous medications for the pain and has also done significant amounts of physical therapy. She also had numerous injections without significant improvement in her symptoms. She now presents for surgical repair of lumbar spine. She does have a history of breast cancer many years ago with radiation and chemotherapy at the time. She has resultant neuropathy in her legs/feet from this.  She does have a history of anxiety and is on celexa which she is tolerating well. She also has history of esophageal reflux, having had nissen fundoplication in the past. She is currently on omeprazole and is doing well. She has had history of limb swelling and does use lasix to help with this. She has had low magnesium in the past as well. She does have a history of osteopenia as well. She does use bentyl for stomach cramping. She has not been diagnosed with sleep apnea but does have daytime drowsiness. She does not believe that she snores, however her  says she does.     Preop Questions 1/11/2021   Have you ever had a heart attack or stroke? No   Have you ever had surgery on your heart or blood vessels, such as a stent placement, a coronary artery bypass, or surgery on an artery in your head, neck, heart, or legs? No   Do you have chest pain with activity? No   Do you have a history of  heart failure? No   Do you currently have a cold, bronchitis or symptoms of other infection? No   Do you have a cough, shortness of breath, or wheezing? No   Do you or anyone in your family have previous history of blood clots? No   Do you or does anyone in your family have a serious bleeding problem such as prolonged bleeding following surgeries or cuts? No   Have you ever had problems with anemia or been told to take iron pills? No   Have you had any abnormal blood loss such as black, tarry or bloody stools, or abnormal vaginal bleeding? No   Have you ever had a blood transfusion? No   Are you willing to have a blood transfusion  if it is medically needed before, during, or after your surgery? Yes   Have you or any of your relatives ever had problems with anesthesia? YES - patient had had significant nausea from anesthesia in the past.     Do you have sleep apnea, excessive snoring or daytime drowsiness? YES -  says she snores, she does have daytime drowsiness   Do you have a CPAP machine? No   Do you have any artifical heart valves or other implanted medical devices like a pacemaker, defibrillator, or continuous glucose monitor? No   Do you have artificial joints? YES - both knees replaced in 2015 and 2016   Are you allergic to latex? No     Health Care Directive:  Patient has a Health Care Directive on file.     Preoperative Review of :    reviewed - no record of controlled substances prescribed.    [id number]}  DEPRESSION - Patient has a long history of anxiety of moderate severity requiring medication for control with recent symptoms being stable..Current symptoms of anxiety include anxious mood but significantly improved from previously.  She feels like overall she is very stable.  We will continue on her Celexa as she has been.  She is not suicidal or homicidal.  She had been on Klonopin in the past however stopped that due to having issues with falling and she has had no further problems with falling since then.  She seems to be doing well since stopping the Klonopin.  Little interest or pleasure in doing things: Several days  Feeling down, depressed, or hopeless: Several days  Trouble falling or staying asleep, or sleeping too much: More than half the days  Feeling tired or having little energy: Several days  Poor appetite or overeating: Several days  Feeling bad about yourself - or that you are a failure or have let yourself or your family down: Not at all  Trouble concentrating on things, such as reading the newspaper or watching television: Not at all  Moving or speaking so slowly that other people could have noticed. Or  the opposite - being so fidgety or restless that you have been moving around a lot more than usual: Nearly every day  Thoughts that you would be better off dead, or of hurting yourself in some way: Not at all  PHQ-9 Total Score: 9  If you checked off any problems, how difficult have these problems made it for you to do your work, take care of things at home, or get along with other people?: Not difficult at all    Total DERIK 7 Score       1/11/2021             DERIK 7 Total Score:  4        Patient does have a history of osteopenia and therefore vitamin D level was drawn today as well.    Patient Active Problem List    Diagnosis Date Noted     Spinal stenosis of lumbar region with neurogenic claudication 12/23/2020     Synovial cyst of lumbar facet joint 12/23/2020     Lumbar disc herniation 12/23/2020     Primary osteoarthritis involving multiple joints 03/05/2019     Arthritis of first metatarsophalangeal (MTP) joint of right foot 03/05/2019     Esophageal reflux 02/14/2017     Osteoporosis, senile 09/13/2016     Primary osteoarthritis of knee, left 04/21/2016     Primary osteoarthritis of right knee 10/29/2015     Peripheral Neuropathy      Urticaria      Major Depression, Recurrent      Lump Or Mass In Breast      Anxiety      Limb Swelling      Irritable Bowel Syndrome      Past Medical History:   Diagnosis Date     Anxiety      Arthritis      Breast cancer (H) 2003     Depression      Esophageal reflux      History of anesthesia complications     nausea     Hx antineoplastic chemotherapy 2003    for breast cancer     Hx of radiation therapy 2004    for breast cancer     IBS (irritable bowel syndrome)      Lactose intolerance     lactose breath test positive at GI, 7/2019     Macular degeneration 08/2019    caught early, takes Areds 2 on a daily basis, seeing opthamology     Neuropathy     secondary to chemotherapy     Osteopenia      Past Surgical History:   Procedure Laterality Date     APPENDECTOMY  1967      BACK SURGERY  2000    cervical fusion     BREAST BIOPSY Right 2003     BREAST LUMPECTOMY  12/2003    breast cancer     CHOLECYSTECTOMY  1994     HYSTERECTOMY  1997    desmoid cyst     NISSEN FUNDOPLICATION  2010     OOPHORECTOMY  1997     RI TOTAL KNEE ARTHROPLASTY Right 10/29/2015    Procedure: RIGHT TOTAL KNEE  ARTHROPLASTY;  Surgeon: Nitin White MD;  Location: [facility] OR;  Service: Orthopedics     RI TOTAL KNEE ARTHROPLASTY Left 04/21/2016    Procedure: LEFT TOTAL KNEE ARTHROPLASTY;  Surgeon: Nitin White MD;  Location: [facility] OR;  Service: Orthopedics     Current Outpatient Medications   Medication Sig Dispense Refill     acetaminophen (TYLENOL) 500 MG tablet Take 1,000 mg by mouth every 6 (six) hours as needed for pain.       aspirin 81 MG EC tablet Take 81 mg by mouth daily.       calcium citrate-vitamin D (CITRACAL+D) 315-200 mg-unit per tablet Take 1 tablet by mouth 2 (two) times a day.       cholecalciferol, vitamin D3, 50 mcg (2,000 unit) capsule Take 2,000 Units by mouth daily.       dicyclomine (BENTYL) 10 MG capsule TAKE 1 CAPSULE FOUR TIMES A DAY AS NEEDED FOR CRAMPS 30 capsule 1     furosemide (LASIX) 20 MG tablet TAKE 1 TABLET DAILY 90 tablet 2     magnesium 250 mg Tab Take 250 mg by mouth daily.        multivit-min-iron-FA-lutein (CENTRUM SILVER WOMEN) 8 mg iron-400 mcg-300 mcg Tab Take one tablet by mouth daily       vit C/E/Zn/coppr/lutein/zeaxan (PRESERVISION AREDS-2 ORAL) Take 2 tablets by mouth daily.       citalopram (CELEXA) 40 MG tablet Take 1 tablet (40 mg total) by mouth daily. 90 tablet 1     omeprazole (PRILOSEC) 20 MG capsule TAKE 1 CAPSULE DAILY BEFORE BREAKFAST 90 capsule 1     traMADoL (ULTRAM) 50 mg tablet Take 1 tablet (50 mg total) by mouth every 6 (six) hours as needed for pain. 12 tablet 0     No current facility-administered medications for this visit.        Allergies   Allergen Reactions     Adhesive Rash     Gabapentin      Has dizziness, spaced  "out when on it. Has been on it twice and both times had this type of reaction. Should not be on it in the future.      Green Pepper      Stomach upset     Lactose Diarrhea     Monosodium Glutamate      diarrhea       Social History     Tobacco Use     Smoking status: Never Smoker     Smokeless tobacco: Never Used   Substance Use Topics     Alcohol use: Yes     Alcohol/week: 5.0 standard drinks     Types: 5 Standard drinks or equivalent per week     Frequency: 4 or more times a week     Drinks per session: 1 or 2     Binge frequency: Never     Comment: Occasional      Family History   Problem Relation Age of Onset     Breast cancer Maternal Aunt 46     Kidney cancer Mother 67     Hypertension Mother      Heart disease Mother      Alzheimer's disease Father      Atrial fibrillation Father      Aortic stenosis Father      Skin cancer Father      Stroke Father      Depression Father      Lung cancer Paternal Grandfather      Social History     Substance and Sexual Activity   Drug Use No        Objective     /74   Pulse 93   Temp 98.8  F (37.1  C) (Oral)   Resp 18   Ht 5' 3.5\" (1.613 m)   Wt 171 lb (77.6 kg)   LMP 01/11/1997   SpO2 96%   BMI 29.82 kg/m        Review of Systems:  Denies fever, chills, visual changes, fatigue, myalgias, nasal congestion, rhinorrhea, ear pain or discharge, sore throat, swollen glands, breast mass, nipple discharge, breast changes, abdominal pain, nausea, vomiting, diarrhea, constipation, cough, shortness of breath, chest pain, weight change, change in bowel habits, melena, rectal bleeding, dysuria, frequency, urgency, hematuria, polyuria, polydipsia, polyphagia, joint pain or swelling or erythema, edema, rash, weakness, paresthesias, vaginal discharge or bleeding or mood changes.  Remainder of review of systems was negative.    Positives: feeling well other than chronic low back pain      Objective:   /74   Pulse 93   Temp 98.8  F (37.1  C) (Oral)   Resp 18   Ht 5' " "3.5\" (1.613 m)   Wt 171 lb (77.6 kg)   LMP 01/11/1997   SpO2 96%   BMI 29.82 kg/m    Weight: 171 lb (77.6 kg)        Physical Exam:  General Appearance: Alert, cooperative, no distress, appears stated age  Head: Normocephalic, without obvious abnormality, atraumatic  Eyes: PERRL, conjunctiva/corneas clear, EOM's intact  Ears: Normal TM's and external ear canals, both ears  Nose: Nares normal, septum midline,mucosa normal, no drainage  Throat: Lips, mucosa, and tongue normal; teeth and gums normal  Neck: Supple, symmetrical, trachea midline, no adenopathy;  thyroid: not enlarged, symmetric, no tenderness/mass/nodules  Back: Symmetric, no curvature, ROM normal, no CVA tenderness  Lungs: Clear to auscultation bilaterally, respirations unlabored  Heart: Regular rate and rhythm, S1 and S2 normal, no murmur, rub, or gallop  Abdomen: Soft, non-tender, bowel sounds active all four quadrants,  no masses, no organomegaly  Extremities: Extremities normal, atraumatic, no cyanosis or edema  Skin: Skin color, texture, turgor normal, no rashes or lesions  Lymph nodes: Cervical and supraclavicular nodes normal  Neurologic: Normal         Recent Results (from the past 240 hour(s))   Electrocardiogram Perform and Read   Result Value Ref Range    SYSTOLIC BLOOD PRESSURE      DIASTOLIC BLOOD PRESSURE      VENTRICULAR RATE 87 BPM    ATRIAL RATE 87 BPM    P-R INTERVAL 156 ms    QRS DURATION 76 ms    Q-T INTERVAL 382 ms    QTC CALCULATION (BEZET) 459 ms    P Axis 1 degrees    R AXIS 16 degrees    T AXIS 44 degrees    MUSE DIAGNOSIS       Normal sinus rhythm  Nonspecific ST abnormality  Abnormal ECG  When compared with ECG of 01-APR-2016 09:56,  No significant change was found  Confirmed by VARUN CANTU, LES LOC:JN ([id number]) on 1/11/2021 3:39:14 PM     Magnesium   Result Value Ref Range    Magnesium 2.1 1.8 - 2.6 mg/dL   Vitamin D, Total (25-Hydroxy)   Result Value Ref Range    Vitamin D, Total (25-Hydroxy) 50.1 30.0 - 80.0 ng/mL "   HM2(CBC w/o Differential)   Result Value Ref Range    WBC 6.1 4.0 - 11.0 thou/uL    RBC 4.60 3.80 - 5.40 mill/uL    Hemoglobin 14.4 12.0 - 16.0 g/dL    Hematocrit 43.8 35.0 - 47.0 %    MCV 95 80 - 100 fL    MCH 31.3 27.0 - 34.0 pg    MCHC 32.9 32.0 - 36.0 g/dL    RDW 12.0 11.0 - 14.5 %    Platelets 294 140 - 440 thou/uL    MPV 7.7 7.0 - 10.0 fL   Basic Metabolic Panel   Result Value Ref Range    Sodium 140 136 - 145 mmol/L    Potassium 5.0 3.5 - 5.0 mmol/L    Chloride 101 98 - 107 mmol/L    CO2 27 22 - 31 mmol/L    Anion Gap, Calculation 12 5 - 18 mmol/L    Glucose 94 70 - 125 mg/dL    Calcium 9.7 8.5 - 10.5 mg/dL    BUN 9 8 - 28 mg/dL    Creatinine 0.69 0.60 - 1.10 mg/dL    GFR MDRD Af Amer >60 >60 mL/min/1.73m2    GFR MDRD Non Af Amer >60 >60 mL/min/1.73m2   INR   Result Value Ref Range    INR 0.91 0.90 - 1.10   APTT(PTT)   Result Value Ref Range    PTT 26 24 - 37 seconds   Platelet Function Test   Result Value Ref Range    PFA-COL/ 1 - 180 sec   Urinalysis Macroscopic   Result Value Ref Range    Color, UA Yellow Colorless, Yellow, Straw, Light Yellow    Clarity, UA Clear Clear    Glucose, UA Negative Negative    Bilirubin, UA Negative Negative    Ketones, UA Negative Negative    Specific Gravity, UA 1.015 1.005 - 1.030    Blood, UA Negative Negative    pH, UA 7.0 5.0 - 8.0    Protein, UA Negative Negative mg/dL    Urobilinogen, UA 0.2 E.U./dL 0.2 E.U./dL, 1.0 E.U./dL    Nitrite, UA Negative Negative    Leukocytes, UA Trace (!) Negative   Culture, Urine    Specimen: Urine   Result Value Ref Range    Culture No Growth        Migdalia Delvalle M.D.  [id number]  [place] [address]  Ph: [phone] Fax: [phone]  Pager [phone]    Recent Labs   Lab Test 01/11/21  1128 09/04/20  1155 11/01/19  1217 11/01/19  1217 08/05/19  1255   HGB 14.4  --   --  13.2 14.5     --   --   --  276   INR 0.91  --   --   --   --     139   < > 139 139   K 5.0 4.7   < > 4.5 4.1    CREATININE 0.69 0.74   < > 0.66 0.72    < > = values in this interval not displayed.          REVISED CARDIAC RISK INDEX (RCRI)   The patient has the following serious cardiovascular risks for perioperative complications:   - No serious cardiac risks = 0 points    RCRI INTERPRETATION: 0 points: Class I (very low risk - 0.4% complication rate)  [id number]}     Assessment & Plan      Assessment: Adelina Espinoza is a 75 y.o. female who presents for pre-op history and physical. She is scheduled to undergo BILATERAL LUMBAR 4-5 LAMINECTOMIES FOR RIGHT SYNOVIAL CYST RESECTION, LEFT POSSIBLE RIGHT MICRODISCECTOMY LUMBAR 4-5, LUMBAR 4-5 POSTERIOR INSTRUMENTED FUSION AND ARTHRODESIS, USE OF ALLOGRAFT, USE OF AUTOGRAFT, STEALTH NAVIGATION      Plan:    She appears to be medically optimized for the planned procedure.   . Labs were done as indicated below.  Recommendations were reviewed with the patient. Copy of the pre-op was given to the patient to bring along on the day of surgery.   PATIENT HAS HAD SIGNIFICANT NAUSEA FROM ANESTHESIA IN THE PAST.  PATIENT WAS ADVISED TO STOP ASPIRIN 1 WEEK PRIOR TO SURGERY.  PATIENT WAS ADVISED TO STOP TRAMADOL 1 WEEK PRIOR TO SURGERY.   PATIENT WAS ADVISED TO STOP ALL NUTRITIONAL SUPPLEMENTS 1 WEEK PRIOR TO SURGERY.   PATIENT HAS NEVER BEEN DIAGNOSED WITH SLEEP APNEA BUT DOES NOTE DAYTIME DROWSINESS AND HER  SAYS SHE DOES SNORE.  PATIENT HAS HAD KNEE REPLACEMENT BILATERALLY, RIGHT IN 2015 AND LEFT IN 2016.   The patient is approved for surgery and is considered to be low anesthetic risk.   Follow up as needed.    Please page me at [phone] if you have any questions or concerns regarding the care of this patient.      The proposed surgical procedure is considered INTERMEDIATE risk.    Patient does have Covid testing scheduled for 1/25/2020.  Patient did need a refill of her Celexa as well as her Prilosec as well as her Bentyl today.  These refills were all sent to the  pharmacy.    Preoperative examination  - Urinalysis Macroscopic  - Electrocardiogram Perform and Read  - HM2(CBC w/o Differential)  - Basic Metabolic Panel  - INR  - APTT(PTT)  - Platelet Function Test  - Culture, Urine    Spinal stenosis of lumbar region with neurogenic claudication  - Urinalysis Macroscopic  - Electrocardiogram Perform and Read  - HM2(CBC w/o Differential)  - Basic Metabolic Panel  - INR  - APTT(PTT)  - Platelet Function Test  - Culture, Urine    Synovial cyst of lumbar facet joint  - Urinalysis Macroscopic  - Electrocardiogram Perform and Read  - HM2(CBC w/o Differential)  - Basic Metabolic Panel  - INR  - APTT(PTT)  - Platelet Function Test  - Culture, Urine    Lumbar disc herniation  - Urinalysis Macroscopic  - Electrocardiogram Perform and Read  - HM2(CBC w/o Differential)  - Basic Metabolic Panel  - INR  - APTT(PTT)  - Platelet Function Test  - Culture, Urine    Anxiety  - citalopram (CELEXA) 40 MG tablet  Dispense: 90 tablet; Refill: 1    Gastroesophageal reflux disease without esophagitis  - omeprazole (PRILOSEC) 20 MG capsule  Dispense: 90 capsule; Refill: 1  - Basic Metabolic Panel    Limb Swelling  - Basic Metabolic Panel    Personal history of malignant neoplasm of breast    Hypomagnesemia  - Magnesium    Major depressive disorder, recurrent episode (H)  - citalopram (CELEXA) 40 MG tablet  Dispense: 90 tablet; Refill: 1    Stomach cramps  - dicyclomine (BENTYL) 10 MG capsule  Dispense: 30 capsule; Refill: 1    Osteoporosis, senile  - Vitamin D, Total (25-Hydroxy)    Abnormal urinalysis  - Culture, Urine    Possible Sleep Apnea     Risks and Recommendations:  The patient has the following additional risks and recommendations for perioperative complications:   - Consult Hospitalist / IM to assist with post-op medical management    Medication Instructions:  Patient is to take all scheduled medications on the day of surgery EXCEPT for modifications listed below:   - aspirin: Discontinue  aspirin 7-10 days prior to procedure to reduce bleeding risk. It should be resumed postoperatively.     RECOMMENDATION:  APPROVAL GIVEN to proceed with proposed procedure, without further diagnostic evaluation.    Total time spent with patient was at least 65 minutes,  of which greater than fifty percent was spent in counselling and coordination of care regarding her current medical problems as well as clearance for upcoming back surgery.     Signed Electronically by: Migdalia Delvalle MD    Copy of this evaluation report is provided to requesting physician.    Preop [facility] Preop Guidelines    Revised Cardiac Risk Index

## 2021-06-15 ENCOUNTER — OFFICE VISIT - HEALTHEAST (OUTPATIENT)
Dept: RHEUMATOLOGY | Facility: CLINIC | Age: 76
End: 2021-06-15

## 2021-06-15 DIAGNOSIS — M15.0 PRIMARY OSTEOARTHRITIS INVOLVING MULTIPLE JOINTS: ICD-10-CM

## 2021-06-15 DIAGNOSIS — M19.071 ARTHRITIS OF FIRST METATARSOPHALANGEAL (MTP) JOINT OF RIGHT FOOT: ICD-10-CM

## 2021-06-15 PROBLEM — K21.9 ESOPHAGEAL REFLUX: Status: ACTIVE | Noted: 2017-02-14

## 2021-06-15 ASSESSMENT — MIFFLIN-ST. JEOR: SCORE: 1274.72

## 2021-06-15 NOTE — PROGRESS NOTES
Elise presents to [facility] today for a surgical consult with Dr. Burleson regarding a right breast mass.  Mammo and ultrasound were done and are normal.  She does have a history of right breast cancer, in 2003, so her primary md wanted Dr. Burleson to evaluate and make recommendations.  Patient met with Dr. Burleson, see dictation for details of visit.  MRI recommended and scheduled at [facility]. RN time 15 mins

## 2021-06-15 NOTE — PATIENT INSTRUCTIONS - HE
Breast MRI at [facility]     Address:   [address] [place] [id number]    Telephone:   [phone]    Parking Availability:   Free parking is available in the [facility] lot off [address] [address] [facility]. If parking is unavailable in our lot, please proceed to the [facility]amp (located on [address]). Please bring your ticket with you for parking validation at our .

## 2021-06-15 NOTE — PROGRESS NOTES
"ASSESSMENT: Left first intermetatarsal space neuritis    PLAN: Pain is a little better now.  She is content to monitor it.  If pain worsens, return to clinic for a quick injection before her trip to [place].  Otherwise return to clinic as needed.  15 minute visit, greater than half time spent counseling and coordinating medical care.      SUBJECTIVE: Return visit at the [facility] regarding burning pain between the left first and second metatarsals.  Flares at night are worse after a busy day.  No trauma.  No traumatic change in shoes.  Pain is been a little bit better over the past few days.  She is nervous about her trip to [place] in 3 weeks.  Right first metatarsal phalangeal joint arthritis has been stable since her last visit in July.      PHYSICAL EXAM:  Pulse 82  Ht 5' 3.5\" (1.613 m)  Wt 160 lb (72.6 kg)  SpO2 99%  BMI 27.9 kg/m2  General: Pleasant 72 y.o. female in no acute distress.  Vascular: DP pulses are palpable. PT pulses are palpable. Pedal hair is present. Feet are warm to the touch.  Cardiac: Pulse is regular.  Lymphatic: No edema at the ankles.  Neuro: Tenderness with palpation plantarly between the left first and second metatarsal heads.    Derm: No open lesions.  Musculoskeletal: Adequate motion around the left first metatarsal phalangeal joint.  No tenderness along the metatarsal shafts.    "

## 2021-06-15 NOTE — TELEPHONE ENCOUNTER
Pt returned call regarding fall.     She reports that she fell out of bed this morning 0630. Hit head on the left side above ear. C/o headache. Has taken 500 mg of tylenol and it went away, no other complaints other than some neck stiffness.     No c/o low back pain. No issues with incision.       Pt is using walker for safety. Recommended pt take one Robaxin, continue to use ice/heat for muscle stiffness. Can increase tylenol to 1000 mg three times a day if needed.     Reviewed emergent s/s to watch for after a fall.     Pt will call if her symptoms change or worsen. She is in agreement with the plan.     Rose Mary Bravo RN

## 2021-06-15 NOTE — PROGRESS NOTES
Patient presents for 2 week incision nurse visit check.     DOS: 1/29/2021  Procedure: BILATERAL LUMBAR 4-5 LAMINECTOMIES FOR RIGHT SYNOVIAL CYST RESECTION, LUMBAR 4-5, LUMBAR 4-5 POSTERIOR INSTRUMENTED FUSION  Surgeon: William    Today comes in for 2-week wound check. Is accompanied by her . Pt reports pre-op pain, radiculopathy is improved. Patient is pleased with the outcome of the surgery. No motor, gait, or sensory disturbances. Uses walker at home for balance as needed. No safety concerns. Taking tylenol for pain with adequate relief. Pt is using ice/heat for muscle stiffness/soreness. Encouraged icing/heat use, avoiding heat to the incision area. Discussed maximum dose of Acetaminophen in 24 hours.     Patient is walking frequently without difficulty. Activity restrictions reinforced at this time as outlined the After Visit Summary.     Surgical wound WNL - CDI, no signs of infection or skin breakdown.  Incision well-healed: good skin approximation, no redness or visible/palpable edema, no tenderness to palpation.  PT. AF, denies fever, chills or sweats.    Staples - intact removed without difficulty. Wound prepped with Betadine before and after removal.  Surrounding skin has no signs of breakdown.  Verbal instructions regarding incision care are given.  Pt. advised to call us if any s/s of infection noted - all discussed in detail.      All of patient's questions addressed today. She was instructed to call with any additional questions/concerns. Follow up appts made.     Rose Mary Bravo RN

## 2021-06-15 NOTE — PROGRESS NOTES
Chief Complaint:  Right Breast Mass or Lesion    HPI:  This is a 75 y.o. woman who I'm asked to see by Migdalia Delvalle MD for evaluation of a right breast mass.  This was picked up by the patient.  She had a mammogram and ultrasound done that are normal.  Because of a history of breast cancer her primary doctor wanted her evaluated.  She is nearly 20 years status post right lumpectomy followed by radiation and chemotherapy.  She has no significant family history of breast cancer.      Past Medical History:  Past Medical History:   Diagnosis Date     Anxiety      Arthritis      Breast cancer (H) 2003     Depression      Esophageal reflux      History of anesthesia complications     nausea     Hx antineoplastic chemotherapy 2003    for breast cancer     Hx of radiation therapy 2004    for breast cancer     IBS (irritable bowel syndrome)      Lactose intolerance     lactose breath test positive at GI, 7/2019     Macular degeneration 08/2019    caught early, takes Areds 2 on a daily basis, seeing opthamology     Neuropathy     secondary to chemotherapy     Osteopenia      Past Surgical History:   Procedure Laterality Date     APPENDECTOMY  1967     BACK SURGERY  2000    cervical fusion     BREAST BIOPSY Right 2003     BREAST LUMPECTOMY  12/2003    breast cancer     CATARACT EXTRACTION, BILATERAL       CHOLECYSTECTOMY  1994     HYSTERECTOMY  1997    desmoid cyst     NISSEN FUNDOPLICATION  2010     OOPHORECTOMY  1997     [place] ARTHRODESIS POSTERIOR/POSTEROLATERAL THORACIC N/A 1/29/2021    Procedure: BILATERAL LUMBAR 4-5 LAMINECTOMIES FOR RIGHT SYNOVIAL CYST RESECTION, LUMBAR 4-5, LUMBAR 4-5 POSTERIOR INSTRUMENTED FUSION AND ARTHRODESIS, USE OF ALLOGRAFT, USE OF AUTOGRAFT, STEALTH NAVIGATION;  Surgeon: Lisa Thomas MD;  Location: [facility] OR;  Service: Spine     OH TOTAL KNEE ARTHROPLASTY Right 10/29/2015    Procedure: RIGHT TOTAL KNEE  ARTHROPLASTY;  Surgeon: Nitin White MD;  Location: [facility]  Main OR;  Service: Orthopedics     [place] TOTAL KNEE ARTHROPLASTY Left 04/21/2016    Procedure: LEFT TOTAL KNEE ARTHROPLASTY;  Surgeon: Nitin White MD;  Location: [facility] Main OR;  Service: Orthopedics       Meds:    Current Outpatient Medications:      acetaminophen (TYLENOL) 500 MG tablet, Take 1,000 mg by mouth every 6 (six) hours as needed for pain., Disp: , Rfl:      aspirin 81 MG EC tablet, Take 1 tablet (81 mg total) by mouth daily., Disp: , Rfl: 0     calcium citrate-vitamin D (CITRACAL+D) 315-200 mg-unit per tablet, Take 1 tablet by mouth 2 (two) times a day., Disp: , Rfl:      calcium polycarbophiL (FIBERCON) 625 mg tablet, Take 625 mg by mouth 2 (two) times a day., Disp: , Rfl:      citalopram (CELEXA) 40 MG tablet, Take 1 tablet (40 mg total) by mouth daily., Disp: 90 tablet, Rfl: 1     dicyclomine (BENTYL) 10 MG capsule, TAKE 1 CAPSULE FOUR TIMES A DAY AS NEEDED FOR CRAMPS, Disp: 30 capsule, Rfl: 1     furosemide (LASIX) 20 MG tablet, TAKE 1 TABLET DAILY (Patient taking differently: Take 20 mg by mouth daily. ), Disp: 90 tablet, Rfl: 2     magnesium 250 mg Tab, Take 250 mg by mouth daily. , Disp: , Rfl:      methocarbamoL (ROBAXIN) 750 MG tablet, Take 1 tablet (750 mg total) by mouth every 6 (six) hours as needed (muscle spasms)., Disp: 30 tablet, Rfl: 0     multivit-min-iron-FA-lutein (CENTRUM SILVER WOMEN) 8 mg iron-400 mcg-300 mcg Tab, Take 1 tablet by mouth daily. , Disp: , Rfl:      omeprazole (PRILOSEC) 20 MG capsule, TAKE 1 CAPSULE DAILY BEFORE BREAKFAST (Patient taking differently: Take 20 mg by mouth daily before breakfast. ), Disp: 90 capsule, Rfl: 1     polyethylene glycol (MIRALAX) 17 gram packet, Take 1 packet (17 g total) by mouth 2 (two) times a day., Disp:  , Rfl: 0     senna-docusate (PERICOLACE) 8.6-50 mg tablet, Take 1 tablet by mouth 2 (two) times a day., Disp:  , Rfl: 0     tetrahydroz-peg 400-hyprom-gly (VISINE MAX REDNESS RELIEF) 0.05-1-0.36-0.2 % Drop, Administer 1 drop to  both eyes 3 (three) times a day as needed., Disp: , Rfl:      traMADoL (ULTRAM) 50 mg tablet, Take 1 tablet (50 mg total) by mouth every 6 (six) hours as needed for pain., Disp: 10 tablet, Rfl: 0     vit C/E/Zn/coppr/lutein/zeaxan (PRESERVISION AREDS-2 ORAL), Take 1 tablet by mouth 2 (two) times a day. , Disp: , Rfl:     Allergies:  Allergies   Allergen Reactions     Adhesive Rash     Gabapentin      Has dizziness, spaced out when on it. Has been on it twice and both times had this type of reaction. Should not be on it in the future.      Green Pepper      Stomach upset     Lactose Diarrhea     Monosodium Glutamate      diarrhea       Social History:   reports that she has never smoked. She has never used smokeless tobacco. She reports current alcohol use of about 8.0 standard drinks of alcohol per week. She reports that she does not use drugs.    ROS:  A 12 point comprehensive review of systems was negative except as noted.    Physical exam:  General: alert, appears stated age and cooperative    Breast: There is a area of thickening just underneath the lateral edge of the areola of the right breast.  It is not a discrete mass.  I have not seen her in years so I cannot tell if this is something normal for her versus a change.  Lymph Nodes: no axillary nodes palpated  Neuro: Grossly normal        Studies: Mammograms, ultrasound are reviewed.    Impression: Right breast mass or thickening.  This feels like it could be relatively normal for somebody who has had previous radiation but the area is not even close to her lumpectomy site and she feels is new and different.  I think we should do an MRI.  I reviewed her mammogram and I do not see anything worrisome but there definitely is something that feels different than the opposite side.  It may just be related to her radiation but she feels like this is a change.  .    Plan: Breast MRI.  The decision for biopsy will depend on the results.  I will call her with the  results and we will make further plans.

## 2021-06-15 NOTE — PROGRESS NOTES
[facility] Daily Progress     Patient Name: Adelina Espinoza  Date: 1/3/2018  Visit #: 5/10  12/13/17-18  Referral Diagnosis:  Right Shoulder Pain  Referring provider: Migdalia Delvalle MD  Visit Diagnosis:     ICD-10-CM    1. Acute pain of right shoulder M25.511    2. Poor posture R29.3    3. Weakness R53.1          Assessment:     Patient is benefitting from skilled physical therapy and is making steady progress toward functional goals.  Patient is appropriate to continue with skilled physical therapy intervention, as indicated by initial plan of care.  Feel patient has been overdoing her wall sandhya exercise and caused some muscle spasm in her right pectoralis region.  Symptoms were decreased after MFR, but still present.    Goal Status:  Pt. will be independent with home exercise program in : 6 weeks  Pt will: Able to sleep thorugh the night without waking due to pain within 8 weeks  Pt will: Able to lift objects, > 8# such as milk or laundry within 8 weeks  Pt will: Able to reach up into cupboard to grab a dish within 8 weeks    Plan / Patient Education:     Posture edu  MRT scalenes, pectoralis  MFR UT, LS, infraspinatus and supraspinatus     Prefers Pat  Subjective:     Pain Ratin    I am sore around my shoulder (points to bicipital groove).  I have been doing the exercises and they are going ok    Objective:     Pain 0/10 after treatment today    Exercises:  Exercise #1: supine thoracic towel roll stretch  Comment #1: hold 30 seconds up to t minutes  Exercise #2: scapular retraction, chin tuck  Comment #2: 10 seconds X 10 reps  Exercise #3: shoulder IR/Ext,   shoulder abd      with cane  Comment #3: 10  Exercise #4: wall sandhya  Comment #4: 20  Exercise #5: reverse wall push-up  Comment #5: hold 10 seconds, repeat 6-12 times  Exercise #6: scapular row with green tband    work up to 20 reps  Comment #6: shoudler ext with green tband   work up to 20 reps  Exercise #7: shoulder diagonal  with green tband    work up to 20 reps    Treatment Today     TREATMENT MINUTES COMMENTS   Evaluation     Self-care/ Home management     Manual therapy 15 Pt sitting in chair, bilateral UT and LS MFR  Right pectoralis minor at origin     Neuromuscular Re-education     Therapeutic Activity     Therapeutic Exercises 15 See above or flow sheet-   Gait training     Modality__________________                Total 30    Blank areas are intentional and mean the treatment did not include these items.       Kaylyn Wright, PT  1/3/2018

## 2021-06-15 NOTE — PROGRESS NOTES
[facility] Daily Progress     Patient Name: Adelina Espinoza  Date: 2017  Visit #: 4/10  12/13/17-18  Referral Diagnosis:  Right Shoulder Pain  Referring provider: Migdalia Delvalle MD  Visit Diagnosis:     ICD-10-CM    1. Acute pain of right shoulder M25.511    2. Poor posture R29.3    3. Weakness R53.1          Assessment:     Patient is benefitting from skilled physical therapy and is making steady progress toward functional goals.  Patient is appropriate to continue with skilled physical therapy intervention, as indicated by initial plan of care.  Patient feels her symptoms are decreasing and she is having longer times with no pain.    Patient had good tissue response again today with symptoms resolving after MFR  Goal Status:  Pt. will be independent with home exercise program in : 6 weeks  Pt will: Able to sleep thorugh the night without waking due to pain within 8 weeks  Pt will: Able to lift objects, > 8# such as milk or laundry within 8 weeks  Pt will: Able to reach up into cupboard to grab a dish within 8 weeks    Plan / Patient Education:     Posture edu  MRT scalenes, pectoralis  MFR UT, LS, infraspinatus and supraspinatus     Prefers Pat  Subjective:     Pain Ratin     I took the KT off Wednesday as I was itchy.  I feel ok without it  Maybe I took a turn for the better yesterday.  I feel a little better.    Objective:     Pain 0/10 after treatment today    Exercises:  Exercise #1: supine thoracic towel roll stretch  Comment #1: hold 30 seconds up to t minutes  Exercise #2: scapular retractin, chin tuck  Comment #2: 10 seconds X 10 reps  Exercise #3: shoulder IR/Ext,   shoulder abd      with cane  Comment #3: 10  Exercise #4: wall sandhya  Comment #4: 20  Exercise #5: reverse wall push-up  Comment #5: hold 10 seconds, repeat 6-12 times    Treatment Today     TREATMENT MINUTES COMMENTS   Evaluation     Self-care/ Home management     Manual therapy 15 Pt sitting in chair,  bilateral UT and LS MFR     Neuromuscular Re-education     Therapeutic Activity     Therapeutic Exercises 10 See above or flow sheet-   Gait training     Modality__________________                Total 30    Blank areas are intentional and mean the treatment did not include these items.       Kaylyn Wright, PT  12/29/2017

## 2021-06-15 NOTE — TELEPHONE ENCOUNTER
Patient had surgery last Friday, January 29th with Dr. Thomas. She states she was sleeping really hard overnight and fell out of bed this morning. She would like a call back to discuss.     Best number to reach her: [phone]

## 2021-06-15 NOTE — PROGRESS NOTES
[facility] Daily Progress     [facility] Discharge Summary  Patient Name: Adelina Espinoza  Date: 2/20/2018  Referral Diagnosis: Right Shoulder Pain  Referring provider: Migdalia Delvalle MD  Visit Diagnosis:   1. Acute pain of right shoulder     2. Poor posture     3. Weakness         Goals:  Pt. will be independent with home exercise program in : 6 weeks  Pt will: Able to sleep thorugh the night without waking due to pain within 8 weeks  Pt will: Able to lift objects, > 8# such as milk or laundry within 8 weeks  Pt will: Able to reach up into cupboard to grab a dish within 8 weeks    Patient was seen for 6 visits from 12/13/18 to 1/12/18 with 0 missed appointments.  The patient met goals and has demonstrated understanding of and independence in the home program for self-care, and progression to next steps.  She will initiate contact if questions or concerns arise.    Therapy will be discontinued at this time.  The patient will need a new referral to resume.    Thank you for your referral.  Kaylyn Wright  2/20/2018  11:54 AM    Patient Name: Adelina Espinoza  Date: 1/12/2018  Visit #: 6/10  12/13/17-2/12/18  Referral Diagnosis:  Right Shoulder Pain  Referring provider: Migdalia Delvalle MD  Visit Diagnosis:     ICD-10-CM    1. Acute pain of right shoulder M25.511    2. Poor posture R29.3    3. Weakness R53.1          Assessment:     Patient is benefitting from skilled physical therapy and is making steady progress toward functional goals.  Patient is appropriate to continue with skilled physical therapy intervention, as indicated by initial plan of care.  All of pt's pain resolved with MFR today.   She feels she has a good number of exercises and does not want more.  She will try the exercises at home for two weeks and return to the clinic.  If she continues to feel well she will be discharged.    Goal Status:  Pt. will be independent with home exercise program in : 6 weeks  Pt  will: Able to sleep thorugh the night without waking due to pain within 8 weeks  Pt will: Able to lift objects, > 8# such as milk or laundry within 8 weeks  Pt will: Able to reach up into cupboard to grab a dish within 8 weeks   Good progress toward goal    Plan / Patient Education:     Will schedule 2 weeks out.  If continue with low pain levels will dischrge    Prefers Pat  Subjective:     Pain Ratin/2  10    I felt good for a few days then I did a lot of lifting so my shoulder hurts again.   More in my neck    Objective:     Pain 0/10 after treatment today    Exercises:  Exercise #1: supine thoracic towel roll stretch  Comment #1: hold 30 seconds up to t minutes  Exercise #2: scapular retraction, chin tuck  Comment #2: 10 seconds X 10 reps  Exercise #3: shoulder IR/Ext,   shoulder abd      with cane  Comment #3: 10  Exercise #4: wall sandhya  Comment #4: 20  Exercise #5: reverse wall push-up  Comment #5: hold 10 seconds, repeat 6-12 times  Exercise #6: scapular row with green tband    work up to 20 reps  Comment #6: shoudler ext with green tband   work up to 20 reps  Exercise #7: shoulder diagonal with green tband    work up to 20 reps    Treatment Today     TREATMENT MINUTES COMMENTS   Evaluation     Self-care/ Home management     Manual therapy 17 Pt sitting in chair, bilateral UT and LS MFR       Neuromuscular Re-education     Therapeutic Activity     Therapeutic Exercises 8 See above or flow sheet--discussed thoracic towel roll stretch, hold time, variations   Gait training     Modality__________________                Total 25    Blank areas are intentional and mean the treatment did not include these items.       Kaylyn Wright, PT  2018

## 2021-06-15 NOTE — PATIENT INSTRUCTIONS - HE
WOUND CARE:  No lotions, soaps, powders, ointments, creams, or patches on incision until the wound is well healed. If you notice increased or persistent drainage from your wound, contact our office.  You may use an extra large band-aid or 4x4 and tape.  Both can be purchased at your pharmacy.    Wash your hands before changing the dressing or touching the wound. If someone is helping you change the dressing, ensure that the person washes his/her hands.  Good handwashing can decrease the risk of infection.  If you are unable to see the wound, have someone check the wound daily for redness, swelling or drainage.  A small amount of drainage is normal.       Wash your wound daily. Remove all dressings prior to your shower.  Apply mild soap directly to the wound, form a light lather and rinse with running water.     Do not submerge the wound under water. No baths or swimming for 6 weeks.     If you develop redness, swelling, drainage, or temp 101 or greater, please call our office at [phone].    Activity Restrictions:  *No lifting, pushing or pulling greater than 5-10 pound (this is about a gallon of milk).  *No repetitive bending, twisting, or jarring activities  *No overhead work  *No aerobic or strenuous activity  *No activities with increased risk of falls  *You may move about your home as tolerated  *You may walk up and down stairs as tolerated  *You may increase your activity slowly over the next 4-6 weeks    WALKING PROGRAM: As you can tolerate, walk daily-start with 5-10 minutes of continuous walking. This is in addition to the walking that you do as part of your daily activities. Increase the time that you walk by 5 minutes every couple of days. Do not exceed 30-45 minutes of continuous walking until seen in follow-up. Walking is the best exercise after surgery.  **Listen to your body, if you find that you are more painful or fatigued, you may need to proceed more slowly.    **Do not smoke or expose  yourself to second hand smoke. Cigarette smoke can delay healing and cause complications.     DRIVING:  We recommend that you do not drive while taking medications for pain or muscle spasms. Always read and follow the advice on your prescription bottle. If you have questions, speak with your pharmacist.  We recommend that you do not drive while wearing a brace, as it could limit your range of motion.    WORK: If you plan to return to work before you 4-6 weeks appointment, call and discuss with one of the nurses in the neurosurgery office.

## 2021-06-16 PROBLEM — M48.062 LUMBAR STENOSIS WITH NEUROGENIC CLAUDICATION: Status: ACTIVE | Noted: 2021-01-29

## 2021-06-16 PROBLEM — M71.38 SYNOVIAL CYST OF LUMBAR FACET JOINT: Status: ACTIVE | Noted: 2020-12-23

## 2021-06-16 PROBLEM — M51.26 LUMBAR DISC HERNIATION: Status: ACTIVE | Noted: 2020-12-23

## 2021-06-16 PROBLEM — M15.0 PRIMARY OSTEOARTHRITIS INVOLVING MULTIPLE JOINTS: Status: ACTIVE | Noted: 2019-03-05

## 2021-06-16 PROBLEM — M48.062 SPINAL STENOSIS OF LUMBAR REGION WITH NEUROGENIC CLAUDICATION: Status: ACTIVE | Noted: 2020-12-23

## 2021-06-16 PROBLEM — M19.071 ARTHRITIS OF FIRST METATARSOPHALANGEAL (MTP) JOINT OF RIGHT FOOT: Status: ACTIVE | Noted: 2019-03-05

## 2021-06-16 NOTE — PATIENT INSTRUCTIONS - HE
Increase activity as tolerated recommended to remain as active as possible and focus on core strength and stretching   Increase weight restrictions by 5 pounds per week until back to normal weight   Follow up in 2 month with repeat lumbar xray, she understands to contact the office sooner should any symptoms return prior to appointment

## 2021-06-16 NOTE — PROGRESS NOTES
1. Osteoporosis, senile       Patient was educated on safety of Prolia utilizing Patient Counseling Chart for Healthcare Providers, as outlined by the Prolia REMS progam.     Return in about 6 months (around 10/6/2021) for Prolia injection.    Patient Instructions   Prolia 12th today.  Prolia 13th in 6 months with my nurse. I will see you 1 year.    DXA in 9/2022 .   Phone number to schedule [phone].    Daily calcium need is 6738-6862 mg a day from the diet and supplements.  Calcium citrate is easier to digest.  Vitamin D 2000 IU daily recommended.      Chief Complaint   Patient presents with     Osteoporosis Follow Up     Osto F/U       /82   Pulse 81   Wt 172 lb 14.4 oz (78.4 kg)   LMP 01/11/1997   SpO2 95%   BMI 29.68 kg/m        Did you experience any problems with previous Prolia injection? no  Any medication change in the last 6 months? no  Did you take prednisone or other immunosupressant drugs in the last 6 months   (chemo, transplant, rheum, dermatology conditions)? no  Did you have any serious infection in the last 6 months?no  Any recent hospitalizations?no  Do you plan any dental work in the next 2-3 months?no  How much calcium do you take daily from the diet and supplements?1200 mg  How much vit D do you take daily? 2000 IU  Last DXA? 9/2020, Reviewed and discussed      Patient is here today for the 12th Prolia injection. Patient tolerated previous injections well.   We discussed calcium and vit D daily needs today.   I reviewed the lab results:  Component      Latest Ref Rng & Units 1/11/2021   Sodium      136 - 145 mmol/L 140   Potassium      3.5 - 5.0 mmol/L 5.0   Chloride      98 - 107 mmol/L 101   CO2      22 - 31 mmol/L 27   Anion Gap, Calculation      5 - 18 mmol/L 12   Glucose      70 - 125 mg/dL 94   Calcium      8.5 - 10.5 mg/dL 9.7   BUN      8 - 28 mg/dL 9   Creatinine      0.60 - 1.10 mg/dL 0.69   GFR MDRD Af Amer      >60 mL/min/1.73m2 >60   GFR MDRD Non Af Amer      >60  mL/min/1.73m2 >60   WBC      4.0 - 11.0 thou/uL 6.1   RBC      3.80 - 5.40 mill/uL 4.60   Hemoglobin      12.0 - 16.0 g/dL 14.4   Hematocrit      35.0 - 47.0 % 43.8   MCV      80 - 100 fL 95   MCH      27.0 - 34.0 pg 31.3   MCHC      32.0 - 36.0 g/dL 32.9   RDW      11.0 - 14.5 % 12.0   Platelets      140 - 440 thou/uL 294   MPV      7.0 - 10.0 fL 7.7   Vitamin D, Total (25-Hydroxy)      30.0 - 80.0 ng/mL 50.1       ROS: complete ROS negative.  Physical exam:  Constitutional:  oriented to person, place, and time, appears well-nourished. No distress.   HENT:   Head: Normocephalic.   Eyes: Conjunctivae are normal.   Neck: Normal range of motion.   Pulmonary/Chest: Effort normal  Musculoskeletal: Normal range of motion.   Neurological: alert and oriented to person, place, and time.   Psychiatric: normal mood and affect.      Next Prolia injection will be in 6 months.     Patient was educated on safety of Prolia utilizing Patient Counseling Chart for Healthcare Providers, as outlined by the Prolia REMS progam.         This note has been dictated using voice recognition software. Any grammatical or context distortions are unintentional and inherent to the software      Patient Active Problem List   Diagnosis     Peripheral Neuropathy     Urticaria     Major Depression, Recurrent     Anxiety     Irritable Bowel Syndrome     Primary osteoarthritis of right knee     Primary osteoarthritis of knee, left     Osteoporosis, senile     Esophageal reflux     Primary osteoarthritis involving multiple joints     Arthritis of first metatarsophalangeal (MTP) joint of right foot     Spinal stenosis of lumbar region with neurogenic claudication     Synovial cyst of lumbar facet joint     Lumbar disc herniation     Lumbar stenosis with neurogenic claudication       Current Outpatient Medications on File Prior to Visit   Medication Sig Dispense Refill     acetaminophen (TYLENOL) 500 MG tablet Take 1,000 mg by mouth every 6 (six) hours as  needed for pain.       aspirin 81 MG EC tablet Take 1 tablet (81 mg total) by mouth daily.  0     calcium polycarbophiL (FIBERCON) 625 mg tablet Take 625 mg by mouth 2 (two) times a day.       citalopram (CELEXA) 40 MG tablet TAKE 1 TABLET DAILY 90 tablet 0     furosemide (LASIX) 20 MG tablet TAKE 1 TABLET DAILY (Patient taking differently: Take 20 mg by mouth daily. ) 90 tablet 2     magnesium 250 mg Tab Take 250 mg by mouth daily.        multivit-min-iron-FA-lutein (CENTRUM SILVER WOMEN) 8 mg iron-400 mcg-300 mcg Tab Take 1 tablet by mouth daily.        omeprazole (PRILOSEC) 20 MG capsule TAKE 1 CAPSULE DAILY BEFORE BREAKFAST 90 capsule 3     tetrahydroz-peg 400-hyprom-gly (VISINE MAX REDNESS RELIEF) 0.05-1-0.36-0.2 % Drop Administer 1 drop to both eyes 3 (three) times a day as needed.       vit C/E/Zn/coppr/lutein/zeaxan (PRESERVISION AREDS-2 ORAL) Take 1 tablet by mouth 2 (two) times a day.        calcium citrate-vitamin D (CITRACAL+D) 315-200 mg-unit per tablet Take 1 tablet by mouth 2 (two) times a day.       dicyclomine (BENTYL) 10 MG capsule TAKE 1 CAPSULE FOUR TIMES A DAY AS NEEDED FOR CRAMPS 30 capsule 1     methocarbamoL (ROBAXIN) 750 MG tablet Take 1 tablet (750 mg total) by mouth every 6 (six) hours as needed (muscle spasms). 30 tablet 0     [DISCONTINUED] LORazepam (ATIVAN) 0.5 MG tablet Take 1 tablet (0.5 mg total) by mouth once as needed for anxiety. Repeat as needed just prior to MRI 2 tablet 0     [DISCONTINUED] senna-docusate (PERICOLACE) 8.6-50 mg tablet Take 1 tablet by mouth 2 (two) times a day.  0     [DISCONTINUED] traMADoL (ULTRAM) 50 mg tablet Take 1 tablet (50 mg total) by mouth every 6 (six) hours as needed for pain. 10 tablet 0     Current Facility-Administered Medications on File Prior to Visit   Medication Dose Route Frequency Provider Last Rate Last Admin     [START ON 4/15/2021] denosumab 60 mg (PROLIA 60 mg/ml)  60 mg Subcutaneous Q6 Months Mone Blanchard MD

## 2021-06-16 NOTE — PATIENT INSTRUCTIONS - HE
Prolia 12th today.  Prolia 13th in 6 months with my nurse. I will see you 1 year.    DXA in 9/2022 .   Phone number to schedule [phone].    Daily calcium need is 1548-5182 mg a day from the diet and supplements.  Calcium citrate is easier to digest.  Vitamin D 2000 IU daily recommended.

## 2021-06-16 NOTE — TELEPHONE ENCOUNTER
RN cannot approve Refill Request    RN can NOT refill this medication patient reports taking medication differently. Last office visit: 10/19/2020 Migdalia Delvalle MD Last Physical: 1/11/2021 Last MTM visit: Visit date not found Last visit same specialty: 10/19/2020 Migdalia Delvalle MD.  Next visit within 3 mo: Visit date not found  Next physical within 3 mo: Visit date not found      Ran Montes, [facility] Triage/Med Refill 3/18/2021    Requested Prescriptions   Pending Prescriptions Disp Refills     omeprazole (PRILOSEC) 20 MG capsule [Pharmacy Med Name: OMEPRAZOLE DR CAPS 20MG] 90 capsule 3     Sig: TAKE 1 CAPSULE DAILY BEFORE BREAKFAST       GI Medications Refill Protocol Passed - 3/17/2021  9:00 AM        Passed - PCP or prescribing provider visit in last 12 or next 3 months.     Last office visit with prescriber/PCP: 10/19/2020 Migdalia Delvalle MD OR same dept: 10/19/2020 Migdalia Delvalle MD OR same specialty: 10/19/2020 Migdalia Delvalle MD  Last physical: 1/11/2021 Last MTM visit: Visit date not found   Next visit within 3 mo: Visit date not found  Next physical within 3 mo: Visit date not found  Prescriber OR PCP: Migdalia Delvalle MD  Last diagnosis associated with med order: 1. Gastroesophageal reflux disease without esophagitis  - omeprazole (PRILOSEC) 20 MG capsule [Pharmacy Med Name: OMEPRAZOLE DR CAPS 20MG]; TAKE 1 CAPSULE DAILY BEFORE BREAKFAST  Dispense: 90 capsule; Refill: 3    If protocol passes may refill for 12 months if within 3 months of last provider visit (or a total of 15 months).

## 2021-06-16 NOTE — PROGRESS NOTES
PROGRESS NOTE   3/7/2018    SUBJECTIVE:  Adelina Espinoza is a 72 y.o. female  who presents for   Chief Complaint   Patient presents with     Medication Management     Med Check, pt was told to have her Vitamin D checked and wonders if she should have it checked.      Patient is here today to follow-up on her chronic medical problems.  She overall feels like things are doing well.  She just recently got back from a month in [place] and enjoyed that very much.  It certainly helped her depression and her anxiety to get some sun.  She feels like overall her depression and anxiety are doing well.  Please see PHQ 9 and DERIK which are both completed in their entirety today.  She continues on Celexa without side effects or problems.  She is speaking very positively about how everything is going and is much more cheery and uplifted today than she has been in quite some time.  She notes that she has been extremely worried about her daughter who recently filed for divorce and since then it has like a weight has been lifted off of her in terms of her worry.  She is not suicidal or homicidal.  She would very much like to continue on her Celexa.  She has a history of osteoporosis.  She sees Dr. Blanchard for management of that.  She is wondering if she can get a vitamin D level drawn today as Dr. Blanchard had recommended this about this time.  She continues on her Prilosec which is working well as well as her Lasix.  She takes Lasix once a day for limb swelling and that seems to work well.  Recheck labs to follow-up on that.  She is taking vitamin B12 supplementation and thus will check a vitamin B12 level today as well.  She has a history of peripheral neuropathy which is thought to be at least partially secondary to her treatment for breast cancer but will go ahead and check B12 level today as well.  She has had low magnesium in the past and therefore will check a magnesium level as well.  She continues on magnesium supplement.   She does note that she has been more tired recently and she is not sleeping as well as she did.  She is also had some weight gain.  She is not sure if that simply because of her age and the time a year or if it is due to her depression and anxiety or if there is something else going on.  She notes that she is often napping for about 1 or 2 hours during the day.  She has tried melatonin at night to help her sleep and that really has not made a difference.     Patient Active Problem List   Diagnosis     Peripheral Neuropathy     Urticaria     Major Depression, Recurrent     Lump Or Mass In Breast     Anxiety     Limb Swelling     Irritable Bowel Syndrome     Primary osteoarthritis of right knee     Primary osteoarthritis of knee, left     Osteoporosis, senile     Esophageal reflux       Current Outpatient Prescriptions   Medication Sig Dispense Refill     calcium citrate-vitamin D (CITRACAL+D) 315-200 mg-unit per tablet Take 1 tablet by mouth 2 (two) times a day.       citalopram (CELEXA) 40 MG tablet TAKE 1 TABLET DAILY 90 tablet 3     cyanocobalamin (VITAMIN B-12) 1000 MCG tablet Take 1,000 mcg by mouth daily.       dicyclomine (BENTYL) 10 MG capsule Take 1 capsule (10 mg total) by mouth 4 (four) times a day as needed. For cramps 90 capsule 0     furosemide (LASIX) 20 MG tablet TAKE 1 TABLET DAILY AS DIRECTED (MUST SCHEDULE AN OFFICE VISIT FOR FURTHER REFILLS) 90 tablet 0     LORazepam (ATIVAN) 0.5 MG tablet TAKE ONE TABLET BY MOUTH THREE TIMES DAILY as needed for anxiety 10 tablet 0     magnesium 250 mg Tab Take 250 mg by mouth daily.        MULTIVIT &MINERALS/FERROUS FUM (MULTI VITAMIN ORAL) Take 1 tablet by mouth daily.       omeprazole (PRILOSEC) 20 MG capsule TAKE 1 CAPSULE DAILY BEFORE BREAKFAST 90 capsule 3     POLYETHYLENE GLYCOL 3350 (MIRALAX ORAL) Take by mouth.       psyllium (METAMUCIL) 0.52 gram capsule Take 0.52 g by mouth daily.       Current Facility-Administered Medications   Medication Dose Route  Frequency Provider Last Rate Last Dose     denosumab 60 mg (PROLIA 60 mg/ml)  60 mg Subcutaneous Q6 Months Anjali Ramires MD   60 mg at 03/10/16 1440       Allergies   Allergen Reactions     Adhesive Rash     Green Pepper      Stomach upset     Lactose Diarrhea     Monosodium Glutamate      diarrhea       Past Medical History:   Diagnosis Date     Abnormal blood chemistry 03/2018    NEEDS B12 LEVEL AND REPEAT BILI AT NEXT VISIT     Anxiety      Arthritis      Breast cancer 2003     Depression      Esophageal reflux      History of anesthesia complications     goofy     Hx antineoplastic chemotherapy     for breast cancer     Hx of radiation therapy     for breast cancer     IBS (irritable bowel syndrome)      Neuropathy      Osteopenia        Past Surgical History:   Procedure Laterality Date     APPENDECTOMY  1967     BACK SURGERY      cervical fusion     BREAST BIOPSY Right 2003     BREAST LUMPECTOMY  12/2003    breast cancer     CHOLECYSTECTOMY  1994     HYSTERECTOMY  1997    desmoid cyst     NISSEN FUNDOPLICATION       OOPHORECTOMY  1997     CA TOTAL KNEE ARTHROPLASTY Right 10/29/2015    Procedure: RIGHT TOTAL KNEE  ARTHROPLASTY;  Surgeon: Nitin White MD;  Location: [facility] OR;  Service: Orthopedics     CA TOTAL KNEE ARTHROPLASTY Left 4/21/2016    Procedure: LEFT TOTAL KNEE ARTHROPLASTY;  Surgeon: Nitin White MD;  Location: [facility] OR;  Service: Orthopedics       History   Smoking Status     Never Smoker   Smokeless Tobacco     Never Used       OBJECTIVE:     /86  Pulse 72  Wt 169 lb 9.6 oz (76.9 kg)  BMI 29.57 kg/m2    Physical Exam:  GENERAL APPEARANCE: A&A, NAD, well hydrated, well nourished  SKIN:  Normal skin turgor, no lesions/rashes   HEENT: moist mucous membranes, no rhinorrhea, PERRLA, TM's clear bilaterally, Throat without significant erythema or exudate.  NECK: Supple, full ROM, no significant lymphadenopathy or thyromegaly  CV: RRR, no M/G/R   LUNGS: CTAB    EXTREMITY: no swelling noted.  Full range of motion of all 4 extremities.   NEURO: no gross deficits   PSYCHIATRIC;  Mood appropriate, memory intact  A&O x3, thought processes congruent, non-tangential. No hallucinations/delusions. Insight/judgment: intact. Denies suicidal/homicidal ideations.    LABS:     Recent Results (from the past 240 hour(s))   Comprehensive Metabolic Panel   Result Value Ref Range    Sodium 140 136 - 145 mmol/L    Potassium 4.2 3.5 - 5.0 mmol/L    Chloride 102 98 - 107 mmol/L    CO2 28 22 - 31 mmol/L    Anion Gap, Calculation 10 5 - 18 mmol/L    Glucose 75 70 - 125 mg/dL    BUN 13 8 - 28 mg/dL    Creatinine 0.69 0.60 - 1.10 mg/dL    GFR MDRD Af Amer >60 >60 mL/min/1.73m2    GFR MDRD Non Af Amer >60 >60 mL/min/1.73m2    Bilirubin, Total 1.4 (H) 0.0 - 1.0 mg/dL    Calcium 10.1 8.5 - 10.5 mg/dL    Protein, Total 7.6 6.0 - 8.0 g/dL    Albumin 4.0 3.5 - 5.0 g/dL    Alkaline Phosphatase 64 45 - 120 U/L    AST 22 0 - 40 U/L    ALT 22 0 - 45 U/L   Vitamin B12   Result Value Ref Range    Vitamin B-12 846 (H) 213 - 816 pg/mL   Magnesium   Result Value Ref Range    Magnesium 2.3 1.8 - 2.6 mg/dL   Vitamin D, Total (25-Hydroxy)   Result Value Ref Range    Vitamin D, Total (25-Hydroxy) 55.5 30.0 - 80.0 ng/mL   Thyroid Stimulating Hormone (TSH)   Result Value Ref Range    TSH 1.82 0.30 - 5.00 uIU/mL       ASSESSMENT/PLAN:     Anxiety state [F41.1]      1. Anxiety    2. Major depressive disorder, recurrent episode    3. Osteoporosis, senile  - Vitamin D, Total (25-Hydroxy)    4. Esophageal reflux  - Comprehensive Metabolic Panel    5. Peripheral Neuropathy  - Vitamin B12    6. Hypomagnesemia  - Magnesium    7. Limb Swelling    8. Weight gain  - Thyroid Stimulating Hormone (TSH)    Patient overall seems to be doing okay.  She is actually feeling a lot better than she normally does.  She continues to be real tired and is not sleeping as well as she normally does.  She is also had some weight gain and so  therefore going to go ahead and draw thyroid level today and will contact her with results of that when it returns.  She continues on magnesium supplementations will check a magnesium level she also is on B12 supplementation so we will check a B12 level.  She has had a history of osteoporosis and will check a vitamin D level today as well.  She seems to be doing well on her current dose of Celexa for her anxiety and depression and will continue on that.  She is not suicidal or homicidal.  Will check electrolyte panel today to follow-up on her Prilosec usage.  She will continue on her current medications as they seem to working quite well for her.  I will let her know when I hear back about their lab results.  She does not need a refill of any of her medications today but if she does she certainly will let me know.  She overall feels like things are doing okay.  She wishes that she could sleep better and we talked about sleep hygiene and trying to limit her naps during the day so that she is tired at night and can fall asleep a little bit easier.  She will continue to work on that.  All of her questions were answered today.  If she has additional questions or concerns will certainly let me know.  She will set up for physical exam within the next few months as well peer she does have lab work that is ordered by the oncologist to follow-up on her breast cancer.  That is scheduled to be done in a couple of weeks.  Migdalia Delvalle MD

## 2021-06-16 NOTE — PROGRESS NOTES
NEUROSURGERY FOLLOW UP EVALUATION:    ASSESSMENT  Adelina Espinoza is a 75 y.o. female, who presents today status post BILATERAL LUMBAR 4-5 LAMINECTOMIES FOR RIGHT SYNOVIAL CYST RESECTION, LUMBAR 4-5, LUMBAR 4-5 POSTERIOR INSTRUMENTED FUSION AND ARTHRODESIS, USE OF ALLOGRAFT with Dr Thomas 1/29/2021. Xray was completed today and noted good lumbar alignment and hardware position.     Today she reports  that she is doing very well. Notes complete resolsution of her severe lower extremity pain and numbness that was present prior to surgery. She notes intermittent low back pain when bending forward for a prolonged period but states that it improves as soon as she stands up.     PLAN:   Okay to return to her normal activities progressively with increase of weight restrictions by 5 pounds per week. She would prefer to do exercises at home rather than physical therapy and discussion of good core exercises and stretching was done. Follow up will be requested in 6 weeks with repeat lumbar xray for further evaluation, she understands that should any symptoms return prior to her appointment to contact the office sooner.     HPI: Patient presented in clinic with low back pain and posterior leg pain. MRI with severe spinal canal narrowing lumbar 4-5 due to disc herniation, large right synovial cyst and facet arthropathy. Flex/Ex films with 2 mm anterolisthesis L4 on 5 increases to 3 mm on flexion and unchanged on extension. Osteopenia on recent dexa scan.     Past Medical History:   Diagnosis Date     Anxiety      Arthritis      Breast cancer (H) 2003     Depression      Esophageal reflux      History of anesthesia complications     nausea     Hx antineoplastic chemotherapy 2003    for breast cancer     Hx of radiation therapy 2004    for breast cancer     IBS (irritable bowel syndrome)      Lactose intolerance     lactose breath test positive at GI, 7/2019     Macular degeneration 08/2019    caught early, takes Areds 2 on a  daily basis, seeing opthamology     Neuropathy     secondary to chemotherapy     Osteopenia       Past Surgical History:   Procedure Laterality Date     APPENDECTOMY  1967     BACK SURGERY  2000    cervical fusion     BREAST BIOPSY Right 2003     BREAST LUMPECTOMY  12/2003    breast cancer     CATARACT EXTRACTION, BILATERAL       CHOLECYSTECTOMY  1994     HYSTERECTOMY  1997    desmoid cyst     NISSEN FUNDOPLICATION  2010     OOPHORECTOMY  1997     MO ARTHRODESIS POSTERIOR/POSTEROLATERAL THORACIC N/A 1/29/2021    Procedure: BILATERAL LUMBAR 4-5 LAMINECTOMIES FOR RIGHT SYNOVIAL CYST RESECTION, LUMBAR 4-5, LUMBAR 4-5 POSTERIOR INSTRUMENTED FUSION AND ARTHRODESIS, USE OF ALLOGRAFT, USE OF AUTOGRAFT, STEALTH NAVIGATION;  Surgeon: Lisa Thomas MD;  Location: [facility];  Service: Spine     MO TOTAL KNEE ARTHROPLASTY Right 10/29/2015    Procedure: RIGHT TOTAL KNEE  ARTHROPLASTY;  Surgeon: Nitin White MD;  Location: [facility];  Service: Orthopedics     MO TOTAL KNEE ARTHROPLASTY Left 04/21/2016    Procedure: LEFT TOTAL KNEE ARTHROPLASTY;  Surgeon: Nitin White MD;  Location: [facility];  Service: Orthopedics       Current Outpatient Medications   Medication Sig     acetaminophen (TYLENOL) 500 MG tablet Take 1,000 mg by mouth every 6 (six) hours as needed for pain.     aspirin 81 MG EC tablet Take 1 tablet (81 mg total) by mouth daily.     calcium citrate-vitamin D (CITRACAL+D) 315-200 mg-unit per tablet Take 1 tablet by mouth 2 (two) times a day.     calcium polycarbophiL (FIBERCON) 625 mg tablet Take 625 mg by mouth 2 (two) times a day.     citalopram (CELEXA) 40 MG tablet Take 1 tablet (40 mg total) by mouth daily.     dicyclomine (BENTYL) 10 MG capsule TAKE 1 CAPSULE FOUR TIMES A DAY AS NEEDED FOR CRAMPS     furosemide (LASIX) 20 MG tablet TAKE 1 TABLET DAILY (Patient taking differently: Take 20 mg by mouth daily. )     LORazepam (ATIVAN) 0.5 MG tablet Take 1 tablet (0.5 mg  total) by mouth once as needed for anxiety. Repeat as needed just prior to MRI     magnesium 250 mg Tab Take 250 mg by mouth daily.      methocarbamoL (ROBAXIN) 750 MG tablet Take 1 tablet (750 mg total) by mouth every 6 (six) hours as needed (muscle spasms).     multivit-min-iron-FA-lutein (CENTRUM SILVER WOMEN) 8 mg iron-400 mcg-300 mcg Tab Take 1 tablet by mouth daily.      omeprazole (PRILOSEC) 20 MG capsule TAKE 1 CAPSULE DAILY BEFORE BREAKFAST (Patient taking differently: Take 20 mg by mouth daily before breakfast. )     senna-docusate (PERICOLACE) 8.6-50 mg tablet Take 1 tablet by mouth 2 (two) times a day.     tetrahydroz-peg 400-hyprom-gly (VISINE MAX REDNESS RELIEF) 0.05-1-0.36-0.2 % Drop Administer 1 drop to both eyes 3 (three) times a day as needed.     traMADoL (ULTRAM) 50 mg tablet Take 1 tablet (50 mg total) by mouth every 6 (six) hours as needed for pain.     vit C/E/Zn/coppr/lutein/zeaxan (PRESERVISION AREDS-2 ORAL) Take 1 tablet by mouth 2 (two) times a day.        Allergies   Allergen Reactions     Adhesive Rash     Gabapentin      Has dizziness, spaced out when on it. Has been on it twice and both times had this type of reaction. Should not be on it in the future.      Green Pepper      Stomach upset     Lactose Diarrhea     Monosodium Glutamate      diarrhea       PHYSICAL EXAM:  Heart Rate:  [100] 100  Resp:  [16] 16  BP: (120)/(88) 120/88  SpO2:  [97 %] 97 %    Alert and oriented x3, speech normal  PERRL, EOMI, face symmetric, tongue midline, shoulder shrug equal  No pronator drift, finger to nose smooth and accurate bilaterally  Arm strength: 5/5  Leg strength: 5/5   Bulk and tone: normal  Reflexes: biceps, triceps, brachioradialis, patellar and achilles symmetrical bilaterally   No pathological reflexes   Coordination/Gait normal  Incision well healed small portion of incision folding on self inferiorly but no surrounding erythema, induration, or drainage      IMAGING:  The imaging was  personally reviewed by me.   Good lumbar alignment and hardware position beginning of bony fusion noted     Madeleine Rockwell PA-C  [facility] Neurosurgery   O: [phone]

## 2021-06-16 NOTE — PROGRESS NOTES
1. Osteoporosis, senile  DXA Bone Density Scan       Return in about 6 months (around 9/7/2018) for Recheck.    Patient Instructions   Prolia 6th today.  Prolia 7th in 6 months with my nurse. I will see you in 1 year.  DXA in 9/2017 .   Phone number to schedule [phone].  Please avoid any extensive dental work as implants and teeth extractions for the next 1-2 months.  Daily calcium need is 4783-9011 mg a day from the diet and supplements.  Calcium citrate is easier to digest.  Vitamin D 2000 IU daily recommended.      Chief Complaint   Patient presents with     Osteoporosis Follow Up       /72  Pulse 72  Wt 170 lb 3.2 oz (77.2 kg)  BMI 29.68 kg/m2      Did you experience any problems with previous Prolia injection? no  Any medication change in the last 6 months? no  Did you take prednisone or other immunosupressant drugs in the last 6 months   (chemo, transplant, rheum, dermatology conditions)? no  Did you have any serious infection in the last 6 months?no  Any recent hospitalizations?no  Do you plan any dental work in the next 2-3 months?no  How much calcium do you take daily from the diet and supplements?1200 mg  How much vit D do you take daily? 2000 IU  Last DXA?      Patient is here today for the 6th Prolia injection. Patient tolerated previous injections well.   We discussed calcium and vit D daily needs today.   Next Prolia injection will be in 6 months.     15 minutes spent with the patient and more then 50 % of the time in counseling.  This note has been dictated using voice recognition software. Any grammatical or context distortions are unintentional and inherent to the software      Patient Active Problem List   Diagnosis     Peripheral Neuropathy     Urticaria     Major Depression, Recurrent     Ecchymosis     Skin: A Rash     Lump Or Mass In Breast     Anxiety     Limb Swelling     Irritable Bowel Syndrome     Primary osteoarthritis of right knee     Primary osteoarthritis of knee, left      Osteoporosis, senile     Esophageal reflux       Current Outpatient Prescriptions on File Prior to Visit   Medication Sig Dispense Refill     calcium citrate-vitamin D (CITRACAL+D) 315-200 mg-unit per tablet Take 1 tablet by mouth 2 (two) times a day.       citalopram (CELEXA) 40 MG tablet TAKE 1 TABLET DAILY 90 tablet 3     cyanocobalamin (VITAMIN B-12) 1000 MCG tablet Take 1,000 mcg by mouth daily.       dicyclomine (BENTYL) 10 MG capsule Take 1 capsule (10 mg total) by mouth 4 (four) times a day as needed. For cramps 90 capsule 0     furosemide (LASIX) 20 MG tablet TAKE 1 TABLET DAILY AS DIRECTED (MUST SCHEDULE AN OFFICE VISIT FOR FURTHER REFILLS) 90 tablet 0     LORazepam (ATIVAN) 0.5 MG tablet TAKE ONE TABLET BY MOUTH THREE TIMES DAILY as needed for anxiety 10 tablet 0     magnesium 250 mg Tab Take 250 mg by mouth daily.        MULTIVIT &MINERALS/FERROUS FUM (MULTI VITAMIN ORAL) Take 1 tablet by mouth daily.       omeprazole (PRILOSEC) 20 MG capsule TAKE 1 CAPSULE DAILY BEFORE BREAKFAST 90 capsule 3     POLYETHYLENE GLYCOL 3350 (MIRALAX ORAL) Take by mouth.       psyllium (METAMUCIL) 0.52 gram capsule Take 0.52 g by mouth daily.       Current Facility-Administered Medications on File Prior to Visit   Medication Dose Route Frequency Provider Last Rate Last Dose     denosumab 60 mg (PROLIA 60 mg/ml)  60 mg Subcutaneous Q6 Months Anjali Ramires MD   60 mg at 03/10/16 1440

## 2021-06-17 ENCOUNTER — COMMUNICATION - HEALTHEAST (OUTPATIENT)
Dept: FAMILY MEDICINE | Facility: CLINIC | Age: 76
End: 2021-06-17

## 2021-06-17 DIAGNOSIS — M79.89 LIMB SWELLING: ICD-10-CM

## 2021-06-17 RX ORDER — FUROSEMIDE 20 MG
TABLET ORAL
Qty: 90 TABLET | Refills: 0 | Status: SHIPPED | OUTPATIENT
Start: 2021-06-17 | End: 2021-09-03

## 2021-06-17 NOTE — PROGRESS NOTES
Patient is here for a follow up. She states that she is doing well.   Antonia Merritt,[facility],10:17 AM

## 2021-06-17 NOTE — PATIENT INSTRUCTIONS - HE
1. Return in another 2 months with XR  2. Refrain from NSAIDS for another 2 months   DO NOT TAKE  NSAIDs  for 6 months after fusion surgery, as these medications may delay bone healing  NSAIDS include such drugs as:  Aspirin, Ibuprofen, Motrin, Advil, Aleve, Naproxen     3. Call with any questions or concerns

## 2021-06-17 NOTE — PROGRESS NOTES
Assessment and Plan:   Routine general medical examination at a health care facility [Z00.00]    1. Routine general medical examination at a health care facility  - Hepatitis C Antibody (Anti-HCV)  - Urinalysis Macroscopic  - HM2(CBC w/o Differential)    2. Elevated vitamin B12 level  - Vitamin B12    3. Anxiety    4. Major depressive disorder, recurrent episode    5. Osteoporosis, senile  - Comprehensive Metabolic Panel  - HM2(CBC w/o Differential)    6. Irritable bowel syndrome  - Comprehensive Metabolic Panel    7. Limb Swelling    8. Esophageal reflux  - Comprehensive Metabolic Panel  - HM2(CBC w/o Differential)    9. Hypomagnesemia  - Magnesium    10. Encounter for screening for lipoid disorders  - Lipid Cascade, RANDOM    11. Hearing loss  - Ambulatory referral to Audiology    12. Elevated bilirubin  - Bilirubin, Direct    Patient is overall doing well.  She overall feels like her health is in good shape and has no real concerns today.  We will recheck bilirubin today is that have been slightly elevated the last time she was here.  We will also recheck B12 level which have been elevated the last time she was here.  She has stopped her B12 supplementation so hopefully the B12 level be normal now.  She also has had low magnesium in the past and takes magnesium supplements will recheck that today.  We will check electrolytes because of her irritable bowel as well as her reflux disease.  She does have a history of osteoporosis and will recheck electrolytes for that as well.  She continues on Celexa for anxiety.  This seems to be working fairly well.  She did have an elevated bilirubin last time she was here so we will recheck that level as well.  She has noted that she has had more loss of hearing recently.  Will refer to audiology for further evaluation of that.  She did have a fall this past year but seems to be doing fine now.  She will continue to monitor how she is doing and she has additional questions or  concerns will certainly let me know.  She will begin taking an 81 mg aspirin a day just for general health.  She is going to call her insurance and check on the new shingles vaccine and see if it is covered and if so where they would like her to get it.   Will recheck lipid levels today as well.  She will continue on Lasix for her occasional limb swelling.  Hepatitis C testing was done today as well.  Will contact her with results of the lab work returns.  Would like to see her back in about 6 months for follow-up of her anxiety.  Certainly she has worsening of her symptoms prior to that will let me know. Additional 15 minutes spent with patient discussing anxiety, hepatitis C testing, hearing loss, elevated bilirubin, elevated B12 level.       The patient's current medical problems were reviewed.    I have had an Advance Directives discussion with the patient.  The following are part of a depression follow up plan for the patient:  patient follow-up to return when and if necessary  The following health maintenance schedule was reviewed with the patient and provided in printed form in the after visit summary:   Health Maintenance   Topic Date Due     DEPRESSION FOLLOW UP  1945     DXA SCAN  09/13/2018     FALL RISK ASSESSMENT  05/08/2019     MAMMOGRAM  01/09/2020     TD 18+ HE  06/22/2022     ADVANCE DIRECTIVES DISCUSSED WITH PATIENT  05/08/2023     COLONOSCOPY  01/13/2024     PNEUMOCOCCAL POLYSACCHARIDE VACCINE AGE 65 AND OVER  Completed     INFLUENZA VACCINE RULE BASED  Completed     PNEUMOCOCCAL CONJUGATE VACCINE FOR ADULTS (PCV13 OR PREVNAR)  Completed     ZOSTER VACCINE  Completed        Subjective:   Chief Complaint: Adelina Espinoza is an 72 y.o. female here for an Annual Wellness visit.     HPI: Patient is overall very healthy female who presents today for physical exam.  She overall feels well.  She denies that she is having any problems at this time.  She did have one fall in the past year but has  recovered from that and is doing fine.  She does not feel like she is at increased risk for falling in the future.  She continues on Celexa for anxiety and depression.  She overall feels like things are relatively stable in that regard.  She is not suicidal or homicidal.  She is tolerating the medication without problems.  Please see PHQ 9 and DERIK which are both completed in their entirety today.  She would like to continue on the same dose of medication.  She does have an advanced directive that was filed in 2009.  Her daughter and her  are to be her healthcare representative if she is unable to represent herself.  She is wanting she should take a baby aspirin a day and we discussed that that is a good idea.  She is going to check with her insurance in terms of getting the new shingles vaccine.  She continues on Lasix for her leg swelling.  The last time she was here she did have an elevated B12 level and has stopped taking her B12 supplementation.  Will check a B12 level again today.  She also was noted to have increased bilirubin and will recheck that today as well.  She has a history of osteoporosis and does follow with a specialist in regards to that.  She continues to see Dr. Ogden for her irritable bowel and esophageal reflux.  She has had a history of low magnesium in the past and will recheck that today as well.  She does note that she thinks that she has some loss of her hearing.  Her family has definitely noticed that she has a hard time hearing.  We will go ahead and refer to audiology for further evaluation she just recently had a mammogram and is not due for that.  She is due for a DEXA scan in the fall.  She had colonoscopy in 2014.  She is interested in getting hepatitis C testing as well.    Patient Care Team:  Migdalia Delvalle MD as PCP - General  Bj Loving MD as Physician (Ophthalmology)  Geoffrey Ogden MD as Physician (Gastroenterology)     Patient Active Problem  List   Diagnosis     Peripheral Neuropathy     Urticaria     Major Depression, Recurrent     Lump Or Mass In Breast     Anxiety     Limb Swelling     Irritable Bowel Syndrome     Primary osteoarthritis of right knee     Primary osteoarthritis of knee, left     Osteoporosis, senile     Esophageal reflux     Past Medical History:   Diagnosis Date     Anxiety      Arthritis      Breast cancer 2003     Depression      Esophageal reflux      History of anesthesia complications     goofy     Hx antineoplastic chemotherapy 2003    for breast cancer     Hx of radiation therapy 2004    for breast cancer     IBS (irritable bowel syndrome)      Neuropathy      Osteopenia       Past Surgical History:   Procedure Laterality Date     APPENDECTOMY  1967     BACK SURGERY      cervical fusion     BREAST BIOPSY Right 2003     BREAST LUMPECTOMY  12/2003    breast cancer     CHOLECYSTECTOMY  1994     HYSTERECTOMY  1997    desmoid cyst     NISSEN FUNDOPLICATION       OOPHORECTOMY  1997     MI TOTAL KNEE ARTHROPLASTY Right 10/29/2015    Procedure: RIGHT TOTAL KNEE  ARTHROPLASTY;  Surgeon: Nitin White MD;  Location: [facility] [place] Main OR;  Service: Orthopedics     MI TOTAL KNEE ARTHROPLASTY Left 4/21/2016    Procedure: LEFT TOTAL KNEE ARTHROPLASTY;  Surgeon: Nitin White MD;  Location: [facility] Main OR;  Service: Orthopedics      Family History   Problem Relation Age of Onset     Breast cancer Maternal Aunt 46     Kidney cancer Mother 67     Hypertension Mother      Heart disease Mother      Alzheimer's disease Father      Atrial fibrillation Father      Aortic stenosis Father      Skin cancer Father      Stroke Father      Depression Father      Lung cancer Paternal Grandfather       Social History     Social History     Marital status:      Spouse name: Fabiano     Number of children: 2     Years of education: N/A     Occupational History     retired      Social History Main Topics     Smoking status: Never Smoker      "Smokeless tobacco: Never Used     Alcohol use 1.8 oz/week     3 Standard drinks or equivalent per week      Comment: Occasional     Drug use: No     Sexual activity: No     Other Topics Concern     Not on file     Social History Narrative      Current Outpatient Prescriptions   Medication Sig Dispense Refill     calcium citrate-vitamin D (CITRACAL+D) 315-200 mg-unit per tablet Take 1 tablet by mouth 2 (two) times a day.       citalopram (CELEXA) 40 MG tablet TAKE 1 TABLET DAILY 90 tablet 3     dicyclomine (BENTYL) 10 MG capsule Take 1 capsule (10 mg total) by mouth 4 (four) times a day as needed. For cramps 90 capsule 0     furosemide (LASIX) 20 MG tablet TAKE 1 TABLET DAILY AS DIRECTED (MUST SCHEDULE AN OFFICE VISIT FOR FURTHER REFILLS) 90 tablet 0     LORazepam (ATIVAN) 0.5 MG tablet TAKE ONE TABLET BY MOUTH THREE TIMES DAILY as needed for anxiety 10 tablet 0     magnesium 250 mg Tab Take 250 mg by mouth daily.        MULTIVIT &MINERALS/FERROUS FUM (MULTI VITAMIN ORAL) Take 1 tablet by mouth daily.       omeprazole (PRILOSEC) 20 MG capsule Take 1 capsule (20 mg total) by mouth daily before breakfast. 90 capsule 3     POLYETHYLENE GLYCOL 3350 (MIRALAX ORAL) Take by mouth.       psyllium (METAMUCIL) 0.52 gram capsule Take 0.52 g by mouth daily.       Current Facility-Administered Medications   Medication Dose Route Frequency Provider Last Rate Last Dose     denosumab 60 mg (PROLIA 60 mg/ml)  60 mg Subcutaneous Q6 Months Anjali Ramires MD   60 mg at 03/10/16 1440      Objective:   Vital Signs:   Visit Vitals     /72     Pulse 77     Temp 98.1  F (36.7  C)     Ht 5' 3\" (1.6 m)     Wt 168 lb (76.2 kg)     SpO2 97%     Breastfeeding No     BMI 29.76 kg/m2        VisionScreening:  No exam data present       REVIEW OF SYSTEMS:  Denies fever, chills, visual changes, fatigue, myalgias, nasal congestion, rhinorrhea, ear pain or discharge, sore throat, swollen glands, breast mass, nipple discharge, breast changes, " abdominal pain, nausea, vomiting, diarrhea, constipation, cough, shortness of breath, chest pain, weight change, change in bowel habits, melena, rectal bleeding, dysuria, frequency, urgency, hematuria, polyuria, polydipsia, polyphagia, joint pain or swelling or erythema, edema, rash, weakness, paresthesias, vaginal discharge or bleeding or mood changes other than that noted above.   Remainder of review of systems was negative.      PHYSICAL EXAM:  On exam, patient is a WD, WN 72 year old female in NAD.  Head normocephalic, atraumatic.  Eyes PERRL, ears TM s clear bilaterally.  Throat without significant erythemia or exudate.  Neck was supple, full range of motion. No significant lymphadenopathy or thyromegaly was appreciated.  Lungs clear to auscultation  Heart regular rate and rhythm.  Breast exam was done. No masses were appreciated. Axilla were clear bilaterally. No nipple discharge was appreciated. Self breast exam was reviewed and taught today.  Abdomen was soft, nontender, nondistended. No masses or organomegaly were palpated. Positive bowel sounds were appreciated.  Extremities with full range of motion of all 4 extremities were noted.  Deep tendon reflexes were equal and symmetrical. Motor and sensation were intact to both the upper and lower extremities.  Cranial nerves 2 through 12 were grossly intact.  EOM were intact.  Pelvic exam was done.  External genitalia appeared normal. Bimanual exam revealed no palpable masses.     Assessment Results 5/8/2018   Activities of Daily Living No help needed   Instrumental Activities of Daily Living No help needed   Get Up and Go Score Less than 12 seconds   Mini Cog Total Score 5   Some recent data might be hidden     A Mini-Cog score of 0-2 suggests the possibility of dementia, score of 3-5 suggests no dementia    Identified Health Risks:     She is at risk for lack of exercise and has been provided with information to increase physical activity for the benefit of  her well-being.  We discussed importance of regular exercise.   The patient was provided with written information regarding signs of hearing loss.  Was referred to audiology for formal hearing evaluation.  The patient's PHQ-9/DERIK-7 score is consistent with mild depression and anxiety disorder. She is on medication for depression and anxiety already.  She feels like overall things are doing fine.  They seem to be relatively stable.  She is not suicidal or homicidal.  She will contact me if she has additional questions or concerns or changes in her symptoms of depression.  We will see her back in about 6 months for follow-up of this medication.  She is at risk for falling and has been provided with information to reduce the risk of falling at home.  She has fallen once which was a accident and has not had any further problems with falling.  Patient's advanced directive was discussed and I am comfortable with the patient's wishes.  Her  and her daughter are to be her healthcare representatives according to her advanced directive filed in 2009.

## 2021-06-17 NOTE — PATIENT INSTRUCTIONS - HE
Patient Instructions by Beth Patel CNP at 2/22/2019  4:30 PM     Author: Beth Patel CNP Service: -- Author Type: Nurse Practitioner    Filed: 2/22/2019  5:47 PM Encounter Date: 2/22/2019 Status: Addendum    : Beth Patel CNP (Nurse Practitioner)    Related Notes: Original Note by Beth Patel CNP (Nurse Practitioner) filed at 2/22/2019  5:47 PM         Patient Education     Treating Arthritis in the Foot  If your symptoms are mild, medications may be enough to reduce pain and swelling. For more severe arthritis, surgery may be needed to improve the condition of the joint.    Medicine  Your doctor may prescribe medicine--pills or injections--to limit pain and swelling. Ice, aspirin, acetaminophen, or ibuprofen may help relieve mild symptoms that occur after activity.  Surgery and bone trimming  To ease movement and reduce pain, your doctor may trim damaged bone. If arthritis is severe, the joint may be fused or removed. If the bone is not damaged too badly, your doctor may simply shave away bone spurs. Any excess bone growth related to a bunion may also be trimmed.  Fusing joints  If damage is more severe, your doctor may fuse the joint to prevent the bones from rubbing. Afterward, staples, plates, or screws may hold the bones in place so they heal properly. In some cases, the joint may be removed and replaced with an implant.  After surgery  During the early stages of recovery, your foot is likely to be bandaged and immobilized for a while. For best results, follow up with your doctor as scheduled. These visits help ensure that your foot heals properly.  As you heal  After surgery, youll be told how to care for your incision and how soon to begin walking on the foot. Until the foot can bear weight, you may need to walk with crutches or a cane.  For surgery on the big toe, your foot may be splinted to limit movement for several weeks. Despite this, you should be  able to walk soon after surgery.  For surgery on rear or midfoot joints, you may need to wear a cast or surgical shoe. These joints are fairly large, so full recovery may take a few months. Once the bone has healed, any staples, plates, or screws may be removed.  Date Last Reviewed: 7/1/2016 2000-2017 [employer]. [address] [place] [address]. All rights reserved. This information is not intended as a substitute for professional medical care. Always follow your healthcare professional's instructions.

## 2021-06-17 NOTE — PROGRESS NOTES
NEUROSURGERY FOLLOW UP EVALUATION:    ASSESSMENT  Adelina Espinoza is a 75 y.o. female, who presents today status post BILATERAL LUMBAR 4-5 LAMINECTOMIES FOR RIGHT SYNOVIAL CYST RESECTION, LUMBAR 4-5, LUMBAR 4-5 POSTERIOR INSTRUMENTED FUSION AND ARTHRODESIS with Dr Thomas 1/29/2021. XR today reviewed and is within normal limits. No hardware concerns.     Today she reports feeling great. No back pain, leg pain. No bowel or bladder issues. She fell three weeks ago, hurt her left shoulder. She did not attend post op PT.     PLAN:   1. Return in another 2 months for 6 month post op with XR  2. Continue to refrain from NSAIDS    HPI: Patient presented in clinic with low back pain and posterior leg pain. MRI with severe spinal canal narrowing lumbar 4-5 due to disc herniation, large right synovial cyst and facet arthropathy. Flex/Ex films with 2 mm anterolisthesis L4 on 5 increases to 3 mm on flexion and unchanged on extension. Osteopenia on recent dexa scan. PT for 7 months before surgery and failed cortisone shots.     PHYSICAL EXAM:  Heart Rate:  [92] 92  Resp:  [16] 16  BP: (122)/(80) 122/80  SpO2:  [98 %] 98 %    Alert and oriented x3, speech normal   PERRL, EOMI, face symmetric, tongue midline, shoulder shrug equal  Arm strength: 5/5  Leg strength: 5/5   Bulk and tone: normal   Coordination/Gait: Steady and normal   Incision: healed     IMAGING:  The imaging was personally reviewed by me and compared to prior XR.     Catalina Cunningham, PAMELA-CNP  [facility]  O: [phone]           no

## 2021-06-17 NOTE — PATIENT INSTRUCTIONS - HE
Patient Instructions by Rahul Grewal PA-C at 8/10/2019 10:50 AM     Author: Rahul Grewal PA-C Service: -- Author Type: Physician Assistant    Filed: 8/10/2019 11:06 AM Encounter Date: 8/10/2019 Status: Signed    : Rahul Grewal PA-C (Physician Assistant)       Use of over-the-counter Zyrtec.  Follow packaging directions  Do not use Zyrtec is using Dayquil or Nyquil cold products that contain antihistamine.  Use of over-the-counter Flonase  Frequent swallowing drinking and chewing gum to help create low-grade suction on the eustachian tube.  Elevate head at nighttime so it does not increase pressure  Use of over-the-counter Tylenol as needed for comfort.  Return to primary care provider for reevaluation treatment if any complication or new symptoms should present.        Patient Education     Earache, No Infection (Adult)  Earaches can happen without an infection. This occurs when air and fluid build up behind the eardrum causing a feeling of fullness and discomfort and reduced hearing. This is called otitis media with effusion (OME) or serous otitis media. It means there is fluid in the middle ear. It is not the same as acute otitis media, which is typically from infection.  OME can happen when you have a cold if congestion blocks the passage that drains the middle ear. This passage is called the eustachian tube. OME may also occur with nasal allergies or after a bacterial middle ear infection.    The pain or discomfort may come and go. You may hear clicking or popping sounds when you chew or swallow. You may feel that your balance is off. Or you may hear ringing in the ear.  It often takes from several weeks up to 3 months for the fluid to clear on its own. Oral pain relievers and ear drops help if there is pain. Decongestants and antihistamines sometimes help. Antibiotics don't help since there is no infection. Your doctor may prescribe a nasal spray to help reduce swelling in the nose and eustachian tube.  This can allow the ear to drain.  If your OME doesn't improve after 3 months, surgery may be used to drain the fluid and insert a small tube in the eardrum to allow continued drainage.  Because the middle ear fluid can become infected, it is important to watch for signs of an ear infection which may develop later. These signs include increased ear pain, fever, or drainage from the ear.  Home care  The following guidelines will help you care for yourself at home:    You may use over-the-counter medicine as directed to control pain, unless another medicine was prescribed. If you have chronic liver or kidney disease or ever had a stomach ulcer or GI bleeding, talk with your doctor before using these medicines. Aspirin should never be used in anyone under 18 years of age who is ill with a fever. It may cause severe liver damage.    You may use over-the-counter decongestants such as phenylephrine or pseudoephedrine. But they are not always helpful. Don't use nasal spray decongestants more than 3 days. Longer use can make congestion worse. Prescription nasal sprays from your doctor don't typically have those restrictions.    Antihistamines may help if you are also having allergy symptoms.    You may use medicines such as guaifenesin to thin mucus and promote drainage.  Follow-up care  Follow up with your healthcare provider or as advised if you are not feeling better after 3 days.  When to seek medical advice  Call your healthcare provider right away if any of the following occur:    Your ear pain gets worse or does not start to improve     Fever of 100.4 F (38 C) or higher, or as directed by your healthcare provider    Fluid or blood draining from the ear    Headache or sinus pain    Stiff neck    Unusual drowsiness or confusion  Date Last Reviewed: 10/1/2016    8597-4798 [employer]. [address] [place] [address], [place] [address]. All rights reserved. This information is not intended as a substitute for  professional medical care. Always follow your healthcare professional's instructions.

## 2021-06-18 NOTE — PATIENT INSTRUCTIONS - HE
Patient Instructions by Migdalia Delvalle MD at 3/4/2020  9:00 AM     Author: Migdalia Delvalle MD Service: -- Author Type: Physician    Filed: 3/4/2020  9:18 AM Encounter Date: 3/4/2020 Status: Addendum    : Migdalia Delvalle MD (Physician)    Related Notes: Original Note by Migdalia Delvalle MD (Physician) filed at 3/4/2020  9:18 AM         Patient Education     Exercise for a Healthier Heart  You may wonder how you can improve the health of your heart. If youre thinking about exercise, youre on the right track. You dont need to become an athlete, but you do need a certain amount of brisk exercise to help strengthen your heart. If you have been diagnosed with a heart condition, your doctor may recommend exercise to help stabilize your condition. To help make exercise a habit, choose safe, fun activities.       Be sure to check with your health care provider before starting an exercise program.    Why exercise?  Exercising regularly offers many healthy rewards. It can help you do all of the following:    Improve your blood cholesterol levels to help prevent further heart trouble    Lower your blood pressure to help prevent a stroke or heart attack    Control diabetes, or reduce your risk of getting this disease    Improve your heart and lung function    Reach and maintain a healthy weight    Make your muscles stronger and more limber so you can stay active    Prevent falls and fractures by slowing the loss of bone mass (osteoporosis)    Manage stress better  Exercise tips  Ease into your routine. Set small goals. Then build on them.  Exercise on most days. Aim for a total of 150 or more minutes of moderate to  vigorous intensity activity each week. Consider 40 minutes, 3 to 4 times a week. For best results, activity should last for 40 minutes on average. It is OK to work up to the 40 minute period over time. Examples of moderate-intensity activity is walking one mile in 15 minutes or 30 to 45  minutes of yard work.  Step up your daily activity level. Along with your exercise program, try being more active throughout the day. Walk instead of drive. Do more household tasks or yard work.  Choose one or more activities you enjoy. Walking is one of the easiest things you can do. You can also try swimming, riding a bike, or taking an exercise class.  Stop exercising and call your doctor if you:    Have chest pain or feel dizzy or lightheaded    Feel burning, tightness, pressure, or heaviness in your chest, neck, shoulders, back, or arms    Have unusual shortness of breath    Have increased joint or muscle pain    Have palpitations or an irregular heartbeat      4315-2821 [employer]. [address] [place] [address], [place] [address]. All rights reserved. This information is not intended as a substitute for professional medical care. Always follow your healthcare professional's instructions.         Patient Education   Instrumental Activities of Daily Living  Your Health Risk Assessment indicates you have difficulties with instrumental activities of daily living which include laundry, housekeeping, banking, shopping, using the telephone, food preparation, transportation, or taking your own medications. Please make a follow up appointment for us to address this issue in more detail.    [employer] has resources available on the following website: https://www.healthdocplanner.org/caregivers.html     Also, here is a local agency that provides help with meals and other assistance:   [facility] Line: 192.991.7390     Patient Education   Depression and Suicide in Older Adults  Nearly 2 million older Americans have some type of depression. Sadly, some of them even take their own lives. Yet depression among older adults is often ignored. Learn the warning signs. You may help spare a loved one needless pain. You may also save a life.       What Is Depression?  Depression is a mood disorder that affects the way you  think and feel. The most common symptom is a feeling of deep sadness. People who are depressed also may seem tired and listless. And nothing seems to give them pleasure. Its normal to grieve or be sad sometimes. But sadness lessens or passes with time. Depression rarely goes away or improves on its own. Other symptoms of depression are:    Sleeping more or less than normal    Eating more or less than normal    Having headaches, stomachaches, or other pains that dont go away    Feeling nervous, empty, or worthless    Crying a great deal    Thinking or talking about suicide or death    Feeling confused or forgetful  What Causes It?  The causes of depression arent fully known. Certain chemicals in the brain play a role. Depression does run in families. And life stresses can also trigger depression in some people. That may be the case with older adults. They often face great burdens, such as the death of friends or a spouse. They may have failing health. And they are more likely to be alone, lonely, or poor.  How You Can Help  Often, depressed people may not want to ask for help. When they do, they may be ignored. Or, they may receive the wrong treatment. You can help by showing parents and older friends love and support. If they seem depressed, help them find the right treatment. Talk to your doctor. Or contact a local mental health center, social service agency, or hospital. With modern treatment, no one has to suffer from depression.  Resources:    National [place] of Mental Health  859.918.7138  www.nimh.nih.gov    National [place] on Mental Illness  705.732.2429  www.delroy.org    [facility]  610.710.5014  www.[facility] [[facility]].org    National Suicide Hotline  573.476.6700 (800-SUICIDE)      9039-5989 [employer]. [address] [place] [address], [place] [address]. All rights reserved. This information is not intended as a substitute for professional medical care. Always follow your healthcare professional's  instructions.         Patient Education   Preventing Falls in the Home  As you get older, falls are more likely. Thats because your reaction time slows. Your muscles and joints may also get stiffer, making them less flexible. Illness, medications, and vision changes can also affect your balance. A fall could leave you unable to live on your own. To make your home safer, follow these tips:    Floors    Put nonskid pads under area rugs.    Remove throw rugs.    Replace worn floor coverings.    Tack carpets firmly to each step on carpeted stairs. Put nonskid strips on the edges of uncarpeted stairs.    Keep floors and stairs free of clutter and cords.    Arrange furniture so there are clear pathways.    Clean up any spills right away.    Bathrooms    Install grab bars in the tub or shower.    Apply nonskid strips or put a nonskid rubber mat in the tub or shower.    Sit on a bath chair to bathe.    Use bathmats with nonskid backing.    Lighting    Keep a flashlight in each room.    Put a nightlight along the pathway between the bedroom and the bathroom.    1115-1009 [employer]. [address] [place] [address]. All rights reserved. This information is not intended as a substitute for professional medical care. Always follow your healthcare professional's instructions.           Advance Directive  Patients advance directive was discussed and I am comfortable with the patients wishes.  Patient Education   Personalized Prevention Plan  You are due for the preventive services outlined below.  Your care team is available to assist you in scheduling these services.  If you have already completed any of these items, please share that information with your care team to update in your medical record.  Health Maintenance   Topic Date Due   ? DEPRESSION ACTION PLAN  1945   ? ZOSTER VACCINES (2 of 3) 07/01/2008   ? PNEUMOCOCCAL IMMUNIZATION 65+ LOW/MEDIUM RISK (2 of 2 - PPSV23) 10/14/2017   ? MEDICARE  ANNUAL WELLNESS VISIT  05/08/2019   ? FALL RISK ASSESSMENT  03/20/2020   ? DXA SCAN  09/19/2020   ? MAMMOGRAM  01/23/2022   ? TD 18+ HE  06/22/2022   ? ADVANCE CARE PLANNING  05/08/2023   ? LIPID  11/14/2023   ? COLONOSCOPY  01/13/2024   ? HEPATITIS C SCREENING  Completed   ? INFLUENZA VACCINE RULE BASED  Completed

## 2021-06-18 NOTE — PROGRESS NOTES
"Audiology only; referred by Migdalia Delvalle    History:  Patient noted difficulty hearing phone calls when using her cell phone; she indicated she only gets \"2 bars\" on her phone at her home. She also noted a diagnosis of anxiety, treated with medication, which she feels adds to her difficulty hearing on the phone. She does not currently have a land line at home, and uses the cell phone exclusively. She denied tinnitus, dizziness, otalgia, recent illness, or history of significant noise exposure.    Results:  Otoscopy was unremarkable in either ear. Hearing sensitivity was assessed with good reliability using insert phones.      Normal hearing sensitivity, bilaterally, for 250-8000 Hz.      Speech reception thresholds showed agreement with pure tone findings in each ear. Word recognition ability was excellent, bilaterally, with presentation levels at typical conversational volume in both ears.    Tympanometry was consistent with normal middle ear function, bilaterally (shallow tracing was obtained in the right ear only).    Recommendations:  Follow-up with PCP; retest hearing annually (to monitor) or per medical management.  Wear hearing protection consistently in noise to preserve current hearing sensitivity. Appropriate communication strategies were discussed at length, as were reasonable expectations regarding hearing at a distance, in noisy environments, or when attention is diverted elsewhere. It was suggested she use the speaker phone setting whenever possible during calls, so that both ears can be utilized in listening. Limit other visual/auditory distractions when using the phone. Plan important calls in a space that gets better cell coverage than at home. When she is able to modify her cell phone carrier's contract, seek out other carrier options/ask neighbors about their coverage to consider plans and carriers which may offer better coverage at home.    Amrit Jones, [facility]  [place] Licensed " Audiologist [phone]

## 2021-06-19 NOTE — PROGRESS NOTES
Assessment:        URI with Post Nasal Drip       Plan:       Explained lack of efficacy of antibiotics in viral disease.  Antitussives per medication orders.   Recommended plenty of fluids and appropriate rest  Discussed signs of worsening symptoms and when to follow-up with PCP if no symptom improvement.       Patient Instructions   You were seen today for a cough. This is likely due to a virus.    1. Drink plenty of non-caffeinated fluids  2. Avoid smoke exposure  3. May use Tessalon Perles to help suppress the cough  4. May use tylenol or ibuprofen for discomfort    If symptoms have not improved in 5 days, follow-up with your primary care provider.    Reasons to return for re-evaluation:  - Fever 100.4 or higher  - Cough changes, coughing blood, coughing up more phlegm  - Not able to keep down fluids    Other things to try:  - Honey  - Bet[employer]'s VaporRub  - Robitussin if Tessalon Perles don't help      Subjective:        Adelina Espinoza is a 72 y.o. female here for evaluation of a cough.  The cough is non-productive, without wheezing, dyspnea or hemoptysis. Onset of symptoms was 2 weeks ago, unchanged since that time.  Associated symptoms include hot flashes, sore throat, fatigue, headache, sneezing, and lightheadedness. Patient does not have a history of asthma. Patient has not had recent travel. Patient does not have a history of smoking. Patient denies lower extremity swelling.    The following portions of the patient's history were reviewed and updated as appropriate: allergies, current medications and problem list.    Review of Systems  Pertinent items are noted in HPI.     Allergies  Allergies   Allergen Reactions     Adhesive Rash     Green Pepper      Stomach upset     Lactose Diarrhea     Monosodium Glutamate      diarrhea          Objective:       /76 (Patient Site: Left Arm, Patient Position: Sitting, Cuff Size: Adult Regular)  Pulse 88  Temp 97.9  F (36.6  C) (Oral)   Resp 18  Wt 166 lb  (75.3 kg)  SpO2 97%  BMI 29.41 kg/m2  General appearance: alert, appears stated age, cooperative, no distress and non-toxic  Head: Normocephalic, without obvious abnormality, atraumatic, sinuses nontender to percussion  Eyes: no nystagmus seen, EOM intact, PERRL; sclera/conjunctivae clear  Ears: normal TM's and external ear canals both ears  Nose: no discharge  Throat: lips, mucosa, and tongue normal; teeth and gums normal  Neck: no adenopathy and supple, symmetrical, trachea midline  Lungs: clear to auscultation bilaterally and no rhonchi, rales, or wheezing  Heart: regular rate and rhythm, S1, S2 normal, no murmur, click, rub or gallop     Lab Results    Recent Results (from the past 24 hour(s))   HM1 (CBC with Diff)   Result Value Ref Range    WBC 8.9 4.0 - 11.0 thou/uL    RBC 4.99 3.80 - 5.40 mill/uL    Hemoglobin 15.0 12.0 - 16.0 g/dL    Hematocrit 45.9 35.0 - 47.0 %    MCV 92 80 - 100 fL    MCH 30.2 27.0 - 34.0 pg    MCHC 32.8 32.0 - 36.0 g/dL    RDW 12.1 11.0 - 14.5 %    Platelets 307 140 - 440 thou/uL    MPV 6.9 (L) 7.0 - 10.0 fL    Neutrophils % 63 50 - 70 %    Lymphocytes % 27 20 - 40 %    Monocytes % 6 2 - 10 %    Eosinophils % 3 0 - 6 %    Basophils % 1 0 - 2 %    Neutrophils Absolute 5.6 2.0 - 7.7 thou/uL    Lymphocytes Absolute 2.4 0.8 - 4.4 thou/uL    Monocytes Absolute 0.6 0.0 - 0.9 thou/uL    Eosinophils Absolute 0.2 0.0 - 0.4 thou/uL    Basophils Absolute 0.1 0.0 - 0.2 thou/uL     I personally reviewed these results and discussed findings with the patient.    Imaging    Xr Chest 2 Views    Result Date: 7/31/2018  XR CHEST 2 VIEWS 7/31/2018 5:10 PM INDICATION: Cough x 2 weeks worsening; rule out pneumonia COMPARISON: None. FINDINGS: Heart size and pulmonary vessels are normal. Lungs are clear. Postoperative changes with epigastric and right axillary surgical clips as well as previous cervical spine fusion.    I personally reviewed the results, which showed no signs of pneumonia. Discussed findings  with the patient.

## 2021-06-20 NOTE — PROGRESS NOTES
1. Did you experience new onset of achiness or rashes that lasted for over a month? Right and left shoulder achiness x 1 month.  2. Do you feel sick today - fever > 101F, or new deep cough? No  3. Did you have gastric bypass or parathyroid surgery in the last 6 months? No  4. Do you plan any dental implants or extractions in the next 2-3 months? No  5. Have you started any new medications in the last 6 months that you were told could affect your immune system? These may have been prescribed by Oncologist, Transplant Center, Rheumatology or Dermatology. No  Marleny Mclaughlin CMA ............... 1:04 PM, 09/07/18

## 2021-06-21 NOTE — LETTER
Letter by Lisa Thomas MD at      Author: Lisa Thomas MD Service: -- Author Type: --    Filed:  Encounter Date: 1/6/2021 Status: (Other)       Dear Ms. Espinoza,    This letter will help in preparation for your upcoming surgery. Please contact us with any additional questions you may have regarding your surgery. Contact information for your surgery scheduler:   Nadeen Navarro, and Josefa: [phone] ~ Lenore Bacon and [place]: [phone] ~ Tahir    You are scheduled for: Bilateral Lumbar Laminectomies for Synovial Cyst Resection and Possible Lumbar Microdiscectomy, Lumbar Posterior Instrumented Fusion  With: Dr. Lisa Thomas  Date/Time: Friday, January 29, 2021 at 7:30 am (time subject to change)  Location: [place] [address]    Check in at the Welcome Desk inside the main doors of the [facility]. An escort will take you to the surgery waiting area. A nurse from DEB (surgery admit unit) at the hospital will call you with your exact arrival time to the hospital - typically one-and-a-half to two-and-a-half hours prior to your scheduled surgery time.     In the event of an emergency surgery case, there may be an adjustment to your start time for surgery.     PREPARING FOR YOUR SURGERY    *Pre-op Physical: Monday, January 11, 2021 at 10:00 am with Dr. Delvalle at the [facility]. Please have your LABS completed at this appointment as well. Orders have been placed with your primary care provider.     *Please discuss the necessity of receiving a pneumococcal vaccine prior to surgery at your pre-op physical. Recommended for all patients over the age of 65, or based on certain medical conditions.     *After the pre-op physical is complete, please have your clinic fax the visit note to your surgery scheduler at [phone].    *Pre-op Lab Work: Monday, January 11 at your Pre-op Physical appointment.      *COVID-19 Testing: Monday, January 25, 2021 at 11:00 am at the [facility], [address], [place] [address].     *Readiness Visit: Dr. Thomas's nurse will call you prior to your procedure to go over your Pre-op physical and lab results, give Pre-surgery instructions and also answer any additional questions you may have about the upcoming procedure.     *Ensure that you have completed your pre-op physical, along with any other necessary tests/appointments (listed above), prior to your Readiness Visit.             ADDITIONAL INFORMATION REGARDING YOUR SURGERY    Medications    Bring a list ALL of your medications, including any over-the-counter vitamins and herbal supplements to your Readiness Visit, and on the day of surgery.    DO NOT bring your medications with you the day of surgery.    Please see attached third sheet for more details on medications/vitamins/herbal supplements that should be discontinued prior to your surgery date.     If you are unsure if you should discontinue a medication/ vitamin/herbal supplement, please call our office and discuss with a nurse.    Continue taking your medications/vitamins/herbal supplements unless they are on the attached list.     Failure to follow the instructions regarding medications/vitamins/herbal supplements will result in cancellation of your surgery.    Day BEFORE Surgery    DO NOT shave near your surgical site. This can cause irritation of the skin    Using a washcloth and provided bottle of Hibiclens, shower the night BEFORE surgery, using a half bottle of Hibiclens to wash your body, avoiding face and genitals. The morning OF surgery, shower and use the second half of the bottle to wash your body, avoiding face and genitals. If you are unable to take a shower the morning of surgery, please discuss your options with the nurse at your readiness visit.     NOTHING  to eat after 11:00 p.m. the night prior to your procedure    CLEAR  LIQUIDS: May have the following liquids up to two (2) hours before your arrival time at the hospital: water, plain black coffee (no cream or milk), plain black tea or plain green tea (no cream or milk), Gatorade or Propel Water.    SMOKING: Stop smoking as far before surgery as possible, or as directed by your surgeon. NO tobacco products of any kind (cigarettes, e-cigarettes, chewing tobacco) beginning at 11:00 p.m. the night prior to your procedure.     ALCOHOL: You should stop drinking alcohol beginning at 11:00 p.m. the night prior to your procedure    Contact our office if you have symptoms of illness such as a fever of 101 or greater, chills, cough, sore throat, or if you develop a rash or any open sore    Day OF Surgery    If youve been instructed to take a medication(s) on the morning of surgery, please take with a very small sip of water.    Wear loose & comfortable clothing and flat shoes, Leave jewelry/valuables at home. If you wear contact lenses, remove them at home and wear glasses. Remove any body piercings. Remove nail polish.     Planning for Discharge    Start planning for your care after discharge as soon as you receive this letter.    If you have not made arrangements to have someone take you home and stay with you for the first 48 hours after discharge, your surgery will be cancelled.        PRE-OPERATIVE MEDICATION INSTRUCTIONS  Review this information with your primary care physician prior to discontinuing any of the medications listed below.  Notify your primary care physician that you have been instructed to discontinue these medications.    TEN (10) Days Prior to Surgery, STOP the Following Medications   [place]  Anacin  Aspirin  Excedrin  Pepto Bismol    **Before taking ANY over-the-counter medications, check the label for Aspirin   Non-steroidal   Anti-inflammatory Medications (NSAIDS)    Celebrex  Diclofenac (Cataflam)  Etodolac (Iodine)  Fenoprofen (Nalfon)  Ibuprofen (Advil,  Motrin, Nuprin)  Indomethacin (Indocin)  Ketoprofen  Ketorolac (Toradol)  Melaxicam (Mobic)  Naproxen (AnaProx, Aleve, Naprosyn)  Relafen (Nabumetone)   Herbal Supplements (this is a partial list of herbals to be discontinued)    [place]  Ephedra  Feverfew  Fish Oil  Flaxseed Oil  Garlic  Adriana  Gingko  Ginseng  Goldenseal  Imitrex (Sumatriptan)  Kava  Krill Oil  Licorice  Multi Vitamins  [facility]  Valerian  Vitamin E  Yohimbe   CHECK WITH YOUR PRESCRIBING DOCTOR BEFORE STOPPING ANY BLOOD THINNERS (approximately 7 days prior to surgery)  (Coumadin, Plavix, Platel, Aggrenox, Effient (Prasugrel), Ticlid), Xarelto, and Pradaxa      ALWAYS CHECK WITH YOUR PRESCRIBING DOCTOR REGARDING THE MEDICATIONS LISTED BELOW; RECOMMENDED STOP TIME IS ALSO LISTED      If you are taking Lovenox, discontinue 24 HOURS prior to surgery    If you are taking weight loss medication, discontinue 7 days prior to surgery    If you are taking Metformin or Simvastatin, check with your primary care physician (or whoever has prescribed you this medication) regarding when to discontinue prior to surgery

## 2021-06-21 NOTE — PROGRESS NOTES
PROGRESS NOTE   11/14/2018    SUBJECTIVE:  Adelina Espinoza is a 72 y.o. female  who presents for   Chief Complaint   Patient presents with     Follow-up     Med CK     Neck Pain     little shocks behind left ear x 1 month     Hip Pain     left back side pain      Patient comes in today for medication follow-up.  She continues on Lasix for limb swelling although notes that she does not need that all that much because it has not been too bad recently.  She also is on Prilosec for esophageal reflux and that seems to be working well as well.  She does need labs to be followed up on both those medications.  She continues on Celexa 40 mg a day for her anxiety and depression.  She notes that overall things have been much better than they have been in the past.  Things seem like they are under much better control than they have been for a while.  Please see PHQ 9 and DERIK which are both completed in their entirety today.  She is not suicidal or homicidal.  She does have lorazepam at home that she uses on an occasional basis and uses it very infrequently.  She has not use that for at least a month or 2 now.  I gave her 10 tablets in January and she still has 9 of them left.  She overall feels like things are doing well.  She does have Bentyl at home that she uses for stomach cramps that she gets occasionally.  These are very occasional in nature but she would like to get a refill of the medication so that she at least has some on hand.  She was given 90 tablets a year and a half ago and is now just running out of them.  Her other concerns are that she has some tingling behind her left ear.  She is had it there for about the last month or so.  It comes almost every day but it only last for about 20-30 seconds and then goes away.  It may be comes 2 or 3 times a day at the most.  She has a history of breast cancer and is very concerned that this represents cancer being back.  She also notes that she has had some buttock pain on  her left side.  This is been there for about the last month or so.  She slipped and caught herself in a funny way and ever since then she has had lower buttock pain.  It does seem like is gradually improving but she wanted to make sure there was nothing else that she needed to do to help that.    Patient Active Problem List   Diagnosis     Peripheral Neuropathy     Urticaria     Major Depression, Recurrent     Lump Or Mass In Breast     Anxiety     Limb Swelling     Irritable Bowel Syndrome     Primary osteoarthritis of right knee     Primary osteoarthritis of knee, left     Osteoporosis, senile     Esophageal reflux       Current Outpatient Medications   Medication Sig Dispense Refill     calcium polycarbophil (FIBER, CALCIUM POLYCARBOPHIL,) 625 mg tablet Take by mouth.       citalopram (CELEXA) 40 MG tablet TAKE 1 TABLET DAILY 90 tablet 2     dicyclomine (BENTYL) 10 MG capsule Take 1 capsule (10 mg total) by mouth 4 (four) times a day as needed For cramps. 30 capsule 0     DM/p-ephed/acetaminoph/doxylam (NYQUIL D ORAL) Take by mouth.       furosemide (LASIX) 20 MG tablet Take 1 tablet (20 mg total) by mouth daily. 90 tablet 3     ibuprofen (ADVIL,MOTRIN) 200 MG tablet Take 200 mg by mouth every 6 (six) hours as needed for pain.       LORazepam (ATIVAN) 0.5 MG tablet TAKE ONE TABLET BY MOUTH THREE TIMES DAILY as needed for anxiety 10 tablet 0     magnesium 250 mg Tab Take 250 mg by mouth daily.        MULTIVIT &MINERALS/FERROUS FUM (MULTI VITAMIN ORAL) Take 1 tablet by mouth daily.       omeprazole (PRILOSEC) 20 MG capsule Take 1 capsule (20 mg total) by mouth daily before breakfast. 90 capsule 3     POLYETHYLENE GLYCOL 3350 (MIRALAX ORAL) Take by mouth.       psyllium (METAMUCIL) 0.52 gram capsule Take 0.52 g by mouth daily.       calcium citrate-vitamin D (CITRACAL+D) 315-200 mg-unit per tablet Take 1 tablet by mouth 2 (two) times a day.       Current Facility-Administered Medications   Medication Dose Route  Frequency Provider Last Rate Last Dose     denosumab 60 mg (PROLIA 60 mg/ml)  60 mg Subcutaneous Q6 Months Anjali Ramires MD   60 mg at 09/07/18 1308       Allergies   Allergen Reactions     Adhesive Rash     Green Pepper      Stomach upset     Lactose Diarrhea     Monosodium Glutamate      diarrhea       Past Medical History:   Diagnosis Date     Anxiety      Arthritis      Breast cancer (H) 2003     Depression      Esophageal reflux      History of anesthesia complications     goofy     Hx antineoplastic chemotherapy 2003    for breast cancer     Hx of radiation therapy 2004    for breast cancer     IBS (irritable bowel syndrome)      Neuropathy (H)      Osteopenia        Past Surgical History:   Procedure Laterality Date     APPENDECTOMY  1967     BACK SURGERY      cervical fusion     BREAST BIOPSY Right 2003     BREAST LUMPECTOMY  12/2003    breast cancer     CHOLECYSTECTOMY  1994     HYSTERECTOMY  1997    desmoid cyst     NISSEN FUNDOPLICATION       OOPHORECTOMY  1997     VT TOTAL KNEE ARTHROPLASTY Right 10/29/2015    Procedure: RIGHT TOTAL KNEE  ARTHROPLASTY;  Surgeon: Nitin White MD;  Location: [facility];  Service: Orthopedics     VT TOTAL KNEE ARTHROPLASTY Left 4/21/2016    Procedure: LEFT TOTAL KNEE ARTHROPLASTY;  Surgeon: Nitin White MD;  Location: [facility];  Service: Orthopedics       Social History     Tobacco Use   Smoking Status Never Smoker   Smokeless Tobacco Never Used       OBJECTIVE:     /70   Pulse 74   Wt 170 lb (77.1 kg)   SpO2 97%   BMI 30.11 kg/m      Physical Exam:  GENERAL APPEARANCE: A&A, NAD, well hydrated, well nourished  SKIN:  Normal skin turgor, no lesions/rashes   On exam behind her left ear there is absolutely no abnormalities appreciated.  There is no erythema or tenderness to palpation.  Is no warmth to the touch or evidence for any skin breakdown.  CV: RRR, no M/G/R   LUNGS: CTAB  EXTREMITY: no swelling noted.  Full range of motion  of all 4 extremities.   NEURO: no gross deficits   PSYCHIATRIC;  Mood appropriate, memory intact  A&O x3, thought processes congruent, non-tangential. No hallucinations/delusions. Insight/judgment: intact. Denies suicidal/homicidal ideations.    LABS:     Recent Results (from the past 240 hour(s))   Comprehensive Metabolic Panel   Result Value Ref Range    Sodium 139 136 - 145 mmol/L    Potassium 4.7 3.5 - 5.0 mmol/L    Chloride 101 98 - 107 mmol/L    CO2 28 22 - 31 mmol/L    Anion Gap, Calculation 10 5 - 18 mmol/L    Glucose 96 70 - 125 mg/dL    BUN 10 8 - 28 mg/dL    Creatinine 0.69 0.60 - 1.10 mg/dL    GFR MDRD Af Amer >60 >60 mL/min/1.73m2    GFR MDRD Non Af Amer >60 >60 mL/min/1.73m2    Bilirubin, Total 1.4 (H) 0.0 - 1.0 mg/dL    Calcium 10.4 8.5 - 10.5 mg/dL    Protein, Total 7.5 6.0 - 8.0 g/dL    Albumin 4.2 3.5 - 5.0 g/dL    Alkaline Phosphatase 60 45 - 120 U/L    AST 25 0 - 40 U/L    ALT 19 0 - 45 U/L   Lipid Cascade   Result Value Ref Range    Cholesterol 210 (H) <=199 mg/dL    Triglycerides 111 <=149 mg/dL    HDL Cholesterol 81 >=50 mg/dL    LDL Calculated 107 <=129 mg/dL    Patient Fasting > 8hrs? Yes        ASSESSMENT/PLAN:     Episode of recurrent major depressive disorder, unspecified depression episode severity (H) [F33.9]      1. Episode of recurrent major depressive disorder, unspecified depression episode severity (H)    2. Anxiety    3. Gastroesophageal reflux disease without esophagitis  - Comprehensive Metabolic Panel    4. Limb Swelling  - Comprehensive Metabolic Panel    5. Elevated cholesterol  - Lipid Cascade    6. Stomach cramps  - dicyclomine (BENTYL) 10 MG capsule; Take 1 capsule (10 mg total) by mouth 4 (four) times a day as needed For cramps.  Dispense: 30 capsule; Refill: 0    Patient overall seems to be doing okay.  Lipid panel as well as electrolytes were drawn today to follow-up on her Lasix use as well as her Prilosec use as well as her history of elevated cholesterol.  Will  contact her with results of that when it returns.  I did give her a refill of Bentyl that she uses occasionally for stomach cramps.  We gave her 30 tablets today and will see how long that lasts.  She definitely notes that she does not use it very frequently at all.  She seems to be doing well on her Celexa.  She will continue on Celexa 40 mg a day.  She has enough lorazepam at home now.  When she needs a refill of her Celexa she certainly will let me know.  In terms of the tingling that she is having behind her left ear I do not see anything on exam today.  We discussed the fact that it is only lasting 20-30 seconds and only occurs inconsistently I do not think is anything that we need to be overly concerned about.  She should continue to monitor this for now and if she notices that it is happening more frequently she certainly should let me know.  The left lower buttock pain seems to be gradually getting better.  I suspect that she pulled a little bit of a muscle when she caught herself funny a month ago and this is gradually improving.  Again she will continue to monitor this for now and if she has additional questions or concerns will certainly let me know.  We otherwise should see her in about 6 months for follow-up of her chronic medical issues. Total time spent with patient was at least 25 minutes,  of which greater than fifty percent was spent in counselling and coordination of care of the above medical problems.  Migdalia Delvalle MD

## 2021-06-22 NOTE — PROGRESS NOTES
PROGRESS NOTE   1/4/2019    SUBJECTIVE:  Adelina Espinoza is a 73 y.o. female  who presents for   Chief Complaint   Patient presents with     Anxiety     Follow up. Doing better with anxiety     Patient comes in today for follow-up of her anxiety.  I saw her about a month ago and she noted that her anxiety was much worse.  We did not change her medication at that time but I asked her to take more of her lorazepam.  She has been avoiding her lorazepam pretty much entirely because of her fear of taking it too frequently.  She was having panic attacks at that point and therefore I urged her to try to take her lorazepam occasionally and that it was okay to take lorazepam occasionally we did not want her take it any kind of a regular basis.  At that point she just paid off her trip to [place] which is upcoming in a couple of weeks and also was worried about how they were going on for [place].  She today comes in and says that her anxiety is much better.  She has been taking her lorazepam 1 tablet every 2 or 3 days and is doing much better.  She is now not had to use lorazepam for several days.  She is feeling much better about how things are going.  She is taking melatonin to sleep at night and we discussed that that is fine for her to continue to use that.  Please see PHQ 9 and DERIK which are both completed in their entirety today.  She is not suicidal or homicidal.  She continues on Celexa 40 mg a day.  She leaves for [place] on 2/6/2018.  When I saw her last about a month ago she was having lots of buttock pain as well.  She was given a prednisone taper and she notes that that helped a lot.  She is able to move and able to walk without any problems.  If she gets into certain positions she was still have a little bit of the pain there but she thinks it may be muscular pain now.  At this point she is comfortable and feels like things are overall doing better.   She has made the decision to switch from Dr. Ogden her  previous gastroenterologist to someone at the [place] location and will be scheduling that appointment in the near future for follow-up.    Patient Active Problem List   Diagnosis     Peripheral Neuropathy     Urticaria     Major Depression, Recurrent     Lump Or Mass In Breast     Anxiety     Limb Swelling     Irritable Bowel Syndrome     Primary osteoarthritis of right knee     Primary osteoarthritis of knee, left     Osteoporosis, senile     Esophageal reflux       Current Outpatient Medications   Medication Sig Dispense Refill     calcium citrate-vitamin D (CITRACAL+D) 315-200 mg-unit per tablet Take 1 tablet by mouth 2 (two) times a day.       calcium polycarbophil (FIBER, CALCIUM POLYCARBOPHIL,) 625 mg tablet Take by mouth.       citalopram (CELEXA) 40 MG tablet TAKE 1 TABLET DAILY 90 tablet 2     dicyclomine (BENTYL) 10 MG capsule Take 1 capsule (10 mg total) by mouth 4 (four) times a day as needed For cramps. 30 capsule 0     DM/p-ephed/acetaminoph/doxylam (NYQUIL D ORAL) Take by mouth.       furosemide (LASIX) 20 MG tablet Take 1 tablet (20 mg total) by mouth daily. 90 tablet 3     ibuprofen (ADVIL,MOTRIN) 200 MG tablet Take 200 mg by mouth every 6 (six) hours as needed for pain.       LORazepam (ATIVAN) 0.5 MG tablet TAKE ONE TABLET BY MOUTH THREE TIMES DAILY as needed for anxiety 10 tablet 0     magnesium 250 mg Tab Take 250 mg by mouth daily.        melatonin 3 mg Tab tablet Take 3 mg by mouth at bedtime as needed.       MULTIVIT &MINERALS/FERROUS FUM (MULTI VITAMIN ORAL) Take 1 tablet by mouth daily.       omeprazole (PRILOSEC) 20 MG capsule Take 1 capsule (20 mg total) by mouth daily before breakfast. 90 capsule 3     POLYETHYLENE GLYCOL 3350 (MIRALAX ORAL) Take by mouth.       predniSONE (DELTASONE) 10 mg tablet 4 tabs a day for 3 days, 3 tabs a day for 3 days, 2 tabs a day for 3 days, 1 tab a day for 3 days, then stop.. 30 tablet 0     psyllium (METAMUCIL) 0.52 gram capsule Take 0.52 g by mouth  daily.       Current Facility-Administered Medications   Medication Dose Route Frequency Provider Last Rate Last Dose     denosumab 60 mg (PROLIA 60 mg/ml)  60 mg Subcutaneous Q6 Months Anjali Ramires MD   60 mg at 09/07/18 1308       Allergies   Allergen Reactions     Adhesive Rash     Green Pepper      Stomach upset     Lactose Diarrhea     Monosodium Glutamate      diarrhea       Past Medical History:   Diagnosis Date     Anxiety      Arthritis      Breast cancer (H) 2003     Depression      Esophageal reflux      History of anesthesia complications     goofy     Hx antineoplastic chemotherapy 2003    for breast cancer     Hx of radiation therapy 2004    for breast cancer     IBS (irritable bowel syndrome)      Neuropathy (H)      Osteopenia        Past Surgical History:   Procedure Laterality Date     APPENDECTOMY  1967     BACK SURGERY      cervical fusion     BREAST BIOPSY Right 2003     BREAST LUMPECTOMY  12/2003    breast cancer     CHOLECYSTECTOMY  1994     HYSTERECTOMY  1997    desmoid cyst     NISSEN FUNDOPLICATION       OOPHORECTOMY  1997     ID TOTAL KNEE ARTHROPLASTY Right 10/29/2015    Procedure: RIGHT TOTAL KNEE  ARTHROPLASTY;  Surgeon: Nitin White MD;  Location: [facility] OR;  Service: Orthopedics     ID TOTAL KNEE ARTHROPLASTY Left 4/21/2016    Procedure: LEFT TOTAL KNEE ARTHROPLASTY;  Surgeon: Nitin White MD;  Location: [facility] OR;  Service: Orthopedics       Social History     Tobacco Use   Smoking Status Never Smoker   Smokeless Tobacco Never Used       OBJECTIVE:     /71   Pulse 84   Wt 170 lb (77.1 kg)   SpO2 98%   BMI 30.11 kg/m      Physical Exam:  GENERAL APPEARANCE: A&A, NAD, well hydrated, well nourished  SKIN:  Normal skin turgor, no lesions/rashes   CV: RRR, no M/G/R   LUNGS: CTAB  EXTREMITY: no swelling noted.  Full range of motion of all 4 extremities.   NEURO: no gross deficits   PSYCHIATRIC;  Mood appropriate, memory intact  A&O x3,  thought processes congruent, non-tangential. No hallucinations/delusions. Insight/judgment: intact. Denies suicidal/homicidal ideations.      ASSESSMENT/PLAN:     Anxiety state [F41.1]      1. Anxiety  - LORazepam (ATIVAN) 0.5 MG tablet; TAKE ONE TABLET BY MOUTH THREE TIMES DAILY as needed for anxiety  Dispense: 10 tablet; Refill: 0    Patient overall seems to be doing well.  She continues on Celexa 40 mg a day.  She has used the lorazepam a few more times over the last month and she has had much better control of her anxiety.  She has not had to use the lorazepam recently at all.  She is going to continue on the 40 mg of Celexa because that seems to be working well.  She has enough of that at home currently.  She does have only about 2 or 3 tablets of the lorazepam and with her upcoming trip to [place] for a couple of weeks I did go ahead and give her a refill of that medication today.  She knows that she should only use it on an as-needed basis and very infrequently and if she is finding that her anxiety is where she needs to come in for reevaluation.  She understands all of that and will  the prescription to bring along with her to [place] hopefully she will not need it.  In terms of the buttock pain it seems like things are going much better.  She is able to walk and able to do everything that she needs to do.  She will continue to monitor and make sure that the last little bit of pain that she is having seems to resolve the pain at this point does not feel like further monitoring or evaluation is necessary.  If she changes her mind will certainly let me know.  She will set up an appointment to follow-up with gastroenterology in [place] as well.  All of her questions were answered today.  We will see her back in about 3 months for follow-up.  Migdalia Delvalle MD

## 2021-06-22 NOTE — PROGRESS NOTES
PROGRESS NOTE   12/5/2018    SUBJECTIVE:  Adelina Espinoza is a 73 y.o. female  who presents for   Chief Complaint   Patient presents with     Rectal Pain     Left side buttock pain only when she sits travels from middle of buttock to middle thigh - kind of tingly      Patient comes in today for a couple of different reasons.  I had seen her a few weeks ago for follow-up of her anxiety and at that time she was having some left buttock pain.  I thought that this would resolve but she notes that it is not seem to be getting better and therefore she comes in for evaluation.  She notes that the pain is in her lower left buttock area that it extends down the left upper thigh.  Sometimes she gets a sharp shooting tingly pain that goes down into her toes even but usually it stays in the upper thigh area as well as the lower buttock area just on the left side.  She describes it as a pulling or an irritating pain whenever she leans over to put on her socks or leans over to do something and that position.  She has used heat to the area and that seems to help a little bit.  She does not have any walking troubles and she does any troubles when she is standing.  When she is sitting it seems like it bothers her and she has to sit on the side of her but in order to make it not hurt.  She notes that this started when she went to [place] and flew on the plain and 1 of those tiny little seats.  She has been using Tylenol or Advil and that does seem to help a little bit however she has been afraid to take a whole lot of it because she does not want to overdose on that.  She also notes that her anxiety is getting much worse.  She feels like it is actually totally out of control.  Her  had to sit her down last week and look at her straighten the eye and say that she needed to calm down.  She is canceled out of a [place] party and a meeting because it was just too overwhelming for her.  She is not sure exactly what has caused  the acute increase in the anxiety.  There is only good things that are happening in her life.  She is continuing on Celexa 40 mg a day.  Please see PHQ 9 and DERIK which are both completed in their entirety today.  She is not suicidal or homicidal.  As we are talking she notes that she just paid off the trip to [place] a week ago or so and she admits that she is worried about how they are going to pay for [place] because she just had to pay off the trip to [place].  She depleted the [employer] account in order to do that and so now that she is thinking about it she is somewhat concerned about how they are going to pay for all the things that they need to do for [place].  Logically she should not have to worry about that because there is plenty of money but that is concerning to her.  She also notes that she is going to the grocery store very frequently and buying at the grocery store like there is nothing to eat at her house.  She notes that in reality she has an entire freezer full of food and has all kinds of things at home to eat but she keeps buying more because she is so anxious.  She does have lorazepam at home but she has not used it.  Her  was after her to use it recently but she did not.  He is not suicidal or homicidal.  She can contract for safety today.    Patient Active Problem List   Diagnosis     Peripheral Neuropathy     Urticaria     Major Depression, Recurrent     Lump Or Mass In Breast     Anxiety     Limb Swelling     Irritable Bowel Syndrome     Primary osteoarthritis of right knee     Primary osteoarthritis of knee, left     Osteoporosis, senile     Esophageal reflux       Current Outpatient Medications   Medication Sig Dispense Refill     calcium citrate-vitamin D (CITRACAL+D) 315-200 mg-unit per tablet Take 1 tablet by mouth 2 (two) times a day.       calcium polycarbophil (FIBER, CALCIUM POLYCARBOPHIL,) 625 mg tablet Take by mouth.       citalopram (CELEXA) 40 MG tablet TAKE 1 TABLET DAILY  90 tablet 2     dicyclomine (BENTYL) 10 MG capsule Take 1 capsule (10 mg total) by mouth 4 (four) times a day as needed For cramps. 30 capsule 0     furosemide (LASIX) 20 MG tablet Take 1 tablet (20 mg total) by mouth daily. 90 tablet 3     ibuprofen (ADVIL,MOTRIN) 200 MG tablet Take 200 mg by mouth every 6 (six) hours as needed for pain.       LORazepam (ATIVAN) 0.5 MG tablet TAKE ONE TABLET BY MOUTH THREE TIMES DAILY as needed for anxiety 10 tablet 0     magnesium 250 mg Tab Take 250 mg by mouth daily.        MULTIVIT &MINERALS/FERROUS FUM (MULTI VITAMIN ORAL) Take 1 tablet by mouth daily.       omeprazole (PRILOSEC) 20 MG capsule Take 1 capsule (20 mg total) by mouth daily before breakfast. 90 capsule 3     POLYETHYLENE GLYCOL 3350 (MIRALAX ORAL) Take by mouth.       DM/p-ephed/acetaminoph/doxylam (NYQUIL D ORAL) Take by mouth.       predniSONE (DELTASONE) 10 mg tablet 4 tabs a day for 3 days, 3 tabs a day for 3 days, 2 tabs a day for 3 days, 1 tab a day for 3 days, then stop.. 30 tablet 0     psyllium (METAMUCIL) 0.52 gram capsule Take 0.52 g by mouth daily.       Current Facility-Administered Medications   Medication Dose Route Frequency Provider Last Rate Last Dose     denosumab 60 mg (PROLIA 60 mg/ml)  60 mg Subcutaneous Q6 Months Anjali Ramires MD   60 mg at 09/07/18 1308       Allergies   Allergen Reactions     Adhesive Rash     Green Pepper      Stomach upset     Lactose Diarrhea     Monosodium Glutamate      diarrhea       Past Medical History:   Diagnosis Date     Anxiety      Arthritis      Breast cancer (H) 2003     Depression      Esophageal reflux      History of anesthesia complications     goofy     Hx antineoplastic chemotherapy 2003    for breast cancer     Hx of radiation therapy 2004    for breast cancer     IBS (irritable bowel syndrome)      Neuropathy (H)      Osteopenia        Past Surgical History:   Procedure Laterality Date     APPENDECTOMY  1967     BACK SURGERY      cervical  fusion     BREAST BIOPSY Right 2003     BREAST LUMPECTOMY  12/2003    breast cancer     CHOLECYSTECTOMY  1994     HYSTERECTOMY  1997    desmoid cyst     NISSEN FUNDOPLICATION       OOPHORECTOMY  1997     SD TOTAL KNEE ARTHROPLASTY Right 10/29/2015    Procedure: RIGHT TOTAL KNEE  ARTHROPLASTY;  Surgeon: Nitin White MD;  Location: [facility] Main OR;  Service: Orthopedics     SD TOTAL KNEE ARTHROPLASTY Left 4/21/2016    Procedure: LEFT TOTAL KNEE ARTHROPLASTY;  Surgeon: Nitin White MD;  Location: [facility] Main OR;  Service: Orthopedics       Social History     Tobacco Use   Smoking Status Never Smoker   Smokeless Tobacco Never Used       OBJECTIVE:     /62   Pulse 80   Wt 171 lb (77.6 kg)   SpO2 98%   BMI 30.29 kg/m      Physical Exam:  GENERAL APPEARANCE: A&A, NAD, well hydrated, well nourished  SKIN:  Normal skin turgor, no lesions/rashes   CV: RRR, no M/G/R   LUNGS: CTAB  EXTREMITY: no swelling noted.  Full range of motion of all 4 extremities.  She describes pain in her left buttock in the lower buttock region and then it goes down into the center part of her left thigh.  Straight leg lifts were positive on the left and normal on the right.  She also had tenderness in the buttock region when I asked her to press her thighs out and strained that particular part of her leg.  When asked her to lift up her thighs it did not seem to bother nearly as bad as it did with her calves.    NEURO: no gross deficits.  Neurological exam was completely within normal limits.  Deep tendon reflexes were equal and symmetrical.  Motor and sensation were intact both the upper and lower extremities.  Cranial nerves II through XII are grossly intact.  PSYCHIATRIC;  Mood appropriate, memory intact  A&O x3, thought processes congruent, non-tangential. No hallucinations/delusions. Insight/judgment: intact. Denies suicidal/homicidal ideations.      ASSESSMENT/PLAN:     Buttock pain [M79.18]      1. Buttock pain  -  predniSONE (DELTASONE) 10 mg tablet; 4 tabs a day for 3 days, 3 tabs a day for 3 days, 2 tabs a day for 3 days, 1 tab a day for 3 days, then stop..  Dispense: 30 tablet; Refill: 0    2. Anxiety    Patient overall seems to be doing okay.  In terms of her buttock pain I do think that she has a nerve this irritated in the lower buttock region especially given the fact that she has pain when she tries to strain her muscles such as pressing out toward me with her calves as well as lifting up her thighs.  She definitely had positive straight leg lift on the left side as well.  I would like to try prednisone taper and see how she does.  We did discuss the use of prednisone and how being on prednisone can make her feel a little bit funny.  She is aware of that.  She will continue to monitor carefully.  We talked about how she should take prednisone exactly as directed and not stop in the middle of the prescription as she will get very ill by doing so.  She voiced understanding of that.  I am hopeful that that will decrease the inflammation around the nerve and will provide her lots of relief in terms of the pain that she is having.  We talked about the fact that she can use Tylenol if she needs it but do not use ibuprofen Aleve or aspirin while she is on the prednisone taper.  Again she voiced understanding of that.  In terms of her anxiety I think she has an acute increase in her anxiety currently probably due to [place] and all the other things that are going on.  At this point she does have lorazepam at home and I encouraged her to use that as needed when she gets really bad.  She will continue on the Celexa 40 mg a day for now and will use the lorazepam on an occasional basis.  We discussed the fact that I certainly do not want her to be using it multiple times a day but certainly one time her couple of times a week is fine until she can get through this difficult period that she is currently in.  All of her questions  were answered today.  If she has additional questions or concerns will let me know.  She is not suicidal or homicidal today.  If that changes she certainly should let me know.  She is able to contract for safety today as well.  If she does not have improvement in her buttock pain following the prednisone taper she certainly should let me know.  I like to see her back in about a month for follow-up.  If she has additional questions or concerns prior to that will let me know. Total time spent with patient was at least 25 minutes,  of which greater than fifty percent was spent in counselling and coordination of care of the above medical problems.  Migdalia Delvalle MD

## 2021-06-23 NOTE — PROGRESS NOTES
PROGRESS NOTE   1/21/2019    SUBJECTIVE:  Adelina Espinoza is a 73 y.o. female  who presents for   Chief Complaint   Patient presents with     URI     2.5 weeks of sore throat, watery eyes, chills/fever, mouth sores, fatigued, little cough, ear pain      Patient comes in today because she has been feeling well for about the last 2-1/2 weeks.  She started out having a sore throat and chills.  That kind of his resolved but she is had an on and off cough since that time as well as watery eyes and ear pain.  Her sore throat has gone up and on and off since that time as well.  She is not really had any facial pain or suggestions for sinus infection but she does not seem to be getting any better.  She does not seem to be getting worse but does note that she seems to be sleeping a lot more than she normally does.  She thinks that she is had a low-grade fever on and off during this whole time as well.  She is also developed some mouth sores kind of on and off during the same amount of time.  She thought that she had a viral process that will run its course but now that is been there for 2-1/2 weeks she thought she better come in and have further evaluation.    Patient Active Problem List   Diagnosis     Peripheral Neuropathy     Urticaria     Major Depression, Recurrent     Lump Or Mass In Breast     Anxiety     Limb Swelling     Irritable Bowel Syndrome     Primary osteoarthritis of right knee     Primary osteoarthritis of knee, left     Osteoporosis, senile     Esophageal reflux       Current Outpatient Medications   Medication Sig Dispense Refill     calcium citrate-vitamin D (CITRACAL+D) 315-200 mg-unit per tablet Take 1 tablet by mouth 2 (two) times a day.       calcium polycarbophil (FIBER, CALCIUM POLYCARBOPHIL,) 625 mg tablet Take by mouth.       citalopram (CELEXA) 40 MG tablet TAKE 1 TABLET DAILY 90 tablet 2     dicyclomine (BENTYL) 10 MG capsule Take 1 capsule (10 mg total) by mouth 4 (four) times a day as needed  For cramps. 30 capsule 0     DM/p-ephed/acetaminoph/doxylam (NYQUIL D ORAL) Take by mouth.       furosemide (LASIX) 20 MG tablet Take 1 tablet (20 mg total) by mouth daily. 90 tablet 3     ibuprofen (ADVIL,MOTRIN) 200 MG tablet Take 200 mg by mouth every 6 (six) hours as needed for pain.       LORazepam (ATIVAN) 0.5 MG tablet TAKE ONE TABLET BY MOUTH THREE TIMES DAILY as needed for anxiety 10 tablet 0     magnesium 250 mg Tab Take 250 mg by mouth daily.        melatonin 3 mg Tab tablet Take 3 mg by mouth at bedtime as needed.       MULTIVIT &MINERALS/FERROUS FUM (MULTI VITAMIN ORAL) Take 1 tablet by mouth daily.       omeprazole (PRILOSEC) 20 MG capsule Take 1 capsule (20 mg total) by mouth daily before breakfast. 90 capsule 3     POLYETHYLENE GLYCOL 3350 (MIRALAX ORAL) Take by mouth.       predniSONE (DELTASONE) 10 mg tablet 4 tabs a day for 3 days, 3 tabs a day for 3 days, 2 tabs a day for 3 days, 1 tab a day for 3 days, then stop.. 30 tablet 0     psyllium (METAMUCIL) 0.52 gram capsule Take 0.52 g by mouth daily.       cefuroxime (CEFTIN) 500 MG tablet Take 1 tablet (500 mg total) by mouth 2 (two) times a day for 10 days. 20 tablet 0     Current Facility-Administered Medications   Medication Dose Route Frequency Provider Last Rate Last Dose     denosumab 60 mg (PROLIA 60 mg/ml)  60 mg Subcutaneous Q6 Months Anjali Ramires MD   60 mg at 09/07/18 1308       Allergies   Allergen Reactions     Adhesive Rash     Green Pepper      Stomach upset     Lactose Diarrhea     Monosodium Glutamate      diarrhea       Past Medical History:   Diagnosis Date     Anxiety      Arthritis      Breast cancer (H) 2003     Depression      Esophageal reflux      History of anesthesia complications     goofy     Hx antineoplastic chemotherapy 2003    for breast cancer     Hx of radiation therapy 2004    for breast cancer     IBS (irritable bowel syndrome)      Neuropathy (H)      Osteopenia        Past Surgical History:   Procedure  Laterality Date     APPENDECTOMY  1967     BACK SURGERY      cervical fusion     BREAST BIOPSY Right 2003     BREAST LUMPECTOMY  12/2003    breast cancer     CHOLECYSTECTOMY  1994     HYSTERECTOMY  1997    desmoid cyst     NISSEN FUNDOPLICATION       OOPHORECTOMY  1997     MD TOTAL KNEE ARTHROPLASTY Right 10/29/2015    Procedure: RIGHT TOTAL KNEE  ARTHROPLASTY;  Surgeon: Nitin White MD;  Location: [facility] OR;  Service: Orthopedics     MD TOTAL KNEE ARTHROPLASTY Left 4/21/2016    Procedure: LEFT TOTAL KNEE ARTHROPLASTY;  Surgeon: Nitin White MD;  Location: [facility] OR;  Service: Orthopedics       Social History     Tobacco Use   Smoking Status Never Smoker   Smokeless Tobacco Never Used       OBJECTIVE:     /62   Pulse 83   Temp 97.9  F (36.6  C)   Wt 169 lb (76.7 kg)   LMP 01/11/1997   SpO2 98%   BMI 29.94 kg/m      Physical Exam:  GENERAL APPEARANCE: A&A, NAD, well hydrated, well nourished  SKIN:  Normal skin turgor, no lesions/rashes   HEENT: moist mucous membranes, no rhinorrhea, PERRLA, TM's clear bilaterally, Throat without significant erythema or exudate.  NECK: Supple, full ROM, no significant lymphadenopathy or thyromegaly  CV: RRR, no M/G/R   LUNGS: CTAB  EXTREMITY: no swelling noted.  Full range of motion of all 4 extremities.   NEURO: no gross deficits   PSYCHIATRIC;  Mood appropriate, memory intact          ASSESSMENT/PLAN:     Bronchitis [J40]      1. Bronchitis  - cefuroxime (CEFTIN) 500 MG tablet; Take 1 tablet (500 mg total) by mouth 2 (two) times a day for 10 days.  Dispense: 20 tablet; Refill: 0    Patient overall seems to be doing okay.  I do think she has a bronchitis based on her exam today.  I am going to go ahead and start her on some Ceftin.  Prescription was sent to the pharmacy for Ceftin twice a day for 10 days.  I would suspect that she will have gradual improvement in her symptoms.  If that is not the case she certainly should let me know.  I do  not see any evidence of any mouth sores or other difficulties in her mouth today.  We discussed the fact that often month sores are indicative of a viral process and so certainly that could be playing a part here as well.  All of her questions were answered today.  If she has additional questions or concerns or does not seem to have improvement in her symptoms she should let me know.  Migdalia Delvalle MD

## 2021-06-24 NOTE — PROGRESS NOTES
1. Osteoporosis, senile  denosumab 60 mg (PROLIA 60 mg/ml)     Patient was educated on safety of Prolia utilizing Patient Counseling Chart for Healthcare Providers, as outlined by the Prolia REMS progam.     Return in about 6 months (around 9/7/2019) for Recheck, Prolia injection.    Patient Instructions   Prolia 8th today.  Prolia 9th in 6 months witb my nurse. I will see you in 1 year.    DXA in 9/2020 .   Phone number to schedule [phone].    Daily calcium need is 3528-5192 mg a day from the diet and supplements.  Calcium citrate is easier to digest.  Vitamin D 2000 IU daily recommended.      Chief Complaint   Patient presents with     Osteoporosis Follow Up     had a cortisone injection on 3/5/19       /68 (Patient Site: Left Arm, Patient Position: Sitting, Cuff Size: Adult Regular)   Pulse 76   Wt 168 lb 14.4 oz (76.6 kg)   LMP 01/11/1997   BMI 29.92 kg/m        Did you experience any problems with previous Prolia injection? no  Any medication change in the last 6 months? no  Did you take prednisone or other immunosupressant drugs in the last 6 months   (chemo, transplant, rheum, dermatology conditions)? no  Did you have any serious infection in the last 6 months?no  Any recent hospitalizations?no  Do you plan any dental work in the next 2-3 months?no  How much calcium do you take daily from the diet and supplements?1200 mg  How much vit D do you take daily? 2000 IU  Last DXA? 9/2018,  Reviewed and discussed      Patient is here today for the 8th Prolia injection. Patient tolerated previous injections well.   We discussed calcium and vit D daily needs today.   Next Prolia injection will be in 6 months.     15 minutes spent with the patient and more then 50 % of the time in counseling.  This note has been dictated using voice recognition software. Any grammatical or context distortions are unintentional and inherent to the software      Patient Active Problem List   Diagnosis     Peripheral  Neuropathy     Urticaria     Major Depression, Recurrent     Lump Or Mass In Breast     Anxiety     Limb Swelling     Irritable Bowel Syndrome     Primary osteoarthritis of right knee     Primary osteoarthritis of knee, left     Osteoporosis, senile     Esophageal reflux     Primary osteoarthritis involving multiple joints     Arthritis of first metatarsophalangeal (MTP) joint of right foot       Current Outpatient Medications on File Prior to Visit   Medication Sig Dispense Refill     calcium citrate-vitamin D (CITRACAL+D) 315-200 mg-unit per tablet Take 1 tablet by mouth 2 (two) times a day.       citalopram (CELEXA) 40 MG tablet TAKE 1 TABLET DAILY 90 tablet 2     dicyclomine (BENTYL) 10 MG capsule Take 1 capsule (10 mg total) by mouth 4 (four) times a day as needed For cramps. 30 capsule 0     furosemide (LASIX) 20 MG tablet Take 1 tablet (20 mg total) by mouth daily. 90 tablet 3     LORazepam (ATIVAN) 0.5 MG tablet TAKE ONE TABLET BY MOUTH THREE TIMES DAILY as needed for anxiety 10 tablet 0     magnesium 250 mg Tab Take 250 mg by mouth daily.        melatonin 5 mg Tab tablet Take 5 mg by mouth at bedtime as needed.       MULTIVIT &MINERALS/FERROUS FUM (MULTI VITAMIN ORAL) Take 1 tablet by mouth daily.       omeprazole (PRILOSEC) 20 MG capsule Take 1 capsule (20 mg total) by mouth daily before breakfast. 90 capsule 3     POLYETHYLENE GLYCOL 3350 (MIRALAX ORAL) Take 17 g by mouth daily.              psyllium (METAMUCIL) 0.52 gram capsule Take 0.52 g by mouth daily.       [DISCONTINUED] calcium polycarbophil (FIBER, CALCIUM POLYCARBOPHIL,) 625 mg tablet Take by mouth.       [DISCONTINUED] ibuprofen (ADVIL,MOTRIN) 200 MG tablet Take 200 mg by mouth every 6 (six) hours as needed for pain.       [DISCONTINUED] melatonin 3 mg Tab tablet Take 3 mg by mouth at bedtime as needed.       Current Facility-Administered Medications on File Prior to Visit   Medication Dose Route Frequency Provider Last Rate Last Dose      denosumab 60 mg (PROLIA 60 mg/ml)  60 mg Subcutaneous Q6 Months Anjali Ramires MD   60 mg at 09/07/18 7619

## 2021-06-24 NOTE — PATIENT INSTRUCTIONS - HE
Prolia 8th today.  Prolia 9th in 6 months witkaty my nurse. I will see you in 1 year.    DXA in 9/2020 .   Phone number to schedule [phone].    Daily calcium need is 0674-0973 mg a day from the diet and supplements.  Calcium citrate is easier to digest.  Vitamin D 2000 IU daily recommended.

## 2021-06-24 NOTE — PROGRESS NOTES
Assessment:     1. Great toe pain, right  XR Toe Right 2 or More VWS    Ambulatory referral to Rheumatology    methylPREDNISolone (MEDROL DOSEPACK) 4 mg tablet   2. Arthritis of first metatarsophalangeal (MTP) joint of right foot  Ambulatory referral to Rheumatology    methylPREDNISolone (MEDROL DOSEPACK) 4 mg tablet     Xr Toe Right 2 Or More Vws    Result Date: 2/22/2019  [facility] RADIOLOGY EXAM: XR TOE RIGHT 2 OR MORE VWS LOCATION: [facility] DATE/TIME: 2/22/2019 4:24 PM INDICATION: Pain in right toe(s) COMPARISON: None. FINDINGS: Moderate degenerative change and joint space loss first MTP joint. No evidence for fracture.     Plan:     Differential diagnosis include but not limited to right great toe pain, degenerative changes of the right great toe pain, or MTP fracture.  Discussed with the patient on exam patient does have tenderness over the first MTP joint, no swelling noted.  Patient does have limited range of motion with movement of the right great toe pain.  X-ray was done, findings with moderate degenerative changes and joint space loss of first MTP joint and no evidence of fracture.  Discussed the results with the patient and her .  At this time I think patient would benefit from Medrol Dosepak and I will refer her to rheumatology for further evaluation and treatment.  Patient and her  were in agreement with the plan of care.     Subjective:       73 y.o. female presents for evaluation of right great toe pain.  Patient reports that she has been experiencing the symptoms for 2 days.  She has had similar issue before but today she feels like her pain is worse.  Her pain is worse with movement, is sharp, and constant.  Currently her pain level is 7 out of 10 she took Tylenol to Arthritis and That Has Not Been Effective for Her.  She Has Been Seen by Podiatry previously and she was advised to have steroid injections which she never followed through.  Patient was recently on a  flight from [place], [place] to [place] came back about 2 days ago.  She admits that walking in the airport was very difficult for her.  She denies any other symptoms including numbness or tingling.      The following portions of the patient's history were reviewed and updated as appropriate: allergies, current medications, past family history, past medical history, past social history, past surgical history and problem list.    Review of Systems  A 12 point comprehensive review of systems was negative except as noted.      Objective:      /72 (Patient Site: Left Arm, Patient Position: Sitting, Cuff Size: Adult Regular)   Pulse 79   Temp 97.5  F (36.4  C) (Oral)   Resp 20   Wt 169 lb 11.2 oz (77 kg)   LMP 01/11/1997   SpO2 97%   BMI 30.06 kg/m    General appearance: alert, appears stated age, cooperative and moderate distress  Head: Normocephalic, without obvious abnormality, atraumatic  Lungs: clear to auscultation bilaterally  Heart: regular rate and rhythm, S1, S2 normal, no murmur, click, rub or gallop  Extremities: extremities normal, atraumatic, no cyanosis or edema and Right great toe with tenderness to palpation at first MTP joint, no erythema noted.  Limited range of motion with dorsiflexing and plantar flexing.  Pulses: 2+ and symmetric  Skin: Skin color, texture, turgor normal. No rashes or lesions  Neurologic: Grossly normal     This note has been dictated using voice recognition software. Any grammatical or context distortions are unintentional and inherent to the software

## 2021-06-24 NOTE — PROGRESS NOTES
ASSESSMENT AND PLAN:  Adelina Espinoza 73 y.o. female is seen here on 03/05/19 for evaluation of painful joints, she has osteoarthritis the worst of her pain is in the right first MTP.  We discussed management principles.  She has had x-rays.  These were done and in the clinic.  Those films are reviewed today and consistent with osteoarthritis.  We discussed the likelihood of gout which appears to be low.  She would like to proceed with local injection done with 20 mg of methylprednisolone into the right first MTP.  She tolerated the procedure well.  Management principles of osteoarthritis in general very  Diagnoses and all orders for this visit:    Primary osteoarthritis involving multiple joints    Arthritis of first metatarsophalangeal (MTP) joint of right foot  -     methylPREDNISolone acetate injection 20 mg (DEPO-MEDROL)      HISTORY OF PRESENTING ILLNESS:  Adelina Espinoza, 73 y.o., female is here for evaluation of painful joints.  This is in her big toe #1 on each side worse on the right side.  It has been intermittently troublesome in the past.  At one point she was seen in podiatry.  The option of corticosteroid injections were discussed.  It has reached a point now where she would like something more done about it.  She is already taking Tylenol and ibuprofen.  She has tried naproxen that she cannot take because he gets all wired up at night.  She is also had peripheral neuropathy, secondary to chemotherapy for breast cancer 15 years ago.  At no point did she have features suggestive of an acute inflammatory arthropathy affecting her first MTPs.  She rated the pain level lower at 4.0/10.  Morning stiffness no more than 15 minutes.  There is no family or personal history of psoriasis except grandfather.  She is not a smoker alcohol 2-3 drinks per week.  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  Accompanied by her .  Further historical information and ADL limitations as noted  in the multidimensional health assessment questionnaire attached in the EMR. Rest of the 13 system ROS is negative.     ALLERGIES:Adhesive; Green pepper; Lactose; and Monosodium glutamate    PAST MEDICAL/ACTIVE PROBLEMS/MEDICATION/ FAMILY HISTORY/SOCIAL DATA:  The patient has a family history of  Past Medical History:   Diagnosis Date     Anxiety      Arthritis      Breast cancer (H) 2003     Depression      Esophageal reflux      History of anesthesia complications     goofy     Hx antineoplastic chemotherapy 2003    for breast cancer     Hx of radiation therapy 2004    for breast cancer     IBS (irritable bowel syndrome)      Neuropathy (H)      Osteopenia      Social History     Tobacco Use   Smoking Status Never Smoker   Smokeless Tobacco Never Used     Patient Active Problem List   Diagnosis     Peripheral Neuropathy     Urticaria     Major Depression, Recurrent     Lump Or Mass In Breast     Anxiety     Limb Swelling     Irritable Bowel Syndrome     Primary osteoarthritis of right knee     Primary osteoarthritis of knee, left     Osteoporosis, senile     Esophageal reflux     Current Outpatient Medications   Medication Sig Dispense Refill     calcium citrate-vitamin D (CITRACAL+D) 315-200 mg-unit per tablet Take 1 tablet by mouth 2 (two) times a day.       calcium polycarbophil (FIBER, CALCIUM POLYCARBOPHIL,) 625 mg tablet Take by mouth.       citalopram (CELEXA) 40 MG tablet TAKE 1 TABLET DAILY 90 tablet 2     dicyclomine (BENTYL) 10 MG capsule Take 1 capsule (10 mg total) by mouth 4 (four) times a day as needed For cramps. 30 capsule 0     furosemide (LASIX) 20 MG tablet Take 1 tablet (20 mg total) by mouth daily. 90 tablet 3     ibuprofen (ADVIL,MOTRIN) 200 MG tablet Take 200 mg by mouth every 6 (six) hours as needed for pain.       LORazepam (ATIVAN) 0.5 MG tablet TAKE ONE TABLET BY MOUTH THREE TIMES DAILY as needed for anxiety 10 tablet 0     magnesium 250 mg Tab Take 250 mg by mouth daily.         "melatonin 3 mg Tab tablet Take 3 mg by mouth at bedtime as needed.       MULTIVIT &MINERALS/FERROUS FUM (MULTI VITAMIN ORAL) Take 1 tablet by mouth daily.       omeprazole (PRILOSEC) 20 MG capsule Take 1 capsule (20 mg total) by mouth daily before breakfast. 90 capsule 3     POLYETHYLENE GLYCOL 3350 (MIRALAX ORAL) Take by mouth.       psyllium (METAMUCIL) 0.52 gram capsule Take 0.52 g by mouth daily.       Current Facility-Administered Medications   Medication Dose Route Frequency Provider Last Rate Last Dose     denosumab 60 mg (PROLIA 60 mg/ml)  60 mg Subcutaneous Q6 Months Anjali Ramires MD   60 mg at 09/07/18 1308       COMPREHENSIVE EXAMINATION:  Vitals:    03/05/19 1220   BP: 108/70   Patient Site: Right Arm   Patient Position: Sitting   Cuff Size: Adult Large   Pulse: 80   Weight: 169 lb (76.7 kg)   Height: 5' 3\" (1.6 m)     A well appearing alert oriented female. Vital data as noted above. Her eyes without inflammation/scleromalacia. ENTwithout oral mucositis, thrush, nasal deformity, external ear redness, deformity. Her neck is without lymphadenopathy and supple. Lungs normal sounds, no pleural rub. Heart auscultation normal rate, rhythm; no pericardial rub and murmurs. Abdomen soft, non tender, no organomegaly. Skin examined for heliotrope, malar area eruption, lupus pernio, periungual erythema, sclerodactyly, papules, erythema nodosum, purpura, nail pitting, onycholysis, and obvious psoriasis lesion. Neurological examination shows normal alertness, speech, facial symmetry, tone and power in upper and lower extremities, Tinel's and Phalen's at wrist and gait. The joint examination is performed for swelling, tenderness, warmth, erythema, and range of motion in the following joints: DIPs, PIPs, MCPs, wrists, first CMC's, elbows, shoulders, hips, knees, ankles, feet; spine for range of motion and paraspinal muscles for tenderness. The salient normal / abnormal findings are appended.  She has marked " tenderness in the first MTPs bilaterally worse on the right side.  Bilateral TKAs.  She has minimal Heberden such as in the index finger.  She has tiny squatting of the first CMC more so on the right side.  She does not have synovitis in any of the palpable joints.    LAB / IMAGING DATA:  ALT   Date Value Ref Range Status   11/14/2018 19 0 - 45 U/L Final   05/08/2018 18 0 - 45 U/L Final   03/07/2018 22 0 - 45 U/L Final     Albumin   Date Value Ref Range Status   11/14/2018 4.2 3.5 - 5.0 g/dL Final   05/08/2018 3.9 3.5 - 5.0 g/dL Final   03/07/2018 4.0 3.5 - 5.0 g/dL Final     Creatinine   Date Value Ref Range Status   11/14/2018 0.69 0.60 - 1.10 mg/dL Final   05/08/2018 0.65 0.60 - 1.10 mg/dL Final   03/07/2018 0.69 0.60 - 1.10 mg/dL Final       WBC   Date Value Ref Range Status   07/31/2018 8.9 4.0 - 11.0 thou/uL Final   05/08/2018 5.0 4.0 - 11.0 thou/uL Final     Hemoglobin   Date Value Ref Range Status   07/31/2018 15.0 12.0 - 16.0 g/dL Final   05/08/2018 14.5 12.0 - 16.0 g/dL Final   04/23/2016 11.9 (L) 12.0 - 16.0 g/dL Final     Platelets   Date Value Ref Range Status   07/31/2018 307 140 - 440 thou/uL Final   05/08/2018 237 140 - 440 thou/uL Final   04/21/2016 292 140 - 440 thou/uL Final       Lab Results   Component Value Date    SEDRATE 28 (H) 02/26/2010

## 2021-06-25 NOTE — TELEPHONE ENCOUNTER
Refill Approved    Rx renewed per Medication Renewal Policy. Medication was last renewed on 03/30/2021.  Last office visit was 04/06/2021 with PCP office.    Mary Jane Whyte, [facility] Triage/Med Refill 6/7/2021     Requested Prescriptions   Pending Prescriptions Disp Refills     citalopram (CELEXA) 40 MG tablet [Pharmacy Med Name: CITALOPRAM HYDROBROMIDE TABS 40MG] 90 tablet 3     Sig: TAKE 1 TABLET DAILY       [facility] Refill Protocol  Passed - 6/7/2021  2:39 AM        Passed - PCP or prescribing provider visit in last year     Last office visit with prescriber/PCP: 10/19/2020 Migdalia Delvalle MD OR same dept: 10/19/2020 Migdalia Delvalle MD OR same specialty: 10/19/2020 Migdalia Delvalle MD  Last physical: 1/11/2021 Last MTM visit: Visit date not found   Next visit within 3 mo: Visit date not found  Next physical within 3 mo: Visit date not found  Prescriber OR PCP: Migdalia Delvalle MD  Last diagnosis associated with med order: 1. Major depressive disorder, recurrent episode (H)  - citalopram (CELEXA) 40 MG tablet [Pharmacy Med Name: CITALOPRAM HYDROBROMIDE TABS 40MG]; TAKE 1 TABLET DAILY  Dispense: 90 tablet; Refill: 3    2. Anxiety  - citalopram (CELEXA) 40 MG tablet [Pharmacy Med Name: CITALOPRAM HYDROBROMIDE TABS 40MG]; TAKE 1 TABLET DAILY  Dispense: 90 tablet; Refill: 3    If protocol passes may refill for 12 months if within 3 months of last provider visit (or a total of 15 months).

## 2021-06-25 NOTE — TELEPHONE ENCOUNTER
Pt is having more toe pain, pt states she had back surgery and was on pain meds and didn't notice the toe pain at the time but now is having more pain in her toe. Pt would like to come in for possible injection.     Added pt to 6/15 at 8:15am with Dr Borges in person.

## 2021-06-25 NOTE — TELEPHONE ENCOUNTER
Patient is calling because she is having increased toe pain. She has had an injection in the past with Dr. Borges. She is wondering if there is anyway she can get in for an injection sooner than August.

## 2021-06-26 NOTE — TELEPHONE ENCOUNTER
RN cannot approve Refill Request    RN can NOT refill this medication patient reports taking medication differently. Last office visit: 10/19/2020 Migdalia Delvalle MD Last Physical: 1/11/2021 Last MTM visit: Visit date not found Last visit same specialty: 10/19/2020 Migdalia Delvalle MD.  Next visit within 3 mo: Visit date not found  Next physical within 3 mo: Visit date not found      Ran Montes, [facility] Triage/Med Refill 6/17/2021    Requested Prescriptions   Pending Prescriptions Disp Refills     furosemide (LASIX) 20 MG tablet [Pharmacy Med Name: FUROSEMIDE TABS 20MG] 90 tablet 3     Sig: TAKE 1 TABLET DAILY       Diuretics/Combination Diuretics Refill Protocol  Passed - 6/17/2021 11:43 AM        Passed - Visit with PCP or prescribing provider visit in past 12 months     Last office visit with prescriber/PCP: 10/19/2020 Migdalia Delvalle MD OR same dept: 10/19/2020 Migdalia Delvalle MD OR same specialty: 10/19/2020 Migdalia Delvalle MD  Last physical: 1/11/2021 Last MTM visit: Visit date not found   Next visit within 3 mo: Visit date not found  Next physical within 3 mo: Visit date not found  Prescriber OR PCP: Migdalia Delvalle MD  Last diagnosis associated with med order: 1. Limb swelling  - furosemide (LASIX) 20 MG tablet [Pharmacy Med Name: FUROSEMIDE TABS 20MG]; TAKE 1 TABLET DAILY  Dispense: 90 tablet; Refill: 3    If protocol passes may refill for 12 months if within 3 months of last provider visit (or a total of 15 months).             Passed - Serum Potassium in past 12 months      Lab Results   Component Value Date    Potassium 4.2 01/31/2021             Passed - Serum Sodium in past 12 months      Lab Results   Component Value Date    Sodium 139 01/31/2021             Passed - Blood pressure on file in past 12 months     BP Readings from Last 1 Encounters:   06/15/21 120/72             Passed - Serum Creatinine in past 12 months      Creatinine   Date Value Ref Range Status    01/31/2021 0.59 (L) 0.60 - 1.10 mg/dL Final

## 2021-06-26 NOTE — TELEPHONE ENCOUNTER
Please call her and let her know that we did refill her medication however she needs to be seen in follow up with her regular doctor prior to any further refills.

## 2021-06-26 NOTE — PROGRESS NOTES
This document was created using a software with less than 100% fidelity, at times resulting in unintended, even erroneous syntax and grammar.  The reader is advised to keep this under consideration while reviewing, interpreting this note.      ASSESSMENT AND PLAN:  Adelina Espinoza 75 y.o. female is seen here on 06/15/21 for evaluation of right foot pain, this is epicentered at the right first MCP joint.  This is in association with osteoarthritis.  She has tried various modalities acetaminophen, gabapentin, she has history of neuropathy, corticosteroid injections have been helpful in the past she would like to proceed with pros and cons outlined 20 mg of Kenalog injected into the right first MTP joint under ultrasound guidance.  She is to return for follow-up on as-needed basis.  Diagnoses and all orders for this visit:    Arthritis of first metatarsophalangeal (MTP) joint of right foot  -     triamcinolone acetonide 40 mg/mL injection 20 mg (KENALOG-40)    Primary osteoarthritis involving multiple joints          HISTORY OF PRESENTING ILLNESS ON 06/15/21 :  Adelina Espinoza 75 y.o. is here for a moderately severe flare up of pain. Here for a moderately severe flare up of pain.  Joints affected include the right MTP(s): 1st. This has gone on for several weeks. Pain is described as sharp. It is worse with activity at times bedtime..  Her symptoms are moderately severe. The symptoms are progressive.  Associated findings include /do not include: swelling, rash.  There is no associated recent fall or trauma.  2019 she had corticosteroid injection in the same joint which provided her durable relief.  She feels at times as if somebody is poking knife twisting it in her big toe.  Over-the-counter treatment to date has been without significant relief.    Further historical information, including ROS and limitation in activities as noted in the multidimensional health assessment questionnaire scanned in the EMR and in the  assessment and plan section.    ALLERGIES:Adhesive, Gabapentin, Green pepper, Lactose, and Monosodium glutamate    PAST MEDICAL/ACTIVE PROBLEMS/MEDICATION/SOCIAL DATA  Past Medical History:   Diagnosis Date     Anxiety      Arthritis      Breast cancer (H) 2003     Depression      Esophageal reflux      History of anesthesia complications     nausea     Hx antineoplastic chemotherapy 2003    for breast cancer     Hx of radiation therapy 2004    for breast cancer     IBS (irritable bowel syndrome)      Lactose intolerance     lactose breath test positive at GI, 7/2019     Macular degeneration 08/2019    caught early, takes Areds 2 on a daily basis, seeing opthamology     Neuropathy     secondary to chemotherapy     Osteopenia      Social History     Tobacco Use   Smoking Status Never Smoker   Smokeless Tobacco Never Used     Patient Active Problem List   Diagnosis     Peripheral Neuropathy     Urticaria     Major Depression, Recurrent     Anxiety     Irritable Bowel Syndrome     Primary osteoarthritis of right knee     Primary osteoarthritis of knee, left     Osteoporosis, senile     Esophageal reflux     Primary osteoarthritis involving multiple joints     Arthritis of first metatarsophalangeal (MTP) joint of right foot     Spinal stenosis of lumbar region with neurogenic claudication     Synovial cyst of lumbar facet joint     Lumbar disc herniation     Lumbar stenosis with neurogenic claudication     Current Outpatient Medications   Medication Sig Dispense Refill     acetaminophen (TYLENOL) 500 MG tablet Take 1,000 mg by mouth every 6 (six) hours as needed for pain.       aspirin 81 MG EC tablet Take 1 tablet (81 mg total) by mouth daily.  0     calcium citrate-vitamin D (CITRACAL+D) 315-200 mg-unit per tablet Take 1 tablet by mouth 2 (two) times a day.       calcium polycarbophiL (FIBERCON) 625 mg tablet Take 625 mg by mouth 2 (two) times a day.       citalopram (CELEXA) 40 MG tablet TAKE 1 TABLET DAILY 90 tablet  "3     dicyclomine (BENTYL) 10 MG capsule TAKE 1 CAPSULE FOUR TIMES A DAY AS NEEDED FOR CRAMPS 30 capsule 1     furosemide (LASIX) 20 MG tablet TAKE 1 TABLET DAILY (Patient taking differently: Take 20 mg by mouth daily. ) 90 tablet 2     magnesium 250 mg Tab Take 250 mg by mouth daily.        methocarbamoL (ROBAXIN) 750 MG tablet Take 1 tablet (750 mg total) by mouth every 6 (six) hours as needed (muscle spasms). 30 tablet 0     multivit-min-iron-FA-lutein (CENTRUM SILVER WOMEN) 8 mg iron-400 mcg-300 mcg Tab Take 1 tablet by mouth daily.        omeprazole (PRILOSEC) 20 MG capsule TAKE 1 CAPSULE DAILY BEFORE BREAKFAST 90 capsule 3     tetrahydroz-peg 400-hyprom-gly (VISINE MAX REDNESS RELIEF) 0.05-1-0.36-0.2 % Drop Administer 1 drop to both eyes 3 (three) times a day as needed.       vit C/E/Zn/coppr/lutein/zeaxan (PRESERVISION AREDS-2 ORAL) Take 1 tablet by mouth 2 (two) times a day.        Current Facility-Administered Medications   Medication Dose Route Frequency Provider Last Rate Last Admin     denosumab 60 mg (PROLIA 60 mg/ml)  60 mg Subcutaneous Q6 Months Mone Blanchard MD   60 mg at 04/06/21 1031         DETAILED EXAMINATION  06/15/21  :  Vitals:    06/15/21 0810   BP: 120/72   Pulse: 84   Weight: 173 lb (78.5 kg)   Height: 5' 4\" (1.626 m)     Alert oriented. Head including the face is examined for malar rash, heliotropes, scarring, lupus pernio. Eyes examined for redness such as in episcleritis/scleritis, periorbital lesions.   Neck/ Face examined for parotid gland swelling, range of motion of neck.  Left upper and lower and right upper and lower extremities examined for tenderness, swelling, warmth of the appendicular joints, range of motion, edema, rash.  Some of the important findings included: She has marked tenderness of the joint line of the right first MCP and not so on the left side.  Bilateral TKAs, scattered tenderness such as first CMC's bilaterally.  Early Heberden's.           LAB / IMAGING " DATA:  ALT   Date Value Ref Range Status   09/04/2020 20 0 - 45 U/L Final   03/04/2020 23 0 - 45 U/L Final   11/01/2019 18 0 - 45 U/L Final     Albumin   Date Value Ref Range Status   09/04/2020 4.2 3.5 - 5.0 g/dL Final   03/04/2020 4.1 3.5 - 5.0 g/dL Final   11/01/2019 4.0 3.5 - 5.0 g/dL Final     Creatinine   Date Value Ref Range Status   01/31/2021 0.59 (L) 0.60 - 1.10 mg/dL Final   01/30/2021 0.57 (L) 0.60 - 1.10 mg/dL Final   01/11/2021 0.69 0.60 - 1.10 mg/dL Final       WBC   Date Value Ref Range Status   01/31/2021 9.3 4.0 - 11.0 thou/uL Final   01/30/2021 9.5 4.0 - 11.0 thou/uL Final     Hemoglobin   Date Value Ref Range Status   01/31/2021 11.4 (L) 12.0 - 16.0 g/dL Final   01/30/2021 11.7 (L) 12.0 - 16.0 g/dL Final   01/11/2021 14.4 12.0 - 16.0 g/dL Final     Platelets   Date Value Ref Range Status   01/31/2021 233 140 - 440 thou/uL Final   01/30/2021 229 140 - 440 thou/uL Final   01/11/2021 294 140 - 440 thou/uL Final       Lab Results   Component Value Date    SEDRATE 28 (H) 02/26/2010

## 2021-06-27 ENCOUNTER — HEALTH MAINTENANCE LETTER (OUTPATIENT)
Age: 76
End: 2021-06-27

## 2021-06-27 NOTE — PROGRESS NOTES
Progress Notes by Rahul Grewal PA-C at 8/10/2019 10:50 AM     Author: Rahul Grewal PA-C Service: -- Author Type: Physician Assistant    Filed: 8/13/2019  9:48 AM Encounter Date: 8/10/2019 Status: Signed    : Rahul Grewal PA-C (Physician Assistant)       Subjective:      Patient ID: Adelina Espinoza is a 73 y.o. female.    Chief Complaint:    HPI  Adelina Espinoza is a 73 y.o. female who presents today complaining of continued congestion and pain and pressure in the ears.  Patient was seen on 8/5/2019 and had a negative strep test.  She has been dealing with upper respiratory type symptoms and congestion.  Patient was concerned that she is now having problems with fluid and congestion in the ears and also having difficulty with hearing.  She has had no overt otorrhea or balance deficits.  She has had muffled hearing.  Slight pain in the ear especially when she is laying down or sleeping at nighttime.      Past Medical History:   Diagnosis Date   ? Anxiety    ? Arthritis    ? Breast cancer (H) 2003   ? Depression    ? Esophageal reflux    ? History of anesthesia complications     goofy   ? Hx antineoplastic chemotherapy 2003    for breast cancer   ? Hx of radiation therapy 2004    for breast cancer   ? IBS (irritable bowel syndrome)    ? Lactose intolerance     lactose breath test positive at , 7/2019   ? Neuropathy    ? Osteopenia        Past Surgical History:   Procedure Laterality Date   ? APPENDECTOMY  1967   ? BACK SURGERY      cervical fusion   ? BREAST BIOPSY Right 2003   ? BREAST LUMPECTOMY  12/2003    breast cancer   ? CHOLECYSTECTOMY  1994   ? HYSTERECTOMY  1997    desmoid cyst   ? NISSEN FUNDOPLICATION     ? OOPHORECTOMY  1997   ? DE TOTAL KNEE ARTHROPLASTY Right 10/29/2015    Procedure: RIGHT TOTAL KNEE  ARTHROPLASTY;  Surgeon: Nitin White MD;  Location: [facility];  Service: Orthopedics   ? DE TOTAL KNEE ARTHROPLASTY Left 4/21/2016    Procedure: LEFT TOTAL KNEE ARTHROPLASTY;   "Surgeon: Nitin White MD;  Location: [facility] OR;  Service: Orthopedics       Family History   Problem Relation Age of Onset   ? Breast cancer Maternal Aunt 46   ? Kidney cancer Mother 67   ? Hypertension Mother    ? Heart disease Mother    ? Alzheimer's disease Father    ? Atrial fibrillation Father    ? Aortic stenosis Father    ? Skin cancer Father    ? Stroke Father    ? Depression Father    ? Lung cancer Paternal Grandfather        Social History     Tobacco Use   ? Smoking status: Never Smoker   ? Smokeless tobacco: Never Used   Substance Use Topics   ? Alcohol use: Yes     Alcohol/week: 1.8 oz     Types: 3 Standard drinks or equivalent per week     Comment: Occasional   ? Drug use: No       Review of Systems  As above in HPI, otherwise balance of Review of Systems are negative.    Objective:     /77 (Patient Site: Left Arm, Patient Position: Sitting, Cuff Size: Adult Regular)   Pulse 98   Temp 98.4  F (36.9  C) (Oral)   Resp 16   Ht 5' 3\" (1.6 m)   Wt 162 lb (73.5 kg)   LMP 01/11/1997   SpO2 95%   BMI 28.70 kg/m      Physical Exam  General: Patient is resting comfortably no acute distress is afebrile  HEENT: Head is normocephalic atraumatic   eyes are PERRL EOMI sclera anicteric   TMs on the right is with fluid in the middle ear   TM on the left is with fluid in the middle ear  EAC on the left is with no cerumen  Throat is without pharyngeal wall erythema and no exudate  No cervical lymphadenopathy present  LUNGS: Clear to auscultation bilaterally  HEART: Regular rate and rhythm  Skin: Without rash non-diaphoretic    Lab:  Rapid strep is negative on 8/5.      Assessment:     Procedures    The primary encounter diagnosis was Dysfunction of both eustachian tubes. A diagnosis of Upper respiratory tract infection, unspecified type was also pertinent to this visit.    Plan:     1. Dysfunction of both eustachian tubes  fluticasone propionate (FLONASE ALLERGY RELIEF) 50 mcg/actuation nasal " spray   2. Upper respiratory tract infection, unspecified type  fluticasone propionate (FLONASE ALLERGY RELIEF) 50 mcg/actuation nasal spray         Patient Instructions   Use of over-the-counter Zyrtec.  Follow packaging directions  Do not use Zyrtec is using Dayquil or Nyquil cold products that contain antihistamine.  Use of over-the-counter Flonase  Frequent swallowing drinking and chewing gum to help create low-grade suction on the eustachian tube.  Elevate head at nighttime so it does not increase pressure  Use of over-the-counter Tylenol as needed for comfort.  Return to primary care provider for reevaluation treatment if any complication or new symptoms should present.        Patient Education     Earache, No Infection (Adult)  Earaches can happen without an infection. This occurs when air and fluid build up behind the eardrum causing a feeling of fullness and discomfort and reduced hearing. This is called otitis media with effusion (OME) or serous otitis media. It means there is fluid in the middle ear. It is not the same as acute otitis media, which is typically from infection.  OME can happen when you have a cold if congestion blocks the passage that drains the middle ear. This passage is called the eustachian tube. OME may also occur with nasal allergies or after a bacterial middle ear infection.    The pain or discomfort may come and go. You may hear clicking or popping sounds when you chew or swallow. You may feel that your balance is off. Or you may hear ringing in the ear.  It often takes from several weeks up to 3 months for the fluid to clear on its own. Oral pain relievers and ear drops help if there is pain. Decongestants and antihistamines sometimes help. Antibiotics don't help since there is no infection. Your doctor may prescribe a nasal spray to help reduce swelling in the nose and eustachian tube. This can allow the ear to drain.  If your OME doesn't improve after 3 months, surgery may be  used to drain the fluid and insert a small tube in the eardrum to allow continued drainage.  Because the middle ear fluid can become infected, it is important to watch for signs of an ear infection which may develop later. These signs include increased ear pain, fever, or drainage from the ear.  Home care  The following guidelines will help you care for yourself at home:    You may use over-the-counter medicine as directed to control pain, unless another medicine was prescribed. If you have chronic liver or kidney disease or ever had a stomach ulcer or GI bleeding, talk with your doctor before using these medicines. Aspirin should never be used in anyone under 18 years of age who is ill with a fever. It may cause severe liver damage.    You may use over-the-counter decongestants such as phenylephrine or pseudoephedrine. But they are not always helpful. Don't use nasal spray decongestants more than 3 days. Longer use can make congestion worse. Prescription nasal sprays from your doctor don't typically have those restrictions.    Antihistamines may help if you are also having allergy symptoms.    You may use medicines such as guaifenesin to thin mucus and promote drainage.  Follow-up care  Follow up with your healthcare provider or as advised if you are not feeling better after 3 days.  When to seek medical advice  Call your healthcare provider right away if any of the following occur:    Your ear pain gets worse or does not start to improve     Fever of 100.4 F (38 C) or higher, or as directed by your healthcare provider    Fluid or blood draining from the ear    Headache or sinus pain    Stiff neck    Unusual drowsiness or confusion  Date Last Reviewed: 10/1/2016    3430-9433 [employer] [address] [place] [address], [place] [address]. All rights reserved. This information is not intended as a substitute for professional medical care. Always follow your healthcare professional's instructions.

## 2021-06-28 NOTE — PROGRESS NOTES
Progress Notes by Rahul Deutsch PT at 3/18/2020 11:00 AM     Author: Rahul Deutsch PT Service: -- Author Type: Physical Therapist    Filed: 3/18/2020  3:30 PM Encounter Date: 3/18/2020 Status: Attested    : Rahul Deutsch PT (Physical Therapist) Cosigner: Mary Daniels PA-C at 3/18/2020  3:35 PM    Attestation signed by Mary Daniels PA-C at 3/18/2020  3:35 PM    Reviewed and agree with plan.                [facility]   Lumbo-Pelvic Initial Evaluation           [facility] Certification Request    March 18, 2020      Patient: Adelina Espinoza  MR Number: [id number]  YOB: 1945  Date of Visit: 3/18/2020      Dear Dr. Daniels    Thank you for this referral.   We are seeing Adelina Espinoza for Physical Therapy of Low Back Pain.    Medicare and/or Medicaid requires physician review and approval of the treatment plan. Please review the plan of care and verify that you agree with the therapy plan of care by co-signing this note.      Plan of Care  Authorization / Certification Start Date: 03/18/20  Authorization / Certification End Date: 06/15/20  Authorization / Certification Number of Visits: 12  Communication with: Referral Source  Patient Related Instruction: Nature of Condition;Expected outcome;Treatment plan and rationale;Self Care instruction;Basis of treatment;Body mechanics;Posture;Precautions;Next steps  Times per Week: 1-2  Number of Weeks: 12  Number of Visits: 12  Discharge Planning: Provide pt with home exercise and self release programs to manage her pain  Therapeutic Exercise: ROM;Stretching;Strengthening  Neuromuscular Reeducation: kinesio tape  Manual Therapy: soft tissue mobilization;myofascial release;joint mobilization;muscle energy;strain counterstrain;craniosacral therapy      Goals:  No data recorded  Pt will: be able to change sleep positions without pain  Pt will: beable to bend forward to dress , bath put on shoes and socks without  pain  Pt will: Be able to reach above her head to wash, fix hair and reach into a shelf with decreased pain  Pt will: vianey be able to carry groceries, laundry with decreased pain        If you have any questions or concerns, please don't hesitate to call.    Sincerely,      Rahul Deutsch, PT        Physician recommendation:     ___ Follow therapist's recommendation        ___ Modify therapy      *Physician co-signature indicates they certify the need for these services furnished within this plan and while under their care.  Patient Name: Adelina Espinoza  Date of evaluation: 3/18/2020  Referral Diagnosis: Lumbar radiculitis  Referring provider: Mary Daniels PA-C  Visit Diagnosis:     ICD-10-CM    1. Chronic bilateral low back pain with bilateral sciatica  M54.42     M54.41     G89.29    2. Lumbosacral radiculitis  M54.17        Assessment:      Pt. is appropriate for skilled PT intervention as outlined in the Plan of Care (POC).  Pt. is a good candidate for skilled PT services to improve pain levels and function.    Goals:  No data recorded  No data recorded    Patient's expectations/goals are realistic.    Barriers to Learning or Achieving Goals:  Chronicity of the problem.  Co-morbidities or other medical factors.  .  Peripheral Neuropathy   Urticaria   Major Depression, Recurrent   Lump Or Mass In Breast   Anxiety   Limb Swelling   Irritable Bowel Syndrome   Primary osteoarthritis of right knee   Primary osteoarthritis of knee, left   Osteoporosis, senile   Esophageal reflux   Primary osteoarthritis involving multiple joints   Arthritis of first metatarsophalangeal (MTP) joint of right foot          Plan / Patient Instructions:        Plan of Care:   No data recorded    Plan for next visit: Initiate PT per POC     Subjective:       Results of Treatment    SI dysf resolved    normalized  6 sec   Pain levels to the LB decreased 7/10 to 0/10    Social information:   Living Situation:single family home and  lives with others    Occupation:retired   Work Status:NA   Equipment Available: None    History of Present Illness:    Adelina is a 74 y.o. female who presents to therapy today with complaints of Bilat  LBP with radicular pain into the R foot and Buttocks. Date of onset/duration of symptoms is Dec 2019. Onset was gradual and related to trauma. Symptoms are intermittent and not improving. She denies history of similar symptoms. She describes their previous level of function as not limited   Pt has bilat TKA's along with a cervical fusion.   Pt fell in December after cateract surgery initiating the LBP issue    Pain Ratin  Pain rating at best: 2  Pain rating at worst: 10  Pain description: sharp and shooting    Functional limitations are described as occurring with:   balance  reaching overhead  performing routine daily activities  sitting 1 hr  sleeping  standing 10 min  walking 20 min  less than a mile    Patient reports benefit from:  rest  , pain medication, heat, cold, massage         Objective:      Note: Items left blank indicates the item was not performed or not indicated at the time of the evaluation.    Patient Outcome Measures :    No data recorded     Scores range from 0-100%, where a score of 0% represents minimal pain and maximal function. The minimal clinically important difference is a score reduction of 12%.    Examination  1. Chronic bilateral low back pain with bilateral sciatica     2. Lumbosacral radiculitis       Involved side: Bilateral  Posture Observation:      General sitting posture is  fair.  General standing posture is fair.  Lumbopelvic complex: Pelvic alignment: R SI upslip    Lumbar ROM:    Date:      *Indicate scale AROM AROM AROM   Lumbar Flexion 12     Lumbar Extension 10      Right Left Right Left Right Left   Lumbar Sidebending 12 11       Lumbar Rotation         Thoracic Flexion      Thoracic Extension      Thoracic Sidebending         Thoracic Rotation                Palpation:   R SI downslip   DUral Restriction C3,T2,4,8.10.    5 sec   + Scan Neural,   Ligamentous Low Back,   Periosteal  Pelvis, Sacrum.   Lymphatic T duce 6 sec,  Groin and LE's 4 sec    Lumbar Special Tests:     Lumbar Special Tests Right Left SI Tests Right  Left   Quadrant test   SI Compression + -   Straight leg raise 54 59 SI Distraction     Crossover response   POSH Test     Slump + _ Sacral Thrust     Sit-up test  FADIR     Trunk extensor endurance test  Resisted Abduction     Prone instability test  Other:     Pubic shotgun  Other:       Treatment Today     TREATMENT MINUTES COMMENTS   Evaluation 40    Self-care/ Home management     Manual therapy 20 MFR with OA, L5-SI and SI joint releases, Dural Tube Pull.      Neuromuscular Re-education     Therapeutic Activity     Therapeutic Exercises     Gait training     Modality__________________                Total 60    Blank areas are intentional and mean the treatment did not include these items.       PT Evaluation Code: (Please list factors)  Patient History/Comorbidities:   Peripheral Neuropathy   Urticaria   Major Depression, Recurrent   Lump Or Mass In Breast   Anxiety   Limb Swelling   Irritable Bowel Syndrome   Primary osteoarthritis of right knee   Primary osteoarthritis of knee, left   Osteoporosis, senile   Esophageal reflux   Primary osteoarthritis involving multiple joints   Arthritis of first metatarsophalangeal (MTP) joint of right foot       Examination: Neuro,  Periosteal,   Ligamentous,   Lymphatic  Clinical Presentation: Evolving  Clinical Decision Making: Moderate    Patient History/  Comorbidities Examination  (body structures and functions, activity limitations, and/or participation restrictions) Clinical Presentation Clinical Decision Making (Complexity)   No documented Comorbidities or personal factors 1-2 Elements Stable and/or uncomplicated Low   1-2 documented comorbidities or personal factor 3 Elements Evolving clinical  presentation with changing characteristics Moderate   3-4 documented comorbidities or personal factors 4 or more Unstable and unpredictable High            Rahul Deutsch  3/18/2020  11:19 AM

## 2021-06-29 ENCOUNTER — COMMUNICATION - HEALTHEAST (OUTPATIENT)
Dept: SURGERY | Facility: CLINIC | Age: 76
End: 2021-06-29

## 2021-06-29 ENCOUNTER — HOSPITAL ENCOUNTER (OUTPATIENT)
Dept: SURGERY | Facility: CLINIC | Age: 76
Discharge: HOME | End: 2021-06-29
Attending: SPECIALIST
Payer: MEDICARE

## 2021-06-29 ENCOUNTER — AMBULATORY - HEALTHEAST (OUTPATIENT)
Dept: SURGERY | Facility: CLINIC | Age: 76
End: 2021-06-29

## 2021-06-29 DIAGNOSIS — N63.0 LUMP OR MASS IN BREAST: ICD-10-CM

## 2021-06-29 DIAGNOSIS — Z85.3 PERSONAL HISTORY OF BREAST CANCER: ICD-10-CM

## 2021-06-29 DIAGNOSIS — Z11.59 ENCOUNTER FOR SCREENING FOR OTHER VIRAL DISEASES: ICD-10-CM

## 2021-06-29 NOTE — PROGRESS NOTES
Progress Notes by Carson Wells at 6/23/2020  2:00 PM     Author: Carson Wells Service: -- Author Type: Physical Therapist    Filed: 6/23/2020  2:57 PM Encounter Date: 6/23/2020 Status: Attested    : Carson Wells (Physical Therapist) Cosigner: Mary Daniels PA-C at 6/23/2020  3:05 PM    Attestation signed by Mary Daniels PA-C at 6/23/2020  3:05 PM    Agree with POC.                [facility] Re-Certification Request    June 23, 2020      Patient: Adelina Espinoza  MR Number: [id number]  YOB: 1945  Date of Visit: 6/23/2020      Dear Mary Lu PA-C:    As you may recall, we have been seeing Adelina Espinoza for Physical Therapy of LBP.    For therapy to continue, Medicare and/or Medicaid requires periodic physician review of the treatment plan. Please review the summary of the patient's progress and our plan for continued therapy, and verify  that you agree therapy should continue by entering certification dates at the bottom of this note and co-signing this note.    Plan of Care  Authorization / Certification Start Date: 06/15/20  Authorization / Certification End Date: 09/14/20  Communication with: Referral Source  Patient Related Instruction: Nature of Condition;Treatment plan and rationale;Self Care instruction;Basis of treatment;Body mechanics;Precautions;Next steps;Posture;Expected outcome  Times per Week: 1  Number of Weeks: 12  Number of Visits: 12  Discharge Planning: discharge to [facility] once goals are met.  Therapeutic Exercise: ROM;Stretching;Strengthening  Neuromuscular Reeducation: posture;balance/proprioception;TNE;core;kinesio tape  Manual Therapy: soft tissue mobilization;myofascial release;joint mobilization;muscle energy;strain counterstrain      Goal  See below    If you have any questions or concerns, please don't hesitate to call.    Sincerely,      Carson Wells PT, ATC        Physician recommendation:     ___ Follow  therapist's recommendation        ___ Modify therapy      *Physician co-signature indicates they certify the need for these services furnished within this plan and while under their care.       [facility] Daily Progress     Patient Name: Adelina Espinoza  Date: 2020  Visit #:    PTA visit #:   0     Referral Diagnosis: Lumbar Radiculitis  Referring provider: Mary Daniels PA-C  Visit Diagnosis:     ICD-10-CM    1. Chronic bilateral low back pain with bilateral sciatica  M54.42     M54.41     G89.29    2. Lumbosacral radiculitis  M54.17    3. Swelling of limb  M79.89    4. Localized edema  R60.0          Assessment:     She is doing well overall with a decrease in her symptoms and an increase in her function. She is progressing towards all goals at this time.     HEP/POC compliance is  good .  Patient demonstrates understanding/independence with home program.       Goal Status:  No data recorded  Pt will: be able to change sleep positions without pain - PROGRESSING  Pt will: beable to bend forward to dress , bath put on shoes and socks without pain - PROGRESSING  Pt will: Be able to reach above her head to wash, fix hair and reach into a shelf with decreased pain - GOAL MET  Pt will: vianey be able to carry groceries, laundry with decreased pain - PROGRESSING      Plan / Patient Education:     Continue with initial plan of care.  Progress with home program as tolerated.    Subjective:     Pain Ratin-6/10 pain range  She is doing pretty good. She did have a fall a week ago and she is still sore from that. She did reduce her Gabapentin at that point as well.   Aside from the fall she is feeling like she is doing better. Now there is some pain down by her tailbone from the fall yet.   Sleeping and moving around in bed has been much better especially since having an injection not too long ago.   She is still getting some help with the R foot to get on shoes and socks but she has been working on  that.   She is doing fine with her arm movements currently.   Carrying/lifting she has been having her  help her.     Objective:     MS, LV fascial tensions.      Treatment Today     TREATMENT MINUTES COMMENTS   Evaluation     Self-care/ Home management     Manual therapy 55 Fascial release using Strain-Counterstrain of BLE/pelvis (P)-MS,    Neuromuscular Re-education     Therapeutic Activity     Therapeutic Exercises     Gait training     Modality__________________                Total 55    Blank areas are intentional and mean the treatment did not include these items.       Carson Wells  6/23/2020

## 2021-06-29 NOTE — PROGRESS NOTES
Progress Notes by Rahul Deutsch PT at 9/14/2020  8:00 AM     Author: Rahul Deutsch PT Service: -- Author Type: Physical Therapist    Filed: 9/23/2020  9:35 AM Encounter Date: 9/14/2020 Status: Attested Addendum    : Rahul Deutsch PT (Physical Therapist)    Related Notes: Original Note by Rahul Deutsch PT (Physical Therapist) filed at 9/23/2020  9:35 AM    Cosigner: Mary Daniels PA-C at 9/23/2020  9:56 AM    Attestation signed by Mary Daniels PA-C at 9/23/2020  9:56 AM    Continue POC                [facility] Daily Progress   [facility] Re-Certification Request    September 23, 2020      Patient: Adelina Espinoza  MR Number: [id number]  YOB: 1945  Date of Visit: 9/14/2020  Onset Date:  12/2019  Date of Eval: 3/18/2020    Dear Dr. Daniels    As you may recall, we have been seeing Adelina Espinoza for Physical Therapy of Low back, Lower extremities.    For therapy to continue, Medicare and/or Medicaid requires periodic physician review of the treatment plan. Please review the summary of the patient's progress and our plan for continued therapy, and verify  that you agree therapy should continue by entering certification dates at the bottom of this note and co-signing this note.    Plan of Care  Authorization / Certification Start Date: 09/15/20  Authorization / Certification End Date: 12//01/20  Communication with: Referral Source  Patient Related Instruction: Nature of Condition;Treatment plan and rationale;Self Care instruction;Basis of treatment;Body mechanics;Precautions;Next steps;Posture;Expected outcome  Times per Week: 1  Number of Weeks: 12  Number of Visits: 12  Discharge Planning: discharge to [facility] once goals are met.  Therapeutic Exercise: ROM;Stretching;Strengthening  Neuromuscular Reeducation: posture;balance/proprioception;TNE;core;kinesio tape  Manual Therapy: soft tissue mobilization;myofascial release;joint  mobilization;muscle energy;strain counterstrain    Goal    Pt will: be able to change sleep positions without pain - Partially met   Pt still L side lies and is avoiding her R side.  She is able to spend more time on the R side  Pt will: beable to bend forward to dress , bath put on shoes and socks without pain - Met  Pt will: Be able to reach above her head to wash, fix hair and reach into a shelf with decreased pain - GOAL MET  Pt will: vianey be able to carry groceries, laundry with decreased pain - Partially met   Not as painful to  groceries    If you have any questions or concerns, please don't hesitate to call.    Sincerely,      Rahul Deutsch, PT        Physician recommendation:     ___ Follow therapist's recommendation        ___ Modify therapy      *Physician co-signature indicates they certify the need for these services furnished within this plan and while under their care.    Patient Name: Adelina Espinoza  Date: 9/23/2020  Visit # 11/12  + 8  PTA visit #:   0     Referral Diagnosis: Lumbar Radiculitis  Referring provider: Mary Daniels PA-C  Visit Diagnosis:     ICD-10-CM    1. Chronic bilateral low back pain with bilateral sciatica  M54.42     M54.41     G89.29    2. Lumbosacral radiculitis  M54.17    3. Localized edema  R60.0    4. Swelling of limb  M79.89    5. Arthritis of first metatarsophalangeal (MTP) joint of right foot  M19.071    6. Primary osteoarthritis involving multiple joints  M89.49          Assessment:     Results of Treatment    Upper post thigh pain resolved with treatment  SI dysf resolved   Pt's Pain is focused in the lower sacrum , coccyx area with radiation into the posterior upper thighs,  Coccyx releases combined with sacral periosteal work is able to provide pt with pain relief for 2-3 days with 0-1/10 level of pain present.    HEP/POC compliance is  good .  Patient demonstrates understanding/independence with home program.    Goal Status:    Pt will: be able to  change sleep positions without pain - PROGRESSING  Pt will: beable to bend forward to dress , bath put on shoes and socks without pain - PROGRESSING  Pt will: Be able to reach above her head to wash, fix hair and reach into a shelf with decreased pain - GOAL MET  Pt will: vianey be able to carry groceries, laundry with decreased pain - PROGRESSING  Plan / Patient Education:     Continue with initial plan of care.  Progress with home program as tolerated.    Subjective:   My back of the thighs hurt   5/10    Objective:      L SI downslip.  5 sec  + Scan Lig,   Neuro  Coccyx,                  Treatment Today     TREATMENT MINUTES COMMENTS   Evaluation     Self-care/ Home management     Manual therapy 42 MFR with OA, L5-SI and SI joint releases, Dural Tube Sphenoid release.   SCS to PLLCX3,4-MS,   COCX-N,  LF T11, L4-MS,   AR3-MS,   CECM-V  Trochanteric bursa R.      Neuromuscular Re-education     Therapeutic Activity     Therapeutic Exercises     Gait training     Modality__________________                Total 42    Blank areas are intentional and mean the treatment did not include these items.       Rahul Deutsch  9/23/2020

## 2021-06-29 NOTE — PROGRESS NOTES
Progress Notes by Rahul Deutsch PT at 7/15/2020  8:30 AM     Author: Rahul Deutsch PT Service: -- Author Type: Physical Therapist    Filed: 7/16/2020  8:51 AM Encounter Date: 7/15/2020 Status: Attested Addendum    : Rahul Deutsch PT (Physical Therapist)    Related Notes: Original Note by Rahul Deutsch PT (Physical Therapist) filed at 7/15/2020  9:34 AM    Cosigner: Mary Daniels PA-C at 7/16/2020 10:48 AM    Attestation signed by Mary Daniels PA-C at 7/16/2020 10:48 AM    Continue POC.                [facility] Daily Progress   [facility] Re-Certification Request    July 16, 2020      Patient: Adelina Espinoza  MR Number: [id number]  YOB: 1945  Date of Visit: 7/15/2020  Onset Date:  March 2020  Date of Eval: 03/18/2020    Dear Dr. Daniels    As you may recall, we have been seeing Adelina Espinoza for Physical Therapy of the Low Back.    For therapy to continue, Medicare and/or Medicaid requires periodic physician review of the treatment plan. Please review the summary of the patient's progress and our plan for continued therapy, and verify  that you agree therapy should continue by entering certification dates at the bottom of this note and co-signing this note.    Plan of Care  Authorization / Certification Start Date:07/15/2020  Authorization / Certification End Date:10/12/2020  Communication with: Referral Source  Patient Related Instruction: Nature of Condition;Treatment plan and rationale;Self Care instruction;Basis of treatment;Body mechanics;Precautions;Next steps;Posture;Expected outcome  Times per Week: 1  Number of Weeks: 12  Number of Visits: 12  Discharge Planning: discharge to [facility] once goals are met.  Therapeutic Exercise: ROM;Stretching;Strengthening  Neuromuscular Reeducation: posture;balance/proprioception;TNE;core;kinesio tape  Manual Therapy: soft tissue mobilization;myofascial release;joint mobilization;muscle  energy;strain counterstrain      Goal    Pt will: be able to change sleep positions without pain   Partially Met  Pt will: beable to bend forward to dress , bath put on shoes and socks without pain   Partially Met  Pt will: Be able to reach above her head to wash, fix hair and reach into a shelf with decreased pain    Met  Pt will: vianey be able to carry groceries, laundry with decreased pain    Partially Met      If you have any questions or concerns, please don't hesitate to call.    Sincerely,      Rahul Deutsch, PT        Physician recommendation:     ___ Follow therapist's recommendation        ___ Modify therapy      *Physician co-signature indicates they certify the need for these services furnished within this plan and while under their care.    Patient Name: Adelina Espinoza  Date: 2020  Visit #:    PTA visit #:   0     Referral Diagnosis: Lumbar Radiculitis  Referring provider: Mary Daniels PA-C  Visit Diagnosis:     ICD-10-CM    1. Chronic bilateral low back pain with bilateral sciatica  M54.42     M54.41     G89.29    2. Lumbosacral radiculitis  M54.17    3. Osteoporosis, senile  M81.0    4. Swelling of limb  M79.89          Assessment:     Results of Treatment    LBP reduced 4-5/10 to 0/10   SI dysf resolved    Pt ind with exercise,    Pain avoidance gait corrected,   Edema to the bilat calves reducing. Myofascial tension to the LE's decreasing.     Pt shows steady but slowed improvement in her therapy performance.       HEP/POC compliance is  good .  Patient demonstrates understanding/independence with home program.    Goal Status:  No data recorded  No data recorded      Plan / Patient Education:     Continue with initial plan of care.  Progress with home program as tolerated.    Subjective:     Pain Ratin/10 to the Low Back.  Walking with an antalgic gait        Objective:      R SI upslip,   Lymphatic flow at T duct 7 sec slightly slowed,       Treatment Today     TREATMENT  MINUTES COMMENTS   Evaluation     Self-care/ Home management     Manual therapy 45 MFR with OA, L5-SI and SI joint releases, Dural Tube Pull,    SCS to LF T11,12,L1,2,4,5,S1-MS MT10,11,12,L1,2,3-MS   Neuromuscular Re-education     Therapeutic Activity     Therapeutic Exercises 15 Balloon breathing ex. 2 sets of 4 breaths   1x/day   Gait training     Modality__________________                Total 60    Blank areas are intentional and mean the treatment did not include these items.       Rahul Deutsch  7/16/2020

## 2021-06-30 ENCOUNTER — OFFICE VISIT - HEALTHEAST (OUTPATIENT)
Dept: FAMILY MEDICINE | Facility: CLINIC | Age: 76
End: 2021-06-30

## 2021-06-30 DIAGNOSIS — M25.511 ACUTE PAIN OF RIGHT SHOULDER: ICD-10-CM

## 2021-06-30 DIAGNOSIS — R07.81 RIB PAIN ON RIGHT SIDE: ICD-10-CM
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
## 2021-06-30 DIAGNOSIS — R07.81 PLEURODYNIA: ICD-10-CM

## 2021-07-03 ENCOUNTER — RECORDS - HEALTHEAST (OUTPATIENT)
Dept: SCHEDULING | Facility: CLINIC | Age: 76
End: 2021-07-03

## 2021-07-03 NOTE — ADDENDUM NOTE
Addendum Note by Bloch, Lisa M, CMA at 3/1/2017  1:17 PM     Author: Bloch, Lisa M, CMA Service: -- Author Type: Certified Medical Assistant    Filed: 3/1/2017  1:17 PM Encounter Date: 3/1/2017 Status: Signed    : Bloch, Lisa M, CMA (Certified Medical Assistant)    Addended by: BLOCH, LISA M on: 3/1/2017 01:17 PM        Modules accepted: Orders

## 2021-07-03 NOTE — ADDENDUM NOTE
Addendum Note by Bloch, Lisa M, CMA at 3/7/2018  5:06 PM     Author: Bloch, Lisa M, CMA Service: -- Author Type: Certified Medical Assistant    Filed: 3/7/2018  5:06 PM Encounter Date: 3/7/2018 Status: Signed    : Bloch, Lisa M, CMA (Certified Medical Assistant)    Addended by: BLOCH, LISA M on: 3/7/2018 05:06 PM        Modules accepted: Orders

## 2021-07-04 NOTE — PROGRESS NOTES
Progress Notes by Fab Guerrero PA-C at 6/30/2021  6:10 PM     Author: Fab Guerrero PA-C Service: -- Author Type: Physician Assistant    Filed: 6/30/2021  8:04 PM Encounter Date: 6/30/2021 Status: Signed    : Fab Guerrero PA-C (Physician Assistant)         Assessment & Plan:       1. Rib pain on right side  XR Ribs Right W PA Chest   2. Acute pain of right shoulder     3. Fall, initial encounter         Medical Decision Making  Patient presents with acute onset right rib pain, right shoulder pain, and right neck pain following a mechanical fall.  No signs of head trauma or loss of consciousness.  Patient's most significant pain is her right lateral chest wall.  Rib and chest x-ray was negative for acute fracture and pneumothorax.  Patient otherwise had full range of motion of her right shoulder without difficulty and no significant bony tenderness.  Feel the patient most likely sustained several mild to moderate muscle strains of the right shoulder, neck, and right ribs.  Recommend rest, avoidance of aggravating activities, cold/warm compresses, and use of acetaminophen as needed for discomfort.  Offered to try a muscle relaxer to help with nighttime symptoms, but patient politely declined.  Discussed signs of worsening symptoms and when to follow-up with PCP if no symptom improvement.    Patient Instructions   Patient Education     Rib Contusion or Minor Fracture    A rib contusion is a bruise to one or more rib bones. It may cause pain, tenderness, swelling, and a purplish tint to the skin. There may be a sharp pain with each breath. A rib contusion takes anywhere from a few days to a few weeks to heal. A minor rib fracture or break may cause the same symptoms as a rib contusion. The small crack may not be seen on a regular chest X-ray. Treatment for both problems is the same.  Home care    You may use over-the-counter pain medicine to control pain, unless another pain medicine was  prescribed. If you have chronic liver or kidney disease or ever had a stomach ulcer or GI bleeding, talk with your healthcare provider before using these medicines.    Rest. Do not lift anything heavy or do any activity that causes pain.    Apply an ice pack over the injured area for 15 to 20 minutes every 1 to 2 hours. You should do this for the first 24 to 48 hours. You can make an ice pack by filling a plastic bag that seals at the top with ice cubes and then wrapping it with a thin towel. Continue with ice packs as needed for the relief of pain and swelling.    The first 3 to 4 weeks of healing will be the most painful. If your pain is not under control with the treatment given, call your healthcare provider. Sometimes a stronger pain medicine may be needed. A nerve block can be done in case of severe pain. It will numb the nerve between the ribs.  Follow-up care  Follow up with your healthcare provider, or as advised.  If X-rays were taken, you will be told of any new findings that may affect your care.  Call 911  Call 911 if you have:    Dizziness, weakness or fainting    Shortness of breath with or without chest discomfort    New or worsening pain  When to seek medical advice  Call your healthcare provider right away if any of these occur:    Fever of 100.4 F (38 C) or higher, or as directed by your healthcare provider    Stomach pain  Date Last Reviewed: 12/2/2015 2000-2017 [employer]. [address] [place], [place] [address]. All rights reserved. This information is not intended as a substitute for professional medical care. Always follow your healthcare professional's instructions.                 Subjective:       Adelina Espinoza is a 75 y.o. female here for evaluation of right lateral rib pain following a fall.  Event of injury occurred about 4 to 5 hours ago.  Patient tripped over a cord causing her to strike her right side on the door frame.  Patient did not fall to the ground.  She  "denies head trauma.  No loss of consciousness.  She initially noted pain in her right shoulder.  After few hours she bent over to  something out of a cabinet when she felt a sharp \"zinging\" pain at her right lateral ribs.  Patient states the pain \"took her breath away\".  She also notes worsening stiffness in her right neck and right shoulder since the injury.  Patient has not tried any therapies to help her symptoms yet.  She denies shortness of breath.    The following portions of the patient's history were reviewed and updated as appropriate: allergies, current medications and problem list.    Review of Systems  Pertinent items are noted in HPI.     Allergies  Allergies   Allergen Reactions   ? Adhesive Rash   ? Gabapentin      Has dizziness, spaced out when on it. Has been on it twice and both times had this type of reaction. Should not be on it in the future.    ? Green Pepper      Stomach upset   ? Lactose Diarrhea   ? Monosodium Glutamate      diarrhea       Family History   Problem Relation Age of Onset   ? Breast cancer Maternal Aunt 46   ? Kidney cancer Mother 67   ? Hypertension Mother    ? Heart disease Mother    ? Alzheimer's disease Father    ? Atrial fibrillation Father    ? Aortic stenosis Father    ? Skin cancer Father    ? Stroke Father    ? Depression Father    ? Lung cancer Paternal Grandfather        Social History     Socioeconomic History   ? Marital status:      Spouse name: Fabiano   ? Number of children: 2   ? Years of education: None   ? Highest education level: None   Occupational History   ? Occupation: retired   Social Needs   ? Financial resource strain: None   ? Food insecurity     Worry: None     Inability: None   ? Transportation needs     Medical: None     Non-medical: None   Tobacco Use   ? Smoking status: Never Smoker   ? Smokeless tobacco: Never Used   Substance and Sexual Activity   ? Alcohol use: Yes     Alcohol/week: 8.0 standard drinks     Types: 5 Standard drinks " or equivalent, 3 Glasses of wine per week     Frequency: 4 or more times a week     Drinks per session: 1 or 2     Binge frequency: Never   ? Drug use: No   ? Sexual activity: Not Currently   Lifestyle   ? Physical activity     Days per week: None     Minutes per session: None   ? Stress: None   Relationships   ? Social connections     Talks on phone: None     Gets together: None     Attends Holiness service: None     Active member of club or organization: None     Attends meetings of clubs or organizations: None     Relationship status: None   ? Intimate partner violence     Fear of current or ex partner: None     Emotionally abused: None     Physically abused: None     Forced sexual activity: None   Other Topics Concern   ? None   Social History Narrative   ? None         Objective:       /80   Pulse 95   Temp 98  F (36.7  C) (Oral)   Resp 19   Wt 167 lb (75.8 kg)   LMP 01/11/1997   SpO2 98%   BMI 28.67 kg/m    General appearance: alert, appears stated age, cooperative, no distress and non-toxic  Neck: No midline spinal tenderness, full range of motion with pain during looking towards the right  Extremities: No obvious trauma or deformity, no tenderness to palpation over the bony aspects of the right shoulder, scapula, or upper arm  Chest wall: Tenderness to palpation just underneath and lateral to the right breast tissue  Skin: No crepitus, no ecchymosis    Imaging    Xr Ribs Right W Pa Chest    Result Date: 6/30/2021  EXAM: XR RIBS RIGHT W PA CHEST LOCATION: [facility] DATE/TIME: 6/30/2021 7:04 PM INDICATION: right middle lateral rib pain after fall; rule out acute fracture versus pneumothorax COMPARISON: None.     Heart lungs are negative. No infiltrates. No pneumothorax. Right rib detailed films are negative with no acute fractures.    Discussed findings with the patient.

## 2021-07-04 NOTE — PATIENT INSTRUCTIONS - HE
Patient Instructions by Fab Guerrero PA-C at 6/30/2021  6:10 PM     Author: Fab Guerrero PA-C Service: -- Author Type: Physician Assistant    Filed: 6/30/2021  7:12 PM Encounter Date: 6/30/2021 Status: Signed    : Fab Guerrero PA-C (Physician Assistant)       Patient Education     Rib Contusion or Minor Fracture    A rib contusion is a bruise to one or more rib bones. It may cause pain, tenderness, swelling, and a purplish tint to the skin. There may be a sharp pain with each breath. A rib contusion takes anywhere from a few days to a few weeks to heal. A minor rib fracture or break may cause the same symptoms as a rib contusion. The small crack may not be seen on a regular chest X-ray. Treatment for both problems is the same.  Home care    You may use over-the-counter pain medicine to control pain, unless another pain medicine was prescribed. If you have chronic liver or kidney disease or ever had a stomach ulcer or GI bleeding, talk with your healthcare provider before using these medicines.    Rest. Do not lift anything heavy or do any activity that causes pain.    Apply an ice pack over the injured area for 15 to 20 minutes every 1 to 2 hours. You should do this for the first 24 to 48 hours. You can make an ice pack by filling a plastic bag that seals at the top with ice cubes and then wrapping it with a thin towel. Continue with ice packs as needed for the relief of pain and swelling.    The first 3 to 4 weeks of healing will be the most painful. If your pain is not under control with the treatment given, call your healthcare provider. Sometimes a stronger pain medicine may be needed. A nerve block can be done in case of severe pain. It will numb the nerve between the ribs.  Follow-up care  Follow up with your healthcare provider, or as advised.  If X-rays were taken, you will be told of any new findings that may affect your care.  Call 911  Call 911 if you have:    Dizziness,  weakness or fainting    Shortness of breath with or without chest discomfort    New or worsening pain  When to seek medical advice  Call your healthcare provider right away if any of these occur:    Fever of 100.4 F (38 C) or higher, or as directed by your healthcare provider    Stomach pain  Date Last Reviewed: 12/2/2015 2000-2017 [employer]. [address] [place] [address]. All rights reserved. This information is not intended as a substitute for professional medical care. Always follow your healthcare professional's instructions.

## 2021-07-04 NOTE — PROGRESS NOTES
Progress Notes by Lombardi, Susan L, RN at 6/29/2021  1:00 PM     Author: Lombardi, Susan L, RN Service: -- Author Type: RN, Care Manager    Filed: 6/29/2021  4:23 PM Date of Service: 6/29/2021  1:00 PM Status: Signed    : Lombardi, Susan L, RN (RN, Care Manager)       Pat presents to [facility] for a surgical consult follow up  with Dr. Burleson  regarding a right breast mass.  Patient seen about 4 mos ago, hasn't noticed any changes since then.  She is accompanied by her  for consult.  RN assessment and EMR update.  Patient met with Dr. Burleson .  See dictation for details of visit. She will plansurgical excision of lesion.  Pre and post op teaching, written and verbal, provided to patient.  Walked her to [facility] for surgery scheduling.  Follow up pending biopsy results.  RN time 20 mins.

## 2021-07-04 NOTE — PROGRESS NOTES
Elise comes in for continued follow-up of the changes she needs noted in her right breast.  This is a patient who is many years status post right lumpectomy and radiation.  She felt a mass fairly recently.  Because it was not close to her lumpectomy site I had an MRI done.  This did not show any evidence of malignancy.  She does not think it has changed at all since then.    Past Medical History:   Diagnosis Date     Anxiety      Arthritis      Breast cancer (H) 2003     Depression      Esophageal reflux      History of anesthesia complications     nausea     Hx antineoplastic chemotherapy 2003    for breast cancer     Hx of radiation therapy 2004    for breast cancer     IBS (irritable bowel syndrome)      Lactose intolerance     lactose breath test positive at GI, 7/2019     Macular degeneration 08/2019    caught early, takes Areds 2 on a daily basis, seeing opthamology     Neuropathy     secondary to chemotherapy     Osteopenia      Allergies   Allergen Reactions     Adhesive Rash     Gabapentin      Has dizziness, spaced out when on it. Has been on it twice and both times had this type of reaction. Should not be on it in the future.      Green Pepper      Stomach upset     Lactose Diarrhea     Monosodium Glutamate      diarrhea     Current Outpatient Medications   Medication Instructions     acetaminophen (TYLENOL) 1,000 mg, Oral, Every 6 hours PRN     aspirin 81 mg, Oral, DAILY     calcium citrate-vitamin D (CITRACAL+D) 315-200 mg-unit per tablet 1 tablet, 2 times daily     calcium polycarbophiL (FIBERCON) 625 mg, Oral, 2 times daily     citalopram (CELEXA) 40 MG tablet TAKE 1 TABLET DAILY     dicyclomine (BENTYL) 10 MG capsule TAKE 1 CAPSULE FOUR TIMES A DAY AS NEEDED FOR CRAMPS     furosemide (LASIX) 20 MG tablet TAKE 1 TABLET DAILY     magnesium 250 mg, DAILY     methocarbamoL (ROBAXIN) 750 mg, Oral, Every 6 hours PRN     multivit-min-iron-FA-lutein (CENTRUM SILVER WOMEN) 8 mg iron-400 mcg-300 mcg Tab 1  tablet, Oral, DAILY     omeprazole (PRILOSEC) 20 MG capsule TAKE 1 CAPSULE DAILY BEFORE BREAKFAST     tetrahydroz-peg 400-hyprom-gly (VISINE MAX REDNESS RELIEF) 0.05-1-0.36-0.2 % Drop 1 drop, Both Eyes, 3 times daily PRN     vit C/E/Zn/coppr/lutein/zeaxan (PRESERVISION AREDS-2 ORAL) 1 tablet, Oral, 2 times daily       Physical exam:  Healthy appearing middle-aged woman in no acute distress.  Lungs: Clear  CV: Regular without murmur.  Breasts: Persistent area of thickening just along the lateral edge of the right areola and there is almost a slight puckering of the skin.  Her scar from the lumpectomy is superior to this and well away from this.  Its not so much a discrete mass as it is just an area of thickening involving the skin and the tissue just underneath it.  No other areas of concern.  Axilla: No axillary adenopathy.    Reviewed her MRI and I do not see any abnormalities.    Impression: Changes in right breast in a patient who is many years status post lumpectomy followed by radiation.  I do not have a good explanation for what is causing this.  I gave her the options of just continuing to keep an eye on it or going ahead with a biopsy.  I did warn her that because the skin is a little puckered and then 1 to remove a little bit of skin with it and so this could change how her breast looks just slightly.  She understands that and is okay with that and would prefer we do that.    Plan: Right breast biopsy.  This will be a minor outpatient procedure done under local anesthetic with IV sedation.  She asked appropriate questions.

## 2021-07-04 NOTE — ADDENDUM NOTE
Addendum Note by Lombardi, Susan L, RN at 6/29/2021  1:00 PM     Author: Lombardi, Susan L, RN Service: -- Author Type: RN, Care Manager    Filed: 6/29/2021  4:23 PM Date of Service: 6/29/2021  1:00 PM Status: Signed    : Lombardi, Susan L, RN (RN, Care Manager)    Encounter addended by: Lombardi, Susan L, RN on: 6/29/2021  4:23 PM      Actions taken: Clinical Note Signed, Charge Capture section accepted

## 2021-07-04 NOTE — TELEPHONE ENCOUNTER
Telephone Encounter by Arminda Wilks RN at 7/3/2021  4:19 PM     Author: Arminda Wilks RN Service: -- Author Type: Registered Nurse    Filed: 7/3/2021  4:36 PM Encounter Date: 7/3/2021 Status: Signed    : Arminda Wilks RN (Registered Nurse)       Fell on Wednesday.  Pulled muscles under right breast and back.  Was seen 6/30/21. No fractures found in x-ray.  Right rib, shoulder and neck pain    Has been taking 2- 625 mg acetaminophen arthritis tablets. Pain has gotten worse over just the past days.    Was in pain last night to the point of crying.   Declined a muscle relaxant at visit on 6/30/21. She didn't want to take this because she has a breast biopsy this coming week and thought it wouldn't be a good idea to take anything prior to procedure.    She's changed her mind about getting the muscle relaxer.  I recommended she be seen today in [facility].  She'll go to [place].      COVID 19 Nurse Triage Plan/Patient Instructions    Please be aware that novel coronavirus (COVID-19) may be circulating in the community. If you develop symptoms such as fever, cough, or SOB or if you have concerns about the presence of another infection including coronavirus (COVID-19), please contact your health care provider or visit  [email or web address].    Disposition/Instructions    In-Person Visit with provider recommended. Reference Visit Selection Guide.    Thank you for taking steps to prevent the spread of this virus.  o Limit your contact with others.  o Wear a simple mask to cover your cough.  o Wash your hands well and often.    Resources    [facility]: About COVID-19: [email or web address]    CDC: What to Do If You're Sick: www.cdc.gov/coronavirus/2019-ncov/about/steps-when-sick.html    CDC: Ending Home Isolation: www.cdc.gov/coronavirus/2019-ncov/hcp/disposition-in-home-patients.html     CDC: Caring for Someone: www.cdc.gov/coronavirus/2019-ncov/if-you-are-sick/care-for-someone.html     [facility]:  Interim Guidance for Hospital Discharge to Home: www.health.[facility].[place]/diseases/coronavirus/hcp/hospdischarge.pdf    [facility] clinical trials (COVID-19 research studies): [email or web address]-trials     Below are the COVID-19 hotlines at the [place] Department of Health ([facility]). Interpreters are available.   o For health questions: Call 377-463-0348 or 1-944.766.6990 (7 a.m. to 7 p.m.)  o For questions about schools and childcare: Call 034-388-7464 or 1-647.969.8822 (7 a.m. to 7 p.m.)           Reason for Disposition  ? [1] High-risk adult (e.g., age > 60, osteoporosis, chronic steroid use) AND [2] still hurts    Protocols used: CHEST INJURY-A-DAVID SLAUGHTER RN FNA

## 2021-07-05 PROBLEM — N63.0 LUMP OR MASS IN BREAST: Status: ACTIVE | Noted: 2021-06-29

## 2021-07-06 ENCOUNTER — AMBULATORY - HEALTHEAST (OUTPATIENT)
Dept: LAB | Facility: CLINIC | Age: 76
End: 2021-07-06

## 2021-07-06 VITALS
SYSTOLIC BLOOD PRESSURE: 120 MMHG | DIASTOLIC BLOOD PRESSURE: 72 MMHG | BODY MASS INDEX: 29.53 KG/M2 | HEIGHT: 64 IN | WEIGHT: 173 LBS | HEART RATE: 84 BPM

## 2021-07-06 VITALS
OXYGEN SATURATION: 98 % | DIASTOLIC BLOOD PRESSURE: 80 MMHG | BODY MASS INDEX: 28.67 KG/M2 | TEMPERATURE: 98 F | HEART RATE: 95 BPM | SYSTOLIC BLOOD PRESSURE: 122 MMHG | RESPIRATION RATE: 19 BRPM | WEIGHT: 167 LBS

## 2021-07-06 DIAGNOSIS — Z11.59 ENCOUNTER FOR SCREENING FOR OTHER VIRAL DISEASES: ICD-10-CM

## 2021-07-06 ASSESSMENT — MIFFLIN-ST. JEOR: SCORE: 1247.51

## 2021-07-07 NOTE — LETTER
Letter by Shruthi Oh CMA at      Author: Shruthi Oh CMA Service: -- Author Type: --    Filed:  Encounter Date: 6/29/2021 Status: (Other)       Pat - Surgery Details:    We've received instruction to get you scheduled for surgery with Dr Zakia Burleson. We have that arranged as follows:     Surgery Date: Friday July 9th  Location: [address], [address] (3rd floor) [facility]  Arrival Time: 11:00 AM (Unless instructed differently by the pre-op call nurse)     Prep Instructions:     Dr. Burleson will do your pre-op physical the day of surgery.    1. PCR-Rated COVID19 testing is required within 4 days of surgery. We have this scheduled for you at the [facility] on Tuesday July 6th at 11:15 AM. Follow the specific instructions you receive by Majo. If your test is positive, your surgery will be canceled.     2. Nothing to eat or drink for 8 hours before surgery unless instructed differently by the pre-op nurse.    3. No blood thinners including aspirin for one week prior to surgery. Verify this is safe for you with your primary care doctor before stopping.     4. You will need an adult to drive you home and stay with you 24 hours after surgery.     5. You may have one family member wait in the lobby at the surgery center during your surgery.    6. When you arrive to the surgery center, you will be screened for COVID19 symptoms. If you screen positive, your surgery will need to be postponed for your safety.    7. If the community sees a new surge in COVID19 hospital admissions, your procedure may need to be postponed. We will contact you if this happens.    8. We always encourage you to notify your insurance any time you have something scheduled including surgery. The number is usually right on the back of your insurance card. Please call [facility] Cost of Care at [phone] for the surgeon fees, and [facility] Cost of Care  [phone] for facility fees if you need pricing information.     Call our office if you have any questions! Thank you!       Shruthi DENTON  Surgery Scheduler  [facility]  Surgery [facility]  Direct line: [phone]   Main Line: [phone]  Direct Fax: [phone]

## 2021-07-08 ENCOUNTER — COMMUNICATION - HEALTHEAST (OUTPATIENT)
Dept: SCHEDULING | Facility: CLINIC | Age: 76
End: 2021-07-08

## 2021-07-09 ENCOUNTER — SURGERY - HEALTHEAST (OUTPATIENT)
Dept: SURGERY | Facility: AMBULATORY SURGERY CENTER | Age: 76
End: 2021-07-09

## 2021-07-09 ASSESSMENT — MIFFLIN-ST. JEOR
SCORE: 1247.51
SCORE: 1247.51

## 2021-07-10 VITALS — HEIGHT: 64 IN | WEIGHT: 167 LBS | BODY MASS INDEX: 28.51 KG/M2

## 2021-07-13 ENCOUNTER — RECORDS - HEALTHEAST (OUTPATIENT)
Dept: ADMINISTRATIVE | Facility: CLINIC | Age: 76
End: 2021-07-13

## 2021-07-13 ENCOUNTER — OFFICE VISIT (OUTPATIENT)
Dept: NEUROSURGERY | Facility: CLINIC | Age: 76
End: 2021-07-13
Payer: MEDICARE

## 2021-07-13 ENCOUNTER — RECORDS - HEALTHEAST (OUTPATIENT)
Dept: ADMINISTRATIVE | Facility: OTHER | Age: 76
End: 2021-07-13

## 2021-07-13 VITALS
BODY MASS INDEX: 28.51 KG/M2 | RESPIRATION RATE: 16 BRPM | SYSTOLIC BLOOD PRESSURE: 122 MMHG | WEIGHT: 167 LBS | OXYGEN SATURATION: 98 % | DIASTOLIC BLOOD PRESSURE: 84 MMHG | HEIGHT: 64 IN | HEART RATE: 82 BPM

## 2021-07-13 DIAGNOSIS — Z98.1 S/P LUMBAR FUSION: Primary | ICD-10-CM

## 2021-07-13 PROCEDURE — 99213 OFFICE O/P EST LOW 20 MIN: CPT | Performed by: SURGERY

## 2021-07-13 ASSESSMENT — MIFFLIN-ST. JEOR: SCORE: 1247.51

## 2021-07-13 NOTE — PROGRESS NOTES
"NEUROSURGERY FOLLOWUP  NOTE  Adelina Espinoza comes today in f/u. Patient is a 76 yo female who is s/p L4-5 laminectomies and synovial cyst resection and posterior instrumented fusion on 1/29/21. Continue to do well. Fell two weeks ago and had soreness on her right ribs. XRs at that time were negative for a fracture. She denies any significant low back or LE symptoms. Does get left buttock pain with prolonged sitting.     Also has left hand tingling. Worse at tonight. In the middle of the night waking up due to tingling. No hand weakness.       PHYSICAL EXAM:   Constitutional: /84   Pulse 82   Resp 16   Ht 5' 4\" (1.626 m)   Wt 167 lb (75.8 kg)   SpO2 98%   BMI 28.67 kg/m       Mental Status: A & O in no acute distress.  Affect is appropriate.  Speech is fluent.  Recent and remote memory are intact.  Attention span and concentration are normal.     Motor:  Normal bulk and tone all muscle groups of upper and lower extremities.     Sensory: Sensation intact bilaterally to light touch.      Reflexes; no hoffmans    - tinel, phalen    IMAGING:   I personally reviewed all radiographic images        CONSULTATION ASSESSMENT AND PLAN:    76 yo female who is s/p L4-5 laminectomies and synovial cyst resection and posterior instrumented fusion on 1/29/21. Overall doing well. Recommend left wrist splint for likely left CTS. If no relief will get EMG. XR appear stable. She will f/u in 6 months with new lumbar xray.     I spent more than 20 minutes in this apt, examining the pt, reviewing the scans, reviewing notes from chart, discussing treatment options with risks and benefits and coordinating care.    Lisa Thomas MD      CC:     Migdalia Delvalle  [address] [facility]  [address] [address]    "

## 2021-07-13 NOTE — LETTER
"    7/13/2021         RE: Adelina Espinoza  [address]  [facility] [address]        Dear Colleague,    Thank you for referring your patient, Adelina Espinoza, to the [facility]. Please see a copy of my visit note below.    NEUROSURGERY FOLLOWUP  NOTE  Adelina Espinoza comes today in f/u. Patient is a 76 yo female who is s/p L4-5 laminectomies and synovial cyst resection and posterior instrumented fusion on 1/29/21. Continue to do well. Fell two weeks ago and had soreness on her right ribs. XRs at that time were negative for a fracture. She denies any significant low back or LE symptoms. Does get left buttock pain with prolonged sitting.     Also has left hand tingling. Worse at tonight. In the middle of the night waking up due to tingling. No hand weakness.       PHYSICAL EXAM:   Constitutional: /84   Pulse 82   Resp 16   Ht 5' 4\" (1.626 m)   Wt 167 lb (75.8 kg)   SpO2 98%   BMI 28.67 kg/m       Mental Status: A & O in no acute distress.  Affect is appropriate.  Speech is fluent.  Recent and remote memory are intact.  Attention span and concentration are normal.     Motor:  Normal bulk and tone all muscle groups of upper and lower extremities.     Sensory: Sensation intact bilaterally to light touch.      Reflexes; no hoffmans    - tinel, phalen    IMAGING:   I personally reviewed all radiographic images        CONSULTATION ASSESSMENT AND PLAN:    76 yo female who is s/p L4-5 laminectomies and synovial cyst resection and posterior instrumented fusion on 1/29/21. Overall doing well. Recommend left wrist splint for likely left CTS. If no relief will get EMG. XR appear stable. She will f/u in 6 months with new lumbar xray.     I spent more than 20 minutes in this apt, examining the pt, reviewing the scans, reviewing notes from chart, discussing treatment options with risks and benefits and coordinating care.    Lisa Thomas MD      CC:     Mook, " Migdalia Cruz  [address] [facility] [address]  [place] [facility] [address]        Again, thank you for allowing me to participate in the care of your patient.        Sincerely,        Lisa Thomas MD

## 2021-07-21 ENCOUNTER — RECORDS - HEALTHEAST (OUTPATIENT)
Dept: ADMINISTRATIVE | Facility: CLINIC | Age: 76
End: 2021-07-21

## 2021-07-23 ENCOUNTER — RECORDS - HEALTHEAST (OUTPATIENT)
Dept: ADMINISTRATIVE | Facility: CLINIC | Age: 76
End: 2021-07-23

## 2021-07-26 DIAGNOSIS — Z85.3 PERSONAL HISTORY OF BREAST CANCER: Primary | ICD-10-CM

## 2021-08-03 PROBLEM — E83.42 HYPOMAGNESEMIA: Status: ACTIVE | Noted: 2021-08-03

## 2021-08-04 ENCOUNTER — OFFICE VISIT (OUTPATIENT)
Dept: FAMILY MEDICINE | Facility: CLINIC | Age: 76
End: 2021-08-04
Payer: MEDICARE

## 2021-08-04 VITALS
SYSTOLIC BLOOD PRESSURE: 118 MMHG | WEIGHT: 172 LBS | HEART RATE: 80 BPM | DIASTOLIC BLOOD PRESSURE: 80 MMHG | HEIGHT: 64 IN | BODY MASS INDEX: 29.37 KG/M2

## 2021-08-04 DIAGNOSIS — N63.0 LUMP OR MASS IN BREAST: ICD-10-CM

## 2021-08-04 DIAGNOSIS — K21.9 GASTROESOPHAGEAL REFLUX DISEASE WITHOUT ESOPHAGITIS: ICD-10-CM

## 2021-08-04 DIAGNOSIS — E83.42 HYPOMAGNESEMIA: ICD-10-CM

## 2021-08-04 DIAGNOSIS — F41.1 ANXIETY STATE: Primary | ICD-10-CM

## 2021-08-04 DIAGNOSIS — M81.0 OSTEOPOROSIS, SENILE: ICD-10-CM

## 2021-08-04 DIAGNOSIS — K58.9 IRRITABLE BOWEL SYNDROME, UNSPECIFIED TYPE: ICD-10-CM

## 2021-08-04 DIAGNOSIS — M79.89 SWELLING OF LIMB: ICD-10-CM

## 2021-08-04 DIAGNOSIS — F33.0 MILD EPISODE OF RECURRENT MAJOR DEPRESSIVE DISORDER (H): ICD-10-CM

## 2021-08-04 LAB
ALBUMIN SERPL-MCNC: 4.1 G/DL (ref 3.5–5)
ALP SERPL-CCNC: 57 U/L (ref 45–120)
ALT SERPL W P-5'-P-CCNC: 17 U/L (ref 0–45)
ANION GAP SERPL CALCULATED.3IONS-SCNC: 12 MMOL/L (ref 5–18)
AST SERPL W P-5'-P-CCNC: 21 U/L (ref 0–40)
BILIRUB SERPL-MCNC: 1.3 MG/DL (ref 0–1)
BUN SERPL-MCNC: 12 MG/DL (ref 8–28)
CALCIUM SERPL-MCNC: 10.1 MG/DL (ref 8.5–10.5)
CHLORIDE BLD-SCNC: 102 MMOL/L (ref 98–107)
CO2 SERPL-SCNC: 26 MMOL/L (ref 22–31)
CREAT SERPL-MCNC: 0.69 MG/DL (ref 0.6–1.1)
GFR SERPL CREATININE-BSD FRML MDRD: 87 ML/MIN/1.73M2
GLUCOSE BLD-MCNC: 101 MG/DL (ref 70–125)
MAGNESIUM SERPL-MCNC: 2.1 MG/DL (ref 1.8–2.6)
POTASSIUM BLD-SCNC: 4.6 MMOL/L (ref 3.5–5)
PROT SERPL-MCNC: 7.3 G/DL (ref 6–8)
SODIUM SERPL-SCNC: 140 MMOL/L (ref 136–145)

## 2021-08-04 PROCEDURE — 83735 ASSAY OF MAGNESIUM: CPT | Performed by: FAMILY MEDICINE

## 2021-08-04 PROCEDURE — 36415 COLL VENOUS BLD VENIPUNCTURE: CPT | Performed by: FAMILY MEDICINE

## 2021-08-04 PROCEDURE — 99214 OFFICE O/P EST MOD 30 MIN: CPT | Performed by: FAMILY MEDICINE

## 2021-08-04 PROCEDURE — 80053 COMPREHEN METABOLIC PANEL: CPT | Performed by: FAMILY MEDICINE

## 2021-08-04 ASSESSMENT — ANXIETY QUESTIONNAIRES
5. BEING SO RESTLESS THAT IT IS HARD TO SIT STILL: NOT AT ALL
GAD7 TOTAL SCORE: 12
3. WORRYING TOO MUCH ABOUT DIFFERENT THINGS: MORE THAN HALF THE DAYS
7. FEELING AFRAID AS IF SOMETHING AWFUL MIGHT HAPPEN: MORE THAN HALF THE DAYS
6. BECOMING EASILY ANNOYED OR IRRITABLE: MORE THAN HALF THE DAYS
2. NOT BEING ABLE TO STOP OR CONTROL WORRYING: MORE THAN HALF THE DAYS
IF YOU CHECKED OFF ANY PROBLEMS ON THIS QUESTIONNAIRE, HOW DIFFICULT HAVE THESE PROBLEMS MADE IT FOR YOU TO DO YOUR WORK, TAKE CARE OF THINGS AT HOME, OR GET ALONG WITH OTHER PEOPLE: SOMEWHAT DIFFICULT
1. FEELING NERVOUS, ANXIOUS, OR ON EDGE: MORE THAN HALF THE DAYS

## 2021-08-04 ASSESSMENT — PATIENT HEALTH QUESTIONNAIRE - PHQ9
SUM OF ALL RESPONSES TO PHQ QUESTIONS 1-9: 11
5. POOR APPETITE OR OVEREATING: MORE THAN HALF THE DAYS

## 2021-08-04 ASSESSMENT — MIFFLIN-ST. JEOR: SCORE: 1270.19

## 2021-08-05 ASSESSMENT — ANXIETY QUESTIONNAIRES: GAD7 TOTAL SCORE: 12

## 2021-08-08 NOTE — PROGRESS NOTES
PROGRESS NOTE   8/4/2021    SUBJECTIVE:  Adelina Espinoza is a 75 year old female who presents for     Chief Complaint   Patient presents with     Recheck Medication     Patient comes in today for follow-up of her chronic medical problems which include anxiety depression new breast lump gastroesophageal reflux disease swelling of her limbs osteoporosis hypomagnesium as well as irritable bowel syndrome. She notes that overall things are doing okay. She had a little more anxiety and depression recently due to the fact that she had a breast biopsy at the beginning of July. The breast biopsy came back precancerous and so she has been very anxious regarding the results of that. She has now spoken with Dr. Burleson and things are better. Dr. Burleson does note that the lump was precancerous but she has recommended a diagnostic mammogram and ultrasound again in January to continue to monitor this rather than doing any other surgical procedure at this time. She then needs an MRI of her breast every 6 months to keep an eye on things. She continues to follow with Dr. Burleson on a regular basis. Since she got those results she overall seems to be doing better in terms of her anxiety and depression. Her numbers are little bit higher today than they have been in the past because of the fact that she in the last 2 weeks has been dealing with that but notes that overall things are doing well. She is tolerating her medication without problems and would very much like to continue on that. Please see PHQ-9 and DERIK which are both completed in their entirety today. She does have a history of breast cancer in the past and so of course this was quite upsetting to her but overall things are much improved now. She notes that terms over the gastroesophageal reflux disease things are doing well. She continues on Prilosec so we will go ahead and check a metabolic panel to follow-up on that. She was given Bentyl to help with her irritable bowel  syndrome symptoms but she ran out of that and has not really noticed that there has been any change to her symptoms that she is not been taking the Bentyl and that seems to be doing fine. She will continue to monitor things for now. She is on Lasix on a daily basis for swelling in her feet. Overall this seems to be doing well. We will check metabolic panel to follow-up on that as well. She does have a history of osteoporosis and is on Prolia. She sees Dr. Blanchard. She notes that her bone density test is due but Dr. Blanchard and will order that when she sees for the next time within the next few months. Dr. Blanchard had apparently told her that her calcium was low and she was wondering if we could recheck that as well which we will do with a metabolic panel today anyway. Patient tells me today that her osteoporosis is actually been upgraded to osteopenia which is great news. She does have a history of low magnesium in the past so we will go ahead and recheck that today. She did have a spinal fusion in January 2020 and feels like overall things are doing so well in terms of that. She is quite pleased with the results from that. Med list was updated today as she had noted that she was on several pain medications that she is no longer on. We did discuss the fact that her my chart notes that she does need a pneumonia 23 vaccination however she did get 1 on 10/3/2012 which was about a month prior to her 67th birthday. I do think that that is close enough and do not think that we need to repeat that vaccination.    Patient Active Problem List   Diagnosis     Peripheral Neuropathy     Urticaria     Major Depression, Recurrent     Anxiety     Irritable Bowel Syndrome     Primary osteoarthritis of right knee     Primary osteoarthritis of knee, left     Osteoporosis, senile     Esophageal reflux     Primary osteoarthritis involving multiple joints     Arthritis of first metatarsophalangeal (MTP) joint of right foot      Spinal stenosis of lumbar region with neurogenic claudication     Synovial cyst of lumbar facet joint     Lumbar disc herniation     Lumbar stenosis with neurogenic claudication     Lump or mass in breast     Swelling of limb     Hypomagnesemia       Current Outpatient Medications   Medication Sig Dispense Refill     acetaminophen (TYLENOL) 500 MG tablet [ACETAMINOPHEN (TYLENOL) 500 MG TABLET] Take 1,000 mg by mouth every 6 (six) hours as needed for pain.       calcium citrate-vitamin D (CITRACAL+D) 315-200 mg-unit per tablet [CALCIUM CITRATE-VITAMIN D (CITRACAL+D) 315-200 MG-UNIT PER TABLET] Take 1 tablet by mouth 2 (two) times a day.       calcium polycarbophiL (FIBERCON) 625 mg tablet [CALCIUM POLYCARBOPHIL (FIBERCON) 625 MG TABLET] Take 625 mg by mouth 2 (two) times a day.       citalopram (CELEXA) 40 MG tablet [CITALOPRAM (CELEXA) 40 MG TABLET] TAKE 1 TABLET DAILY 90 tablet 3     denosumab (PROLIA) 60 MG/ML SOSY injection Inject 60 mg Subcutaneous       furosemide (LASIX) 20 MG tablet [FUROSEMIDE (LASIX) 20 MG TABLET] TAKE 1 TABLET DAILY 90 tablet 0     magnesium 250 mg Tab [MAGNESIUM 250 MG TAB] Take 250 mg by mouth daily.        multivit-min-iron-FA-lutein (CENTRUM SILVER WOMEN) 8 mg iron-400 mcg-300 mcg Tab [MULTIVIT-MIN-IRON-FA-LUTEIN (CENTRUM SILVER WOMEN) 8 MG IRON-400 MCG-300 MCG TAB] Take 1 tablet by mouth daily.        omeprazole (PRILOSEC) 20 MG capsule [OMEPRAZOLE (PRILOSEC) 20 MG CAPSULE] TAKE 1 CAPSULE DAILY BEFORE BREAKFAST 90 capsule 3     tetrahydroz-peg 400-hyprom-gly (VISINE MAX REDNESS RELIEF) 0.05-1-0.36-0.2 % Drop [TETRAHYDROZ--HYPROM-GLY (VISINE MAX REDNESS RELIEF) 0.05-1-0.36-0.2 % DROP] Administer 1 drop to both eyes 3 (three) times a day as needed.       vit C/E/Zn/coppr/lutein/zeaxan (PRESERVISION AREDS-2 ORAL) [VIT C/E/ZN/COPPR/LUTEIN/ZEAXAN (PRESERVISION AREDS-2 ORAL)] Take 1 tablet by mouth 2 (two) times a day.          Allergies   Allergen Reactions     Adhesive [Mecrylate]  Rash     Gabapentin Unknown     Has dizziness, spaced out when on it. Has been on it twice and both times had this type of reaction. Should not be on it in the future.      Green Pepper [Capsicum] Unknown     Stomach upset     Lactose Diarrhea     Monosodium Glutamate Unknown     diarrhea       Past Medical History:   Diagnosis Date     Anxiety      Arthritis      Breast cancer (H) 2003     Depression      Esophageal reflux      History of anesthesia complications     nausea     Hx antineoplastic chemotherapy 2003    for breast cancer     Hx of radiation therapy 2004    for breast cancer     IBS (irritable bowel syndrome)      Lactose intolerance     lactose breath test positive at GI, 7/2019     Macular degeneration 08/2019    caught early, takes Areds 2 on a daily basis, seeing opthamology     Neuropathy     secondary to chemotherapy     Osteopenia      PONV (postoperative nausea and vomiting)        Past Surgical History:   Procedure Laterality Date     APPENDECTOMY  1967     BACK SURGERY  2000    cervical fusion     BIOPSY BREAST Right 2003     BIOPSY BREAST Right 07/09/2021    dense fibrotic tissue, Dr Burleson     C THORAX SPINE FUSN,POST TECH N/A 1/29/2021    Procedure: BILATERAL LUMBAR 4-5 LAMINECTOMIES FOR RIGHT SYNOVIAL CYST RESECTION, LUMBAR 4-5, LUMBAR 4-5 POSTERIOR INSTRUMENTED FUSION AND ARTHRODESIS, USE OF ALLOGRAFT, USE OF AUTOGRAFT, STEALTH NAVIGATION;  Surgeon: Lisa Thomas MD;  Location: [facility] OR;  Service: Spine     C TOTAL KNEE ARTHROPLASTY Right 10/29/2015    Procedure: RIGHT TOTAL KNEE  ARTHROPLASTY;  Surgeon: Nitin White MD;  Location: [facility] OR;  Service: Orthopedics     C TOTAL KNEE ARTHROPLASTY Left 04/21/2016    Procedure: LEFT TOTAL KNEE ARTHROPLASTY;  Surgeon: Nitin White MD;  Location: [facility] OR;  Service: Orthopedics     CATARACT EXTRACTION, BILATERAL       CHOLECYSTECTOMY  1994     HYSTERECTOMY  1997    desmoid cyst     IR CVC TUNNEL  "PLACEMENT > 5 YRS OF AGE  1/21/2004     IR MISCELLANEOUS PROCEDURE  7/16/2004     LUMPECTOMY BREAST  12/2003    breast cancer     NISSEN FUNDOPLICATION  2010     OOPHORECTOMY  1997       History   Smoking Status     Never Smoker   Smokeless Tobacco     Never Used       OBJECTIVE:     /80   Pulse 80   Ht 1.626 m (5' 4\")   Wt 78 kg (172 lb)   Breastfeeding No   BMI 29.52 kg/m      Physical Exam:  GENERAL APPEARANCE: A&A, NAD, well hydrated, well nourished  SKIN:  Normal skin turgor, no lesions/rashes   HEENT: moist mucous membranes, no rhinorrhea, PERRLA, TM's clear bilaterally, Throat without significant erythema or exudate.  NECK: Supple, full ROM, no significant lymphadenopathy or thyromegaly  CV: RRR, no M/G/R   LUNGS: CTAB  ABDOMEN: S&NT, no masses or organomegaly   EXTREMITY: no swelling noted.  Full range of motion of all 4 extremities.   NEURO: no gross deficits   PSYCHIATRIC;  Mood appropriate, memory intact  A&O x3, thought processes congruent, non-tangential. No hallucinations/delusions. Insight/judgment: intact. Denies suicidal/homicidal ideations.    PHQ-9:  Last PHQ-9 8/4/2021   1.  Little interest or pleasure in doing things 2   2.  Feeling down, depressed, or hopeless 1   3.  Trouble falling or staying asleep, or sleeping too much 2   4.  Feeling tired or having little energy 2   5.  Poor appetite or overeating 1   6.  Feeling bad about yourself 0   7.  Trouble concentrating 1   8.  Moving slowly or restless 2   Q9: Thoughts of better off dead/self-harm past 2 weeks 0   PHQ-9 Total Score 11   Difficulty at work, home, or with people Somewhat difficult       GAD7:  DERIK-7  8/4/2021   1. Feeling nervous, anxious, or on edge 2   2. Not being able to stop or control worrying 2   3. Worrying too much about different things 2   4. Trouble relaxing 2   5. Being so restless that it is hard to sit still 0   6. Becoming easily annoyed or irritable 2   7. Feeling afraid, as if something awful might " happen 2   DERIK-7 Total Score 12   If you checked any problems, how difficult have they made it for you to do your work, take care of things at home, or get along with other people? Somewhat difficult       LABS:     Recent Results (from the past 240 hour(s))   Comprehensive metabolic panel    Collection Time: 08/04/21 11:12 AM   Result Value Ref Range    Sodium 140 136 - 145 mmol/L    Potassium 4.6 3.5 - 5.0 mmol/L    Chloride 102 98 - 107 mmol/L    Carbon Dioxide (CO2) 26 22 - 31 mmol/L    Anion Gap 12 5 - 18 mmol/L    Urea Nitrogen 12 8 - 28 mg/dL    Creatinine 0.69 0.60 - 1.10 mg/dL    Calcium 10.1 8.5 - 10.5 mg/dL    Glucose 101 70 - 125 mg/dL    Alkaline Phosphatase 57 45 - 120 U/L    AST 21 0 - 40 U/L    ALT 17 0 - 45 U/L    Protein Total 7.3 6.0 - 8.0 g/dL    Albumin 4.1 3.5 - 5.0 g/dL    Bilirubin Total 1.3 (H) 0.0 - 1.0 mg/dL    GFR Estimate 87 >60 mL/min/1.73m2   Magnesium    Collection Time: 08/04/21 11:12 AM   Result Value Ref Range    Magnesium 2.1 1.8 - 2.6 mg/dL      Did thoroughly reviewed the documentation from Dr. Burleson as well as the pathology report from patient's breast biopsy that was done on 7/9/2021.    ASSESSMENT/PLAN:     Anxiety    Mild episode of recurrent major depressive disorder (H)    Lump or mass in breast    Gastroesophageal reflux disease without esophagitis  - Comprehensive metabolic panel    Swelling of limb  - Comprehensive metabolic panel    Irritable bowel syndrome, unspecified type    Osteoporosis, senile    Hypomagnesemia  - Magnesium      Patient comes in today for follow-up of her chronic medical problems. She seems to overall be doing well. She has a history of anxiety and depression which overall are doing okay. She had in the last couple of weeks has had increased anxiety due to the fact that she had a breast biopsy in the middle part of July and was awaiting the results of that. The results came back precancerous but Dr. Gonzalez has suggested that they continue to monitor  this with a diagnostic mammogram and ultrasound in January and then MRIs every 6 months. Now that she has that result and she has a plan she feels like overall things are doing better and her anxiety has been much improved. She continues on Celexa 40 mg a day which she seems to be tolerating well. Please see PHQ-9 and DERIK which are both completed in their entirety today. She does not need a refill of the medication today but when she does she certainly will let me know. Again we spent quite a while discussing recent diagnosis of breast lump and breast biopsy that was done on 7/9/2021. All of her questions were answered. I did review Dr. Burleson's note as well as the pathology from this breast lump with patient in detail today. If she has additional questions or concerns will let me know. In terms of the gastroesophageal reflux disease she seem to be doing well on Prilosec. We will check metabolic panel today to follow-up on her Prilosec usage. She continues on Lasix on a daily basis for swelling of her lower extremities. Will again check metabolic panel to follow-up on that. Patient had been on Bentyl for irritable bowel syndrome but she ran out of that and does not seem to have noticed any difference since she has stopped taking the medication. She would like to continue off of the medication but if she would like that to restart that in the future she certainly will let me know. She continues to follow-up with Dr. Blanchard in terms of the osteoporosis. Dr. Blanchard had recommended that she have her calcium rechecked which we will do today with a metabolic panel. She does have a history of low magnesium in the past we will recheck that today as well. We did discuss the fact that she does not need a second pneumonia 23 shot as her first was given a month prior to her 67th birthday. All of her questions were answered today. She has additional questions or concerns should certainly let me know. At least 35 minutes  spent on the day of encounter doing chart review, history and exam, counseling, coordination of care, documentation and other activities as noted.   Migdalia Delvalle MD    This note was dictated using voice recognition software. Any grammatical errors, context distortions, or spelling errors are not intentional

## 2021-09-02 DIAGNOSIS — M79.89 LIMB SWELLING: ICD-10-CM

## 2021-09-03 RX ORDER — FUROSEMIDE 20 MG
20 TABLET ORAL DAILY
Qty: 90 TABLET | Refills: 3 | Status: SHIPPED | OUTPATIENT
Start: 2021-09-03 | End: 2022-06-27

## 2021-09-03 NOTE — TELEPHONE ENCOUNTER
"Last Written Prescription Date:  6/17/21  Last Fill Quantity: 90,  # refills: 0   Last office visit provider:  8/4/21     Requested Prescriptions   Pending Prescriptions Disp Refills     furosemide (LASIX) 20 MG tablet [Pharmacy Med Name: FUROSEMIDE TABS 20MG] 90 tablet 3     Sig: TAKE 1 TABLET DAILY (NEED TO BE SEEN IN FOLLOW UP PRIOR TO ANY FURTHER REFILLS)       Diuretics (Including Combos) Protocol Passed - 9/2/2021 10:47 AM        Passed - Blood pressure under 140/90 in past 12 months     BP Readings from Last 3 Encounters:   08/04/21 118/80   07/13/21 122/84   07/03/21 114/83                 Passed - Recent (12 mo) or future (30 days) visit within the authorizing provider's specialty     Patient has had an office visit with the authorizing provider or a provider within the authorizing providers department within the previous 12 mos or has a future within next 30 days. See \"Patient Info\" tab in inbasket, or \"Choose Columns\" in Meds & Orders section of the refill encounter.              Passed - Medication is active on med list        Passed - Patient is age 18 or older        Passed - No active pregancy on record        Passed - Normal serum creatinine on file in past 12 months     Recent Labs   Lab Test 08/04/21  1112   CR 0.69              Passed - Normal serum potassium on file in past 12 months     Recent Labs   Lab Test 08/04/21  1112   POTASSIUM 4.6                    Passed - Normal serum sodium on file in past 12 months     Recent Labs   Lab Test 08/04/21  1112                 Passed - No positive pregnancy test in past 12 months             Ran Montes RN 09/03/21 8:15 AM  "

## 2021-09-22 ENCOUNTER — TRANSCRIBE ORDERS (OUTPATIENT)
Dept: INTERNAL MEDICINE | Facility: CLINIC | Age: 76
End: 2021-09-22

## 2021-09-22 DIAGNOSIS — M81.0 OSTEOPOROSIS, SENILE: Primary | ICD-10-CM

## 2021-09-22 DIAGNOSIS — Z92.29 PERSONAL HISTORY OF OTHER DRUG THERAPY: ICD-10-CM

## 2021-09-22 NOTE — PROGRESS NOTES
As part of the required manual data conversion process for integration, this encounter was created to document a CAM (Clinic Administered Medication) order. This information was copied from the [facility] patient's chart to the [facility] patient chart.     Marcella Dey, Shyam  September 22, 2021

## 2021-09-29 ENCOUNTER — TELEPHONE (OUTPATIENT)
Dept: INTERNAL MEDICINE | Facility: CLINIC | Age: 76
End: 2021-09-29

## 2021-10-01 ENCOUNTER — IMMUNIZATION (OUTPATIENT)
Dept: FAMILY MEDICINE | Facility: CLINIC | Age: 76
End: 2021-10-01
Payer: MEDICARE

## 2021-10-01 PROCEDURE — 90662 IIV NO PRSV INCREASED AG IM: CPT

## 2021-10-01 PROCEDURE — G0008 ADMIN INFLUENZA VIRUS VAC: HCPCS

## 2021-10-06 ENCOUNTER — ALLIED HEALTH/NURSE VISIT (OUTPATIENT)
Dept: FAMILY MEDICINE | Facility: CLINIC | Age: 76
End: 2021-10-06
Payer: MEDICARE

## 2021-10-06 DIAGNOSIS — M81.0 OSTEOPOROSIS, SENILE: Primary | ICD-10-CM

## 2021-10-06 PROCEDURE — 99207 PR NO CHARGE NURSE ONLY: CPT

## 2021-10-06 PROCEDURE — 96372 THER/PROPH/DIAG INJ SC/IM: CPT | Performed by: INTERNAL MEDICINE

## 2021-10-06 NOTE — PROGRESS NOTES
"Prolia Injection Phone Screen      Screening questions have been asked 2-3 days prior to administration visit for Prolia. If any questions are answered with \"Yes,\" this phone encounter were will routed to ordering provider for further evaluation.     1.  When was the last injection?  4/6/2021    2.  Has insurance for this injection been verified?  Yes    3.  Did you experience any new onset achiness or rashes that lasted for over a month with your previous Prolia injection?   No    4.  Do you have a fever over 101?F or a new deep cough that is unusual for you today? No    5.  Have you started any new medications in the last 6 months that you were told could affect your immune system? These may have been prescribed by oncologist, transplant, rheumatology, or dermatology.   No    6.  In the last 6 months have you have gastric bypass or parathyroid surgery?   No    7.  Do you plan dental work requiring drilling into the bone such as implants/extractions or oral surgery in the next 2-3 months?   No     The following steps were completed to comply with the REMS program for Prolia:  1. Ordering provider has previously reviewed information in the Medication Guide and Patient Counseling Chart, including the serious risks of Prolia  and the symptoms of each risk and have been advised to seek prompt medical attention if they have signs or symptoms of any of the serious risks.  2. Provided each patient a copy of the Medication Guide and Patient Brochure.  See MAR for administration details.   Indication: Prolia  (denosumab) is a prescription medicine used to treat osteoporosis in patients who:   Are at high risk for fracture, meaning patients who have had a fracture related to osteoporosis, or who have multiple risk factors for fracture; Cannot use another osteoporosis medicine or other osteoporosis medicines did not work well.   The timeline for early/late injections would be 4 weeks early and any time after the 6 month anthony. " If a patient receives their injection late, then the subsequent injection would be 6 months from the date that they actually received the injection    Have the screening questions been asked prior to this administration? Yes    Patient informed if symptoms discussed above present prior to their administration appointment, they are to notify clinic immediately.     Irene Gil

## 2021-10-20 ENCOUNTER — IMMUNIZATION (OUTPATIENT)
Dept: NURSING | Facility: CLINIC | Age: 76
End: 2021-10-20
Payer: MEDICARE

## 2021-10-20 PROCEDURE — 0004A PR COVID VAC PFIZER DIL RECON 30 MCG/0.3 ML IM: CPT

## 2021-10-20 PROCEDURE — 91300 PR COVID VAC PFIZER DIL RECON 30 MCG/0.3 ML IM: CPT

## 2021-12-10 ENCOUNTER — TRANSFERRED RECORDS (OUTPATIENT)
Dept: HEALTH INFORMATION MANAGEMENT | Facility: CLINIC | Age: 76
End: 2021-12-10
Payer: MEDICARE

## 2022-01-26 ENCOUNTER — TRANSFERRED RECORDS (OUTPATIENT)
Dept: HEALTH INFORMATION MANAGEMENT | Facility: CLINIC | Age: 77
End: 2022-01-26

## 2022-01-26 ENCOUNTER — HOSPITAL ENCOUNTER (OUTPATIENT)
Dept: ULTRASOUND IMAGING | Facility: CLINIC | Age: 77
End: 2022-01-26
Attending: SPECIALIST
Payer: MEDICARE

## 2022-01-26 ENCOUNTER — HOSPITAL ENCOUNTER (OUTPATIENT)
Dept: MAMMOGRAPHY | Facility: CLINIC | Age: 77
End: 2022-01-26
Attending: SPECIALIST
Payer: MEDICARE

## 2022-01-26 DIAGNOSIS — Z85.3 PERSONAL HISTORY OF BREAST CANCER: ICD-10-CM

## 2022-01-26 PROCEDURE — 76642 ULTRASOUND BREAST LIMITED: CPT | Mod: RT

## 2022-01-26 PROCEDURE — 77062 BREAST TOMOSYNTHESIS BI: CPT

## 2022-01-31 NOTE — PROGRESS NOTES
This is a 76 year old woman who comes in for  continued follow-up of her right breast cancer.  She is now many years status post right  lumpectomy  with radiation.  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  She is feeling well.  She has no new complaints today.      Please see the chart review for PMH, Meds, allergies, FH and SH.    ROS:  A comprehensive review of systems was negative.      Physical Exam:  There were no vitals taken for this visit.  General appearance: alert, appears stated age and cooperative  Breasts: There are no palpable masses. There are minimal radiation changes. The scar(s) has(ve) healed up well.  There is still a little area of thickening near the nipple but not significantly changed from prior to surgery.  Certainly no progression.  Lymph nodes: Cervical, supraclavicular, and axillary nodes normal.  Neurologic: Grossly normal    Data Review: Reviewed her current mammograms. No evidence of disease.      Impression: Personal history of breast cancer.  She was found to have just a focal area of DCIS on this biopsy 6 months ago.  Follow-up mammogram looks normal.  Discussed whether or not an MRI would be helpful.  She is already had an MRI that did not show anything prior to this so I am not sure that would be helpful at this time.  Radiology did recommend that next year she should have a diagnostic mammogram again.    Plan: Follow up in 1 year with a diagnostic mammogram.  She should continue with self breast exam between now and then.

## 2022-02-01 ENCOUNTER — OFFICE VISIT (OUTPATIENT)
Dept: NEUROSURGERY | Facility: CLINIC | Age: 77
End: 2022-02-01
Payer: MEDICARE

## 2022-02-01 ENCOUNTER — OFFICE VISIT (OUTPATIENT)
Dept: SURGERY | Facility: CLINIC | Age: 77
End: 2022-02-01
Attending: FAMILY MEDICINE
Payer: MEDICARE

## 2022-02-01 ENCOUNTER — ANCILLARY PROCEDURE (OUTPATIENT)
Dept: GENERAL RADIOLOGY | Facility: CLINIC | Age: 77
End: 2022-02-01
Attending: SURGERY
Payer: MEDICARE

## 2022-02-01 VITALS
SYSTOLIC BLOOD PRESSURE: 111 MMHG | HEART RATE: 99 BPM | WEIGHT: 172 LBS | HEIGHT: 64 IN | OXYGEN SATURATION: 97 % | DIASTOLIC BLOOD PRESSURE: 67 MMHG | BODY MASS INDEX: 29.37 KG/M2

## 2022-02-01 DIAGNOSIS — M48.062 SPINAL STENOSIS OF LUMBAR REGION WITH NEUROGENIC CLAUDICATION: Primary | ICD-10-CM

## 2022-02-01 DIAGNOSIS — Z85.3 PERSONAL HISTORY OF BREAST CANCER: Primary | ICD-10-CM

## 2022-02-01 DIAGNOSIS — Z98.1 S/P LUMBAR FUSION: ICD-10-CM

## 2022-02-01 PROCEDURE — 72100 X-RAY EXAM L-S SPINE 2/3 VWS: CPT | Mod: TC | Performed by: RADIOLOGY

## 2022-02-01 PROCEDURE — 99213 OFFICE O/P EST LOW 20 MIN: CPT | Performed by: PHYSICIAN ASSISTANT

## 2022-02-01 PROCEDURE — G0463 HOSPITAL OUTPT CLINIC VISIT: HCPCS

## 2022-02-01 PROCEDURE — 99213 OFFICE O/P EST LOW 20 MIN: CPT | Performed by: SPECIALIST

## 2022-02-01 RX ORDER — CHOLECALCIFEROL (VITAMIN D3) 125 MCG
CAPSULE ORAL
COMMUNITY
Start: 2022-01-26

## 2022-02-01 ASSESSMENT — MIFFLIN-ST. JEOR: SCORE: 1265.19

## 2022-02-01 NOTE — LETTER
2/1/2022         RE: Adelina Espinoza  [address]  [place] [facility] [address]        Dear Colleague,    Thank you for referring your patient, Adelina Espinoza, to the [facility]. Please see a copy of my visit note below.    Neurosurgery clinic note: 2/1/2022    Assessment:   Status post L4-L5 laminectomies and synovial cyst resection and posterior instrumented fusion on 1/29/21      Plan:  Patient is doing well. She plans to follow up with her orthopedic physician for complaint of left knee pain and hip pain. She understands that should the buttocks to left knee persists or worsen we can obtain lumbar MRI for further evaluation of which she states presently she would like to hold as the pain has progressively improved since its onset in November. She also states that when she sleeps in her wrist splint her hand numbness is improved. Follow up will otherwise be on an as needed basis and she understands to contact the office should any symptoms worsen or arise.     HPI:   Ms. Espinoza is a 76 year old right handed female who is status post L4-L5 laminectomies and synovial cyst resection and posterior instrumented fusion on 1/29/21 by Dr. Thomas. She presents today with completion of lumbar xray for further review. Presently she states that she is doing very well. She notes that around end of November she turned her back poorly and began to have radicular pain from her buttocks to her anterior knee on the left. She states that over the past month it has continued to improve and does not seem bothered by it. She plans to see her orthopedics physician in the future as she is having intermittent left knee pain and had a replacement surgery 5 years ago. She is overall very pleased with her progress and continues to do well without restrictions. She also notes that her left hand numbness has improved when wearing the wrist splint and would rather continue with the splint for the  "time being.      Exam:  /67   Pulse 99   Ht 5' 4\" (1.626 m)   Wt 172 lb (78 kg)   SpO2 97%   BMI 29.52 kg/m      She is awake alert and oriented to self and place   PERRL, Face is symmetrical, tongue is midline, EOMs   Neurologically intact with 5/5 strength throughout, sensation intact in lower extremities      Mental status: alert and oriented x3 speech clear and appropriate   Cranial nerves: II-XII intact  Motor strength:    Hip flexors: 5/5 right, 5/5 left   Quadriceps: 5/5 right, 5/5 left  Ankle dorsiflexion: 5/5 right, 5/5 left    Ankle plantar flexion:5/5 right, 5/5 left   Extensor hallicus longus: 5/5 right, 5/5 left     Pain with internal rotation of bilateral hips     Incision: well healed without erythema, induration, or drainage     Images: reviewed personally with stable appearance of lumbar alignment and good hardware positioning     Madeleine Rockwell PA-C  [facility] Neurosurgery   O: [phone]      Again, thank you for allowing me to participate in the care of your patient.        Sincerely,        Madeleine Rockwell PA-C    "

## 2022-02-01 NOTE — NURSING NOTE
Pat presents to [facility] for a follow up appointment with Dr. Burleson .Patient notes no new breast concerns.  Patient had mammogram done recently, see report for details.   RN assessment and EMRupdate.  Patient met with Dr. Burleson .  See dictation for details of visit and follow up plan.  Support provided, invited calls.  RN time 12 mins.

## 2022-02-01 NOTE — LETTER
2/1/2022         RE: Adelina Espinoza  [address]  [facility] [address]        Dear Colleague,    Thank you for referring your patient, Adelina Espinoza, to the [facility] [place]. Please see a copy of my visit note below.    This is a 76 year old woman who comes in for  continued follow-up of her right breast cancer.  She is now many years status post right  lumpectomy  with radiation.  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  She is feeling well.  She has no new complaints today.      Please see the chart review for PMH, Meds, allergies, FH and SH.    ROS:  A comprehensive review of systems was negative.      Physical Exam:  There were no vitals taken for this visit.  General appearance: alert, appears stated age and cooperative  Breasts: There are no palpable masses. There are minimal radiation changes. The scar(s) has(ve) healed up well.  There is still a little area of thickening near the nipple but not significantly changed from prior to surgery.  Certainly no progression.  Lymph nodes: Cervical, supraclavicular, and axillary nodes normal.  Neurologic: Grossly normal    Data Review: Reviewed her current mammograms. No evidence of disease.      Impression: Personal history of breast cancer.  She was found to have just a focal area of DCIS on this biopsy 6 months ago.  Follow-up mammogram looks normal.  Discussed whether or not an MRI would be helpful.  She is already had an MRI that did not show anything prior to this so I am not sure that would be helpful at this time.  Radiology did recommend that next year she should have a diagnostic mammogram again.    Plan: Follow up in 1 year with a diagnostic mammogram.  She should continue with self breast exam between now and then.            Again, thank you for allowing me to participate in the care of your patient.         Sincerely,        Zakia Burleson MD

## 2022-02-01 NOTE — PROGRESS NOTES
"Neurosurgery clinic note: 2/1/2022    Assessment:   Status post L4-L5 laminectomies and synovial cyst resection and posterior instrumented fusion on 1/29/21      Plan:  Patient is doing well. She plans to follow up with her orthopedic physician for complaint of left knee pain and hip pain. She understands that should the buttocks to left knee persists or worsen we can obtain lumbar MRI for further evaluation of which she states presently she would like to hold as the pain has progressively improved since its onset in November. She also states that when she sleeps in her wrist splint her hand numbness is improved. Follow up will otherwise be on an as needed basis and she understands to contact the office should any symptoms worsen or arise.     HPI:   Ms. Espinoza is a 76 year old right handed female who is status post L4-L5 laminectomies and synovial cyst resection and posterior instrumented fusion on 1/29/21 by Dr. Thomas. She presents today with completion of lumbar xray for further review. Presently she states that she is doing very well. She notes that around end of November she turned her back poorly and began to have radicular pain from her buttocks to her anterior knee on the left. She states that over the past month it has continued to improve and does not seem bothered by it. She plans to see her orthopedics physician in the future as she is having intermittent left knee pain and had a replacement surgery 5 years ago. She is overall very pleased with her progress and continues to do well without restrictions. She also notes that her left hand numbness has improved when wearing the wrist splint and would rather continue with the splint for the time being.      Exam:  /67   Pulse 99   Ht 5' 4\" (1.626 m)   Wt 172 lb (78 kg)   SpO2 97%   BMI 29.52 kg/m      She is awake alert and oriented to self and place   PERRL, Face is symmetrical, tongue is midline, EOMs   Neurologically intact with 5/5 strength " throughout, sensation intact in lower extremities      Mental status: alert and oriented x3 speech clear and appropriate   Cranial nerves: II-XII intact  Motor strength:    Hip flexors: 5/5 right, 5/5 left   Quadriceps: 5/5 right, 5/5 left  Ankle dorsiflexion: 5/5 right, 5/5 left    Ankle plantar flexion:5/5 right, 5/5 left   Extensor hallicus longus: 5/5 right, 5/5 left     Pain with internal rotation of bilateral hips     Incision: well healed without erythema, induration, or drainage     Images: reviewed personally with stable appearance of lumbar alignment and good hardware positioning     Madeleine Rockwell PA-C  [facility] Neurosurgery   O: [phone]

## 2022-03-22 ENCOUNTER — TRANSFERRED RECORDS (OUTPATIENT)
Dept: HEALTH INFORMATION MANAGEMENT | Facility: CLINIC | Age: 77
End: 2022-03-22
Payer: MEDICARE

## 2022-04-07 ENCOUNTER — OFFICE VISIT (OUTPATIENT)
Dept: INTERNAL MEDICINE | Facility: CLINIC | Age: 77
End: 2022-04-07
Payer: MEDICARE

## 2022-04-07 VITALS
WEIGHT: 178.2 LBS | BODY MASS INDEX: 30.59 KG/M2 | DIASTOLIC BLOOD PRESSURE: 82 MMHG | SYSTOLIC BLOOD PRESSURE: 125 MMHG | HEART RATE: 82 BPM | OXYGEN SATURATION: 95 %

## 2022-04-07 DIAGNOSIS — K21.9 GASTROESOPHAGEAL REFLUX DISEASE WITHOUT ESOPHAGITIS: ICD-10-CM

## 2022-04-07 DIAGNOSIS — M81.0 OSTEOPOROSIS, SENILE: Primary | ICD-10-CM

## 2022-04-07 DIAGNOSIS — F41.1 ANXIETY STATE: ICD-10-CM

## 2022-04-07 PROBLEM — M79.89 SWELLING OF LIMB: Status: RESOLVED | Noted: 2021-08-03 | Resolved: 2022-04-07

## 2022-04-07 PROCEDURE — 99214 OFFICE O/P EST MOD 30 MIN: CPT | Mod: 25 | Performed by: INTERNAL MEDICINE

## 2022-04-07 PROCEDURE — 96372 THER/PROPH/DIAG INJ SC/IM: CPT | Performed by: INTERNAL MEDICINE

## 2022-04-07 NOTE — PROGRESS NOTES
"Prolia Injection Phone Screen      Screening questions have been asked 2-3 days prior to administration visit for Prolia. If any questions are answered with \"Yes,\" this phone encounter were will routed to ordering provider for further evaluation.     1.  When was the last injection?  10/6/21    2.  Has insurance for this injection been verified?  Yes    3.  Did you experience any new onset achiness or rashes that lasted for over a month with your previous Prolia injection?   No    4.  Do you have a fever over 101?F or a new deep cough that is unusual for you today? No    5.  Have you started any new medications in the last 6 months that you were told could affect your immune system? These may have been prescribed by oncologist, transplant, rheumatology, or dermatology.   No    6.  In the last 6 months have you have gastric bypass or parathyroid surgery?   No    7.  Do you plan dental work requiring drilling into the bone such as implants/extractions or oral surgery in the next 2-3 months?   No    8. Do you have new insurance since the last injection?    9. Have you received the Covid-19 vaccine? Yes  If No - Proceed with Prolia injection  If Yes - Date of vaccination 10/20/2021. Will need to wait until 2 weeks after 2nd dose of Covid-19 vaccine before administering Prolia       Patient informed if symptoms discussed above present prior to their administration appointment, they are to notify clinic immediately.     Katia Riley            "

## 2022-04-07 NOTE — PROGRESS NOTES
(M81.0) Osteoporosis, senile  (primary encounter diagnosis)  Comment: Tolerating Prolia well.  Plan: DX Hip/Pelvis/Spine            (K21.9) Gastroesophageal reflux disease without esophagitis  Comment: On omeprazole bid.      (F41.1) Anxiety  Comment: on citalopram    Taking SSRI s is associated with lower bone mineral density in children and adults. In a study of adults over the age of 50, use of SSRIs not only lowered bone mineral density but they also increased the odds of falling and doubled the risk of osteoporosis fractures.      Acid blocking medications, such as Aciphex, Nexium, Prevacid, Protonix, Prilosec and Nexium are commonly used for heartburn. These medications can increase osteoporosis and fracture risk. A large study of more than 13,000 patients concluded that hip fracture risk increases by 22% after one year of taking the medication and by 54% after 3 years.               Patient was educated on safety of Prolia utilizing Patient Counseling Chart for Healthcare Providers, as outlined by the Prolia REMS progam.     Return in about 6 months (around 10/7/2022) for Prolia with CSS.    Patient Instructions   Prolia 14th today.  Prolia 15th in 6 months with my nurse. I will see you in 1 year.    DXA in 9/2022 .   Phone number to schedule [phone].    Daily calcium need is 3911-1426 mg a day from the diet and supplements.  Calcium citrate is easier to digest.  Vitamin D 2000 IU daily recommended.          /82   Pulse 82   Wt 80.8 kg (178 lb 3.2 oz)   SpO2 95%   BMI 30.59 kg/m        Did you experience any problems with previous Prolia injection? no  Any medication change in the last 6 months? no  Did you take prednisone or other immunosupressant drugs in the last 6 months   (chemo, transplant, rheum, dermatology conditions)? no  Did you have any serious infection in the last 6 months?no  Any recent hospitalizations?no  Do you plan any dental work in the next 2-3 months?no  How much calcium  do you take daily from the diet and supplements?1200 mg  How much vit D do you take daily? 2000 IU  Last DXA? 9/2020. Reviewed and discussed      Patient is here today for the 14th Prolia injection. Patient tolerated previous injections well.   We discussed calcium and vit D daily needs today.   I reviewed the lab results:  Component      Latest Ref Rng & Units 8/4/2021   Sodium      136 - 145 mmol/L 140   Potassium      3.5 - 5.0 mmol/L 4.6   Chloride      98 - 107 mmol/L 102   Carbon Dioxide      22 - 31 mmol/L 26   Anion Gap      5 - 18 mmol/L 12   Urea Nitrogen      8 - 28 mg/dL 12   Creatinine      0.60 - 1.10 mg/dL 0.69   Calcium      8.5 - 10.5 mg/dL 10.1   Glucose      70 - 125 mg/dL 101   Alkaline Phosphatase      45 - 120 U/L 57   AST      0 - 40 U/L 21   ALT      0 - 45 U/L 17   Protein Total      6.0 - 8.0 g/dL 7.3   Albumin      3.5 - 5.0 g/dL 4.1   Bilirubin Total      0.0 - 1.0 mg/dL 1.3 (H)   GFR Estimate      >60 mL/min/1.73m2 87     Component      Latest Ref Rng & Units 1/11/2021   Vitamin D, Total (25-Hydroxy)      30.0 - 80.0 ng/mL 50.1       We discussed high risk of rebound vertebral fractures when Prolia suddenly stopped.    Next Prolia injection will be in 6 months.           This note has been dictated using voice recognition software. Any grammatical or context distortions are unintentional and inherent to the software      Patient Active Problem List   Diagnosis     Peripheral Neuropathy     Urticaria     Major Depression, Recurrent     Anxiety     Irritable Bowel Syndrome     Primary osteoarthritis of right knee     Primary osteoarthritis of knee, left     Osteoporosis, senile     Esophageal reflux     Primary osteoarthritis involving multiple joints     Arthritis of first metatarsophalangeal (MTP) joint of right foot     Spinal stenosis of lumbar region with neurogenic claudication     Synovial cyst of lumbar facet joint     Lumbar disc herniation     Lumbar stenosis with neurogenic  claudication     Lump or mass in breast     Hypomagnesemia       Current Outpatient Medications   Medication     acetaminophen (TYLENOL) 500 MG tablet     calcium citrate-vitamin D (CITRACAL+D) 315-200 mg-unit per tablet     calcium polycarbophiL (FIBERCON) 625 mg tablet     citalopram (CELEXA) 40 MG tablet     denosumab (PROLIA) 60 MG/ML SOSY injection     furosemide (LASIX) 20 MG tablet     lactase (LACTAID) 3000 UNIT tablet     Lactobacillus Rhamnosus, GG, ( PROBIOTIC DIGESTIVE CARE) CAPS     magnesium 250 mg Tab     multivit-min-iron-FA-lutein (CENTRUM SILVER WOMEN) 8 mg iron-400 mcg-300 mcg Tab     omeprazole (PRILOSEC) 20 MG capsule     tetrahydroz-peg 400-hyprom-gly (VISINE MAX REDNESS RELIEF) 0.05-1-0.36-0.2 % Drop     vit C/E/Zn/coppr/lutein/zeaxan (PRESERVISION AREDS-2 ORAL)     No current facility-administered medications for this visit.

## 2022-04-07 NOTE — PATIENT INSTRUCTIONS
Prolia 14th today.  Prolia 15th in 6 months with my nurse. I will see you in 1 year.    DXA in 9/2022 .   Phone number to schedule [phone].    Daily calcium need is 1776-2206 mg a day from the diet and supplements.  Calcium citrate is easier to digest.  Vitamin D 2000 IU daily recommended.

## 2022-04-26 ENCOUNTER — TRANSFERRED RECORDS (OUTPATIENT)
Dept: HEALTH INFORMATION MANAGEMENT | Facility: CLINIC | Age: 77
End: 2022-04-26
Payer: MEDICARE

## 2022-05-05 DIAGNOSIS — Z00.00 ROUTINE GENERAL MEDICAL EXAMINATION AT A HEALTH CARE FACILITY: ICD-10-CM

## 2022-05-05 DIAGNOSIS — M17.12 OSTEOARTHRITIS OF LEFT KNEE: Primary | ICD-10-CM

## 2022-05-12 ENCOUNTER — LAB (OUTPATIENT)
Dept: LAB | Facility: CLINIC | Age: 77
End: 2022-05-12
Payer: MEDICARE

## 2022-05-12 ENCOUNTER — TRANSFERRED RECORDS (OUTPATIENT)
Dept: HEALTH INFORMATION MANAGEMENT | Facility: CLINIC | Age: 77
End: 2022-05-12

## 2022-05-12 DIAGNOSIS — Z00.00 ROUTINE GENERAL MEDICAL EXAMINATION AT A HEALTH CARE FACILITY: ICD-10-CM

## 2022-05-12 DIAGNOSIS — M17.12 OSTEOARTHRITIS OF LEFT KNEE: ICD-10-CM

## 2022-05-12 LAB
BASOPHILS # BLD AUTO: 0.1 10E3/UL (ref 0–0.2)
BASOPHILS NFR BLD AUTO: 1 %
CRP SERPL HS-MCNC: 2.8 MG/L (ref 0–3)
EOSINOPHIL # BLD AUTO: 0.3 10E3/UL (ref 0–0.7)
EOSINOPHIL NFR BLD AUTO: 4 %
ERYTHROCYTE [DISTWIDTH] IN BLOOD BY AUTOMATED COUNT: 11.9 % (ref 10–15)
ERYTHROCYTE [SEDIMENTATION RATE] IN BLOOD BY WESTERGREN METHOD: 14 MM/HR (ref 0–20)
HCT VFR BLD AUTO: 44 % (ref 35–47)
HGB BLD-MCNC: 14.3 G/DL (ref 11.7–15.7)
IMM GRANULOCYTES # BLD: 0 10E3/UL
IMM GRANULOCYTES NFR BLD: 0 %
LYMPHOCYTES # BLD AUTO: 1.9 10E3/UL (ref 0.8–5.3)
LYMPHOCYTES NFR BLD AUTO: 31 %
MCH RBC QN AUTO: 31.4 PG (ref 26.5–33)
MCHC RBC AUTO-ENTMCNC: 32.5 G/DL (ref 31.5–36.5)
MCV RBC AUTO: 97 FL (ref 78–100)
MONOCYTES # BLD AUTO: 0.5 10E3/UL (ref 0–1.3)
MONOCYTES NFR BLD AUTO: 8 %
NEUTROPHILS # BLD AUTO: 3.5 10E3/UL (ref 1.6–8.3)
NEUTROPHILS NFR BLD AUTO: 57 %
PLATELET # BLD AUTO: 263 10E3/UL (ref 150–450)
RBC # BLD AUTO: 4.55 10E6/UL (ref 3.8–5.2)
WBC # BLD AUTO: 6.2 10E3/UL (ref 4–11)

## 2022-05-12 PROCEDURE — 86141 C-REACTIVE PROTEIN HS: CPT

## 2022-05-12 PROCEDURE — 85025 COMPLETE CBC W/AUTO DIFF WBC: CPT

## 2022-05-12 PROCEDURE — 36415 COLL VENOUS BLD VENIPUNCTURE: CPT

## 2022-05-12 PROCEDURE — 85652 RBC SED RATE AUTOMATED: CPT

## 2022-05-17 ENCOUNTER — OFFICE VISIT (OUTPATIENT)
Dept: RHEUMATOLOGY | Facility: CLINIC | Age: 77
End: 2022-05-17
Payer: MEDICARE

## 2022-05-17 VITALS
HEART RATE: 84 BPM | BODY MASS INDEX: 30.39 KG/M2 | SYSTOLIC BLOOD PRESSURE: 136 MMHG | HEIGHT: 64 IN | DIASTOLIC BLOOD PRESSURE: 86 MMHG | WEIGHT: 178 LBS

## 2022-05-17 DIAGNOSIS — M15.0 PRIMARY OSTEOARTHRITIS INVOLVING MULTIPLE JOINTS: ICD-10-CM

## 2022-05-17 DIAGNOSIS — M19.071 ARTHRITIS OF FIRST METATARSOPHALANGEAL (MTP) JOINT OF RIGHT FOOT: Primary | ICD-10-CM

## 2022-05-17 PROCEDURE — 20600 DRAIN/INJ JOINT/BURSA W/O US: CPT | Mod: RT | Performed by: INTERNAL MEDICINE

## 2022-05-17 PROCEDURE — 99214 OFFICE O/P EST MOD 30 MIN: CPT | Mod: 25 | Performed by: INTERNAL MEDICINE

## 2022-05-17 RX ORDER — TRIAMCINOLONE ACETONIDE 40 MG/ML
20 INJECTION, SUSPENSION INTRA-ARTICULAR; INTRAMUSCULAR ONCE
Status: COMPLETED | OUTPATIENT
Start: 2022-05-17 | End: 2022-05-17

## 2022-05-17 RX ORDER — POLYETHYLENE GLYCOL 3350 17 G/17G
1 POWDER, FOR SOLUTION ORAL DAILY
COMMUNITY
End: 2022-08-22

## 2022-05-17 RX ORDER — DICYCLOMINE HYDROCHLORIDE 10 MG/1
1 CAPSULE ORAL PRN
COMMUNITY
Start: 2022-05-12

## 2022-05-17 RX ADMIN — TRIAMCINOLONE ACETONIDE 20 MG: 40 INJECTION, SUSPENSION INTRA-ARTICULAR; INTRAMUSCULAR at 15:32

## 2022-05-17 NOTE — PROGRESS NOTES
"      Rheumatology follow-up visit note     Adelina is a 76 year old female presents today for follow-up.    Adelina was seen today for recheck.    Diagnoses and all orders for this visit:    Arthritis of first metatarsophalangeal (MTP) joint of right foot  -     triamcinolone (KENALOG-40) injection 20 mg  -     DC ARTHROCENTESIS ASPIR&/INJ INTERM JT/BURS W/US    Primary osteoarthritis involving multiple joints  -     triamcinolone (KENALOG-40) injection 20 mg          After pros and cons were discussed including risk of infection, bleeding, skin changes including thinning, pigmentary alteration and scarring to name a few, right first metatarsophalangeal joint injected as noted in the orders section. This was done with nontouch technique.  The patient tolerated the procedure well and had a brisk Marcaine effect.  The postinjection care was discussed.  Follow-up as needed.    HPI    Adelina Espinoza is a 76 year old female is here for follow-up of osteoarthritis, exacerbation of pain in the right foot, epicentered at the first MTP.  This is worse with activity interfering with day-to-day activities and interfering with some of the shoewear.  This is reminiscent of what she has experienced in the past and had good response to corticosteroid injections.  She has taken acetaminophen.      DETAILED EXAMINATION  05/17/22  :    Vitals:    05/17/22 1210   BP: 136/86   BP Location: Left arm   Patient Position: Sitting   Cuff Size: Adult Regular   Pulse: 84   Weight: 80.7 kg (178 lb)   Height: 1.626 m (5' 4\")     Alert oriented. Head including the face is examined for malar rash, heliotropes, scarring, lupus pernio. Eyes examined for redness such as in episcleritis/scleritis, periorbital lesions.   Neck/ Face examined for parotid gland swelling, range of motion of neck.  Left upper and lower and right upper and lower extremities examined for tenderness, swelling, warmth of the appendicular joints, range of motion, edema, " rash.  Some of the important findings included: she does not have evidence of synovitis in the palpable joints of the upper extremities.  No significant deformities of the digits.  Small Heberden nodes.  Range of motion of the shoulders  show full abduction.  No JLT effusion or warmth of the knees.  she does not have dactylitis of the digits.  She is tender in the right first MTP.  There are no acute inflammatory changes such as seen in gout.     Patient Active Problem List    Diagnosis Date Noted     Hypomagnesemia 08/03/2021     Priority: Medium     Lump or mass in breast 06/29/2021     Priority: Medium     Added automatically from request for surgery [id number]         Lumbar stenosis with neurogenic claudication 01/29/2021     Priority: Medium     Spinal stenosis of lumbar region with neurogenic claudication 12/23/2020     Priority: Medium     Added automatically from request for surgery [id number]         Synovial cyst of lumbar facet joint 12/23/2020     Priority: Medium     Added automatically from request for surgery [id number]         Lumbar disc herniation 12/23/2020     Priority: Medium     Added automatically from request for surgery [id number]         Primary osteoarthritis involving multiple joints 03/05/2019     Priority: Medium     Arthritis of first metatarsophalangeal (MTP) joint of right foot 03/05/2019     Priority: Medium     Esophageal reflux 02/14/2017     Priority: Medium     Osteoporosis, senile 09/13/2016     Priority: Medium     Peripheral Neuropathy      Priority: Medium     Created by Conversion  [facility] Annotation: Jun 21 2009  9:51PM - Toya Charles: AFTER   CHEMO  Replacement Utility updated for latest IMO load         Major Depression, Recurrent      Priority: Medium     Created by [facility] Annotation: Jun 6 2013 11:43AM - Marcia Cisse: 1995, 2012  Replacement Utility updated for latest IMO load         Anxiety      Priority: Medium     Created by Conversion  Replacement  Utility updated for latest IMO load         Primary osteoarthritis of knee, left 04/21/2016     Priority: Medium     Replacement Utility updated for latest IMO load         Primary osteoarthritis of right knee 10/29/2015     Priority: Medium     Urticaria      Priority: Medium     Created by Conversion         Irritable Bowel Syndrome      Priority: Medium     Created by Conversion         Past Surgical History:   Procedure Laterality Date     APPENDECTOMY  1967     BACK SURGERY  2000    cervical fusion     BIOPSY BREAST Right 2003     BIOPSY BREAST Right 07/09/2021    dense fibrotic tissue, Dr Burleson     CATARACT EXTRACTION, BILATERAL       CHOLECYSTECTOMY  1994     HYSTERECTOMY  1997    desmoid cyst     IR CVC TUNNEL PLACEMENT > 5 YRS OF AGE  1/21/2004     IR MISCELLANEOUS PROCEDURE  7/16/2004     LUMPECTOMY BREAST  12/2003    breast cancer     NISSEN FUNDOPLICATION  2010     OOPHORECTOMY  1997     [facility] THORAX SPINE FUSN,POST TECH N/A 1/29/2021    Procedure: BILATERAL LUMBAR 4-5 LAMINECTOMIES FOR RIGHT SYNOVIAL CYST RESECTION, LUMBAR 4-5, LUMBAR 4-5 POSTERIOR INSTRUMENTED FUSION AND ARTHRODESIS, USE OF ALLOGRAFT, USE OF AUTOGRAFT, STEALTH NAVIGATION;  Surgeon: Lisa Thomas MD;  Location: [facility] Main OR;  Service: Spine     [facility] TOTAL KNEE ARTHROPLASTY Right 10/29/2015    Procedure: RIGHT TOTAL KNEE  ARTHROPLASTY;  Surgeon: Nitin White MD;  Location: [facility] Main OR;  Service: Orthopedics     [facility] TOTAL KNEE ARTHROPLASTY Left 04/21/2016    Procedure: LEFT TOTAL KNEE ARTHROPLASTY;  Surgeon: Nitin White MD;  Location: [facility] OR;  Service: Orthopedics      Past Medical History:   Diagnosis Date     Anxiety      Arthritis      Breast cancer (H) 2003     Depression      Esophageal reflux      H/O colonoscopy 03/2022    hyperplastic polyp noted, no further colonoscopy recommended per [facility]     History of anesthesia complications     nausea     Hx antineoplastic chemotherapy 2003    for  breast cancer     Hx of radiation therapy 2004    for breast cancer     IBS (irritable bowel syndrome)      Lactose intolerance     lactose breath test positive at GI, 7/2019     Macular degeneration 08/2019    caught early, takes Areds 2 on a daily basis, seeing opthamology     Neuropathy     secondary to chemotherapy     Osteopenia      PONV (postoperative nausea and vomiting)      Allergies   Allergen Reactions     Adhesive Tape      Other reaction(s): Rash     Adhesive [Mecrylate] Rash     Chocolate Flavor Diarrhea     Gabapentin Unknown     Has dizziness, spaced out when on it. Has been on it twice and both times had this type of reaction. Should not be on it in the future.      Green Pepper [Capsicum] Unknown     Stomach upset     Lactose Diarrhea     Monosodium Glutamate Unknown     diarrhea     Current Outpatient Medications   Medication Sig Dispense Refill     acetaminophen (TYLENOL) 500 MG tablet [ACETAMINOPHEN (TYLENOL) 500 MG TABLET] Take 1,000 mg by mouth every 6 (six) hours as needed for pain.       calcium citrate-vitamin D (CITRACAL+D) 315-200 mg-unit per tablet [CALCIUM CITRATE-VITAMIN D (CITRACAL+D) 315-200 MG-UNIT PER TABLET] Take 1 tablet by mouth 2 (two) times a day.       calcium polycarbophiL (FIBERCON) 625 mg tablet [CALCIUM POLYCARBOPHIL (FIBERCON) 625 MG TABLET] Take 625 mg by mouth 2 (two) times a day.       citalopram (CELEXA) 40 MG tablet [CITALOPRAM (CELEXA) 40 MG TABLET] TAKE 1 TABLET DAILY 90 tablet 3     dicyclomine (BENTYL) 10 MG capsule Take 1 capsule by mouth as needed       furosemide (LASIX) 20 MG tablet Take 1 tablet (20 mg) by mouth daily 90 tablet 3     lactase (LACTAID) 3000 UNIT tablet        Lactobacillus Rhamnosus, GG, ( PROBIOTIC DIGESTIVE CARE) CAPS Take 1 capsule by mouth every 24 hours       magnesium 250 mg Tab [MAGNESIUM 250 MG TAB] Take 250 mg by mouth daily.        multivit-min-iron-FA-lutein (CENTRUM SILVER WOMEN) 8 mg iron-400 mcg-300 mcg Tab  [MULTIVIT-MIN-IRON-FA-LUTEIN (CENTRUM SILVER WOMEN) 8 MG IRON-400 MCG-300 MCG TAB] Take 1 tablet by mouth daily.        omeprazole (PRILOSEC) 20 MG capsule [OMEPRAZOLE (PRILOSEC) 20 MG CAPSULE] TAKE 1 CAPSULE DAILY BEFORE BREAKFAST (Patient taking differently: 2 times daily) 90 capsule 3     polyethylene glycol (MIRALAX) 17 g packet Take 1 packet by mouth daily       tetrahydroz-peg 400-hyprom-gly (VISINE MAX REDNESS RELIEF) 0.05-1-0.36-0.2 % Drop [TETRAHYDROZ--HYPROM-GLY (VISINE MAX REDNESS RELIEF) 0.05-1-0.36-0.2 % DROP] Administer 1 drop to both eyes 3 (three) times a day as needed.       vit C/E/Zn/coppr/lutein/zeaxan (PRESERVISION AREDS-2 ORAL) [VIT C/E/ZN/COPPR/LUTEIN/ZEAXAN (PRESERVISION AREDS-2 ORAL)] Take 1 tablet by mouth 2 (two) times a day.        denosumab (PROLIA) 60 MG/ML SOSY injection Inject 60 mg Subcutaneous       family history includes Alzheimer Disease in her father; Aortic stenosis in her father; Atrial fibrillation in her father; Breast Cancer (age of onset: 46.00) in her maternal aunt; Cerebrovascular Disease in her father; Depression in her father; Heart Disease in her mother; Hypertension in her mother; Kidney Cancer (age of onset: 67.00) in her mother; Lung Cancer in her paternal grandfather; Skin Cancer in her father.  Social Connections: Not on file          WBC Count   Date Value Ref Range Status   05/12/2022 6.2 4.0 - 11.0 10e3/uL Final     RBC Count   Date Value Ref Range Status   05/12/2022 4.55 3.80 - 5.20 10e6/uL Final     Hemoglobin   Date Value Ref Range Status   05/12/2022 14.3 11.7 - 15.7 g/dL Final     Hematocrit   Date Value Ref Range Status   05/12/2022 44.0 35.0 - 47.0 % Final     MCV   Date Value Ref Range Status   05/12/2022 97 78 - 100 fL Final     MCH   Date Value Ref Range Status   05/12/2022 31.4 26.5 - 33.0 pg Final     Platelet Count   Date Value Ref Range Status   05/12/2022 263 150 - 450 10e3/uL Final     % Lymphocytes   Date Value Ref Range Status    05/12/2022 31 % Final     AST   Date Value Ref Range Status   08/04/2021 21 0 - 40 U/L Final     ALT   Date Value Ref Range Status   08/04/2021 17 0 - 45 U/L Final     Albumin   Date Value Ref Range Status   08/04/2021 4.1 3.5 - 5.0 g/dL Final     Alkaline Phosphatase   Date Value Ref Range Status   08/04/2021 57 45 - 120 U/L Final     Creatinine   Date Value Ref Range Status   08/04/2021 0.69 0.60 - 1.10 mg/dL Final     GFR Estimate   Date Value Ref Range Status   08/04/2021 87 >60 mL/min/1.73m2 Final     Comment:     As of July 11, 2021, eGFR is calculated by the CKD-EPI creatinine equation, without race adjustment. eGFR can be influenced by muscle mass, exercise, and diet. The reported eGFR is an estimation only and is only applicable if the renal function is stable.   01/31/2021 >60 >60 mL/min/1.73m2 Final     GFR Estimate If Black   Date Value Ref Range Status   01/31/2021 >60 >60 mL/min/1.73m2 Final     Erythrocyte Sedimentation Rate   Date Value Ref Range Status   05/12/2022 14 0 - 20 mm/hr Final     C-Reactive Protein High Sensitivity   Date Value Ref Range Status   05/12/2022 2.8 0.0 - 3.0 mg/L Final     Comment:     Low risk < 1.0 mg/L  Average risk 1.0 to 3.0 mg/L  High risk > 3.0 mg/L  Acute inflamation > 10.0 mg/L

## 2022-05-25 ENCOUNTER — TRANSFERRED RECORDS (OUTPATIENT)
Dept: HEALTH INFORMATION MANAGEMENT | Facility: CLINIC | Age: 77
End: 2022-05-25
Payer: MEDICARE

## 2022-05-31 ENCOUNTER — IMMUNIZATION (OUTPATIENT)
Dept: NURSING | Facility: CLINIC | Age: 77
End: 2022-05-31
Payer: MEDICARE

## 2022-05-31 PROCEDURE — 91305 COVID-19,PF,PFIZER (12+ YRS): CPT

## 2022-05-31 PROCEDURE — 0054A COVID-19,PF,PFIZER (12+ YRS): CPT

## 2022-06-02 DIAGNOSIS — F33.9 MAJOR DEPRESSIVE DISORDER, RECURRENT EPISODE (H): ICD-10-CM

## 2022-06-02 DIAGNOSIS — F41.1 ANXIETY STATE: ICD-10-CM

## 2022-06-03 RX ORDER — CITALOPRAM HYDROBROMIDE 40 MG/1
40 TABLET ORAL DAILY
Qty: 60 TABLET | Refills: 0 | Status: SHIPPED | OUTPATIENT
Start: 2022-06-03 | End: 2022-06-27

## 2022-06-03 NOTE — TELEPHONE ENCOUNTER
"Routing refill request to provider for review/approval because:  PHQ 9 score - not on file/out of date.    Last Written Prescription Date:  6/7/21  Last Fill Quantity: 90,  # refills: 3   Last office visit provider:  4/7/22     Requested Prescriptions   Pending Prescriptions Disp Refills     citalopram (CELEXA) 40 MG tablet [Pharmacy Med Name: CITALOPRAM HYDROBROMIDE TABS 40MG] 90 tablet 3     Sig: TAKE 1 TABLET DAILY       SSRIs Protocol Failed - 6/3/2022 11:15 AM        Failed - PHQ-9 score less than 5 in past 6 months     Please review last PHQ-9 score.           Passed - Medication is active on med list        Passed - Patient is age 18 or older        Passed - No active pregnancy on record        Passed - No positive pregnancy test in last 12 months        Passed - Recent (6 mo) or future (30 days) visit within the authorizing provider's specialty     Patient had office visit in the last 6 months or has a visit in the next 30 days with authorizing provider or within the authorizing provider's specialty.  See \"Patient Info\" tab in inbasket, or \"Choose Columns\" in Meds & Orders section of the refill encounter.                 Ran Montes RN 06/03/22 11:16 AM  "

## 2022-06-22 ENCOUNTER — TRANSFERRED RECORDS (OUTPATIENT)
Dept: HEALTH INFORMATION MANAGEMENT | Facility: CLINIC | Age: 77
End: 2022-06-22

## 2022-06-22 DIAGNOSIS — M81.0 OSTEOPOROSIS, SENILE: Primary | ICD-10-CM

## 2022-06-22 DIAGNOSIS — Z92.29 PERSONAL HISTORY OF OTHER DRUG THERAPY: ICD-10-CM

## 2022-06-22 ASSESSMENT — ANXIETY QUESTIONNAIRES
GAD7 TOTAL SCORE: 6
2. NOT BEING ABLE TO STOP OR CONTROL WORRYING: SEVERAL DAYS
4. TROUBLE RELAXING: SEVERAL DAYS
7. FEELING AFRAID AS IF SOMETHING AWFUL MIGHT HAPPEN: NOT AT ALL
7. FEELING AFRAID AS IF SOMETHING AWFUL MIGHT HAPPEN: NOT AT ALL
3. WORRYING TOO MUCH ABOUT DIFFERENT THINGS: SEVERAL DAYS
5. BEING SO RESTLESS THAT IT IS HARD TO SIT STILL: SEVERAL DAYS
8. IF YOU CHECKED OFF ANY PROBLEMS, HOW DIFFICULT HAVE THESE MADE IT FOR YOU TO DO YOUR WORK, TAKE CARE OF THINGS AT HOME, OR GET ALONG WITH OTHER PEOPLE?: SOMEWHAT DIFFICULT
GAD7 TOTAL SCORE: 6
GAD7 TOTAL SCORE: 6
6. BECOMING EASILY ANNOYED OR IRRITABLE: SEVERAL DAYS
1. FEELING NERVOUS, ANXIOUS, OR ON EDGE: SEVERAL DAYS

## 2022-06-22 ASSESSMENT — PATIENT HEALTH QUESTIONNAIRE - PHQ9
SUM OF ALL RESPONSES TO PHQ QUESTIONS 1-9: 4
SUM OF ALL RESPONSES TO PHQ QUESTIONS 1-9: 4
10. IF YOU CHECKED OFF ANY PROBLEMS, HOW DIFFICULT HAVE THESE PROBLEMS MADE IT FOR YOU TO DO YOUR WORK, TAKE CARE OF THINGS AT HOME, OR GET ALONG WITH OTHER PEOPLE: SOMEWHAT DIFFICULT

## 2022-06-27 ENCOUNTER — OFFICE VISIT (OUTPATIENT)
Dept: FAMILY MEDICINE | Facility: CLINIC | Age: 77
End: 2022-06-27
Payer: MEDICARE

## 2022-06-27 VITALS
BODY MASS INDEX: 29.83 KG/M2 | DIASTOLIC BLOOD PRESSURE: 74 MMHG | WEIGHT: 173.8 LBS | OXYGEN SATURATION: 98 % | HEART RATE: 99 BPM | SYSTOLIC BLOOD PRESSURE: 102 MMHG

## 2022-06-27 DIAGNOSIS — M79.89 SWELLING OF LIMB: ICD-10-CM

## 2022-06-27 DIAGNOSIS — N95.1 MENOPAUSAL SYNDROME (HOT FLASHES): ICD-10-CM

## 2022-06-27 DIAGNOSIS — K21.9 GASTROESOPHAGEAL REFLUX DISEASE WITHOUT ESOPHAGITIS: ICD-10-CM

## 2022-06-27 DIAGNOSIS — G60.9 HEREDITARY AND IDIOPATHIC PERIPHERAL NEUROPATHY: ICD-10-CM

## 2022-06-27 DIAGNOSIS — E83.42 HYPOMAGNESEMIA: ICD-10-CM

## 2022-06-27 DIAGNOSIS — F41.1 ANXIETY STATE: Primary | ICD-10-CM

## 2022-06-27 DIAGNOSIS — M81.0 OSTEOPOROSIS, SENILE: ICD-10-CM

## 2022-06-27 DIAGNOSIS — F33.0 MILD EPISODE OF RECURRENT MAJOR DEPRESSIVE DISORDER (H): ICD-10-CM

## 2022-06-27 DIAGNOSIS — K58.9 IRRITABLE BOWEL SYNDROME, UNSPECIFIED TYPE: ICD-10-CM

## 2022-06-27 PROBLEM — F33.41 RECURRENT MAJOR DEPRESSIVE DISORDER, IN PARTIAL REMISSION (H): Status: ACTIVE | Noted: 2022-06-27

## 2022-06-27 LAB
ALBUMIN SERPL-MCNC: 3.9 G/DL (ref 3.5–5)
ALP SERPL-CCNC: 54 U/L (ref 45–120)
ALT SERPL W P-5'-P-CCNC: 16 U/L (ref 0–45)
ANION GAP SERPL CALCULATED.3IONS-SCNC: 11 MMOL/L (ref 5–18)
AST SERPL W P-5'-P-CCNC: 22 U/L (ref 0–40)
BILIRUB SERPL-MCNC: 1.1 MG/DL (ref 0–1)
BUN SERPL-MCNC: 12 MG/DL (ref 8–28)
CALCIUM SERPL-MCNC: 10.1 MG/DL (ref 8.5–10.5)
CHLORIDE BLD-SCNC: 103 MMOL/L (ref 98–107)
CO2 SERPL-SCNC: 26 MMOL/L (ref 22–31)
CREAT SERPL-MCNC: 0.7 MG/DL (ref 0.6–1.1)
ERYTHROCYTE [DISTWIDTH] IN BLOOD BY AUTOMATED COUNT: 12.2 % (ref 10–15)
GFR SERPL CREATININE-BSD FRML MDRD: 90 ML/MIN/1.73M2
GLUCOSE BLD-MCNC: 90 MG/DL (ref 70–125)
HCT VFR BLD AUTO: 43.3 % (ref 35–47)
HGB BLD-MCNC: 14 G/DL (ref 11.7–15.7)
MAGNESIUM SERPL-MCNC: 2.1 MG/DL (ref 1.8–2.6)
MCH RBC QN AUTO: 30.5 PG (ref 26.5–33)
MCHC RBC AUTO-ENTMCNC: 32.3 G/DL (ref 31.5–36.5)
MCV RBC AUTO: 94 FL (ref 78–100)
PLATELET # BLD AUTO: 274 10E3/UL (ref 150–450)
POTASSIUM BLD-SCNC: 4.3 MMOL/L (ref 3.5–5)
PROT SERPL-MCNC: 7.4 G/DL (ref 6–8)
RBC # BLD AUTO: 4.59 10E6/UL (ref 3.8–5.2)
SODIUM SERPL-SCNC: 140 MMOL/L (ref 136–145)
WBC # BLD AUTO: 7.8 10E3/UL (ref 4–11)

## 2022-06-27 PROCEDURE — 85027 COMPLETE CBC AUTOMATED: CPT | Performed by: FAMILY MEDICINE

## 2022-06-27 PROCEDURE — 80053 COMPREHEN METABOLIC PANEL: CPT | Performed by: FAMILY MEDICINE

## 2022-06-27 PROCEDURE — 36415 COLL VENOUS BLD VENIPUNCTURE: CPT | Performed by: FAMILY MEDICINE

## 2022-06-27 PROCEDURE — 99214 OFFICE O/P EST MOD 30 MIN: CPT | Performed by: FAMILY MEDICINE

## 2022-06-27 PROCEDURE — 83735 ASSAY OF MAGNESIUM: CPT | Performed by: FAMILY MEDICINE

## 2022-06-27 RX ORDER — FUROSEMIDE 20 MG
20 TABLET ORAL DAILY
Qty: 90 TABLET | Refills: 1 | Status: SHIPPED | OUTPATIENT
Start: 2022-06-27 | End: 2022-08-25

## 2022-06-27 RX ORDER — CITALOPRAM HYDROBROMIDE 20 MG/1
40 TABLET ORAL DAILY
Qty: 180 TABLET | Refills: 1 | Status: SHIPPED | OUTPATIENT
Start: 2022-06-27 | End: 2022-10-09

## 2022-06-28 NOTE — PROGRESS NOTES
PROGRESS NOTE   6/27/2022    SUBJECTIVE:  Adelina Espinoza is a 76 year old female who presents for     Chief Complaint   Patient presents with     Recheck Medication     Had hot flashes after anxiety meds. Dripping in sweat at times.      Patient comes in today for follow-up of her chronic medical issues.  She overall is doing okay.  She is having some issues with her knee and is seeing [facility].  She has some popping in her left knee that has had a knee replacement in the past.  She may need a revision but they are trying to work on doing some other things first to avoid having to have any sinus surgery.  She actually over the last week or 2 is started to feel little bit better in terms of that so she is hopeful that maybe she will be able to hold off on any kind of intervention at this time.  She also continues to work with the gastroenterologist about her irritable bowel and her chronic constipation.  She did see the gastroenterologist again just recently in May.  They increased her Prilosec to twice a day and told her to take MiraLAX but she is still having lots of stool and now is runny stool just about with every time that she uses the restroom.  She does not really have a follow-up plan from them and is frustrated by that.  She did have a colonoscopy which looked fine.  She does have a history of anxiety gastroesophageal reflux disease hypomagnesemia irritable bowel syndrome depression osteoporosis neuropathy swelling in her limbs and she also notes that she has been having more hot flashes recently.  In terms of the anxiety and depression she overall seems to be doing okay.  She continues on Celexa which is working fine.  She is currently taking 40 mg a day.  This medication is working fine but she is wondering about decreasing it down.  She feels like she is overall doing okay and she is wondering if maybe the hot flashes are from that.  She would like to wean the Celexa down to 20 mg a day and  we can certainly try to do that and see how she does.  We will also plan to do a CBC to follow-up on the hot flashes that she is having.  In terms of the gastroesophageal reflux disease again she had her Prilosec increased to twice a day and she seems to be tolerating that okay.  She will continue to monitor that and work with the gastroenterologist.  She did start back on Bentyl from the gastroenterologist and that seems like it is helping.  She tries not to use it very frequently but it does seem to be helping to some extent.  She does use Lasix on a daily basis to help with swelling in her limbs and that seems to be controlling her symptoms quite well.  She does need a refill of that today.  We will recheck a magnesium level as that has been low in the past.  She does have a history of osteoporosis and continues on Prolia.  She does have follow-up already scheduled with a DEXA scan scheduled in September.  She also has a history of breast cancer and has a mammogram scheduled for July.    Patient Active Problem List   Diagnosis     Peripheral Neuropathy     Urticaria     Major Depression, Recurrent     Anxiety     Irritable Bowel Syndrome     Primary osteoarthritis of right knee     Primary osteoarthritis of knee, left     Osteoporosis, senile     Esophageal reflux     Primary osteoarthritis involving multiple joints     Arthritis of first metatarsophalangeal (MTP) joint of right foot     Spinal stenosis of lumbar region with neurogenic claudication     Synovial cyst of lumbar facet joint     Lumbar disc herniation     Lumbar stenosis with neurogenic claudication     Lump or mass in breast     Swelling of limb     Hypomagnesemia       Current Outpatient Medications   Medication Sig Dispense Refill     acetaminophen (TYLENOL) 500 MG tablet [ACETAMINOPHEN (TYLENOL) 500 MG TABLET] Take 1,000 mg by mouth every 6 (six) hours as needed for pain.       calcium citrate-vitamin D (CITRACAL+D) 315-200 mg-unit per tablet  [CALCIUM CITRATE-VITAMIN D (CITRACAL+D) 315-200 MG-UNIT PER TABLET] Take 1 tablet by mouth 2 (two) times a day.       calcium polycarbophiL (FIBERCON) 625 mg tablet [CALCIUM POLYCARBOPHIL (FIBERCON) 625 MG TABLET] Take 625 mg by mouth 2 (two) times a day.       citalopram (CELEXA) 20 MG tablet Take 2 tablets (40 mg) by mouth daily 180 tablet 1     dicyclomine (BENTYL) 10 MG capsule Take 1 capsule by mouth as needed       furosemide (LASIX) 20 MG tablet Take 1 tablet (20 mg) by mouth daily 90 tablet 1     lactase (LACTAID) 3000 UNIT tablet        Lactobacillus Rhamnosus, GG, ( PROBIOTIC DIGESTIVE CARE) CAPS Take 1 capsule by mouth every 24 hours       magnesium 250 mg Tab [MAGNESIUM 250 MG TAB] Take 250 mg by mouth daily.        multivit-min-iron-FA-lutein (CENTRUM SILVER WOMEN) 8 mg iron-400 mcg-300 mcg Tab [MULTIVIT-MIN-IRON-FA-LUTEIN (CENTRUM SILVER WOMEN) 8 MG IRON-400 MCG-300 MCG TAB] Take 1 tablet by mouth daily.        omeprazole (PRILOSEC) 20 MG capsule [OMEPRAZOLE (PRILOSEC) 20 MG CAPSULE] TAKE 1 CAPSULE DAILY BEFORE BREAKFAST (Patient taking differently: 2 times daily) 90 capsule 3     polyethylene glycol (MIRALAX) 17 g packet Take 1 packet by mouth daily       tetrahydroz-peg 400-hyprom-gly (VISINE MAX REDNESS RELIEF) 0.05-1-0.36-0.2 % Drop [TETRAHYDROZ--HYPROM-GLY (VISINE MAX REDNESS RELIEF) 0.05-1-0.36-0.2 % DROP] Administer 1 drop to both eyes 3 (three) times a day as needed.       vit C/E/Zn/coppr/lutein/zeaxan (PRESERVISION AREDS-2 ORAL) [VIT C/E/ZN/COPPR/LUTEIN/ZEAXAN (PRESERVISION AREDS-2 ORAL)] Take 1 tablet by mouth 2 (two) times a day.        denosumab (PROLIA) 60 MG/ML SOSY injection Inject 60 mg Subcutaneous         Allergies   Allergen Reactions     Adhesive Tape      Other reaction(s): Rash     Adhesive [Mecrylate] Rash     Chocolate Flavor Diarrhea     Gabapentin Unknown     Has dizziness, spaced out when on it. Has been on it twice and both times had this type of reaction. Should  not be on it in the future.      Green Pepper [Capsicum] Unknown     Stomach upset     Lactose Diarrhea     Monosodium Glutamate Unknown     diarrhea       Past Medical History:   Diagnosis Date     Anxiety      Arthritis      Breast cancer (H) 2003     Depression      Esophageal reflux      H/O colonoscopy 03/2022    hyperplastic polyp noted, no further colonoscopy recommended per [facility]     History of anesthesia complications     nausea     Hx antineoplastic chemotherapy 2003    for breast cancer     Hx of radiation therapy 2004    for breast cancer     IBS (irritable bowel syndrome)      Lactose intolerance     lactose breath test positive at GI, 7/2019     Macular degeneration 08/2019    caught early, takes Areds 2 on a daily basis, seeing opthamology     Neuropathy     secondary to chemotherapy     Osteopenia      PONV (postoperative nausea and vomiting)        Past Surgical History:   Procedure Laterality Date     APPENDECTOMY  1967     BACK SURGERY  2000    cervical fusion     BIOPSY BREAST Right 2003     BIOPSY BREAST Right 07/09/2021    dense fibrotic tissue, Dr Burleson     CATARACT EXTRACTION, BILATERAL       CHOLECYSTECTOMY  1994     HYSTERECTOMY  1997    desmoid cyst     IR CVC TUNNEL PLACEMENT > 5 YRS OF AGE  1/21/2004     IR MISCELLANEOUS PROCEDURE  7/16/2004     LUMPECTOMY BREAST  12/2003    breast cancer     NISSEN FUNDOPLICATION  2010     OOPHORECTOMY  1997     [facility] THORAX SPINE FUSN,POST TECH N/A 1/29/2021    Procedure: BILATERAL LUMBAR 4-5 LAMINECTOMIES FOR RIGHT SYNOVIAL CYST RESECTION, LUMBAR 4-5, LUMBAR 4-5 POSTERIOR INSTRUMENTED FUSION AND ARTHRODESIS, USE OF ALLOGRAFT, USE OF AUTOGRAFT, STEALTH NAVIGATION;  Surgeon: Lisa Thomas MD;  Location: [facility];  Service: Spine     [facility] TOTAL KNEE ARTHROPLASTY Right 10/29/2015    Procedure: RIGHT TOTAL KNEE  ARTHROPLASTY;  Surgeon: Nitin White MD;  Location: [facility] OR;  Service: Orthopedics     [facility] TOTAL KNEE ARTHROPLASTY  Left 04/21/2016    Procedure: LEFT TOTAL KNEE ARTHROPLASTY;  Surgeon: Nitin White MD;  Location: [facility] OR;  Service: Orthopedics       History   Smoking Status     Never Smoker   Smokeless Tobacco     Never Used       OBJECTIVE:     /74 (BP Location: Left arm, Patient Position: Sitting, Cuff Size: Adult Regular)   Pulse 99   Wt 78.8 kg (173 lb 12.8 oz)   SpO2 98%   BMI 29.83 kg/m      Physical Exam:  GENERAL APPEARANCE: A&A, NAD, well hydrated, well nourished  SKIN:  Normal skin turgor, no lesions/rashes   CV: RRR, no M/G/R   LUNGS: CTAB  ABDOMEN: S&NT, no masses or organomegaly   EXTREMITY: no swelling noted.  Full range of motion of all 4 extremities.   NEURO: no gross deficits   PSYCHIATRIC;  Mood appropriate, memory intact  A&O x3, thought processes congruent, non-tangential. No hallucinations/delusions. Insight/judgment: intact. Denies suicidal/homicidal ideations.    PHQ-9:  Last PHQ-9 6/22/2022   1.  Little interest or pleasure in doing things 1   2.  Feeling down, depressed, or hopeless 1   3.  Trouble falling or staying asleep, or sleeping too much 0   4.  Feeling tired or having little energy 1   5.  Poor appetite or overeating 0   6.  Feeling bad about yourself 0   7.  Trouble concentrating 0   8.  Moving slowly or restless 1   Q9: Thoughts of better off dead/self-harm past 2 weeks 0   PHQ-9 Total Score 4   Difficulty at work, home, or with people -       GAD7:  DERIK-7  6/22/2022   1. Feeling nervous, anxious, or on edge 1   2. Not being able to stop or control worrying 1   3. Worrying too much about different things 1   4. Trouble relaxing 1   5. Being so restless that it is hard to sit still 1   6. Becoming easily annoyed or irritable 1   7. Feeling afraid, as if something awful might happen 0   DERIK-7 Total Score 6   If you checked any problems, how difficult have they made it for you to do your work, take care of things at home, or get along with other people? -       LABS:      Recent Results (from the past 240 hour(s))   Comprehensive metabolic panel    Collection Time: 06/27/22  4:11 PM   Result Value Ref Range    Sodium 140 136 - 145 mmol/L    Potassium 4.3 3.5 - 5.0 mmol/L    Chloride 103 98 - 107 mmol/L    Carbon Dioxide (CO2) 26 22 - 31 mmol/L    Anion Gap 11 5 - 18 mmol/L    Urea Nitrogen 12 8 - 28 mg/dL    Creatinine 0.70 0.60 - 1.10 mg/dL    Calcium 10.1 8.5 - 10.5 mg/dL    Glucose 90 70 - 125 mg/dL    Alkaline Phosphatase 54 45 - 120 U/L    AST 22 0 - 40 U/L    ALT 16 0 - 45 U/L    Protein Total 7.4 6.0 - 8.0 g/dL    Albumin 3.9 3.5 - 5.0 g/dL    Bilirubin Total 1.1 (H) 0.0 - 1.0 mg/dL    GFR Estimate 90 >60 mL/min/1.73m2   Magnesium    Collection Time: 06/27/22  4:11 PM   Result Value Ref Range    Magnesium 2.1 1.8 - 2.6 mg/dL   CBC with platelets    Collection Time: 06/27/22  4:11 PM   Result Value Ref Range    WBC Count 7.8 4.0 - 11.0 10e3/uL    RBC Count 4.59 3.80 - 5.20 10e6/uL    Hemoglobin 14.0 11.7 - 15.7 g/dL    Hematocrit 43.3 35.0 - 47.0 %    MCV 94 78 - 100 fL    MCH 30.5 26.5 - 33.0 pg    MCHC 32.3 31.5 - 36.5 g/dL    RDW 12.2 10.0 - 15.0 %    Platelet Count 274 150 - 450 10e3/uL          ASSESSMENT/PLAN:     Anxiety  - citalopram (CELEXA) 20 MG tablet  Dispense: 180 tablet; Refill: 1    Gastroesophageal reflux disease without esophagitis  - Comprehensive metabolic panel  - Comprehensive metabolic panel    Hypomagnesemia  - Magnesium  - Magnesium    Irritable bowel syndrome, unspecified type  - Comprehensive metabolic panel  - CBC with platelets  - Comprehensive metabolic panel  - CBC with platelets    Mild episode of recurrent major depressive disorder (H)  - citalopram (CELEXA) 20 MG tablet  Dispense: 180 tablet; Refill: 1    Osteoporosis, senile  - Comprehensive metabolic panel  - Comprehensive metabolic panel    Peripheral Neuropathy  - Comprehensive metabolic panel  - CBC with platelets  - Comprehensive metabolic panel  - CBC with platelets    Swelling of  limb  - Comprehensive metabolic panel  - furosemide (LASIX) 20 MG tablet  Dispense: 90 tablet; Refill: 1  - Comprehensive metabolic panel    Menopausal syndrome (hot flashes)    Patient is overall doing okay.  She is feeling well and overall feels like things are doing well.  She does have a history of anxiety and depression is wondering if she can go down on her Celexa.  She is currently taking 40 mg a day.  Please see PHQ-9 and DERIK which are both completed in their entirety today.  She is wondering if the Celexa is causing hot flashes.  She would like to go down on the Celexa and just see how she does.  She does need a refill of her Celexa today so I did send in a prescription for 20 mg tablet have her take 40 mg alternating with 30 mg for 2 weeks and then she can go down to 30 mg a day for 2 weeks and then she can go down to 30 mg alternating with 20 mg a day and then if that all is successful then she can go down to 20 mg and take that on a daily basis.  Prescription was sent to the pharmacy today.  If she has additional questions or concerns will let me know.  She will continue to work with the gastroenterologist in regards to her IBS as well as her chronic constipation.  As she also has gastroesophageal reflux disease which she is working with the gastroenterologist about as well.  We will go ahead and check metabolic panel as well as a CBC today to follow-up on all of the gastroenterology issues including the irritable bowel chronic constipation and the gastroesophageal reflux disease.  She is using Prilosec on a twice a day basis and so definitely want to check her kidney functions today.  She does have a history of low magnesium so we will go ahead and recheck a magnesium level today as well.  We will check a CBC to follow-up on her hot flashes as well.  She does have osteoporosis but does have a DEXA scan scheduled in September.  She continues on Prolia and does have follow-up with the osteoporosis  doctors.  She does continue on Lasix for swelling in her limbs.  Refill of that medication was given today.  Overall she is happy with how things are going.  All her questions were answered today.  She will continue to work with the orthopedic doctors in regards to her knee pain and the popping sensation that she is getting in her knees.  If she has additional problems or concerns will let me know.  We did discuss the fact that she is due for an annual wellness visit.  She is also due for Td vaccination I will get that at the pharmacy due to insurance coverage.  We will see her back in about 6 months for follow-up and perhaps annual wellness visit.  Migdalia Delvalle MD    This note was dictated using voice recognition software. Any grammatical errors, context distortions, or spelling errors are not intentional   Answers for HPI/ROS submitted by the patient on 6/22/2022  If you checked off any problems, how difficult have these problems made it for you to do your work, take care of things at home, or get along with other people?: Somewhat difficult  PHQ9 TOTAL SCORE: 4  DERIK 7 TOTAL SCORE: 6  Depression/Anxiety: Depression & Anxiety  Status since last visit:: good  Anxiety since last: : no change  Other associated symptoms of depression:: No  Other associated symotome: : No  Significant life event: : No  Anxious:: No  Current substance use:: No  How many servings of fruits and vegetables do you eat daily?: 2-3  On average, how many sweetened beverages do you drink each day (Examples: soda, juice, sweet tea, etc.  Do NOT count diet or artificially sweetened beverages)?: 0  How many minutes a day do you exercise enough to make your heart beat faster?: 9 or less  How many days a week do you exercise enough to make your heart beat faster?: 3 or less  How many days per week do you miss taking your medication?: 0

## 2022-07-11 ENCOUNTER — HOSPITAL ENCOUNTER (OUTPATIENT)
Dept: MAMMOGRAPHY | Facility: CLINIC | Age: 77
Discharge: HOME | End: 2022-07-11
Attending: SPECIALIST | Admitting: SPECIALIST
Payer: MEDICARE

## 2022-07-11 DIAGNOSIS — Z85.3 PERSONAL HISTORY OF BREAST CANCER: ICD-10-CM

## 2022-07-11 PROCEDURE — 77061 BREAST TOMOSYNTHESIS UNI: CPT | Mod: RT

## 2022-07-24 ENCOUNTER — HEALTH MAINTENANCE LETTER (OUTPATIENT)
Age: 77
End: 2022-07-24

## 2022-08-03 ENCOUNTER — TRANSFERRED RECORDS (OUTPATIENT)
Dept: HEALTH INFORMATION MANAGEMENT | Facility: CLINIC | Age: 77
End: 2022-08-03

## 2022-08-22 ENCOUNTER — VIRTUAL VISIT (OUTPATIENT)
Dept: FAMILY MEDICINE | Facility: CLINIC | Age: 77
End: 2022-08-22
Payer: MEDICARE

## 2022-08-22 DIAGNOSIS — U07.1 INFECTION DUE TO 2019 NOVEL CORONAVIRUS: Primary | ICD-10-CM

## 2022-08-22 PROCEDURE — 99213 OFFICE O/P EST LOW 20 MIN: CPT | Mod: CS | Performed by: FAMILY MEDICINE

## 2022-08-22 NOTE — PROGRESS NOTES
Elise is a 76 year old who is being evaluated via a billable video visit.      How would you like to obtain your AVS? MyChart  If the video visit is dropped, the invitation should be resent by: Text to cell phone: [phone]  Will anyone else be joining your video visit? No          Adelina was seen today for covid concern.    Diagnoses and all orders for this visit:    Infection due to 2019 novel coronavirus  -     nirmatrelvir and ritonavir (PAXLOVID) therapy pack; Take 3 tablets by mouth 2 times daily (Take 2 Nirmatrelvir tablets and 1 Ritonavir tablet twice daily for 5 days)  Reviewed risks and benefits of paxlovid at length.  Reviewed medication list for interactions.  No medication interactions found.  Discussed potential for COVID rebound.  Patient would like the prescription filled and will decide if she would like to fill it.  Discussed signs and symptoms for which to be seen emergently.        Subjective   Elise is a 76 year old , presenting for the following health issues:  Covid Concern (Tested positive yesterday )      HPI     Patient just got back from a cruise.  On Friday started with a cough.  Has a history of intermittent cough due to GERD.  Nonproductive.  Patient had chills, but normal temps.  No chest pain or SOB.  She took a COVID test and is positive today.  She notes overall she does not feel too bad.    Patient Active Problem List    Diagnosis Date Noted     Swelling of limb 08/03/2021     Priority: Medium     Hypomagnesemia 08/03/2021     Priority: Medium     Lump or mass in breast 06/29/2021     Priority: Medium     Added automatically from request for surgery [id number]         Lumbar stenosis with neurogenic claudication 01/29/2021     Priority: Medium     Spinal stenosis of lumbar region with neurogenic claudication 12/23/2020     Priority: Medium     Added automatically from request for surgery [id number]         Synovial cyst of lumbar facet joint 12/23/2020     Priority: Medium     Added  automatically from request for surgery [id number]         Lumbar disc herniation 12/23/2020     Priority: Medium     Added automatically from request for surgery [id number]         Primary osteoarthritis involving multiple joints 03/05/2019     Priority: Medium     Arthritis of first metatarsophalangeal (MTP) joint of right foot 03/05/2019     Priority: Medium     Esophageal reflux 02/14/2017     Priority: Medium     Osteoporosis, senile 09/13/2016     Priority: Medium     Peripheral Neuropathy      Priority: Medium     Created by Conversion  [facility] Annotation: Jun 21 2009  9:51PM - Cora Charlesa: AFTER   CHEMO  Replacement Utility updated for latest IMO load         Major Depression, Recurrent      Priority: Medium     Created by Conversion  [facility] Annotation: Jun 6 2013 11:43AM - Debra Cissely: 1993, 2012  Replacement Utility updated for latest IMO load         Anxiety      Priority: Medium     Created by Conversion  Replacement Utility updated for latest IMO load         Primary osteoarthritis of knee, left 04/21/2016     Priority: Medium     Replacement Utility updated for latest IMO load         Primary osteoarthritis of right knee 10/29/2015     Priority: Medium     Urticaria      Priority: Medium     Created by Conversion         Irritable Bowel Syndrome      Priority: Medium     Created by Conversion             Objective           Vitals:  No vitals were obtained today due to virtual visit.    Physical Exam   GENERAL: Healthy, alert and no distress  EYES: Eyes grossly normal to inspection.  No discharge or erythema, or obvious scleral/conjunctival abnormalities.  RESP: No audible wheeze, cough, or visible cyanosis.  No visible retractions or increased work of breathing.    SKIN: Visible skin clear. No significant rash, abnormal pigmentation or lesions.  NEURO: Cranial nerves grossly intact.  Mentation and speech appropriate for age.  PSYCH: Mentation appears normal, affect normal/bright, judgement  and insight intact, normal speech and appearance well-groomed.              Video-Visit Details    Video Start Time: 10:23.    Type of service:  Video Visit    Video End Time:10:43 AM    Originating Location (pt. Location): Home    Distant Location (provider location):  [facility]     [facility] Visit[facility]    .  ..

## 2022-08-24 DIAGNOSIS — M79.89 SWELLING OF LIMB: ICD-10-CM

## 2022-08-25 RX ORDER — FUROSEMIDE 20 MG
TABLET ORAL
Qty: 90 TABLET | Refills: 3 | Status: SHIPPED | OUTPATIENT
Start: 2022-08-25 | End: 2023-10-04

## 2022-08-25 NOTE — TELEPHONE ENCOUNTER
"  Last Written Prescription Date:  6/27/22  Last Fill Quantity: 90,  # refills: 1   Last office visit provider:  6/27/22    Requested Prescriptions   Pending Prescriptions Disp Refills     furosemide (LASIX) 20 MG tablet [Pharmacy Med Name: FUROSEMIDE TABS 20MG] 90 tablet 3     Sig: TAKE 1 TABLET DAILY       Diuretics (Including Combos) Protocol Passed - 8/24/2022  8:39 AM        Passed - Blood pressure under 140/90 in past 12 months     BP Readings from Last 3 Encounters:   06/27/22 102/74   05/17/22 136/86   04/07/22 125/82                 Passed - Recent (12 mo) or future (30 days) visit within the authorizing provider's specialty     Patient has had an office visit with the authorizing provider or a provider within the authorizing providers department within the previous 12 mos or has a future within next 30 days. See \"Patient Info\" tab in inbasket, or \"Choose Columns\" in Meds & Orders section of the refill encounter.              Passed - Medication is active on med list        Passed - Patient is age 18 or older        Passed - No active pregancy on record        Passed - Normal serum creatinine on file in past 12 months     Recent Labs   Lab Test 06/27/22  1611   CR 0.70              Passed - Normal serum potassium on file in past 12 months     Recent Labs   Lab Test 06/27/22  1611   POTASSIUM 4.3                    Passed - Normal serum sodium on file in past 12 months     Recent Labs   Lab Test 06/27/22  1611                 Passed - No positive pregnancy test in past 12 months             Charline Jeff RN 08/25/22 2:54 PM  "

## 2022-09-02 ENCOUNTER — NURSE TRIAGE (OUTPATIENT)
Dept: NURSING | Facility: CLINIC | Age: 77
End: 2022-09-02

## 2022-09-02 NOTE — TELEPHONE ENCOUNTER
Patient is complaining of watery eyes, sneezing, stuffy nose and a hoarse voice  Patient denies any fever or yellow or green nasal drainage.  Patient recently completed  home isolation for Covid-19  Patient says she took a second Covid-19 home test and the line was barely faint.  Triage guidelines recommend to see in office within two weeks  Caller verbalized and understands directives      Additional Information    Negative: Wheezing (high pitched whistling sound) and previous asthma attacks or use of asthma medicines    Negative: Doesn't match the SYMPTOMS for nasal allergy    Negative: Patient sounds very sick or weak to the triager    Negative: Lots of coughing    Negative: Lots of yellow or green discharge from nose, present > 3 days    Negative: Nasal discharge present > 10 days    Negative: MODERATE-SEVERE nasal allergy symptoms (i.e., interfere with sleep, school, or work) and taking antihistamines > 2 days    Negative: Patient wants to be seen    Nasal allergies occur only certain times of year and diagnosis of hay fever has never been confirmed by a physician    Protocols used: HAY FEVER - NASAL ALLERGIES-A-[place]

## 2022-09-22 ENCOUNTER — ANCILLARY PROCEDURE (OUTPATIENT)
Dept: BONE DENSITY | Facility: CLINIC | Age: 77
End: 2022-09-22
Attending: INTERNAL MEDICINE
Payer: MEDICARE

## 2022-09-22 DIAGNOSIS — M81.0 OSTEOPOROSIS, SENILE: ICD-10-CM

## 2022-09-22 PROCEDURE — 77080 DXA BONE DENSITY AXIAL: CPT | Mod: TC | Performed by: RADIOLOGY

## 2022-10-01 ENCOUNTER — HEALTH MAINTENANCE LETTER (OUTPATIENT)
Age: 77
End: 2022-10-01

## 2022-10-04 ENCOUNTER — TRANSFERRED RECORDS (OUTPATIENT)
Dept: HEALTH INFORMATION MANAGEMENT | Facility: CLINIC | Age: 77
End: 2022-10-04

## 2022-10-09 ENCOUNTER — OFFICE VISIT (OUTPATIENT)
Dept: FAMILY MEDICINE | Facility: CLINIC | Age: 77
End: 2022-10-09
Payer: MEDICARE

## 2022-10-09 ENCOUNTER — ANCILLARY PROCEDURE (OUTPATIENT)
Dept: GENERAL RADIOLOGY | Facility: CLINIC | Age: 77
End: 2022-10-09
Attending: PHYSICIAN ASSISTANT
Payer: MEDICARE

## 2022-10-09 VITALS
HEART RATE: 80 BPM | OXYGEN SATURATION: 98 % | RESPIRATION RATE: 16 BRPM | BODY MASS INDEX: 30.04 KG/M2 | WEIGHT: 175 LBS | SYSTOLIC BLOOD PRESSURE: 131 MMHG | DIASTOLIC BLOOD PRESSURE: 81 MMHG | TEMPERATURE: 98.1 F

## 2022-10-09 DIAGNOSIS — T14.8XXA PULLED MUSCLE: ICD-10-CM

## 2022-10-09 DIAGNOSIS — R07.81 RIB PAIN ON LEFT SIDE: ICD-10-CM

## 2022-10-09 DIAGNOSIS — M85.88 OSTEOPENIA OF OTHER SITE: ICD-10-CM

## 2022-10-09 DIAGNOSIS — S22.42XA MULTIPLE FRACTURES OF RIBS, LEFT SIDE, INITIAL ENCOUNTER FOR CLOSED FRACTURE: Primary | ICD-10-CM

## 2022-10-09 PROCEDURE — 99213 OFFICE O/P EST LOW 20 MIN: CPT | Performed by: PHYSICIAN ASSISTANT

## 2022-10-09 PROCEDURE — 71101 X-RAY EXAM UNILAT RIBS/CHEST: CPT | Mod: TC | Performed by: RADIOLOGY

## 2022-10-09 RX ORDER — CYCLOBENZAPRINE HCL 5 MG
5 TABLET ORAL 3 TIMES DAILY PRN
Qty: 15 TABLET | Refills: 0 | Status: SHIPPED | OUTPATIENT
Start: 2022-10-09 | End: 2022-11-21

## 2022-10-09 ASSESSMENT — ENCOUNTER SYMPTOMS
RESPIRATORY NEGATIVE: 1
CONSTITUTIONAL NEGATIVE: 1
NEUROLOGICAL NEGATIVE: 1
MYALGIAS: 1
CARDIOVASCULAR NEGATIVE: 1
ARTHRALGIAS: 1

## 2022-10-09 NOTE — PROGRESS NOTES
Assessment & Plan     Multiple fractures of ribs, left side, initial encounter for closed fracture    Pulled muscle  - cyclobenzaprine (FLEXERIL) 5 MG tablet  Dispense: 15 tablet; Refill: 0    Rib pain on left side  - XR Ribs & Chest Left G/E 3 Views  - cyclobenzaprine (FLEXERIL) 5 MG tablet  Dispense: 15 tablet; Refill: 0    Osteopenia of other site     Due to history of osteopenia, XR performed to ensure no rib fracture. XR shows 2 rib fractures. Pat is to reduce activity, continue to ice and follow up in 6 weeks for recheck.     Perform hot epsom salt soaks, light stretching, lidocaine patches as needed.     Take muscle relaxer as directed. This can make the patient sleepy, so do not drive while on it or perform important functions.     Return if symptoms worsen or fail to improve, for Follow up.    Subjective     Elise is a 76 year old female who presents to clinic today with partner  for the following health issues:  Chief Complaint   Patient presents with     Musculoskeletal Problem     Started to have pain on left side under rib. Patient states she thinks she got hurt on Saturday while carrying groceries.     Pat presents with reports of left sided rib pain x several days. She reports Saturday she was carrying groceries then noticed the pain. Pain is worse with certain movements. It is improved with certain movements as well as Tylenol and Ibuprofen. It radiates from left chest to back. She denies other symptoms.           Review of Systems   Constitutional: Negative.    Respiratory: Negative.    Cardiovascular: Negative.    Musculoskeletal: Positive for arthralgias and myalgias.   Skin: Negative.    Neurological: Negative.            Objective    /81   Pulse 80   Temp 98.1  F (36.7  C) (Oral)   Resp 16   Wt 79.4 kg (175 lb)   SpO2 98%   BMI 30.04 kg/m    Physical Exam  Constitutional:       Appearance: Normal appearance.   HENT:      Head: Normocephalic and atraumatic.   Musculoskeletal:       Cervical back: Normal range of motion and neck supple.      Comments: Tenderness under left breast to mid axillary line   Skin:     General: Skin is warm and dry.   Neurological:      General: No focal deficit present.      Mental Status: She is alert and oriented to person, place, and time.              Dean Olmstead PA-C

## 2022-10-09 NOTE — PATIENT INSTRUCTIONS
Ibuprofen 800 mg (4 of the 200 mg OTC tablets or 800 mg of the children's liquid) up to 4 times daily with food or milk which may be alternated with Tylenol 500 to 1000 mg every 8 hours as needed. This would mean Ibuprofen, 4 hours later may take Tylenol, then 4 hours after Tylenol you may take Ibuprofen, and so on. If pain more managed, decrease doses.     Perform hot epsom salt soaks, light stretching, lidocaine patches as needed.     Take muscle relaxer as directed. This can make the patient sleepy, so do not drive while on it or perform important functions.

## 2022-10-10 ENCOUNTER — ALLIED HEALTH/NURSE VISIT (OUTPATIENT)
Dept: FAMILY MEDICINE | Facility: CLINIC | Age: 77
End: 2022-10-10
Payer: MEDICARE

## 2022-10-10 DIAGNOSIS — M81.0 OSTEOPOROSIS, SENILE: Primary | ICD-10-CM

## 2022-10-10 PROCEDURE — 99207 PR NO CHARGE NURSE ONLY: CPT

## 2022-10-10 PROCEDURE — 96372 THER/PROPH/DIAG INJ SC/IM: CPT | Performed by: INTERNAL MEDICINE

## 2022-10-10 NOTE — PROGRESS NOTES
"The following steps were completed to comply with the REMS program for Prolia:  1. Ordering provider has previously reviewed information in the Medication Guide and Patient Counseling Chart, including the serious risks of Prolia  and the symptoms of each risk and have been advised to seek prompt medical attention if they have signs or symptoms of any of the serious risks.  2. Provided each patient a copy of the Medication Guide and Patient Brochure.  See MAR for administration details.   Indication: Prolia  (denosumab) is a prescription medicine used to treat osteoporosis in patients who:   Are at high risk for fracture, meaning patients who have had a fracture related to osteoporosis, or who have multiple risk factors for fracture; Cannot use another osteoporosis medicine or other osteoporosis medicines did not work well.   The timeline for early/late injections would be 4 weeks early and any time after the 6 month anthony. If a patient receives their injection late, then the subsequent injection would be 6 months from the date that they actually received the injection    Have the screening questions been asked prior to this administration? Yes      Prolia Injection Phone Screen      Screening questions have been asked 2-3 days prior to administration visit for Prolia. If any questions are answered with \"Yes,\" this phone encounter were will routed to ordering provider for further evaluation.     1.  When was the last injection?  04/07/2022    2.  Has insurance for this injection been verified?  Yes    3.  Did you experience any new onset achiness or rashes that lasted for over a month with your previous Prolia injection?   No    4.  Do you have a fever over 101?F or a new deep cough that is unusual for you today? No    5.  Have you started any new medications in the last 6 months that you were told could affect your immune system? These may have been prescribed by oncologist, transplant, rheumatology, or dermatology. "   No    6.  In the last 6 months have you have gastric bypass or parathyroid surgery?   No    7.  Do you plan dental work requiring drilling into the bone such as implants/extractions or oral surgery in the next 2-3 months?   No    8. Do you have new insurance since the last injection?    9. Have you received the Covid-19 vaccine? No  If No - Proceed with Prolia injection  If Yes - Date of vaccination . Will need to wait until 2 weeks after 2nd dose of Covid-19 vaccine before administering Prolia       Patient informed if symptoms discussed above present prior to their administration appointment, they are to notify clinic immediately.     Deisi Hanson CMA    Clinic Administered Medication Documentation    Administrations This Visit     denosumab (PROLIA) injection 60 mg     Admin Date  10/10/2022 Action  Given Dose  60 mg Route  Subcutaneous Site  Left Arm Administered By  Deisi Hanson CMA    Ordering Provider: Mone Blanchard MD    Patient Supplied?: No                Deisi Hanson CMA

## 2022-10-18 ENCOUNTER — TELEPHONE (OUTPATIENT)
Dept: INTERNAL MEDICINE | Facility: CLINIC | Age: 77
End: 2022-10-18

## 2022-10-18 NOTE — TELEPHONE ENCOUNTER
Reason for Call: Request for an order or referral:    Order or referral being requested: prolia injection scheduled for 4/11/2023 but no order in chart at this time    Date needed: at your convenience    Has the patient been seen by the PCP for this problem? YES    Additional comments:     Phone number Patient can be reached at:  Home number on file [phone] (home)    Best Time:      Can we leave a detailed message on this number?  YES    Call taken on 10/18/2022 at 11:12 AM by Toya Mejia PTA

## 2022-10-31 ENCOUNTER — NURSE TRIAGE (OUTPATIENT)
Dept: NURSING | Facility: CLINIC | Age: 77
End: 2022-10-31

## 2022-10-31 NOTE — TELEPHONE ENCOUNTER
"Nurse Triage SBAR    Is this a 2nd Level Triage? NO    Situation:   Spoke with 76 yr old Pat who c/o:    Exposed to someone who tested positive for COVID-19 within the past 10 days.  Patient states she did not have \"close contact exposure\".  Less than 15 minutes within 6 feet.  Patient denies current symptoms.  Pt has not tested for COVID-19 at this time.    Background:  Pt states she is up to date with Covid-19 immunizations accept the Bivalent booster.  Pt tested positive for COVID-19 in August 2022 after a cruise.    Assessment:   Pt with hx of COVID-19 within the past 90 days.  No current COVID-19 symptoms.    Protocol Recommended Disposition:   Home care    Recommendation:   Advised Home Care per protocol.  Care advice given per COVID-19 exposure protocol.  Advised to call back if further questions/concerns.  Pt voiced understanding.    Mary Jane Brandon RN  [facility]    Reason for Disposition    [1] CLOSE CONTACT COVID-19 EXPOSURE within last 14 days AND [2] NO symptoms    Additional Information    Negative: COVID-19 lab test positive    Negative: [1] Lives with someone known to have influenza (flu test positive) AND [2] flu-like symptoms (e.g., cough, runny nose, sore throat, SOB; with or without fever)    Negative: [1] Symptoms of COVID-19 (e.g., cough, fever, SOB, or others) AND [2] doctor (or NP/PA) diagnosed COVID-19 based on symptoms    Negative: [1] Symptoms of COVID-19 (e.g., cough, fever, SOB, or others) AND [2] lives in an area with community spread    Negative: [1] Symptoms of COVID-19 (e.g., cough, fever, SOB, or others) AND [2] within 14 days of EXPOSURE (close contact) with diagnosed or suspected COVID-19 patient    Negative: [1] Symptoms of COVID-19 (e.g., cough, fever, SOB, or others) AND [2] within 14 days of travel from high-risk area for COVID-19 community spread (identified by CDC)    Negative: [1] Difficulty breathing (shortness of breath) occurs AND [2] onset > 14 days after " COVID-19 EXPOSURE (Close Contact)    Negative: [1] Cough occurs AND [2] onset > 14 days after COVID-19 EXPOSURE    Negative: [1] Common cold symptoms AND [2] onset > 14 days after COVID-19 EXPOSURE    Negative: COVID-19 vaccine reaction suspected (e.g., fever, headache, muscle aches) occurring during days 1-3 after getting vaccine    Negative: COVID-19 vaccine, questions about    Negative: [1] CLOSE CONTACT COVID-19 EXPOSURE within last 14 days AND [2] needs COVID-19 lab test to return to work AND [3] NO symptoms    Negative: [1] CLOSE CONTACT COVID-19 EXPOSURE within last 14 days AND [2] exposed person is a  (e.g., police or paramedic) AND [3] NO symptoms    Negative: [1] CLOSE CONTACT COVID-19 EXPOSURE within last 14 days AND [2] exposed person is a healthcare worker who was NOT using all recommended personal protective equipment (e.g., a respirator-N95 mask, eye protection, gloves, and gown) AND [3] NO symptoms    Negative: [1] Living or working in a correctional facility, long-term care facility, or shelter (i.e., congregate setting; densely populated) AND [2] where an outbreak has occurred AND [3] NO symptoms    Negative: [1] CLOSE CONTACT COVID-19 EXPOSURE within last 14 days AND [2] weak immune system (e.g., HIV positive, cancer chemo, splenectomy, organ transplant, chronic steroids) AND [3] NO symptoms    Protocols used: CORONAVIRUS (COVID-19) EXPOSURE-A-[place]

## 2022-11-21 ENCOUNTER — OFFICE VISIT (OUTPATIENT)
Dept: FAMILY MEDICINE | Facility: CLINIC | Age: 77
End: 2022-11-21
Payer: MEDICARE

## 2022-11-21 VITALS
OXYGEN SATURATION: 97 % | SYSTOLIC BLOOD PRESSURE: 118 MMHG | TEMPERATURE: 97.5 F | WEIGHT: 173 LBS | DIASTOLIC BLOOD PRESSURE: 80 MMHG | RESPIRATION RATE: 16 BRPM | HEART RATE: 90 BPM | HEIGHT: 64 IN | BODY MASS INDEX: 29.53 KG/M2

## 2022-11-21 DIAGNOSIS — S22.42XD CLOSED FRACTURE OF MULTIPLE RIBS OF LEFT SIDE WITH ROUTINE HEALING, SUBSEQUENT ENCOUNTER: Primary | ICD-10-CM

## 2022-11-21 DIAGNOSIS — M81.0 OSTEOPOROSIS, SENILE: ICD-10-CM

## 2022-11-21 DIAGNOSIS — Z23 NEED FOR COVID-19 VACCINE: ICD-10-CM

## 2022-11-21 DIAGNOSIS — F41.1 ANXIETY STATE: ICD-10-CM

## 2022-11-21 DIAGNOSIS — K21.00 GASTROESOPHAGEAL REFLUX DISEASE WITH ESOPHAGITIS WITHOUT HEMORRHAGE: ICD-10-CM

## 2022-11-21 DIAGNOSIS — F33.0 MILD EPISODE OF RECURRENT MAJOR DEPRESSIVE DISORDER (H): ICD-10-CM

## 2022-11-21 PROCEDURE — 80053 COMPREHEN METABOLIC PANEL: CPT | Performed by: FAMILY MEDICINE

## 2022-11-21 PROCEDURE — 99214 OFFICE O/P EST MOD 30 MIN: CPT | Mod: 25 | Performed by: FAMILY MEDICINE

## 2022-11-21 PROCEDURE — 0124A COVID-19,PF,PFIZER BOOSTER BIVALENT: CPT | Performed by: FAMILY MEDICINE

## 2022-11-21 PROCEDURE — 36415 COLL VENOUS BLD VENIPUNCTURE: CPT | Performed by: FAMILY MEDICINE

## 2022-11-21 PROCEDURE — 96127 BRIEF EMOTIONAL/BEHAV ASSMT: CPT | Performed by: FAMILY MEDICINE

## 2022-11-21 PROCEDURE — 91312 COVID-19,PF,PFIZER BOOSTER BIVALENT: CPT | Performed by: FAMILY MEDICINE

## 2022-11-21 RX ORDER — OMEPRAZOLE 20 MG/1
TABLET, DELAYED RELEASE ORAL EVERY 12 HOURS
COMMUNITY
Start: 2022-09-07 | End: 2022-11-21

## 2022-11-21 RX ORDER — CITALOPRAM HYDROBROMIDE 20 MG/1
TABLET ORAL
COMMUNITY
Start: 2022-11-14 | End: 2022-11-21

## 2022-11-21 RX ORDER — CITALOPRAM HYDROBROMIDE 20 MG/1
20 TABLET ORAL DAILY
Qty: 90 TABLET | Refills: 1 | Status: SHIPPED | OUTPATIENT
Start: 2022-11-21 | End: 2023-02-11

## 2022-11-21 ASSESSMENT — ANXIETY QUESTIONNAIRES
1. FEELING NERVOUS, ANXIOUS, OR ON EDGE: NOT AT ALL
IF YOU CHECKED OFF ANY PROBLEMS ON THIS QUESTIONNAIRE, HOW DIFFICULT HAVE THESE PROBLEMS MADE IT FOR YOU TO DO YOUR WORK, TAKE CARE OF THINGS AT HOME, OR GET ALONG WITH OTHER PEOPLE: NOT DIFFICULT AT ALL
7. FEELING AFRAID AS IF SOMETHING AWFUL MIGHT HAPPEN: NOT AT ALL
2. NOT BEING ABLE TO STOP OR CONTROL WORRYING: NOT AT ALL
GAD7 TOTAL SCORE: 2
6. BECOMING EASILY ANNOYED OR IRRITABLE: NOT AT ALL
GAD7 TOTAL SCORE: 2
3. WORRYING TOO MUCH ABOUT DIFFERENT THINGS: SEVERAL DAYS
5. BEING SO RESTLESS THAT IT IS HARD TO SIT STILL: NOT AT ALL

## 2022-11-21 ASSESSMENT — PATIENT HEALTH QUESTIONNAIRE - PHQ9
5. POOR APPETITE OR OVEREATING: SEVERAL DAYS
SUM OF ALL RESPONSES TO PHQ QUESTIONS 1-9: 2

## 2022-11-21 ASSESSMENT — PAIN SCALES - GENERAL: PAINLEVEL: MILD PAIN (2)

## 2022-11-21 NOTE — PROGRESS NOTES
PROGRESS NOTE   11/21/2022    SUBJECTIVE:  Adelina Espinoza is a 77 year old female who presents for     Chief Complaint   Patient presents with     RECHECK     Follow up , broken ribs 10/9 , two ribs, no idea how they got broken, slight pain,      Patient comes in today for follow-up after having been seen in the walk-in care on 10/9/2022 for fracture of the left fourth and fifth rib.  She is not sure exactly how she fractured them.  The only thing they can figure out is that she was carrying groceries.  She does not fall she did not have any other injury at all.  She notes that she was evaluated in the walk-in care and they did find that she broke the 2 ribs.  Gradually things have seemed to get better.  She knows that now when she presses on it hard that area it seems like it bothers her but otherwise she is able to do pretty much anything that she supposed to do.  Every day is definitely seems to be getting better and better.  She would has given it time to heal and it does seem like that is working.  She again has no idea how she did this.  She does remember falling her  other member her falling and she is not sure if maybe she cracked it at some point in the past and then this completely broke it or what but that is a little bit concerning to her.  She does have a history of osteoporosis and is on Prolia.  She is getting her Prolia shots as she is supposed to.  Her last DEXA scan was 9/22/2022 so she is up-to-date with that as well.  She would also like follow-up of her chronic medical problems which include anxiety and depression.  She continues on Celexa and feels like overall things are doing okay.  She is not suicidal or homicidal.  Please see PHQ-9 and DERIK which are both completed in their entirety today.  She does feel like she probably needs a refill sent to the mail order pharmacy.  She is changing mail-order pharmacy and they need a new prescription sent so we will go ahead and do that today.   She is not suicidal and overall feels like she feels better than she has in quite some time.  Her and her  are doing well and they have had some veronique times in the past in terms of his health and her health and so she is very thankful that things are going well.  They have a trip planned again in the next couple of months and that is great.  She also has a history of gastroesophageal reflux disease and that seems to be doing well.  She is taking Prilosec on a twice a day basis per the gastroenterologist and again seems to be controlling her symptoms quite well.  She is not having any side effects to that medication.  She does desire COVID shot today.  She did get her flu shot on 11/10/2022.  She had COVID in August when she returned from a cruise and was wondering if it is okay for her to get the shot today as its been a couple of months only since she had the COVID disease.  She is due for an annual wellness visit we did discuss that.    Patient Active Problem List   Diagnosis     Peripheral Neuropathy     Urticaria     Major Depression, Recurrent     Anxiety     Irritable Bowel Syndrome     Primary osteoarthritis of right knee     Primary osteoarthritis of knee, left     Osteoporosis, senile     Esophageal reflux     Primary osteoarthritis involving multiple joints     Arthritis of first metatarsophalangeal (MTP) joint of right foot     Spinal stenosis of lumbar region with neurogenic claudication     Synovial cyst of lumbar facet joint     Lumbar disc herniation     Lumbar stenosis with neurogenic claudication     Lump or mass in breast     Swelling of limb     Hypomagnesemia       Current Outpatient Medications   Medication Sig Dispense Refill     acetaminophen (TYLENOL) 500 MG tablet [ACETAMINOPHEN (TYLENOL) 500 MG TABLET] Take 1,000 mg by mouth every 6 (six) hours as needed for pain.       calcium citrate-vitamin D (CITRACAL+D) 315-200 mg-unit per tablet [CALCIUM CITRATE-VITAMIN D (CITRACAL+D) 315-200  MG-UNIT PER TABLET] Take 1 tablet by mouth 2 (two) times a day.       calcium polycarbophiL (FIBERCON) 625 mg tablet [CALCIUM POLYCARBOPHIL (FIBERCON) 625 MG TABLET] Take 625 mg by mouth 2 (two) times a day.       citalopram (CELEXA) 20 MG tablet Take 1 tablet (20 mg) by mouth daily 90 tablet 1     dicyclomine (BENTYL) 10 MG capsule Take 1 capsule by mouth as needed       furosemide (LASIX) 20 MG tablet TAKE 1 TABLET DAILY 90 tablet 3     lactase (LACTAID) 3000 UNIT tablet        Lactobacillus Rhamnosus, GG, ( PROBIOTIC DIGESTIVE CARE) CAPS Take 1 capsule by mouth every 24 hours       magnesium 250 mg Tab [MAGNESIUM 250 MG TAB] Take 250 mg by mouth daily.        multivit-min-iron-FA-lutein (CENTRUM SILVER WOMEN) 8 mg iron-400 mcg-300 mcg Tab [MULTIVIT-MIN-IRON-FA-LUTEIN (CENTRUM SILVER WOMEN) 8 MG IRON-400 MCG-300 MCG TAB] Take 1 tablet by mouth daily.        omeprazole (PRILOSEC) 20 MG capsule [OMEPRAZOLE (PRILOSEC) 20 MG CAPSULE] TAKE 1 CAPSULE DAILY BEFORE BREAKFAST (Patient taking differently: 2 times daily) 90 capsule 3     tetrahydroz-peg 400-hyprom-gly (VISINE MAX REDNESS RELIEF) 0.05-1-0.36-0.2 % Drop [TETRAHYDROZ--HYPROM-GLY (VISINE MAX REDNESS RELIEF) 0.05-1-0.36-0.2 % DROP] Administer 1 drop to both eyes 3 (three) times a day as needed.       vit C/E/Zn/coppr/lutein/zeaxan (PRESERVISION AREDS-2 ORAL) [VIT C/E/ZN/COPPR/LUTEIN/ZEAXAN (PRESERVISION AREDS-2 ORAL)] Take 1 tablet by mouth 2 (two) times a day.        denosumab (PROLIA) 60 MG/ML SOSY injection Inject 60 mg Subcutaneous         Allergies   Allergen Reactions     Adhesive Tape      Other reaction(s): Rash     Adhesive [Mecrylate] Rash     Chocolate Flavor Diarrhea     Gabapentin Unknown     Has dizziness, spaced out when on it. Has been on it twice and both times had this type of reaction. Should not be on it in the future.      Green Pepper [Capsicum] Unknown     Stomach upset     Lactose Diarrhea     Monosodium Glutamate Unknown      diarrhea       Past Medical History:   Diagnosis Date     Anxiety      Arthritis      Breast cancer (H) 2003     Depression      Esophageal reflux      H/O colonoscopy 03/2022    hyperplastic polyp noted, no further colonoscopy recommended per [facility]     History of anesthesia complications     nausea     Hx antineoplastic chemotherapy 2003    for breast cancer     Hx of radiation therapy 2004    for breast cancer     IBS (irritable bowel syndrome)      Lactose intolerance     lactose breath test positive at GI, 7/2019     Macular degeneration 08/2019    caught early, takes Areds 2 on a daily basis, seeing opthamology     Neuropathy     secondary to chemotherapy     Osteopenia      PONV (postoperative nausea and vomiting)        Past Surgical History:   Procedure Laterality Date     APPENDECTOMY  1967     BACK SURGERY  2000    cervical fusion     BIOPSY BREAST Right 2003     BIOPSY BREAST Right 07/09/2021    dense fibrotic tissue, Dr Burleson     CATARACT EXTRACTION, BILATERAL       CHOLECYSTECTOMY  1994     HYSTERECTOMY  1997    desmoid cyst     IR CVC TUNNEL PLACEMENT > 5 YRS OF AGE  01/21/2004     IR MISCELLANEOUS PROCEDURE  07/16/2004     LASER SURGERY OF EYE Bilateral 09/2022    cataracts     LUMPECTOMY BREAST  12/2003    breast cancer     NISSEN FUNDOPLICATION  2010     OOPHORECTOMY  1997     [facility] THORAX SPINE FUSN,POST TECH N/A 01/29/2021    Procedure: BILATERAL LUMBAR 4-5 LAMINECTOMIES FOR RIGHT SYNOVIAL CYST RESECTION, LUMBAR 4-5, LUMBAR 4-5 POSTERIOR INSTRUMENTED FUSION AND ARTHRODESIS, USE OF ALLOGRAFT, USE OF AUTOGRAFT, STEALTH NAVIGATION;  Surgeon: Lisa Thomas MD;  Location: [facility] OR;  Service: Spine     [facility] TOTAL KNEE ARTHROPLASTY Right 10/29/2015    Procedure: RIGHT TOTAL KNEE  ARTHROPLASTY;  Surgeon: Nitin White MD;  Location: [facility] OR;  Service: Orthopedics     [facility] TOTAL KNEE ARTHROPLASTY Left 04/21/2016    Procedure: LEFT TOTAL KNEE ARTHROPLASTY;  Surgeon: Nitin AGUILAR  "MD Cindy;  Location: [facility] OR;  Service: Orthopedics       History   Smoking Status     Never   Smokeless Tobacco     Never       OBJECTIVE:     /80   Pulse 90   Temp 97.5  F (36.4  C)   Resp 16   Ht 1.626 m (5' 4\")   Wt 78.5 kg (173 lb)   SpO2 97%   BMI 29.70 kg/m      Physical Exam:  GENERAL APPEARANCE: A&A, NAD, well hydrated, well nourished  SKIN:  Normal skin turgor, no lesions/rashes   CV: RRR, no M/G/R   LUNGS: CTAB  On exam of her chest region there is no abnormalities with the breathing at all.  She has some minor tenderness over the front of the lower rib cage on the left side but no other abnormalities.  They certainly are not extremely tender to palpation  EXTREMITY: no swelling noted.  Full range of motion of all 4 extremities.   NEURO: no gross deficits   PSYCHIATRIC;  Mood appropriate, memory intact  A&O x3, thought processes congruent, non-tangential. No hallucinations/delusions. Insight/judgment: intact. Denies suicidal/homicidal ideations.    PHQ-9:  Last PHQ-9 11/21/2022   1.  Little interest or pleasure in doing things 1   2.  Feeling down, depressed, or hopeless 0   3.  Trouble falling or staying asleep, or sleeping too much 0   4.  Feeling tired or having little energy 1   5.  Poor appetite or overeating 0   6.  Feeling bad about yourself 0   7.  Trouble concentrating 0   8.  Moving slowly or restless 0   Q9: Thoughts of better off dead/self-harm past 2 weeks 0   PHQ-9 Total Score 2   Difficulty at work, home, or with people -       GAD7:  DERIK-7  11/21/2022   1. Feeling nervous, anxious, or on edge 0   2. Not being able to stop or control worrying 0   3. Worrying too much about different things 1   4. Trouble relaxing 1   5. Being so restless that it is hard to sit still 0   6. Becoming easily annoyed or irritable 0   7. Feeling afraid, as if something awful might happen 0   DERIK-7 Total Score 2   If you checked any problems, how difficult have they made it for you to do " your work, take care of things at home, or get along with other people? Not difficult at all       I did thoroughly review the walk-in care note from 10/9/2022 including diagnosis as well as x-ray findings.    I did thoroughly review the x-ray results from 10/9/2022 that were taken in the walk-in care.  EXAM: XR RIBS AND CHEST LEFT 3 VIEWS  LOCATION: [facility]  DATE/TIME: 10/9/2022 10:50 AM     INDICATION: Rib pain on left side.  COMPARISON: Chest radiograph 06/30/2021.                                                                      IMPRESSION:      Mildly displaced fractures of the left posterolateral fourth and fifth ribs, viewed best on the frontal chest radiograph. No other displaced rib fractures.     No focal airspace opacities, pleural effusions, or pneumothorax. Mildly tortuous thoracic aorta. Right axillary clips. Cholecystectomy clips. Partially visualized lumbar spinal fusion hardware and cervical hardware.      LABS:     Recent Results (from the past 240 hour(s))   Comprehensive metabolic panel    Collection Time: 11/21/22  4:21 PM   Result Value Ref Range    Sodium 141 136 - 145 mmol/L    Potassium 4.9 3.4 - 5.3 mmol/L    Chloride 102 98 - 107 mmol/L    Carbon Dioxide (CO2) 25 22 - 29 mmol/L    Anion Gap 14 7 - 15 mmol/L    Urea Nitrogen 10.9 8.0 - 23.0 mg/dL    Creatinine 0.63 0.51 - 0.95 mg/dL    Calcium 9.6 8.8 - 10.2 mg/dL    Glucose 84 70 - 99 mg/dL    Alkaline Phosphatase 67 35 - 104 U/L    AST 29 10 - 35 U/L    ALT 21 10 - 35 U/L    Protein Total 7.6 6.4 - 8.3 g/dL    Albumin 4.6 3.5 - 5.2 g/dL    Bilirubin Total 0.8 <=1.2 mg/dL    GFR Estimate >90 >60 mL/min/1.73m2          ASSESSMENT/PLAN:     1. Closed fracture of multiple ribs of left side with routine healing, subsequent encounter    2. Osteoporosis, senile    3. Anxiety  - citalopram (CELEXA) 20 MG tablet; Take 1 tablet (20 mg) by mouth daily  Dispense: 90 tablet; Refill: 1    4. Mild episode of recurrent major  depressive disorder (H)  - citalopram (CELEXA) 20 MG tablet; Take 1 tablet (20 mg) by mouth daily  Dispense: 90 tablet; Refill: 1    5. Gastroesophageal reflux disease with esophagitis without hemorrhage  - Comprehensive metabolic panel; Future  - Comprehensive metabolic panel    6. Need for COVID-19 vaccine  - COVID-19, PF Pfizer Booster Bivalent 12+    Patient overall seems to be doing well.  She comes in today for follow-up after having been found to have 2 fractured ribs on 10/9/2022.  She was seen in the walk-in care at that time.  She overall feels like things are doing better.  Every day since he better in terms of the healing of those ribs on the left side.  Overall I do not think she needs any other further evaluation of this as it does seem like it is gradually improving.  She does have a history of osteoporosis and is on Prolia on a regular basis.  Her DEXA scan is up-to-date.  She continues to get Prolia on a regular basis.  She really does not know how this fracture happened and so that is a bit worrisome but we are doing everything we can for the osteoporosis and she and I discussed that at length today.  She does have a history of anxiety and depression and seems to doing well on Celexa.  Refill of that was sent today.  Please see PHQ-9 and DERIK which are both completed in their entirety today.  She is not suicidal or homicidal.  She will continue on the Celexa for the next 6 months or so.  She was reminded that she is due for an annual wellness visit and we will schedule that in the spring when she is due for follow-up of these other chronic medical problems.  She also has a history of gastroesophageal reflux disease and we will go ahead and check metabolic panel to follow-up on her Prilosec usage.  She is continue to use Prilosec on a twice a day basis.  She did desire COVID vaccination today which was given.  We will see her back in about 6 months for follow-up of her chronic medical problems as  well as annual wellness visit.  She has already had a flu vaccination this season.  Migdalia Delvalle MD    This note was dictated using voice recognition software. Any grammatical errors, context distortions, or spelling errors are not intentional

## 2022-11-21 NOTE — PATIENT INSTRUCTIONS

## 2022-11-22 LAB
ALBUMIN SERPL BCG-MCNC: 4.6 G/DL (ref 3.5–5.2)
ALP SERPL-CCNC: 67 U/L (ref 35–104)
ALT SERPL W P-5'-P-CCNC: 21 U/L (ref 10–35)
ANION GAP SERPL CALCULATED.3IONS-SCNC: 14 MMOL/L (ref 7–15)
AST SERPL W P-5'-P-CCNC: 29 U/L (ref 10–35)
BILIRUB SERPL-MCNC: 0.8 MG/DL
BUN SERPL-MCNC: 10.9 MG/DL (ref 8–23)
CALCIUM SERPL-MCNC: 9.6 MG/DL (ref 8.8–10.2)
CHLORIDE SERPL-SCNC: 102 MMOL/L (ref 98–107)
CREAT SERPL-MCNC: 0.63 MG/DL (ref 0.51–0.95)
DEPRECATED HCO3 PLAS-SCNC: 25 MMOL/L (ref 22–29)
GFR SERPL CREATININE-BSD FRML MDRD: >90 ML/MIN/1.73M2
GLUCOSE SERPL-MCNC: 84 MG/DL (ref 70–99)
POTASSIUM SERPL-SCNC: 4.9 MMOL/L (ref 3.4–5.3)
PROT SERPL-MCNC: 7.6 G/DL (ref 6.4–8.3)
SODIUM SERPL-SCNC: 141 MMOL/L (ref 136–145)

## 2022-12-23 ENCOUNTER — ANCILLARY PROCEDURE (OUTPATIENT)
Dept: GENERAL RADIOLOGY | Facility: CLINIC | Age: 77
End: 2022-12-23
Attending: PHYSICIAN ASSISTANT
Payer: MEDICARE

## 2022-12-23 ENCOUNTER — OFFICE VISIT (OUTPATIENT)
Dept: FAMILY MEDICINE | Facility: CLINIC | Age: 77
End: 2022-12-23
Payer: MEDICARE

## 2022-12-23 VITALS
OXYGEN SATURATION: 97 % | TEMPERATURE: 97.5 F | DIASTOLIC BLOOD PRESSURE: 84 MMHG | HEART RATE: 74 BPM | RESPIRATION RATE: 16 BRPM | SYSTOLIC BLOOD PRESSURE: 123 MMHG

## 2022-12-23 DIAGNOSIS — M79.631 PAIN OF RIGHT FOREARM: ICD-10-CM

## 2022-12-23 DIAGNOSIS — M25.512 ACUTE PAIN OF LEFT SHOULDER: Primary | ICD-10-CM

## 2022-12-23 DIAGNOSIS — M25.552 HIP PAIN, LEFT: ICD-10-CM

## 2022-12-23 PROCEDURE — 73090 X-RAY EXAM OF FOREARM: CPT | Mod: TC | Performed by: RADIOLOGY

## 2022-12-23 PROCEDURE — 72170 X-RAY EXAM OF PELVIS: CPT | Mod: TC | Performed by: STUDENT IN AN ORGANIZED HEALTH CARE EDUCATION/TRAINING PROGRAM

## 2022-12-23 PROCEDURE — 73060 X-RAY EXAM OF HUMERUS: CPT | Mod: TC | Performed by: RADIOLOGY

## 2022-12-23 PROCEDURE — 99214 OFFICE O/P EST MOD 30 MIN: CPT | Performed by: PHYSICIAN ASSISTANT

## 2022-12-23 PROCEDURE — 73552 X-RAY EXAM OF FEMUR 2/>: CPT | Mod: TC | Performed by: RADIOLOGY

## 2022-12-23 NOTE — PROGRESS NOTES
Patient presents with:  Fall: Fell today left hip right forearm felt nauseated after fall and on the way here        Clinical Decision Making:  Multiple etiologies and diagnoses were considered to include head injury, neck injury and back injury, concussion, multiple sites on the body that were hurting from her fall. Closed head injury was considered.  Multiple x-rays were obtained of the left humerus, right forearm and left hip and pelvis.  There is no noted fracture or dislocation.  Patient is treated for bone contusion with symptomatic care. Expected course of resolution and indication for return was gone over and questions were answered to patient/parent's satisfaction before discharge.          ICD-10-CM    1. Acute pain of left shoulder  M25.512 XR Humerus Left G/E 2 Views     diclofenac (VOLTAREN) 1 % topical gel      2. Pain of right forearm  M79.631 XR Forearm Right 2 Views      3. Hip pain, left  M25.552 XR Pelvis 1/2 Views     XR Femur Left 2 Views     diclofenac (VOLTAREN) 1 % topical gel          Patient Instructions   Ice topically to the area 20 minutes on each hour while awake.  Take precautions to avoid damage to the skin.  After 2 days, transition to ice topically 20 minutes 3 times per day.  After 2 days may add heat and alternate ice and heat.  Voltaren gel topically to the left hip and shoulder  Return to see primary care provider or return to clinic for reexamination and rommel-ray in 2 weeks if anything less than 100% resolution or return to pain-free weightbearing and full activities.            HPI:  Adelina Espinoza is a 77 year old female who presents with her  in the office today for a assessment of pain after fall.  Patient was at the [facility] when she fell from a standing position and tripped.  She did not have syncope or presyncope she did not hit her head neck or back and does not have any neck or back pain.  No loss of consciousness.  Patient landed on her left side and hurt  her left shoulder left hip and right forearm.  She did not have break in the skin or bleeding.  Patient has been full weightbearing has been able to ambulate after the injury and into the clinic without difficulty.    History obtained from chart review and the patient.    Problem List:  2021-08: Swelling of limb  2021-08: Hypomagnesemia  2021-06: Lump or mass in breast  2021-01: Lumbar stenosis with neurogenic claudication  2020-12: Spinal stenosis of lumbar region with neurogenic claudication  2020-12: Synovial cyst of lumbar facet joint  2020-12: Lumbar disc herniation  2019-03: Primary osteoarthritis involving multiple joints  2019-03: Arthritis of first metatarsophalangeal (MTP) joint of right   foot  2017-02: Esophageal reflux  2016-09: Osteoporosis, senile  2016-04: Primary osteoarthritis of knee, left  2015-10: Primary osteoarthritis of right knee  Peripheral Neuropathy  Urticaria  Major Depression, Recurrent  Anxiety  Irritable Bowel Syndrome      Past Medical History:   Diagnosis Date     Anxiety      Arthritis      Breast cancer (H) 2003     Depression      Esophageal reflux      H/O colonoscopy 03/2022    hyperplastic polyp noted, no further colonoscopy recommended per [facility]     History of anesthesia complications     nausea     Hx antineoplastic chemotherapy 2003    for breast cancer     Hx of radiation therapy 2004    for breast cancer     IBS (irritable bowel syndrome)      Lactose intolerance     lactose breath test positive at GI, 7/2019     Macular degeneration 08/2019    caught early, takes Areds 2 on a daily basis, seeing opthamology     Neuropathy     secondary to chemotherapy     Osteopenia      PONV (postoperative nausea and vomiting)        Social History     Tobacco Use     Smoking status: Never     Smokeless tobacco: Never   Substance Use Topics     Alcohol use: Yes     Alcohol/week: 8.0 standard drinks     Comment: 3-4 per week       Review of Systems  As above in HPI otherwise  negative.    Vitals:    12/23/22 1259   BP: 123/84   Pulse: 74   Resp: 16   Temp: 97.5  F (36.4  C)   TempSrc: Oral   SpO2: 97%       General: Patient is resting comfortably no acute distress is afebrile  HEENT: Head is normocephalic atraumatic no noted crepitus  eyes are PERRL EOMI sclera anicteric   TMs are clear bilaterally no noted hemotympanum  No cervical lymphadenopathy present neck is supple full range of motion  Skin: Without rash non-diaphoretic  Musculoskeletal:  Head is normocephalic atraumatic no cervical thoracic lumbar sacral spinous processes.  No pain over the left or right SI joint  No pain over the bilateral clavicles  There is pain over the left hip patient has negative logrolling test on the left and right lower extremities.  Tenderness to palpation over the left proximal humerus the clavicles are nontender palpation the right forearm had pain over the midshaft of the radius and ulna.  However she had good supination and pronation which is full to 90 and 90.  Otherwise no tenderness over the right humerus no pain over the left or right scaphoid bones and the carpals and metacarpals were nontender palpation patient was able make a good close tight fist bilaterally.  The right lower extremity was nontender palpation over the humerus at the knee and the tibia.  The right hip knee and ankle are nontender palpation full range of motion without effusion  Left knee and ankle are nontender palpation full range of motion without effusion    Physical Exam      Labs:  Results for orders placed or performed in visit on 12/23/22   XR Humerus Left G/E 2 Views     Status: None    Narrative    EXAM: XR HUMERUS LEFT G/E 2 VIEWS  LOCATION: [facility]  DATE/TIME: 12/23/2022 1:51 PM    INDICATION: left proximal humerus pain s p fall  COMPARISON: X-rays 10/09/2022      Impression    IMPRESSION: Humerus negative. No acute fracture. Subacute healing fractures of the left fourth and fifth ribs.    XR Forearm Right 2 Views     Status: None    Narrative    EXAM: XR FOREARM RIGHT 2 VIEWS  LOCATION: [facility]  DATE/TIME: 12/23/2022 1:50 PM    INDICATION: right forearm pain s p fall  COMPARISON: None.      Impression    IMPRESSION: Within normal limits. No fracture or dislocation.   XR Pelvis 1/2 Views     Status: None    Narrative    EXAM: XR PELVIS 1/2 VIEWS  LOCATION: [facility]  DATE/TIME: 12/23/2022 1:49 PM    INDICATION: left hip pain.   COMPARISON: None available at time of dictation.      Impression    IMPRESSION: Single view of the pelvis demonstrates no acute fracture or dislocation within the limitations of osteopenia. Mild degenerative changes of the hips, sacroiliac joints, and pubic symphysis. Lumbar spinal fusion hardware. The lateral margin of   the greater trochanter and iliac crest are not completely within the field of view.   XR Femur Left 2 Views     Status: None    Narrative    EXAM: XR FEMUR LEFT 2 VIEWS  LOCATION: [facility]  DATE/TIME: 12/23/2022 1:51 PM    INDICATION: left hip pain s p fall  COMPARISON: None.      Impression    IMPRESSION: No acute fracture. There is a small focus of chronic cortical thickening involving the distal femoral shaft medially which is probably related to remote stress injury. Mild degenerative arthritis left hip joint. Left knee arthroplasty.     I have personally ordered and preliminarily reviewed the following xray, I have noted no acute fractures or dislocation.    At the end of the encounter, I discussed results, diagnosis, medications. Discussed red flags for immediate return to clinic/ER, as well as indications for follow up if no improvement. Patient understood and agreed to plan. Patient was stable for discharge.

## 2022-12-23 NOTE — PATIENT INSTRUCTIONS
Ice topically to the area 20 minutes on each hour while awake.  Take precautions to avoid damage to the skin.  After 2 days, transition to ice topically 20 minutes 3 times per day.  After 2 days may add heat and alternate ice and heat.  Voltaren gel topically to the left hip and shoulder  Return to see primary care provider or return to clinic for reexamination and rommel-ray in 2 weeks if anything less than 100% resolution or return to pain-free weightbearing and full activities.

## 2022-12-27 ENCOUNTER — OFFICE VISIT (OUTPATIENT)
Dept: RHEUMATOLOGY | Facility: CLINIC | Age: 77
End: 2022-12-27
Payer: MEDICARE

## 2022-12-27 VITALS — DIASTOLIC BLOOD PRESSURE: 78 MMHG | HEART RATE: 74 BPM | SYSTOLIC BLOOD PRESSURE: 128 MMHG

## 2022-12-27 DIAGNOSIS — M19.071 ARTHRITIS OF FIRST METATARSOPHALANGEAL (MTP) JOINT OF RIGHT FOOT: ICD-10-CM

## 2022-12-27 DIAGNOSIS — M15.0 PRIMARY OSTEOARTHRITIS INVOLVING MULTIPLE JOINTS: Primary | ICD-10-CM

## 2022-12-27 PROCEDURE — 20606 DRAIN/INJ JOINT/BURSA W/US: CPT | Mod: RT | Performed by: INTERNAL MEDICINE

## 2022-12-27 PROCEDURE — 99214 OFFICE O/P EST MOD 30 MIN: CPT | Mod: 25 | Performed by: INTERNAL MEDICINE

## 2022-12-27 RX ORDER — TRIAMCINOLONE ACETONIDE 40 MG/ML
20 INJECTION, SUSPENSION INTRA-ARTICULAR; INTRAMUSCULAR ONCE
Status: COMPLETED | OUTPATIENT
Start: 2022-12-27 | End: 2022-12-27

## 2022-12-27 RX ADMIN — TRIAMCINOLONE ACETONIDE 20 MG: 40 INJECTION, SUSPENSION INTRA-ARTICULAR; INTRAMUSCULAR at 15:16

## 2022-12-27 NOTE — PROGRESS NOTES
Rheumatology follow-up visit note     Adelina is a 77 year old female presents today for follow-up.    Adelina was seen today for recheck.    Diagnoses and all orders for this visit:    Primary osteoarthritis involving multiple joints    Arthritis of first metatarsophalangeal (MTP) joint of right foot  -     triamcinolone (KENALOG-40) injection 20 mg  -     PA ARTHROCENTESIS ASPIR&/INJ INTERM JT/BURS W/US            After pros and cons were discussed including risk of infection, bleeding, skin changes including thinning, pigmentary alteration and scarring to name a few, right first MTP joint was injected, under ultrasound guidance, injected as noted in the orders section. This was done with nontouch technique.  The patient tolerated the procedure well and had a brisk Marcaine effect.  The postinjection care was discussed.       HPI    Adelina Espinoza is a 77 year old female is here for worsening pain.  This is in the right foot.  This is centered on the right first MTP.  She is known to have widespread osteoarthritis, most symptomatic of the right first MTP.  This is worse with activity interfering with day-to-day activities and interfering with some of the shoewear.  This is reminiscent of what she has experienced in the past and had good response to corticosteroid injections.  She has taken acetaminophen.        DETAILED EXAMINATION  12/27/22  :    Vitals:    12/27/22 1219   BP: 128/78   BP Location: Left arm   Patient Position: Sitting   Cuff Size: Adult Regular   Pulse: 74     Alert oriented. Head including the face is examined for malar rash, heliotropes, scarring, lupus pernio. Eyes examined for redness such as in episcleritis/scleritis, periorbital lesions.   Neck/ Face examined for parotid gland swelling, range of motion of neck.  Left upper and lower and right upper and lower extremities examined for tenderness, swelling, warmth of the appendicular joints, range of motion, edema, rash.  Some of the  important findings included: she does not have evidence of synovitis in the palpable joints of the upper extremities.  No significant deformities of the digits.  She has marked tenderness in the joint line of the right first MTP.  Patient Active Problem List    Diagnosis Date Noted     Swelling of limb 08/03/2021     Priority: Medium     Hypomagnesemia 08/03/2021     Priority: Medium     Lump or mass in breast 06/29/2021     Priority: Medium     Added automatically from request for surgery [id number]         Lumbar stenosis with neurogenic claudication 01/29/2021     Priority: Medium     Spinal stenosis of lumbar region with neurogenic claudication 12/23/2020     Priority: Medium     Added automatically from request for surgery [id number]         Synovial cyst of lumbar facet joint 12/23/2020     Priority: Medium     Added automatically from request for surgery [id number]         Lumbar disc herniation 12/23/2020     Priority: Medium     Added automatically from request for surgery [id number]         Primary osteoarthritis involving multiple joints 03/05/2019     Priority: Medium     Arthritis of first metatarsophalangeal (MTP) joint of right foot 03/05/2019     Priority: Medium     Esophageal reflux 02/14/2017     Priority: Medium     Osteoporosis, senile 09/13/2016     Priority: Medium     Peripheral Neuropathy      Priority: Medium     Created by Conversion  [facility] Annotation: Jun 21 2009  9:51PM - Toya Charles: AFTER   CHEMO  Replacement Utility updated for latest IMO load         Major Depression, Recurrent      Priority: Medium     Created by [facility] Annotation: Jun 6 2013 11:43AM - Mracia Cisse: 1995, 2012  Replacement Utility updated for latest IMO load         Anxiety      Priority: Medium     Created by Conversion  Replacement Utility updated for latest IMO load         Primary osteoarthritis of knee, left 04/21/2016     Priority: Medium     Replacement Utility updated for latest IMO load          Primary osteoarthritis of right knee 10/29/2015     Priority: Medium     Urticaria      Priority: Medium     Created by Conversion         Irritable Bowel Syndrome      Priority: Medium     Created by Conversion         Past Surgical History:   Procedure Laterality Date     APPENDECTOMY  1967     BACK SURGERY  2000    cervical fusion     BIOPSY BREAST Right 2003     BIOPSY BREAST Right 07/09/2021    dense fibrotic tissue, Dr Burleson     CATARACT EXTRACTION, BILATERAL       CHOLECYSTECTOMY  1994     HYSTERECTOMY  1997    desmoid cyst     IR CVC TUNNEL PLACEMENT > 5 YRS OF AGE  01/21/2004     IR MISCELLANEOUS PROCEDURE  07/16/2004     LASER SURGERY OF EYE Bilateral 09/2022    cataracts     LUMPECTOMY BREAST  12/2003    breast cancer     NISSEN FUNDOPLICATION  2010     OOPHORECTOMY  1997     [facility] THORAX SPINE FUSN,POST TECH N/A 01/29/2021    Procedure: BILATERAL LUMBAR 4-5 LAMINECTOMIES FOR RIGHT SYNOVIAL CYST RESECTION, LUMBAR 4-5, LUMBAR 4-5 POSTERIOR INSTRUMENTED FUSION AND ARTHRODESIS, USE OF ALLOGRAFT, USE OF AUTOGRAFT, STEALTH NAVIGATION;  Surgeon: Lisa Thomas MD;  Location: [facility] Main OR;  Service: Spine     [facility] TOTAL KNEE ARTHROPLASTY Right 10/29/2015    Procedure: RIGHT TOTAL KNEE  ARTHROPLASTY;  Surgeon: Nitin White MD;  Location: [facility] Main OR;  Service: Orthopedics     [facility] TOTAL KNEE ARTHROPLASTY Left 04/21/2016    Procedure: LEFT TOTAL KNEE ARTHROPLASTY;  Surgeon: Nitin White MD;  Location: [facility] OR;  Service: Orthopedics      Past Medical History:   Diagnosis Date     Anxiety      Arthritis      Breast cancer (H) 2003     Depression      Esophageal reflux      H/O colonoscopy 03/2022    hyperplastic polyp noted, no further colonoscopy recommended per [facility]     History of anesthesia complications     nausea     Hx antineoplastic chemotherapy 2003    for breast cancer     Hx of radiation therapy 2004    for breast cancer     IBS (irritable bowel syndrome)       Lactose intolerance     lactose breath test positive at GI, 7/2019     Macular degeneration 08/2019    caught early, takes Areds 2 on a daily basis, seeing opthamology     Neuropathy     secondary to chemotherapy     Osteopenia      PONV (postoperative nausea and vomiting)      Allergies   Allergen Reactions     Adhesive Tape      Other reaction(s): Rash     Adhesive [Mecrylate] Rash     Chocolate Flavor Diarrhea     Gabapentin Unknown     Has dizziness, spaced out when on it. Has been on it twice and both times had this type of reaction. Should not be on it in the future.      Green Pepper [Capsicum] Unknown     Stomach upset     Lactose Diarrhea     Monosodium Glutamate Unknown     diarrhea     Current Outpatient Medications   Medication Sig Dispense Refill     acetaminophen (TYLENOL) 500 MG tablet [ACETAMINOPHEN (TYLENOL) 500 MG TABLET] Take 1,000 mg by mouth every 6 (six) hours as needed for pain.       calcium citrate-vitamin D (CITRACAL+D) 315-200 mg-unit per tablet [CALCIUM CITRATE-VITAMIN D (CITRACAL+D) 315-200 MG-UNIT PER TABLET] Take 1 tablet by mouth 2 (two) times a day.       calcium polycarbophiL (FIBERCON) 625 mg tablet [CALCIUM POLYCARBOPHIL (FIBERCON) 625 MG TABLET] Take 625 mg by mouth 2 (two) times a day.       citalopram (CELEXA) 20 MG tablet Take 1 tablet (20 mg) by mouth daily 90 tablet 1     denosumab (PROLIA) 60 MG/ML SOSY injection Inject 60 mg Subcutaneous       diclofenac (VOLTAREN) 1 % topical gel Apply 2 grams to left hip up to 4 times daily not to exceed 8 grams per day. 100 g 1     dicyclomine (BENTYL) 10 MG capsule Take 1 capsule by mouth as needed       furosemide (LASIX) 20 MG tablet TAKE 1 TABLET DAILY 90 tablet 3     lactase (LACTAID) 3000 UNIT tablet        Lactobacillus Rhamnosus, GG, ( PROBIOTIC DIGESTIVE CARE) CAPS Take 1 capsule by mouth every 24 hours       magnesium 250 mg Tab [MAGNESIUM 250 MG TAB] Take 250 mg by mouth daily.        multivit-min-iron-FA-lutein (CENTRUM  SILVER WOMEN) 8 mg iron-400 mcg-300 mcg Tab [MULTIVIT-MIN-IRON-FA-LUTEIN (CENTRUM SILVER WOMEN) 8 MG IRON-400 MCG-300 MCG TAB] Take 1 tablet by mouth daily.        omeprazole (PRILOSEC) 20 MG capsule [OMEPRAZOLE (PRILOSEC) 20 MG CAPSULE] TAKE 1 CAPSULE DAILY BEFORE BREAKFAST (Patient taking differently: 2 times daily) 90 capsule 3     tetrahydroz-peg 400-hyprom-gly (VISINE MAX REDNESS RELIEF) 0.05-1-0.36-0.2 % Drop [TETRAHYDROZ--HYPROM-GLY (VISINE MAX REDNESS RELIEF) 0.05-1-0.36-0.2 % DROP] Administer 1 drop to both eyes 3 (three) times a day as needed.       vit C/E/Zn/coppr/lutein/zeaxan (PRESERVISION AREDS-2 ORAL) [VIT C/E/ZN/COPPR/LUTEIN/ZEAXAN (PRESERVISION AREDS-2 ORAL)] Take 1 tablet by mouth 2 (two) times a day.        family history includes Alzheimer Disease in her father; Aortic stenosis in her father; Atrial fibrillation in her father; Breast Cancer (age of onset: 46.00) in her maternal aunt; Cerebrovascular Disease in her father; Depression in her father; Heart Disease in her mother; Hypertension in her mother; Kidney Cancer (age of onset: 67.00) in her mother; Lung Cancer in her paternal grandfather; Skin Cancer in her father.  Social Connections: Not on file          WBC Count   Date Value Ref Range Status   06/27/2022 7.8 4.0 - 11.0 10e3/uL Final     RBC Count   Date Value Ref Range Status   06/27/2022 4.59 3.80 - 5.20 10e6/uL Final     Hemoglobin   Date Value Ref Range Status   06/27/2022 14.0 11.7 - 15.7 g/dL Final     Hematocrit   Date Value Ref Range Status   06/27/2022 43.3 35.0 - 47.0 % Final     MCV   Date Value Ref Range Status   06/27/2022 94 78 - 100 fL Final     MCH   Date Value Ref Range Status   06/27/2022 30.5 26.5 - 33.0 pg Final     Platelet Count   Date Value Ref Range Status   06/27/2022 274 150 - 450 10e3/uL Final     % Lymphocytes   Date Value Ref Range Status   05/12/2022 31 % Final     AST   Date Value Ref Range Status   11/21/2022 29 10 - 35 U/L Final     ALT   Date  Value Ref Range Status   11/21/2022 21 10 - 35 U/L Final     Albumin   Date Value Ref Range Status   11/21/2022 4.6 3.5 - 5.2 g/dL Final   06/27/2022 3.9 3.5 - 5.0 g/dL Final     Alkaline Phosphatase   Date Value Ref Range Status   11/21/2022 67 35 - 104 U/L Final     Creatinine   Date Value Ref Range Status   11/21/2022 0.63 0.51 - 0.95 mg/dL Final     GFR Estimate   Date Value Ref Range Status   11/21/2022 >90 >60 mL/min/1.73m2 Final     Comment:     Effective December 21, 2021 eGFRcr in adults is calculated using the 2021 CKD-EPI creatinine equation which includes age and gender (Philip et al., NEJ, DOI: 10.1056/LVPYar9828855)   01/31/2021 >60 >60 mL/min/1.73m2 Final     GFR Estimate If Black   Date Value Ref Range Status   01/31/2021 >60 >60 mL/min/1.73m2 Final     Erythrocyte Sedimentation Rate   Date Value Ref Range Status   05/12/2022 14 0 - 20 mm/hr Final     C-Reactive Protein High Sensitivity   Date Value Ref Range Status   05/12/2022 2.8 0.0 - 3.0 mg/L Final     Comment:     Low risk < 1.0 mg/L  Average risk 1.0 to 3.0 mg/L  High risk > 3.0 mg/L  Acute inflamation > 10.0 mg/L

## 2023-01-30 ENCOUNTER — HOSPITAL ENCOUNTER (OUTPATIENT)
Dept: MAMMOGRAPHY | Facility: CLINIC | Age: 78
Discharge: HOME | End: 2023-01-30
Attending: SPECIALIST | Admitting: SPECIALIST
Payer: MEDICARE

## 2023-01-30 DIAGNOSIS — D05.11 DUCTAL CARCINOMA IN SITU (DCIS) OF RIGHT BREAST: ICD-10-CM

## 2023-01-30 DIAGNOSIS — R92.8 ABNORMAL MAMMOGRAM: ICD-10-CM

## 2023-01-30 PROCEDURE — 77062 BREAST TOMOSYNTHESIS BI: CPT

## 2023-02-07 ENCOUNTER — OFFICE VISIT (OUTPATIENT)
Dept: SURGERY | Facility: CLINIC | Age: 78
End: 2023-02-07
Attending: FAMILY MEDICINE
Payer: MEDICARE

## 2023-02-07 DIAGNOSIS — Z85.3 PERSONAL HISTORY OF BREAST CANCER: Primary | ICD-10-CM

## 2023-02-07 PROCEDURE — 99213 OFFICE O/P EST LOW 20 MIN: CPT | Performed by: SPECIALIST

## 2023-02-07 PROCEDURE — G0463 HOSPITAL OUTPT CLINIC VISIT: HCPCS | Performed by: SPECIALIST

## 2023-02-07 NOTE — LETTER
2/7/2023         RE: Adelina Espinoza  [address] [facility] [facility] [address]        Dear Colleague,    Thank you for referring your patient, Adelina Espinoza, to the [facility] [place]. Please see a copy of my visit note below.    This is a 76 year old woman who comes in for  continued follow-up of her right breast cancer.  She is now many years status post right  lumpectomy  with radiation.  And now about 18 months status post right right breast biopsy of an area near her nipple that showed just a focal area of DCIS.  She is feeling well.  She has no new complaints today.        Please see the chart review for PMH, Meds, allergies, FH and SH.     ROS:  A comprehensive review of systems was negative.        Physical Exam:  There were no vitals taken for this visit.  General appearance: alert, appears stated age and cooperative  Breasts: There are no palpable masses. There are minimal radiation changes. The scar(s) has(ve) healed up well.  There is still a little area of thickening near the nipple but not significantly changed from prior to surgery.  Certainly no progression.  Lymph nodes: Cervical, supraclavicular, and axillary nodes normal.  Neurologic: Grossly normal     Data Review: Reviewed her current mammograms. No evidence of disease.        Impression: Personal history of breast cancer.  First treated with a lumpectomy many years ago and then was found to have a tiny area of DCIS in a biopsy 18 months ago.  No evidence of disease on current mammogram and nothing palpable on exam.  As we discussed before, she is always at some risk of a recurrence given her history of breast cancer but at this point there is nothing to suggest she has any active disease.     Plan: Follow-up with me I think can be as needed.  As long as she is getting her yearly mammograms and continuing to see her primary care physician on a yearly basis, a yearly visit with me is going to offer her very  minimal benefit.  I told her that if she ever has any questions or concerns about anything, we are always going to be here.      Again, thank you for allowing me to participate in the care of your patient.        Sincerely,        Zakia Burleson MD

## 2023-02-07 NOTE — PROGRESS NOTES
This is a 76 year old woman who comes in for  continued follow-up of her right breast cancer.  She is now many years status post right  lumpectomy  with radiation.  And now about 18 months status post right right breast biopsy of an area near her nipple that showed just a focal area of DCIS.  She is feeling well.  She has no new complaints today.        Please see the chart review for PMH, Meds, allergies, FH and SH.     ROS:  A comprehensive review of systems was negative.        Physical Exam:  There were no vitals taken for this visit.  General appearance: alert, appears stated age and cooperative  Breasts: There are no palpable masses. There are minimal radiation changes. The scar(s) has(ve) healed up well.  There is still a little area of thickening near the nipple but not significantly changed from prior to surgery.  Certainly no progression.  Lymph nodes: Cervical, supraclavicular, and axillary nodes normal.  Neurologic: Grossly normal     Data Review: Reviewed her current mammograms. No evidence of disease.        Impression: Personal history of breast cancer.  First treated with a lumpectomy many years ago and then was found to have a tiny area of DCIS in a biopsy 18 months ago.  No evidence of disease on current mammogram and nothing palpable on exam.  As we discussed before, she is always at some risk of a recurrence given her history of breast cancer but at this point there is nothing to suggest she has any active disease.     Plan: Follow-up with me I think can be as needed.  As long as she is getting her yearly mammograms and continuing to see her primary care physician on a yearly basis, a yearly visit with me is going to offer her very minimal benefit.  I told her that if she ever has any questions or concerns about anything, we are always going to be here.

## 2023-02-07 NOTE — NURSING NOTE
Pat presents to [facility] for a follow up appointment with Dr. Burleson .Patient notes no new breast concerns.  Patient had mammogram done recently, see report for details.  RN assessment and EMRupdate.  Patient met with Dr. Burleson .  See dictation for details of visit and follow up plan.  Support provided, invited calls.  RN time 12 mins.

## 2023-02-10 DIAGNOSIS — F41.1 ANXIETY STATE: ICD-10-CM

## 2023-02-10 DIAGNOSIS — F33.0 MILD EPISODE OF RECURRENT MAJOR DEPRESSIVE DISORDER (H): ICD-10-CM

## 2023-02-10 NOTE — TELEPHONE ENCOUNTER
Refill Request  Medication name: Pending Prescriptions:                       Disp   Refills    citalopram (CELEXA) 20 MG tablet          90 tab*1            Sig: Take 1 tablet (20 mg) by mouth daily    Requested Pharmacy: [facility]     PHARMACY CHANGE PER PT REQUEST

## 2023-02-11 RX ORDER — CITALOPRAM HYDROBROMIDE 20 MG/1
20 TABLET ORAL DAILY
Qty: 90 TABLET | Refills: 0 | Status: SHIPPED | OUTPATIENT
Start: 2023-02-11 | End: 2023-05-10

## 2023-02-12 NOTE — TELEPHONE ENCOUNTER
"Last Written Prescription Date:  11/21/22  Last Fill Quantity: 90,  # refills: 1   Last office visit provider:  11/21/22     Requested Prescriptions   Pending Prescriptions Disp Refills     citalopram (CELEXA) 20 MG tablet 90 tablet 1     Sig: Take 1 tablet (20 mg) by mouth daily       SSRIs Protocol Passed - 2/10/2023  9:17 AM        Passed - PHQ-9 score less than 5 in past 6 months     Please review last PHQ-9 score.           Passed - Medication is active on med list        Passed - Patient is age 18 or older        Passed - No active pregnancy on record        Passed - No positive pregnancy test in last 12 months        Passed - Recent (6 mo) or future (30 days) visit within the authorizing provider's specialty     Patient had office visit in the last 6 months or has a visit in the next 30 days with authorizing provider or within the authorizing provider's specialty.  See \"Patient Info\" tab in inbasket, or \"Choose Columns\" in Meds & Orders section of the refill encounter.                 Vicky De Souza RN 02/11/23 10:11 PM  "

## 2023-03-02 ENCOUNTER — OFFICE VISIT (OUTPATIENT)
Dept: FAMILY MEDICINE | Facility: CLINIC | Age: 78
End: 2023-03-02
Payer: MEDICARE

## 2023-03-02 VITALS
DIASTOLIC BLOOD PRESSURE: 82 MMHG | HEART RATE: 73 BPM | TEMPERATURE: 97.9 F | RESPIRATION RATE: 16 BRPM | OXYGEN SATURATION: 98 % | BODY MASS INDEX: 30.21 KG/M2 | SYSTOLIC BLOOD PRESSURE: 134 MMHG | WEIGHT: 176 LBS

## 2023-03-02 DIAGNOSIS — R31.0 GROSS HEMATURIA: Primary | ICD-10-CM

## 2023-03-02 LAB
ALBUMIN UR-MCNC: NEGATIVE MG/DL
APPEARANCE UR: CLEAR
BILIRUB UR QL STRIP: NEGATIVE
COLOR UR AUTO: YELLOW
GLUCOSE UR STRIP-MCNC: NEGATIVE MG/DL
HGB UR QL STRIP: NEGATIVE
KETONES UR STRIP-MCNC: NEGATIVE MG/DL
LEUKOCYTE ESTERASE UR QL STRIP: NEGATIVE
NITRATE UR QL: NEGATIVE
PH UR STRIP: 5.5 [PH] (ref 5–8)
SP GR UR STRIP: 1.01 (ref 1–1.03)
UROBILINOGEN UR STRIP-ACNC: 0.2 E.U./DL

## 2023-03-02 PROCEDURE — 81003 URINALYSIS AUTO W/O SCOPE: CPT | Performed by: PHYSICIAN ASSISTANT

## 2023-03-02 PROCEDURE — 99213 OFFICE O/P EST LOW 20 MIN: CPT | Performed by: PHYSICIAN ASSISTANT

## 2023-03-02 NOTE — PROGRESS NOTES
Patient presents with:  Hematuria: This morning        Clinical Decision Making:  Patient will monitor symptoms.  Follow-up with primary care provider for reevaluation and treat in 2 to 3 weeks to recheck urinalysis and symptoms.  Questions were answered to patient's satisfaction before discharge.      ICD-10-CM    1. Gross hematuria  R31.0 UA macro with reflex to Microscopic and Culture - Clinc Collect          Patient Instructions   No noted blood on the urinalysis and today's office visit  Follow-up with your primary care provider in 2 to 3 weeks for reevaluation and retesting of your urine  You may return to the walk-in care in the interim if new symptoms or concerns arise.          HPI:  Adelina Espinoza is a 77 year old female who presents today for a one day history of irritative voiding symptoms to include urinary hesitancy urgency frequency and dysuria and 1 episode of hematuria.  He has not continued.   Denies fever chills night sweats fatigue or other red flag symptoms to include back or flank pain and no vaginal discharge.  She has had a recent urinary tract infections does feel similar to how her other symptoms have been.    History obtained from chart review and the patient.    Problem List:  2021-08: Swelling of limb  2021-08: Hypomagnesemia  2021-06: Lump or mass in breast  2021-01: Lumbar stenosis with neurogenic claudication  2020-12: Spinal stenosis of lumbar region with neurogenic claudication  2020-12: Synovial cyst of lumbar facet joint  2020-12: Lumbar disc herniation  2019-03: Primary osteoarthritis involving multiple joints  2019-03: Arthritis of first metatarsophalangeal (MTP) joint of right   foot  2017-02: Esophageal reflux  2016-09: Osteoporosis, senile  2016-04: Primary osteoarthritis of knee, left  2015-10: Primary osteoarthritis of right knee  Peripheral Neuropathy  Urticaria  Major Depression, Recurrent  Anxiety  Irritable Bowel Syndrome      Past Medical History:   Diagnosis Date      Anxiety      Arthritis      Breast cancer (H) 2003     Depression      Esophageal reflux      H/O colonoscopy 03/2022    hyperplastic polyp noted, no further colonoscopy recommended per [facility]     History of anesthesia complications     nausea     Hx antineoplastic chemotherapy 2003    for breast cancer     Hx of radiation therapy 2004    for breast cancer     IBS (irritable bowel syndrome)      Lactose intolerance     lactose breath test positive at GI, 7/2019     Macular degeneration 08/2019    caught early, takes Areds 2 on a daily basis, seeing opthamology     Neuropathy     secondary to chemotherapy     Osteopenia      PONV (postoperative nausea and vomiting)        Social History     Tobacco Use     Smoking status: Never     Smokeless tobacco: Never   Substance Use Topics     Alcohol use: Yes     Alcohol/week: 8.0 standard drinks     Comment: 3-4 per week       Review of Systems  As above in HPI otherwise negative.    Vitals:    03/02/23 1256   BP: 134/82   BP Location: Right arm   Patient Position: Sitting   Cuff Size: Adult Regular   Pulse: 73   Resp: 16   Temp: 97.9  F (36.6  C)   TempSrc: Oral   SpO2: 98%   Weight: 79.8 kg (176 lb)       General: Patient is resting comfortably no acute distress is afebrile  HEENT: Head is normocephalic atraumatic   eyes are PERRL EOMI sclera anicteric   Abdomen: No suprapubic tenderness to palpation or induration no CVA tenderness to percussion.  Abdomen is soft nontender  Skin: Without rash non-diaphoretic    Physical Exam      Labs:  Results for orders placed or performed in visit on 03/02/23   UA macro with reflex to Microscopic and Culture - Clinc Collect     Status: Normal    Specimen: Urine, Clean Catch   Result Value Ref Range    Color Urine Yellow Colorless, Straw, Light Yellow, Yellow    Appearance Urine Clear Clear    Glucose Urine Negative Negative mg/dL    Bilirubin Urine Negative Negative    Ketones Urine Negative Negative mg/dL    Specific Gravity Urine  1.015 1.005 - 1.030    Blood Urine Negative Negative    pH Urine 5.5 5.0 - 8.0    Protein Albumin Urine Negative Negative mg/dL    Urobilinogen Urine 0.2 0.2, 1.0 E.U./dL    Nitrite Urine Negative Negative    Leukocyte Esterase Urine Negative Negative    Narrative    Microscopic not indicated     At the end of the encounter, I discussed results, diagnosis, medications. Discussed red flags for immediate return to clinic/ER, as well as indications for follow up if no improvement. Patient understood and agreed to plan. Patient was stable for discharge.

## 2023-03-02 NOTE — PATIENT INSTRUCTIONS
No noted blood on the urinalysis and today's office visit  Follow-up with your primary care provider in 2 to 3 weeks for reevaluation and retesting of your urine  You may return to the walk-in care in the interim if new symptoms or concerns arise.

## 2023-04-11 ENCOUNTER — OFFICE VISIT (OUTPATIENT)
Dept: INTERNAL MEDICINE | Facility: CLINIC | Age: 78
End: 2023-04-11
Payer: MEDICARE

## 2023-04-11 ENCOUNTER — ALLIED HEALTH/NURSE VISIT (OUTPATIENT)
Dept: FAMILY MEDICINE | Facility: CLINIC | Age: 78
End: 2023-04-11
Payer: MEDICARE

## 2023-04-11 VITALS
WEIGHT: 173.06 LBS | HEART RATE: 78 BPM | OXYGEN SATURATION: 96 % | SYSTOLIC BLOOD PRESSURE: 132 MMHG | DIASTOLIC BLOOD PRESSURE: 80 MMHG | BODY MASS INDEX: 29.71 KG/M2 | TEMPERATURE: 98.4 F | RESPIRATION RATE: 16 BRPM

## 2023-04-11 DIAGNOSIS — M81.0 OSTEOPOROSIS, SENILE: Primary | ICD-10-CM

## 2023-04-11 DIAGNOSIS — R26.89 BALANCE PROBLEMS: ICD-10-CM

## 2023-04-11 PROBLEM — M15.0 PRIMARY OSTEOARTHRITIS INVOLVING MULTIPLE JOINTS: Status: RESOLVED | Noted: 2019-03-05 | Resolved: 2023-04-11

## 2023-04-11 PROBLEM — N63.0 LUMP OR MASS IN BREAST: Status: RESOLVED | Noted: 2021-06-29 | Resolved: 2023-04-11

## 2023-04-11 PROBLEM — M79.89 SWELLING OF LIMB: Status: RESOLVED | Noted: 2021-08-03 | Resolved: 2023-04-11

## 2023-04-11 PROCEDURE — 99207 PR NO CHARGE NURSE ONLY: CPT

## 2023-04-11 PROCEDURE — 96372 THER/PROPH/DIAG INJ SC/IM: CPT | Performed by: INTERNAL MEDICINE

## 2023-04-11 PROCEDURE — 99213 OFFICE O/P EST LOW 20 MIN: CPT | Mod: 25 | Performed by: INTERNAL MEDICINE

## 2023-04-11 NOTE — PATIENT INSTRUCTIONS
Prolia 16th today.  Prolia 17th in 6 months with my nurse. I will see you in 1 year.    DXA in 9/2024.   Phone number to schedule [phone].    Daily calcium need is 7532-7478 mg a day from the diet and supplements.  Calcium citrate is easier to digest.  Vitamin D 2000 IU daily recommended.    Risk of rebound vertebral fractures is higher when Prolia suddenly stopped or dose was missed.      Prolia and Covid vaccine should be  for at least a week.

## 2023-04-11 NOTE — PROGRESS NOTES
Osteoporosis, senile  She did use alendronate in the past and then had zoledronic acid in the form of Reclast and then Zometa after her breast cancer.  She tells me that she used the Zometa every 6 months for 1 year.  On Prolia since 2015, tolerated it well.  Last DXA was done in 9/2022.  Component      Latest Ref Rng 11/21/2022   Sodium      136 - 145 mmol/L 141    Potassium      3.4 - 5.3 mmol/L 4.9    Chloride      98 - 107 mmol/L 102    Carbon Dioxide (CO2)      22 - 29 mmol/L 25    Anion Gap      7 - 15 mmol/L 14    Urea Nitrogen      8.0 - 23.0 mg/dL 10.9    Creatinine      0.51 - 0.95 mg/dL 0.63    Calcium      8.8 - 10.2 mg/dL 9.6    Glucose      70 - 99 mg/dL 84    Alkaline Phosphatase      35 - 104 U/L 67    AST      10 - 35 U/L 29    ALT      10 - 35 U/L 21    Protein Total      6.4 - 8.3 g/dL 7.6    Albumin      3.5 - 5.2 g/dL 4.6    Bilirubin Total      <=1.2 mg/dL 0.8    GFR Estimate      >60 mL/min/1.73m2 >90        She will have vit D checked with her PCP when doing AWV.  - Physical Therapy Referral; Future    Balance problems  She fell twice in the last 6 months. Luckily no fractures. She thinks her balance is off. She will start the PT for osteoporosis and balance training.  - Physical Therapy Referral; Future    Patient was educated on safety of Prolia utilizing Patient Counseling Chart for Healthcare Providers, as outlined by the Prolia REMS progam.     Return in about 6 months (around 10/11/2023) for Prolia with CSS.    Patient Instructions   Prolia 16th today.  Prolia 17th in 6 months with my nurse. I will see you in 1 year.    DXA in 9/2024.   Phone number to schedule [phone].    Daily calcium need is 5865-8167 mg a day from the diet and supplements.  Calcium citrate is easier to digest.  Vitamin D 2000 IU daily recommended.    Risk of rebound vertebral fractures is higher when Prolia suddenly stopped or dose was missed.      Prolia and Covid vaccine should be  for at least a  week.           /80 (BP Location: Left arm, Patient Position: Sitting, Cuff Size: Adult Regular)   Pulse 78   Temp 98.4  F (36.9  C) (Oral)   Resp 16   Wt 78.5 kg (173 lb 1 oz)   SpO2 96%   BMI 29.71 kg/m        Did you experience any problems with previous Prolia injection? no  Any medication change in the last 6 months? no  Did you take prednisone or other immunosupressant drugs in the last 6 months   (chemo, transplant, rheum, dermatology conditions)? no  Did you have any serious infection in the last 6 months?no  Any recent hospitalizations?no  Do you plan any dental work in the next 2-3 months?no  How much calcium do you take daily from the diet and supplements?1200 mg  How much vit D do you take daily? 2000 IU  Last DXA? 9/2022, Reviewed and discussed      Patient is here today for the  Prolia injection. Patient tolerated previous injections well.   We discussed calcium and vit D daily needs today.         We discussed high risk of rebound vertebral fractures when Prolia suddenly stopped.    Next Prolia injection will be in 6 months.           This note has been dictated using voice recognition software. Any grammatical or context distortions are unintentional and inherent to the software      Patient Active Problem List   Diagnosis     Peripheral Neuropathy     Urticaria     Major Depression, Recurrent     Anxiety     Irritable Bowel Syndrome     Primary osteoarthritis of right knee     Primary osteoarthritis of knee, left     Osteoporosis, senile     Esophageal reflux     Arthritis of first metatarsophalangeal (MTP) joint of right foot     Spinal stenosis of lumbar region with neurogenic claudication     Synovial cyst of lumbar facet joint     Lumbar disc herniation     Lumbar stenosis with neurogenic claudication     Hypomagnesemia       Current Outpatient Medications   Medication     acetaminophen (TYLENOL) 500 MG tablet     calcium citrate-vitamin D (CITRACAL+D) 315-200 mg-unit per tablet      calcium polycarbophiL (FIBERCON) 625 mg tablet     citalopram (CELEXA) 20 MG tablet     dicyclomine (BENTYL) 10 MG capsule     furosemide (LASIX) 20 MG tablet     lactase (LACTAID) 3000 UNIT tablet     Lactobacillus Rhamnosus, GG, ( PROBIOTIC DIGESTIVE CARE) CAPS     magnesium 250 mg Tab     multivit-min-iron-FA-lutein (CENTRUM SILVER WOMEN) 8 mg iron-400 mcg-300 mcg Tab     omeprazole (PRILOSEC) 20 MG DR capsule     tetrahydroz-peg 400-hyprom-gly (VISINE MAX REDNESS RELIEF) 0.05-1-0.36-0.2 % Drop     vit C/E/Zn/coppr/lutein/zeaxan (PRESERVISION AREDS-2 ORAL)     denosumab (PROLIA) 60 MG/ML SOSY injection     No current facility-administered medications for this visit.

## 2023-04-11 NOTE — PROGRESS NOTES
Indication: Prolia  (denosumab) is a prescription medicine used to treat osteoporosis in patients who:     Are at high risk for fracture, meaning patients who have had a fracture related to osteoporosis, or who have multiple risk factors for fracture     Cannot use another osteoporosis medicine or other osteoporosis medicines did not work well   The timeline for early/late injections would be 4 weeks early and any time after the 6 month anthony. If a patient receives their injection late, then the subsequent injection would be 6 months from the date that they actually received the injection    1.  When was the last injection? 10/10/22  2.  Did they check with their insurance for this calendar year? yes  3.  Is there an order in the chart?  yes  4.  Has the patient had dental work involving the bone in the past month or will have work in the next 6 months? No  5.  Did you have the patient wait 15 minutes after the injection?    6.  Remember to use .injection under the medication notes    The following steps were completed to comply with the REMS program for Prolia:    Reviewed information in the Medication Guide and Patient Counseling Chart, including the serious risks of Prolia  and the symptoms of each risk.    Advised patient to seek prompt medical attention if they have signs or symptoms of any of the serious risks.  Provided each patient a copy of the Medication Guide and Patient Brochure.  Clinic Administered Medication Documentation      Prolia Documentation    Indication: Prolia  (denosumab) is a prescription medicine used to treat osteoporosis in patients who:     Are at high risk for fracture, meaning patients who have had a fracture related to osteoporosis, or who have multiple risk factors for fracture.    Cannot use another osteoporosis medicine or other osteoporosis medicines did not work well.    The timeline for early/late injections would be 4 weeks early and any time after the 6 month anthony. If a  patient receives their injection late, then the subsequent injection would be 6 months from the date that they actually received the injection.    When was the last injection? 10/10/22  Was the last injection at least 6 months ago? Yes  Has the prior authorization been completed?  Yes  Is there an active order (written within the past 365 days, with administrations remaining, not ) in the chart?  Yes  Patient denies any dental work involving the bone (e.g. tooth extraction or dental implants) in the past 4 weeks?  Yes  Patient denies plans for any dental work involving the bone (e.g. tooth extraction or dental implants) in the next 4 weeks? Yes    The following steps were completed to comply with the REMS program for Prolia:    Reviewed information in the Medication Guide and Patient Counseling Chart, including the serious risks of Prolia  and the symptoms of each risk.    Advised patient to seek prompt medical attention if they have signs or symptoms of any of the serious risks.    Provided each patient a copy of the Medication Guide and Patient Brochure.      Prior to injection, verified patient identity using patient's name and date of birth. Medication was administered. Please see MAR and medication order for additional information. Patient instructed to remain in clinic for 15 minutes and report any adverse reaction to staff immediately.    Vial/Syringe: Syringe  Was this medication supplied by the patient? No  Verified that the patient has refills remaining in their prescription.

## 2023-04-17 ENCOUNTER — OFFICE VISIT (OUTPATIENT)
Dept: RHEUMATOLOGY | Facility: CLINIC | Age: 78
End: 2023-04-17
Payer: MEDICARE

## 2023-04-17 VITALS
WEIGHT: 174.8 LBS | BODY MASS INDEX: 29.84 KG/M2 | DIASTOLIC BLOOD PRESSURE: 78 MMHG | HEIGHT: 64 IN | HEART RATE: 84 BPM | SYSTOLIC BLOOD PRESSURE: 122 MMHG

## 2023-04-17 DIAGNOSIS — M15.0 PRIMARY OSTEOARTHRITIS INVOLVING MULTIPLE JOINTS: Primary | ICD-10-CM

## 2023-04-17 DIAGNOSIS — M19.071 ARTHRITIS OF FIRST METATARSOPHALANGEAL (MTP) JOINT OF RIGHT FOOT: ICD-10-CM

## 2023-04-17 PROCEDURE — 99214 OFFICE O/P EST MOD 30 MIN: CPT | Mod: 25 | Performed by: INTERNAL MEDICINE

## 2023-04-17 PROCEDURE — 20605 DRAIN/INJ JOINT/BURSA W/O US: CPT | Mod: RT | Performed by: INTERNAL MEDICINE

## 2023-04-17 RX ORDER — TRIAMCINOLONE ACETONIDE 40 MG/ML
20 INJECTION, SUSPENSION INTRA-ARTICULAR; INTRAMUSCULAR ONCE
Status: COMPLETED | OUTPATIENT
Start: 2023-04-17 | End: 2023-04-17

## 2023-04-17 RX ADMIN — TRIAMCINOLONE ACETONIDE 20 MG: 40 INJECTION, SUSPENSION INTRA-ARTICULAR; INTRAMUSCULAR at 14:03

## 2023-04-17 NOTE — PROGRESS NOTES
Rheumatology follow-up visit note     Adelina is a 77 year old female presents today for follow-up.    Adelina was seen today for recheck.    Diagnoses and all orders for this visit:    Primary osteoarthritis involving multiple joints  -     triamcinolone (KENALOG-40) injection 20 mg  -     [place] ARTHROCENTESIS ASPIR&/INJ INTERM JT/BURS W/US    Arthritis of first metatarsophalangeal (MTP) joint of right foot  -     triamcinolone (KENALOG-40) injection 20 mg  -     WV ARTHROCENTESIS ASPIR&/INJ INTERM JT/BURS W/US  -     Orthopedic  Referral; Future            She has worsening pain in her foot and right first MTP, we discussed options she would like to see how podiatry might help her surgically, meanwhile she would like to go with a local injection.  After pros and cons were discussed including risk of infection, bleeding, skin changes including thinning, pigmentary alteration and scarring to name a few, 1st right MTP injected as noted in the orders section. This was done with nontouch technique.  The patient tolerated the procedure well and had a brisk Marcaine effect.  The postinjection care was discussed.         HPI    Adelina Espinoza is a 77 year old female is here for follow-up of worsening pain.  This is in the right foot.  This is centered on the right first MTP.  She is known to have widespread osteoarthritis, most symptomatic of the right first MTP.  This is worse with activity interfering with day-to-day activities and interfering with some of the shoewear.  This is reminiscent of what she has experienced in the past and had good response to corticosteroid injections.  She has taken acetaminophen with minimal benefit.  Local diclofenac gel has not helped.  Ibuprofen has helped.        DETAILED EXAMINATION  04/17/23  :    Vitals:    04/17/23 1216   BP: 122/78   BP Location: Left arm   Patient Position: Sitting   Cuff Size: Adult Regular   Pulse: 84   Weight: 79.3 kg (174 lb 12.8 oz)   Height:  "1.626 m (5' 4\")     Alert oriented. Head including the face is examined for malar rash, heliotropes, scarring, lupus pernio. Eyes examined for redness such as in episcleritis/scleritis, periorbital lesions.   Neck/ Face examined for parotid gland swelling, range of motion of neck.  Left upper and lower and right upper and lower extremities examined for tenderness, swelling, warmth of the appendicular joints, range of motion, edema, rash.  Some of the important findings included: She has tenderness in the right first MCP joint line.  She does not have dactylitis of the toes.  Patient Active Problem List    Diagnosis Date Noted     Hypomagnesemia 08/03/2021     Priority: Medium     Lumbar stenosis with neurogenic claudication 01/29/2021     Priority: Medium     Spinal stenosis of lumbar region with neurogenic claudication 12/23/2020     Priority: Medium     Added automatically from request for surgery [id number]         Synovial cyst of lumbar facet joint 12/23/2020     Priority: Medium     Added automatically from request for surgery [id number]         Lumbar disc herniation 12/23/2020     Priority: Medium     Added automatically from request for surgery [id number]         Arthritis of first metatarsophalangeal (MTP) joint of right foot 03/05/2019     Priority: Medium     Esophageal reflux 02/14/2017     Priority: Medium     Osteoporosis, senile 09/13/2016     Priority: Medium     Peripheral Neuropathy      Priority: Medium     Created by Conversion  [facility] Annotation: Jun 21 2009  9:51PM - Toya Charles: AFTER   CHEMO  Replacement Utility updated for latest IMO load         Major Depression, Recurrent      Priority: Medium     Created by [facility] Annotation: Jun 6 2013 11:43AM - Marcia Cisse: 1995, 2012  Replacement Utility updated for latest IMO load         Anxiety      Priority: Medium     Created by Conversion  Replacement Utility updated for latest IMO load         Primary osteoarthritis of knee, " left 04/21/2016     Priority: Medium     Replacement Utility updated for latest IMO load         Primary osteoarthritis of right knee 10/29/2015     Priority: Medium     Urticaria      Priority: Medium     Created by Conversion         Irritable Bowel Syndrome      Priority: Medium     Created by Conversion         Past Surgical History:   Procedure Laterality Date     APPENDECTOMY  1967     BACK SURGERY  2000    cervical fusion     BIOPSY BREAST Right 2003     BIOPSY BREAST Right 07/09/2021    dense fibrotic tissue, Dr Burleson     CATARACT EXTRACTION, BILATERAL       CHOLECYSTECTOMY  1994     HYSTERECTOMY  1997    desmoid cyst     IR CVC TUNNEL PLACEMENT > 5 YRS OF AGE  01/21/2004     IR MISCELLANEOUS PROCEDURE  07/16/2004     LASER SURGERY OF EYE Bilateral 09/2022    cataracts     LUMPECTOMY BREAST  12/2003    breast cancer     NISSEN FUNDOPLICATION  2010     OOPHORECTOMY  1997     [facility] THORAX SPINE FUSN,POST TECH N/A 01/29/2021    Procedure: BILATERAL LUMBAR 4-5 LAMINECTOMIES FOR RIGHT SYNOVIAL CYST RESECTION, LUMBAR 4-5, LUMBAR 4-5 POSTERIOR INSTRUMENTED FUSION AND ARTHRODESIS, USE OF ALLOGRAFT, USE OF AUTOGRAFT, STEALTH NAVIGATION;  Surgeon: Lisa Thomas MD;  Location: [facility] Main OR;  Service: Spine     [facility] TOTAL KNEE ARTHROPLASTY Right 10/29/2015    Procedure: RIGHT TOTAL KNEE  ARTHROPLASTY;  Surgeon: Nitin White MD;  Location: [facility] Main OR;  Service: Orthopedics     [facility] TOTAL KNEE ARTHROPLASTY Left 04/21/2016    Procedure: LEFT TOTAL KNEE ARTHROPLASTY;  Surgeon: Nitin White MD;  Location: [facility] OR;  Service: Orthopedics      Past Medical History:   Diagnosis Date     Anxiety      Arthritis      Breast cancer (H) 2003     Depression      Esophageal reflux      H/O colonoscopy 03/2022    hyperplastic polyp noted, no further colonoscopy recommended per [facility]     History of anesthesia complications     nausea     Hx antineoplastic chemotherapy 2003    for breast cancer      Hx of radiation therapy 2004    for breast cancer     IBS (irritable bowel syndrome)      Lactose intolerance     lactose breath test positive at GI, 7/2019     Macular degeneration 08/2019    caught early, takes Areds 2 on a daily basis, seeing opthamology     Neuropathy     secondary to chemotherapy     Osteopenia      PONV (postoperative nausea and vomiting)      Allergies   Allergen Reactions     Adhesive Tape      Other reaction(s): Rash     Chocolate Flavor Diarrhea     Gabapentin Unknown     Has dizziness, spaced out when on it. Has been on it twice and both times had this type of reaction. Should not be on it in the future.      Green Pepper [Capsicum] Unknown     Stomach upset     Lactose Diarrhea     Monosodium Glutamate Unknown     diarrhea     Current Outpatient Medications   Medication Sig Dispense Refill     acetaminophen (TYLENOL) 500 MG tablet [ACETAMINOPHEN (TYLENOL) 500 MG TABLET] Take 1,000 mg by mouth every 6 (six) hours as needed for pain.       calcium citrate-vitamin D (CITRACAL+D) 315-200 mg-unit per tablet [CALCIUM CITRATE-VITAMIN D (CITRACAL+D) 315-200 MG-UNIT PER TABLET] Take 1 tablet by mouth 2 (two) times a day.       calcium polycarbophiL (FIBERCON) 625 mg tablet [CALCIUM POLYCARBOPHIL (FIBERCON) 625 MG TABLET] Take 625 mg by mouth 2 (two) times a day.       citalopram (CELEXA) 20 MG tablet Take 1 tablet (20 mg) by mouth daily 90 tablet 0     dicyclomine (BENTYL) 10 MG capsule Take 1 capsule by mouth as needed       furosemide (LASIX) 20 MG tablet TAKE 1 TABLET DAILY 90 tablet 3     lactase (LACTAID) 3000 UNIT tablet        Lactobacillus Rhamnosus, GG, ( PROBIOTIC DIGESTIVE CARE) CAPS Take 1 capsule by mouth every 24 hours       magnesium 250 mg Tab [MAGNESIUM 250 MG TAB] Take 250 mg by mouth daily.        multivit-min-iron-FA-lutein (CENTRUM SILVER WOMEN) 8 mg iron-400 mcg-300 mcg Tab [MULTIVIT-MIN-IRON-FA-LUTEIN (CENTRUM SILVER WOMEN) 8 MG IRON-400 MCG-300 MCG TAB] Take 1 tablet by  mouth daily.        omeprazole (PRILOSEC) 20 MG DR capsule Take 1 capsule by mouth 2 times daily       tetrahydroz-peg 400-hyprom-gly (VISINE MAX REDNESS RELIEF) 0.05-1-0.36-0.2 % Drop [TETRAHYDROZ--HYPROM-GLY (VISINE MAX REDNESS RELIEF) 0.05-1-0.36-0.2 % DROP] Administer 1 drop to both eyes 3 (three) times a day as needed.       vit C/E/Zn/coppr/lutein/zeaxan (PRESERVISION AREDS-2 ORAL) [VIT C/E/ZN/COPPR/LUTEIN/ZEAXAN (PRESERVISION AREDS-2 ORAL)] Take 1 tablet by mouth 2 (two) times a day.        denosumab (PROLIA) 60 MG/ML SOSY injection Inject 60 mg Subcutaneous       family history includes Alzheimer Disease in her father; Aortic stenosis in her father; Atrial fibrillation in her father; Breast Cancer (age of onset: 46.00) in her maternal aunt; Cerebrovascular Disease in her father; Depression in her father; Heart Disease in her mother; Hypertension in her mother; Kidney Cancer (age of onset: 67.00) in her mother; Lung Cancer in her paternal grandfather; Skin Cancer in her father.  Social Connections: Not on file          WBC Count   Date Value Ref Range Status   06/27/2022 7.8 4.0 - 11.0 10e3/uL Final     RBC Count   Date Value Ref Range Status   06/27/2022 4.59 3.80 - 5.20 10e6/uL Final     Hemoglobin   Date Value Ref Range Status   06/27/2022 14.0 11.7 - 15.7 g/dL Final     Hematocrit   Date Value Ref Range Status   06/27/2022 43.3 35.0 - 47.0 % Final     MCV   Date Value Ref Range Status   06/27/2022 94 78 - 100 fL Final     MCH   Date Value Ref Range Status   06/27/2022 30.5 26.5 - 33.0 pg Final     Platelet Count   Date Value Ref Range Status   06/27/2022 274 150 - 450 10e3/uL Final     % Lymphocytes   Date Value Ref Range Status   05/12/2022 31 % Final     AST   Date Value Ref Range Status   11/21/2022 29 10 - 35 U/L Final     ALT   Date Value Ref Range Status   11/21/2022 21 10 - 35 U/L Final     Albumin   Date Value Ref Range Status   11/21/2022 4.6 3.5 - 5.2 g/dL Final   06/27/2022 3.9 3.5 - 5.0  g/dL Final     Alkaline Phosphatase   Date Value Ref Range Status   11/21/2022 67 35 - 104 U/L Final     Creatinine   Date Value Ref Range Status   11/21/2022 0.63 0.51 - 0.95 mg/dL Final     GFR Estimate   Date Value Ref Range Status   11/21/2022 >90 >60 mL/min/1.73m2 Final     Comment:     Effective December 21, 2021 eGFRcr in adults is calculated using the 2021 CKD-EPI creatinine equation which includes age and gender (Philip et al., [place], DOI: 10.1056/NBADzx2805921)   01/31/2021 >60 >60 mL/min/1.73m2 Final     GFR Estimate If Black   Date Value Ref Range Status   01/31/2021 >60 >60 mL/min/1.73m2 Final     Erythrocyte Sedimentation Rate   Date Value Ref Range Status   05/12/2022 14 0 - 20 mm/hr Final     C-Reactive Protein High Sensitivity   Date Value Ref Range Status   05/12/2022 2.8 0.0 - 3.0 mg/L Final     Comment:     Low risk < 1.0 mg/L  Average risk 1.0 to 3.0 mg/L  High risk > 3.0 mg/L  Acute inflamation > 10.0 mg/L

## 2023-04-26 ENCOUNTER — OFFICE VISIT (OUTPATIENT)
Dept: PODIATRY | Facility: CLINIC | Age: 78
End: 2023-04-26
Payer: MEDICARE

## 2023-04-26 ENCOUNTER — ANCILLARY PROCEDURE (OUTPATIENT)
Dept: GENERAL RADIOLOGY | Facility: CLINIC | Age: 78
End: 2023-04-26
Attending: PODIATRIST
Payer: MEDICARE

## 2023-04-26 VITALS — OXYGEN SATURATION: 96 % | HEART RATE: 98 BPM | TEMPERATURE: 97.5 F | RESPIRATION RATE: 12 BRPM

## 2023-04-26 DIAGNOSIS — M20.5X1 HALLUX LIMITUS, RIGHT: Primary | ICD-10-CM

## 2023-04-26 DIAGNOSIS — M20.5X1 HALLUX LIMITUS, RIGHT: ICD-10-CM

## 2023-04-26 PROCEDURE — 73630 X-RAY EXAM OF FOOT: CPT | Mod: RT | Performed by: PODIATRIST

## 2023-04-26 PROCEDURE — 99204 OFFICE O/P NEW MOD 45 MIN: CPT | Performed by: PODIATRIST

## 2023-04-26 RX ORDER — POLYETHYLENE GLYCOL 3350 17 G/17G
1 POWDER, FOR SOLUTION ORAL DAILY
COMMUNITY

## 2023-04-26 ASSESSMENT — PAIN SCALES - GENERAL: PAINLEVEL: EXTREME PAIN (8)

## 2023-04-26 NOTE — LETTER
4/26/2023         RE: Adelina Espinoza  [address]  [facility] [address]        Dear Colleague,    Thank you for referring your patient, Adelina Espinoza, to the [facility]. Please see a copy of my visit note below.    FOOT AND ANKLE SURGERY/PODIATRY CONSULT NOTE         ASSESSMENT:   Hallux limitus right foot      TREATMENT:  An x-ray of the right foot was taken today.  The x-rays were read and interpreted by this physician.  I informed the patient she has significant degenerative changes of the first metatarsal phalangeal joint.  I have recommended a first metatarsal phalangeal joint implant arthroplasty of the right foot.  The patient was told that this procedure is performed on an outpatient basis under local anesthesia with IV sedation.  She was told the procedure takes approximately 30 minutes to perform.  She would then be discharged weightbearing in a postoperative shoe for 2 weeks.  The patient was asked to go n.p.o. at midnight prior to the procedure and she was asked to see her primary care physician for preoperative consultation.  All pertinent questions were invited and answered.        HPI: I was asked to see Adelina Espinoza today to evaluate and treat a painful first metatarsophalangeal joint right foot.  The patient indicated that she has had a problem with this joint for several years.  She has received several cortisone injections which give her some relief for several months.  She was getting these cortisone injections by her rheumatologist.  During her last visit it was suggested she see podiatry for possible surgical intervention.  The patient stated that the joint is quite painful with weightbearing and ambulation.  It is an aching type pain which is only partially relieved with nonweightbearing.  She has not had any trauma to her foot.  She has not had any associated redness or swelling.  The patient was seen in consultation at the request of   MD Saida for evaluation and treatment of painful big toe joint right foot.     Past Medical History:   Diagnosis Date     Anxiety      Arthritis      Breast cancer (H) 2003     Depression      Esophageal reflux      H/O colonoscopy 03/2022    hyperplastic polyp noted, no further colonoscopy recommended per [facility]     History of anesthesia complications     nausea     Hx antineoplastic chemotherapy 2003    for breast cancer     Hx of radiation therapy 2004    for breast cancer     IBS (irritable bowel syndrome)      Lactose intolerance     lactose breath test positive at GI, 7/2019     Macular degeneration 08/2019    caught early, takes Areds 2 on a daily basis, seeing opthamology     Neuropathy     secondary to chemotherapy     Osteopenia      PONV (postoperative nausea and vomiting)        Social History     Socioeconomic History     Marital status:      Spouse name: Not on file     Number of children: 2     Years of education: Not on file     Highest education level: Not on file   Occupational History     Not on file   Tobacco Use     Smoking status: Never     Smokeless tobacco: Never   Vaping Use     Vaping status: Never Used   Substance and Sexual Activity     Alcohol use: Yes     Alcohol/week: 8.0 standard drinks of alcohol     Comment: 3-4 per week     Drug use: No     Sexual activity: Not Currently     Partners: Male     Birth control/protection: Post-menopausal   Other Topics Concern     Not on file   Social History Narrative     Not on file     Social Determinants of Health     Financial Resource Strain: Not on file   Food Insecurity: Not on file   Transportation Needs: Not on file   Physical Activity: Not on file   Stress: Not on file   Social Connections: Not on file   Intimate Partner Violence: Not on file   Housing Stability: Not on file          Allergies   Allergen Reactions     Adhesive Tape      Other reaction(s): Rash     Chocolate Flavor Diarrhea     Gabapentin Unknown     Has dizziness,  spaced out when on it. Has been on it twice and both times had this type of reaction. Should not be on it in the future.      Green Pepper [Capsicum] Unknown     Stomach upset     Lactose Diarrhea     Monosodium Glutamate Unknown     diarrhea          Current Outpatient Medications:      acetaminophen (TYLENOL) 500 MG tablet, [ACETAMINOPHEN (TYLENOL) 500 MG TABLET] Take 1,000 mg by mouth every 6 (six) hours as needed for pain., Disp: , Rfl:      calcium citrate-vitamin D (CITRACAL+D) 315-200 mg-unit per tablet, [CALCIUM CITRATE-VITAMIN D (CITRACAL+D) 315-200 MG-UNIT PER TABLET] Take 1 tablet by mouth 2 (two) times a day., Disp: , Rfl:      calcium polycarbophiL (FIBERCON) 625 mg tablet, [CALCIUM POLYCARBOPHIL (FIBERCON) 625 MG TABLET] Take 625 mg by mouth 2 (two) times a day., Disp: , Rfl:      citalopram (CELEXA) 20 MG tablet, Take 1 tablet (20 mg) by mouth daily, Disp: 90 tablet, Rfl: 0     denosumab (PROLIA) 60 MG/ML SOSY injection, Inject 60 mg Subcutaneous, Disp: , Rfl:      dicyclomine (BENTYL) 10 MG capsule, Take 1 capsule by mouth as needed, Disp: , Rfl:      furosemide (LASIX) 20 MG tablet, TAKE 1 TABLET DAILY, Disp: 90 tablet, Rfl: 3     lactase (LACTAID) 3000 UNIT tablet, , Disp: , Rfl:      Lactobacillus Rhamnosus, GG, ( PROBIOTIC DIGESTIVE CARE) CAPS, Take 1 capsule by mouth every 24 hours, Disp: , Rfl:      magnesium 250 mg Tab, [MAGNESIUM 250 MG TAB] Take 250 mg by mouth daily. , Disp: , Rfl:      multivit-min-iron-FA-lutein (CENTRUM SILVER WOMEN) 8 mg iron-400 mcg-300 mcg Tab, [MULTIVIT-MIN-IRON-FA-LUTEIN (CENTRUM SILVER WOMEN) 8 MG IRON-400 MCG-300 MCG TAB] Take 1 tablet by mouth daily. , Disp: , Rfl:      omeprazole (PRILOSEC) 20 MG DR capsule, Take 1 capsule by mouth 2 times daily, Disp: , Rfl:      tetrahydroz-peg 400-hyprom-gly (VISINE MAX REDNESS RELIEF) 0.05-1-0.36-0.2 % Drop, [TETRAHYDROZ--HYPROM-GLY (VISINE MAX REDNESS RELIEF) 0.05-1-0.36-0.2 % DROP] Administer 1 drop to both eyes 3  (three) times a day as needed., Disp: , Rfl:      vit C/E/Zn/coppr/lutein/zeaxan (PRESERVISION AREDS-2 ORAL), [VIT C/E/ZN/COPPR/LUTEIN/ZEAXAN (PRESERVISION AREDS-2 ORAL)] Take 1 tablet by mouth 2 (two) times a day. , Disp: , Rfl:      Family History   Problem Relation Age of Onset     Breast Cancer Maternal Aunt 46.00     Kidney Cancer Mother 67.00     Hypertension Mother      Heart Disease Mother      Alzheimer Disease Father      Atrial fibrillation Father      Aortic stenosis Father      Skin Cancer Father      Cerebrovascular Disease Father      Depression Father      Lung Cancer Paternal Grandfather         Social History     Socioeconomic History     Marital status:      Spouse name: Not on file     Number of children: 2     Years of education: Not on file     Highest education level: Not on file   Occupational History     Not on file   Tobacco Use     Smoking status: Never     Smokeless tobacco: Never   Vaping Use     Vaping status: Never Used   Substance and Sexual Activity     Alcohol use: Yes     Alcohol/week: 8.0 standard drinks of alcohol     Comment: 3-4 per week     Drug use: No     Sexual activity: Not Currently     Partners: Male     Birth control/protection: Post-menopausal   Other Topics Concern     Not on file   Social History Narrative     Not on file     Social Determinants of Health     Financial Resource Strain: Not on file   Food Insecurity: Not on file   Transportation Needs: Not on file   Physical Activity: Not on file   Stress: Not on file   Social Connections: Not on file   Intimate Partner Violence: Not on file   Housing Stability: Not on file        Review of Systems - Patient denies fever, chills, rash, wound, stiffness, limping, numbness, weakness, heart burn, blood in stool, chest pain with activity, calf pain when walking, shortness of breath with activity, chronic cough, easy bleeding/bruising, swelling of ankles, excessive thirst, fatigue, depression, anxiety.  Patient  admits to painful first metatarsal phalangeal joint right foot.      OBJECTIVE:  Appearance: alert, well appearing, and in no distress.    There were no vitals taken for this visit.     There is no height or weight on file to calculate BMI.     General appearance: Patient is alert and fully cooperative with history & exam.  No sign of distress is noted during the visit.  Psychiatric: Affect is pleasant & appropriate.  Patient appears motivated to improve health.  Respiratory: Breathing is regular & unlabored while sitting.  HEENT: Hearing is intact to spoken word.  Speech is clear.  No gross evidence of visual impairment that would impact ambulation.    Vascular: Dorsalis pedis and posterior tibial pulses are palpable. There is no pedal hair growth bilaterally.  CFT < 3 sec from anterior tibial surface to distal digits bilaterally. There is no appreciable edema noted.  Dermatologic: Turgor and texture are within normal limits. No coloration or temperature changes. No primary or secondary lesions noted.  Neurologic: All epicritic and proprioceptive sensations are grossly intact bilaterally.  Musculoskeletal: All active and passive ankle, subtalar, midtarsal, and 1st MPJ range of motion are grossly intact without pain or crepitus, with the exception of none. Manual muscle strength is within normal limits bilaterally. All dorsiflexors, plantarflexors, invertors, evertors are intact bilaterally. Tenderness present to the first MPJ right foot on palpation. Tenderness to the first MPJ right foot with range of motion. Calf is soft/non-tender without warmth/induration    Imaging:       No images are attached to the encounter or orders placed in the encounter.     No results found.   No results found.           Manuel Moore DPM  [facility] Foot & Ankle Surgery/Podiatry         Again, thank you for allowing me to participate in the care of your patient.        Sincerely,        Manuel Sparks DPM

## 2023-04-26 NOTE — PATIENT INSTRUCTIONS
Adelina,      Your surgery with [facility] has been scheduled. Please read thoroughly and follow instructions.     Procedure:   Arthoplasty right foot    Procedure Date :     *A surgery nurse will call you 1-2 days before surgery to inform you of the time of arrival for surgery.    Surgeon:   MD Manuel Sparks    Admission Type:   Outpatient    Procedure Location:   [facility]:  [address] [place] [address] [address] [place] [address] (Fax: [phone])    Home Rapid COVID Test: To be done 1-2 days before surgery if done at [facility]. See below.     Post Operative Appointment:       Preparation Instructions to complete:    []  You will need a Pre-op Physical within 30 days before surgery with your primary care provider. This exam is required by the anesthesiologist to ensure a safe surgical experience.    Failure  to obtain your pre-op physical will lead to cancellation of your surgery  Call them right away to schedule this. Please ensure your Preoperative Physical is faxed to the Hospital (numbers listed above)    [] Preoperative Medication Instructions  We would like you to stop your anticoagulation medications 3-5 days before surgery HOWEVER contact your prescribing provider for instructions before discontinuing any medications. (Examples: Coumadin, Plavix, Xarelto, Eliquis, Pradaxa, Effient, Brilinta) If you are on Coumadin, we would like the goal INR ? 1.2.  IT IS OK TO STAY ON ASPIRIN PRIOR TO SURGERY.   Hold Ibuprofen, Herbal Supplements and Vitamin E 7 days before  Stop Cialis, Levitra and Viagra 2-3 days before surgery    [] Fasting Requirements  Nothing to eat for 8 hours before surgery unless instructed differently by the surgery nurse.  You may have clear liquids up to two hours before your arrival time (coffee, tea, water, or Gatorade. No cream or milk)  If your primary care provider has instructed you to continue oral medications, you may take them  on the morning of surgery with a small sip of water.    No alcohol or smoking after 12:00am the day of surgery    [] Home Rapid COVID-19 test is required only at [facility]  You will need a home rapid test done 1-2 days before surgery. Please take a photo of result on your phone and show to staff.   If you are experiencing COVID symptoms or have tested positive for COVID-19 within 14 days of procedure date, reach out to the care team to reschedule please.   If your surgery is located at the hospital, you will not need a COVID test.     [] Contact your insurance regarding coverage  If you would like a Good Erika Estimate for your upcoming procedure at [facility] Location, contact Cost of Care Estimates   Advocates are available Monday through Friday 8am - 5pm   [phone]  You may also submit a request online through [employer]    For all self-pay, estimate, or anesthesia billing questions at [facility], the contact information is below:    Who to contact: Olamide ALMODOVAR  [facility] number: [phone]  Prepay number: [phone]    [] DO NOT BRING FMLA WITH TO SURGERY.  These should be sent to the provider's office by fax to [phone].    [] Day of Surgery  Medications - Take as indicated with sips of water.   Wear comfortable loose fitting clothes. Wear your glasses-Not contacts. Do not wear jewelry and remove body piercing's. Surgery may be cancelled if they are not removed.   You may have 1 family member wait in the lobby during your surgery. Visitor restrictions are subject to change. Please verify with the surgery nurse when they call.   If same day surgery-Have a someone come with you to surgery that can help you understand the surgeon's instructions, drive you home and stay with you overnight the first night.    [] If the community sees a new COVID-19 surge, your procedure may need to be postponed. We will contact you if this happens.    [] You will  receive a call from a surgery nurse 1-3 days prior to surgery. They will go over more details with you.       Frequently Asked Questions    WHAT DO I DO WHEN I COME HOME FROM SURGERY?    After surgery and/ or your hospital stay you may be tired and drowsy. Rest, but make sure to get up and walk around every couple of hours to circulate your blood. If you are non-weight bearing do not put any weight on your foot.  Be sure to take your medications as directed. Try to get a restful sleep and begin more normal activities as tolerated.    HOW MUCH PAIN SHOULD I EXPECT AND WILL I HAVE PAIN MEDICATION?    Everyone tolerates pain differently. Still, each type of surgery involves a certain level and type of pain. Generally most people feel better within a few days but keep in mind that everyone has a different tolerance to pain. All medications should be taken as directed. All medications can have side effects such as bleeding, upset stomach, headaches, dizziness, constipation, etc. If you have any major problems or allergic reaction, stop the medication and call the office. If you only have a little pain, you may simply take Tylenol or Ibuprofen as directed on the label.     WHAT ABOUT MY DRESSING AND WHEN DO I COME BACK TO THE OFFICE?    The outer dressing stays in place for 7 days and when you come in for your first post-op we will remove it.  Some patients may have staples, zipper or sutures; these stay in for 10-14 days and will be removed at your 2nd post-op visit. After 24 hours you may shower using shower precautions.    WHAT ABOUT SWELLING, REDNESS, BRUISING OR DRAINAGE?    It is normal to have some swelling, and bruising after surgery. It gradually subsides but may be present for several weeks. Elevation and icing will help to reduce the swelling more rapidly.  If your bandage is really tight after 24 hours you may cut the bandage an inch by the toes or under your foot and by your ankle.  Ice therapy often works  better than oral pain medication. Using cold therapy is recommended every hour for 20 minutes.    WHEN CAN I TAKE A BATH?    You can take a bath as long as the wound has no drainage and is fully healed without a scab. This generally takes about 2 weeks.  Unless you get the bandaged protector from above then you can take a shower when you feel up to it.    WHEN CAN I RETURN TO WORK?    You may return to work to an office-type job whenever you feel comfortable enough. The only restriction would be your own motivation and pain tolerance. If your job requires vigorous activities you may be off 1-4 weeks which is determined by what surgery you have and your operating physician.     WHAT ABOUT DRIVING OR FLYING?    As a general rule, you may resume driving as soon as you are comfortable and fully alert and off narcotic pain medications. If you are traveling by plane within 4 weeks of surgery, perform range of motion exercises of the legs and feet during the flight. Get up and walk around frequently to prevent blood clots.      WHEN CAN I EXERSICE?    You may return to normal activities such as exercising when your surgeon tells you usually 2 weeks. Walking, sitting, standing and going up the stairs are all fine after the 2-week anthony. You may have restrictions for 1-4 weeks of no lifting, pushing, pulling in excess of 20 lbs. This is determined by what surgery you have and your surgeon.    WILL I BECOME ADDICTED TO MY PAIN MEDICATION?    Pain medications are safe and effective when used as directed. However, misuse of these products can be extremely harmful and even deadly. Pain medications must be taken only as directed by your surgeon. Do not change the dose of your pain medication without talking to your operating physician first.    POSTOPERATIVE INFORMATION: MANAGING PAIN  Measuring Your Pain    A pain scale helps you rate pain intensity. In the scale, 0 means no pain, and 10 is the worst pain possible.  You should  rate your pain every 4-6 hours. You may feel some pain even with medications. It is important to tell your health care provider if medications don't reduce the pain.        Managing Post-Op Pain at Home: Medications    Pain after an operation (post-op pain) is common and expected. These guidelines can help you stay as comfortable as possible.    Taking Pain Medications    Take any prescribed pain medications as directed by your doctor.  Take pain medications with some food to avoid an upset stomach.  Be aware that narcotic pain medications cause constipation. We recommend taking Milk of Magnesia   Eating high-fiber foods and drink plenty of water.  Don t drink alcohol while using pain medications.  Possible side effects include stomach upset, nausea, and itching.    Alternating Ibuprofen with your pain medication    Ibuprofen has three actions: it reduces fever, relieves pain and fights inflammation. Alternate between your prescribed pain medication and Ibuprofen every three hours (i.e., take a dose of Ibuprofen then three hours later take your prescribed pain medication then three hours later Ibuprofen, ect.) These two medications are safe to use together like this.    POSTOPERATIVE INFORMATION: CONSTIPATION    Constipation after surgery is a common problem and is often related to taking post-operative narcotic pain medication. Signs that you may be constipated include bloating, abdominal distention and/or pain.    Constipation Prevention & Treatment    Drink plenty of fluids! This is the most important thing you can do to help prevent constipation.  Increase physical activity as recommended by your surgeon.  Consume a high fiber diet. We recommend introducing high fiber foods pre-operatively. Some foods high in fiber include:    Oatmeal Bran  Flaxseed Dried Fruit    Carrots   Bananas Strawberries Oranges Whole Grain Bread     Bananas Prunes  Pears  Raspberries     Over the counter medication/supplements - in order  of recommended treatment:   (Be sure to follow instructions on product packaging)    Fiber supplement   Bulk forming laxative - Can be used to prevent constipation  Great food sources of fiber include but are not limited to:  Colace (docusate sodium)  Osmotic stool softener - Can be used 2-3 times per day to prevent constipation  Milk of Magnesia  MIRALAX  Enema/Suppository    Patient can also  some probiotics such as Culturelle to help prevent Antibiotic associated diarrhea. They can take this 1 hour before or 2 hours after taking their antibiotic should not be taken at the same time as they can cancel out the effects.                          ** AFTER SURGERY INSTRUCTIONS **                          [] You are to remain LIMITED WEIGHT BEARING on your right foot LIMITED WEIGHT BEARING MEANS THAT IT IS ONLY OKAY FOR YOU TO APPLY LIGHT PRESSURE ON THE AFFECTED FOOT WHEN TRANSFERRING FROM YOUR ASSISTIVE DEVICE TO A CHAIR OR BED.    [] Prior to surgery you will be given a POST OP SHOE which you will need to Wear this anytime you are up and out of bed, it is okay to remove when you are sleeping. Please bring this with you on the day of surgery.      [] During surgery   will apply a gauze dressing to your foot. This will remain intact until you are seen in clinic for follow up    [] It is NOT OKAY to get your surgical site wet while bathing, you will need to purchase a cast cover to protect your foot from getting wet. You can purchase this at [facility] or your local pharmacy.    [] It is important that you elevate your affected foot after surgery. Proper elevation is raising your foot above your waist. The fluid in your lower extremities needs to get back to your heart so it can get pumped to your kidneys and expelled through urination. So if you notice you have to go to the bathroom more frequently when you are elevating your leg this is a good sign that it is working.     [] It is important  that you increase your protein intake after surgery. Protein is essential for wound healing. We recommend you take a protein supplement twice per day. This is in addition to your normal diet. Examples of protein supplements are Ensure, Boost, Glucerna (if you are diabetic), or protein powder. You can purchase these at your local retailer or grocery store.       Notify our office right away, if you have any changes in your health status, or if you develop a cold, flu, diarrhea, infection, fever or sore throat before your scheduled surgery date. We can be reached at [phone]   Monday-Friday 8 am - 4:30 pm if you have any questions.     Thank you for trusting us with your care!      For informational purposes only. Not to replace the advice of your health care provider. Copyright   2020 [facility]. All rights reserved. Clinically reviewed by Infection Prevention and the [facility] COVID-19 Clinical Team. [employer] 579924 - Rev 09/09/21.

## 2023-04-26 NOTE — PROGRESS NOTES
FOOT AND ANKLE SURGERY/PODIATRY CONSULT NOTE         ASSESSMENT:   Hallux limitus right foot      TREATMENT:  An x-ray of the right foot was taken today.  The x-rays were read and interpreted by this physician.  I informed the patient she has significant degenerative changes of the first metatarsal phalangeal joint.  I have recommended a first metatarsal phalangeal joint implant arthroplasty of the right foot.  The patient was told that this procedure is performed on an outpatient basis under local anesthesia with IV sedation.  She was told the procedure takes approximately 30 minutes to perform.  She would then be discharged weightbearing in a postoperative shoe for 2 weeks.  The patient was asked to go n.p.o. at midnight prior to the procedure and she was asked to see her primary care physician for preoperative consultation.  All pertinent questions were invited and answered.        HPI: I was asked to see Adelina Espinoza today to evaluate and treat a painful first metatarsophalangeal joint right foot.  The patient indicated that she has had a problem with this joint for several years.  She has received several cortisone injections which give her some relief for several months.  She was getting these cortisone injections by her rheumatologist.  During her last visit it was suggested she see podiatry for possible surgical intervention.  The patient stated that the joint is quite painful with weightbearing and ambulation.  It is an aching type pain which is only partially relieved with nonweightbearing.  She has not had any trauma to her foot.  She has not had any associated redness or swelling.  The patient was seen in consultation at the request of  MD Saida for evaluation and treatment of painful big toe joint right foot.     Past Medical History:   Diagnosis Date     Anxiety      Arthritis      Breast cancer (H) 2003     Depression      Esophageal reflux      H/O colonoscopy 03/2022    hyperplastic polyp  noted, no further colonoscopy recommended per [facility]     History of anesthesia complications     nausea     Hx antineoplastic chemotherapy 2003    for breast cancer     Hx of radiation therapy 2004    for breast cancer     IBS (irritable bowel syndrome)      Lactose intolerance     lactose breath test positive at GI, 7/2019     Macular degeneration 08/2019    caught early, takes Areds 2 on a daily basis, seeing opthamology     Neuropathy     secondary to chemotherapy     Osteopenia      PONV (postoperative nausea and vomiting)        Social History     Socioeconomic History     Marital status:      Spouse name: Not on file     Number of children: 2     Years of education: Not on file     Highest education level: Not on file   Occupational History     Not on file   Tobacco Use     Smoking status: Never     Smokeless tobacco: Never   Vaping Use     Vaping status: Never Used   Substance and Sexual Activity     Alcohol use: Yes     Alcohol/week: 8.0 standard drinks of alcohol     Comment: 3-4 per week     Drug use: No     Sexual activity: Not Currently     Partners: Male     Birth control/protection: Post-menopausal   Other Topics Concern     Not on file   Social History Narrative     Not on file     Social Determinants of Health     Financial Resource Strain: Not on file   Food Insecurity: Not on file   Transportation Needs: Not on file   Physical Activity: Not on file   Stress: Not on file   Social Connections: Not on file   Intimate Partner Violence: Not on file   Housing Stability: Not on file          Allergies   Allergen Reactions     Adhesive Tape      Other reaction(s): Rash     Chocolate Flavor Diarrhea     Gabapentin Unknown     Has dizziness, spaced out when on it. Has been on it twice and both times had this type of reaction. Should not be on it in the future.      Green Pepper [Capsicum] Unknown     Stomach upset     Lactose Diarrhea     Monosodium Glutamate Unknown     diarrhea          Current  Outpatient Medications:      acetaminophen (TYLENOL) 500 MG tablet, [ACETAMINOPHEN (TYLENOL) 500 MG TABLET] Take 1,000 mg by mouth every 6 (six) hours as needed for pain., Disp: , Rfl:      calcium citrate-vitamin D (CITRACAL+D) 315-200 mg-unit per tablet, [CALCIUM CITRATE-VITAMIN D (CITRACAL+D) 315-200 MG-UNIT PER TABLET] Take 1 tablet by mouth 2 (two) times a day., Disp: , Rfl:      calcium polycarbophiL (FIBERCON) 625 mg tablet, [CALCIUM POLYCARBOPHIL (FIBERCON) 625 MG TABLET] Take 625 mg by mouth 2 (two) times a day., Disp: , Rfl:      citalopram (CELEXA) 20 MG tablet, Take 1 tablet (20 mg) by mouth daily, Disp: 90 tablet, Rfl: 0     denosumab (PROLIA) 60 MG/ML SOSY injection, Inject 60 mg Subcutaneous, Disp: , Rfl:      dicyclomine (BENTYL) 10 MG capsule, Take 1 capsule by mouth as needed, Disp: , Rfl:      furosemide (LASIX) 20 MG tablet, TAKE 1 TABLET DAILY, Disp: 90 tablet, Rfl: 3     lactase (LACTAID) 3000 UNIT tablet, , Disp: , Rfl:      Lactobacillus Rhamnosus, GG, ( PROBIOTIC DIGESTIVE CARE) CAPS, Take 1 capsule by mouth every 24 hours, Disp: , Rfl:      magnesium 250 mg Tab, [MAGNESIUM 250 MG TAB] Take 250 mg by mouth daily. , Disp: , Rfl:      multivit-min-iron-FA-lutein (CENTRUM SILVER WOMEN) 8 mg iron-400 mcg-300 mcg Tab, [MULTIVIT-MIN-IRON-FA-LUTEIN (CENTRUM SILVER WOMEN) 8 MG IRON-400 MCG-300 MCG TAB] Take 1 tablet by mouth daily. , Disp: , Rfl:      omeprazole (PRILOSEC) 20 MG DR capsule, Take 1 capsule by mouth 2 times daily, Disp: , Rfl:      tetrahydroz-peg 400-hyprom-gly (VISINE MAX REDNESS RELIEF) 0.05-1-0.36-0.2 % Drop, [TETRAHYDROZ--HYPROM-GLY (VISINE MAX REDNESS RELIEF) 0.05-1-0.36-0.2 % DROP] Administer 1 drop to both eyes 3 (three) times a day as needed., Disp: , Rfl:      vit C/E/Zn/coppr/lutein/zeaxan (PRESERVISION AREDS-2 ORAL), [VIT C/E/ZN/COPPR/LUTEIN/ZEAXAN (PRESERVISION AREDS-2 ORAL)] Take 1 tablet by mouth 2 (two) times a day. , Disp: , Rfl:      Family History   Problem  Relation Age of Onset     Breast Cancer Maternal Aunt 46.00     Kidney Cancer Mother 67.00     Hypertension Mother      Heart Disease Mother      Alzheimer Disease Father      Atrial fibrillation Father      Aortic stenosis Father      Skin Cancer Father      Cerebrovascular Disease Father      Depression Father      Lung Cancer Paternal Grandfather         Social History     Socioeconomic History     Marital status:      Spouse name: Not on file     Number of children: 2     Years of education: Not on file     Highest education level: Not on file   Occupational History     Not on file   Tobacco Use     Smoking status: Never     Smokeless tobacco: Never   Vaping Use     Vaping status: Never Used   Substance and Sexual Activity     Alcohol use: Yes     Alcohol/week: 8.0 standard drinks of alcohol     Comment: 3-4 per week     Drug use: No     Sexual activity: Not Currently     Partners: Male     Birth control/protection: Post-menopausal   Other Topics Concern     Not on file   Social History Narrative     Not on file     Social Determinants of Health     Financial Resource Strain: Not on file   Food Insecurity: Not on file   Transportation Needs: Not on file   Physical Activity: Not on file   Stress: Not on file   Social Connections: Not on file   Intimate Partner Violence: Not on file   Housing Stability: Not on file        Review of Systems - Patient denies fever, chills, rash, wound, stiffness, limping, numbness, weakness, heart burn, blood in stool, chest pain with activity, calf pain when walking, shortness of breath with activity, chronic cough, easy bleeding/bruising, swelling of ankles, excessive thirst, fatigue, depression, anxiety.  Patient admits to painful first metatarsal phalangeal joint right foot.      OBJECTIVE:  Appearance: alert, well appearing, and in no distress.    There were no vitals taken for this visit.     There is no height or weight on file to calculate BMI.     General appearance:  Patient is alert and fully cooperative with history & exam.  No sign of distress is noted during the visit.  Psychiatric: Affect is pleasant & appropriate.  Patient appears motivated to improve health.  Respiratory: Breathing is regular & unlabored while sitting.  HEENT: Hearing is intact to spoken word.  Speech is clear.  No gross evidence of visual impairment that would impact ambulation.    Vascular: Dorsalis pedis and posterior tibial pulses are palpable. There is no pedal hair growth bilaterally.  CFT < 3 sec from anterior tibial surface to distal digits bilaterally. There is no appreciable edema noted.  Dermatologic: Turgor and texture are within normal limits. No coloration or temperature changes. No primary or secondary lesions noted.  Neurologic: All epicritic and proprioceptive sensations are grossly intact bilaterally.  Musculoskeletal: All active and passive ankle, subtalar, midtarsal, and 1st MPJ range of motion are grossly intact without pain or crepitus, with the exception of none. Manual muscle strength is within normal limits bilaterally. All dorsiflexors, plantarflexors, invertors, evertors are intact bilaterally. Tenderness present to the first MPJ right foot on palpation. Tenderness to the first MPJ right foot with range of motion. Calf is soft/non-tender without warmth/induration    Imaging:       No images are attached to the encounter or orders placed in the encounter.     No results found.   No results found.           Manuel Moore DPM  [facility]

## 2023-04-28 ENCOUNTER — TELEPHONE (OUTPATIENT)
Dept: PODIATRY | Facility: CLINIC | Age: 78
End: 2023-04-28
Payer: MEDICARE

## 2023-04-28 NOTE — TELEPHONE ENCOUNTER
Pat is calling requesting to schedule surgery with Dr. Sparks, she would like to do this around June 16 or 19. She is also wondering if she can leave her nails painted for the surgery?

## 2023-04-28 NOTE — TELEPHONE ENCOUNTER
[facility] Call Center    Phone Message    May a detailed message be left on voicemail: yes     Reason for Call: Other: pat called she needs to get scheduled for surgery with Dr. Sparks. Please call to schedule     Action Taken: Other: [facility]    Travel Screening: Not Applicable

## 2023-05-02 PROBLEM — M20.5X1 HALLUX LIMITUS, RIGHT: Status: ACTIVE | Noted: 2023-05-02

## 2023-05-02 NOTE — TELEPHONE ENCOUNTER
Message left for patient:  Surgery scheduled with Dr. Sparks at [facility] on 06/16/2023.  First metatarsal phalangeal joint implant arthroplasty right foot. CPT Code 04405    Email sent to Richie.  Added to [place].    Patient was directed to schedule a pre-op physical and instructed on home COVID testing.    Post ops scheduled.  Surgery instructions sent via [employer].

## 2023-05-03 ASSESSMENT — ENCOUNTER SYMPTOMS
DYSURIA: 0
CHILLS: 0
SHORTNESS OF BREATH: 0
DIZZINESS: 0
FREQUENCY: 0
MYALGIAS: 0
HEARTBURN: 1
NERVOUS/ANXIOUS: 0
DIARRHEA: 1
SORE THROAT: 0
HEMATURIA: 0
PALPITATIONS: 0
ABDOMINAL PAIN: 1
EYE PAIN: 0
CONSTIPATION: 1
HEADACHES: 0
PARESTHESIAS: 0
ARTHRALGIAS: 1
HEMATOCHEZIA: 0
BREAST MASS: 0
FEVER: 0
WEAKNESS: 0
NAUSEA: 0
JOINT SWELLING: 0
COUGH: 0

## 2023-05-03 ASSESSMENT — ANXIETY QUESTIONNAIRES
7. FEELING AFRAID AS IF SOMETHING AWFUL MIGHT HAPPEN: NOT AT ALL
6. BECOMING EASILY ANNOYED OR IRRITABLE: NOT AT ALL
IF YOU CHECKED OFF ANY PROBLEMS ON THIS QUESTIONNAIRE, HOW DIFFICULT HAVE THESE PROBLEMS MADE IT FOR YOU TO DO YOUR WORK, TAKE CARE OF THINGS AT HOME, OR GET ALONG WITH OTHER PEOPLE: NOT DIFFICULT AT ALL
3. WORRYING TOO MUCH ABOUT DIFFERENT THINGS: SEVERAL DAYS
7. FEELING AFRAID AS IF SOMETHING AWFUL MIGHT HAPPEN: NOT AT ALL
1. FEELING NERVOUS, ANXIOUS, OR ON EDGE: SEVERAL DAYS
2. NOT BEING ABLE TO STOP OR CONTROL WORRYING: NOT AT ALL
8. IF YOU CHECKED OFF ANY PROBLEMS, HOW DIFFICULT HAVE THESE MADE IT FOR YOU TO DO YOUR WORK, TAKE CARE OF THINGS AT HOME, OR GET ALONG WITH OTHER PEOPLE?: NOT DIFFICULT AT ALL
GAD7 TOTAL SCORE: 3
4. TROUBLE RELAXING: SEVERAL DAYS
5. BEING SO RESTLESS THAT IT IS HARD TO SIT STILL: NOT AT ALL
GAD7 TOTAL SCORE: 3

## 2023-05-03 ASSESSMENT — ACTIVITIES OF DAILY LIVING (ADL): CURRENT_FUNCTION: NO ASSISTANCE NEEDED

## 2023-05-08 ENCOUNTER — HOSPITAL ENCOUNTER (OUTPATIENT)
Dept: PHYSICAL THERAPY | Facility: REHABILITATION | Age: 78
Discharge: HOME | End: 2023-05-08
Attending: INTERNAL MEDICINE
Payer: MEDICARE

## 2023-05-08 DIAGNOSIS — Z91.81 RISK FOR FALLS: Primary | ICD-10-CM

## 2023-05-08 DIAGNOSIS — M62.81 GENERALIZED MUSCLE WEAKNESS: ICD-10-CM

## 2023-05-08 DIAGNOSIS — M81.0 OSTEOPOROSIS, SENILE: ICD-10-CM

## 2023-05-08 DIAGNOSIS — R26.89 BALANCE PROBLEMS: ICD-10-CM

## 2023-05-08 PROCEDURE — 97110 THERAPEUTIC EXERCISES: CPT | Mod: GP | Performed by: PHYSICAL THERAPIST

## 2023-05-08 PROCEDURE — 97161 PT EVAL LOW COMPLEX 20 MIN: CPT | Mod: GP | Performed by: PHYSICAL THERAPIST

## 2023-05-08 NOTE — PROGRESS NOTES
[facility]    OUTPATIENT PHYSICAL THERAPY ORTHOPEDIC EVALUATION  PLAN OF TREATMENT FOR OUTPATIENT REHABILITATION  (COMPLETE FOR INITIAL CLAIMS ONLY)  Patient's Last Name, First Name, M.I.  YOB: 1945  Adelina Espinoza    Provider s Name:  [facility]   Medical Record No.  [id number]   Start of Care Date:  05/08/23   Onset Date:  04/11/23   Type:     _X__PT   ___OT   ___SLP Medical Diagnosis:  Osteoporosis and Balance     PT Diagnosis:  (P) weakness, risk for falls, balance, osteoporosis   Visits from SOC:  1      _________________________________________________________________________________  Plan of Treatment/Functional Goals:  balance training, neuromuscular re-education, ROM, strengthening, stretching           Goals  Goal Identifier: walking  Goal Description: Pt will be able to walk with more confidence and not run into walls as pt is at a less risk for falls as LEFS improves by 10 points  Target Date: 08/05/23    Goal Identifier: gardening  Goal Description: Pt will be able to garden for 2 hours, 4 days a week as endurance levels improve and knee pain decreases to 0-2/10  Target Date: 08/05/23    Goal Identifier: exercise  Goal Description: Pt will learn and demonstrate an exercise program to help strengthen bones and muscles to help develop a healthier lifestyle for support for osteoporosis  Target Date: 08/05/23                                                           Therapy Frequency:  1 time/week  Predicted Duration of Therapy Intervention:  10 visits    Kaylyn Wright PT                 I CERTIFY THE NEED FOR THESE SERVICES FURNISHED UNDER        THIS PLAN OF TREATMENT AND WHILE UNDER MY CARE     (Physician co-signature of this document indicates review and certification of the therapy plan).                     Certification Date From:   05/08/23   Certification Date To:  08/05/23    Referring Provider:  Dr. Blanchard    Initial Assessment        See Epic Evaluation Start of Care Date: 05/08/23 05/08/23 0900   General Information   Type of Visit Initial OP Ortho PT Evaluation   Start of Care Date 05/08/23   Referring Physician Dr. Blanchard   Patient/Family Goals Statement balance, bone density   Orders Evaluate and Treat   Certification Required? Yes   Medical Diagnosis Osteoporosis and Balance   Surgical/Medical history reviewed Yes   Precautions/Limitations fall precautions   Special Instructions osteoporosis, peripheral neuropathy, OA B knees, breast cancer 20 years ago   Body Part(s)   Body Part(s) Lumbar Spine/SI   Presentation and Etiology   Pertinent history of current problem (include personal factors and/or comorbidities that impact the POC) Pt had a visit with her MD and mentioned her last two falls.  In December when she fell she did have to go to the emergency room for multiple X-rays.  She will bump into the wall when she walks at times.  She is now more mindful when she is on stairs and when walking to help prevent falls.  She does have osteoporosis and has not broken anything. She has been tripping most of her life so this isn't anything new for her, she wants to be more cautious now to help prevent falls and prevent fractures. She does get prolio shots.  She has B TKA and needs a re-do on her left.  June 16, 2023 she is having a surgery on her toe. When her toe is irritated and her knees irritated she is at more risk for falls. She does not use any assistive device but does own them.   Impairments A. Pain;G. Impaired balance;H. Impaired gait;L. Tingling;K. Numbness   Symptom Location she does have pain in her toes and knees   Onset date of current episode/exacerbation 04/11/23   Prior Level of Function   Prior Level of Function-Mobility pt does not want  to walk with a SEC   Current Level of Function   Patient role/employment history F. Retired   Fall Risk Screen   Fall screen completed by PT   Have you fallen 2 or more times in the past year? Yes   Have you fallen and had an injury in the past year? No   Is patient a fall risk? Yes   Abuse Screen (yes response referral indicated)   Feels Unsafe at Home or Work/School no   Feels Threatened by Someone no   Does Anyone Try to Keep You From Having Contact with Others or Doing Things Outside Your Home? no   Physical Signs of Abuse Present no   Patient needs abuse support services and resources No   Functional Scales   Functional Scales Other   Other Scales  45/80 on LEFS   Lumbar Spine/SI Objective Findings   Gait/Locomotion pronates B with L>R, more walking on lateral edge of R foot due to great toe issues   Balance/Proprioception (Single Leg Stance) APTA: 14, no hands; 1 leg stance 4 secondds B, mod instability B, + reverse Trendelenber on R   Hamstring Flexibility normal   Hip Flexor Flexibility normal   Hip Flexion (L2) Strength R=4, L=4-   Hip Abduction Strength B 5-   Hip Adduction Strength B 4+   Knee Flexion Strength B5-   Knee Extension (L3) Strength B 5-   Lumbar/Hip/Knee/Foot Strength Comments normal  B LE unless noted, B UE strength grossly 4+ to 5-/5 throughout   Posture no specific issues   Planned Therapy Interventions   Planned Therapy Interventions balance training;neuromuscular re-education;ROM;strengthening;stretching   Clinical Impression   Criteria for Skilled Therapeutic Interventions Met yes, treatment indicated   PT Diagnosis weakness, risk for falls, balance, osteoporosis   Influenced by the following impairments decrease 1 leg stance, decrease MMT   Functional limitations due to impairments walking, stairs, gardening, risk for falls   Clinical Presentation Stable/Uncomplicated   Clinical Decision Making (Complexity) Low complexity   Therapy Frequency 1 time/week   Predicted Duration of Therapy  "Intervention (days/wks) 10 visits   Risk & Benefits of therapy have been explained Yes   Patient, Family & other staff in agreement with plan of care Yes   Clinical Impression Comments Pt presents with osteoporosis and 2 falls within a few month span.  She demonstrates weakness, decrease balance, decrease 1 leg stance.  She feels she has always been \"clumsy\" with walking and has fallen her whole life but due to osteoporosis feel pt will benefit from physical therapy to work on improving her function to help improve functional abilities and decrease risk for falls.   ORTHO GOALS   PT Ortho Eval Goals 1;2;3   Ortho Goal 1   Goal Identifier walking   Goal Description Pt will be able to walk with more confidence and not run into walls as pt is at a less risk for falls as LEFS improves by 10 points   Target Date 08/05/23   Ortho Goal 2   Goal Identifier gardening   Goal Description Pt will be able to garden for 2 hours, 4 days a week as endurance levels improve and knee pain decreases to 0-2/10   Target Date 08/05/23   Ortho Goal 3   Goal Identifier exercise   Goal Description Pt will learn and demonstrate an exercise program to help strengthen bones and muscles to help develop a healthier lifestyle for support for osteoporosis   Target Date 08/05/23   Total Evaluation Time   PT Eval, Low Complexity Minutes (45309) 22   Therapy Certification   Certification date from 05/08/23   Certification date to 08/05/23   Medical Diagnosis Osteoporosis and Balance     "

## 2023-05-10 ENCOUNTER — OFFICE VISIT (OUTPATIENT)
Dept: FAMILY MEDICINE | Facility: CLINIC | Age: 78
End: 2023-05-10
Payer: MEDICARE

## 2023-05-10 VITALS
SYSTOLIC BLOOD PRESSURE: 122 MMHG | OXYGEN SATURATION: 96 % | BODY MASS INDEX: 29.88 KG/M2 | HEART RATE: 82 BPM | HEIGHT: 64 IN | RESPIRATION RATE: 16 BRPM | DIASTOLIC BLOOD PRESSURE: 78 MMHG | WEIGHT: 175 LBS | TEMPERATURE: 98.1 F

## 2023-05-10 DIAGNOSIS — Z00.00 ENCOUNTER FOR MEDICARE ANNUAL WELLNESS EXAM: Primary | ICD-10-CM

## 2023-05-10 DIAGNOSIS — Z23 NEED FOR COVID-19 VACCINE: ICD-10-CM

## 2023-05-10 DIAGNOSIS — M79.89 SWELLING OF LIMB: ICD-10-CM

## 2023-05-10 DIAGNOSIS — F41.1 ANXIETY STATE: ICD-10-CM

## 2023-05-10 DIAGNOSIS — K21.9 GASTROESOPHAGEAL REFLUX DISEASE WITHOUT ESOPHAGITIS: ICD-10-CM

## 2023-05-10 DIAGNOSIS — E83.42 HYPOMAGNESEMIA: ICD-10-CM

## 2023-05-10 DIAGNOSIS — G60.9 HEREDITARY AND IDIOPATHIC PERIPHERAL NEUROPATHY: ICD-10-CM

## 2023-05-10 DIAGNOSIS — K58.9 IRRITABLE BOWEL SYNDROME, UNSPECIFIED TYPE: ICD-10-CM

## 2023-05-10 DIAGNOSIS — F33.0 MILD EPISODE OF RECURRENT MAJOR DEPRESSIVE DISORDER (H): ICD-10-CM

## 2023-05-10 DIAGNOSIS — M81.0 OSTEOPOROSIS, SENILE: ICD-10-CM

## 2023-05-10 LAB
ALBUMIN SERPL BCG-MCNC: 4.6 G/DL (ref 3.5–5.2)
ALP SERPL-CCNC: 75 U/L (ref 35–104)
ALT SERPL W P-5'-P-CCNC: 22 U/L (ref 10–35)
ANION GAP SERPL CALCULATED.3IONS-SCNC: 10 MMOL/L (ref 7–15)
AST SERPL W P-5'-P-CCNC: 29 U/L (ref 10–35)
BILIRUB SERPL-MCNC: 0.9 MG/DL
BUN SERPL-MCNC: 10.9 MG/DL (ref 8–23)
CALCIUM SERPL-MCNC: 9.9 MG/DL (ref 8.8–10.2)
CHLORIDE SERPL-SCNC: 103 MMOL/L (ref 98–107)
CREAT SERPL-MCNC: 0.61 MG/DL (ref 0.51–0.95)
DEPRECATED HCO3 PLAS-SCNC: 27 MMOL/L (ref 22–29)
ERYTHROCYTE [DISTWIDTH] IN BLOOD BY AUTOMATED COUNT: 12.3 % (ref 10–15)
GFR SERPL CREATININE-BSD FRML MDRD: >90 ML/MIN/1.73M2
GLUCOSE SERPL-MCNC: 107 MG/DL (ref 70–99)
HCT VFR BLD AUTO: 44.3 % (ref 35–47)
HGB BLD-MCNC: 14.4 G/DL (ref 11.7–15.7)
MAGNESIUM SERPL-MCNC: 2.2 MG/DL (ref 1.7–2.3)
MCH RBC QN AUTO: 30.6 PG (ref 26.5–33)
MCHC RBC AUTO-ENTMCNC: 32.5 G/DL (ref 31.5–36.5)
MCV RBC AUTO: 94 FL (ref 78–100)
PLATELET # BLD AUTO: 266 10E3/UL (ref 150–450)
POTASSIUM SERPL-SCNC: 5.3 MMOL/L (ref 3.4–5.3)
PROT SERPL-MCNC: 7.9 G/DL (ref 6.4–8.3)
RBC # BLD AUTO: 4.7 10E6/UL (ref 3.8–5.2)
SODIUM SERPL-SCNC: 140 MMOL/L (ref 136–145)
WBC # BLD AUTO: 6.3 10E3/UL (ref 4–11)

## 2023-05-10 PROCEDURE — 83735 ASSAY OF MAGNESIUM: CPT | Performed by: FAMILY MEDICINE

## 2023-05-10 PROCEDURE — G0439 PPPS, SUBSEQ VISIT: HCPCS | Performed by: FAMILY MEDICINE

## 2023-05-10 PROCEDURE — 0124A COVID-19 BIVALENT 12+ (PFIZER): CPT | Performed by: FAMILY MEDICINE

## 2023-05-10 PROCEDURE — 85027 COMPLETE CBC AUTOMATED: CPT | Performed by: FAMILY MEDICINE

## 2023-05-10 PROCEDURE — 80053 COMPREHEN METABOLIC PANEL: CPT | Performed by: FAMILY MEDICINE

## 2023-05-10 PROCEDURE — 91312 COVID-19 BIVALENT 12+ (PFIZER): CPT | Performed by: FAMILY MEDICINE

## 2023-05-10 PROCEDURE — 99214 OFFICE O/P EST MOD 30 MIN: CPT | Mod: 25 | Performed by: FAMILY MEDICINE

## 2023-05-10 PROCEDURE — 82306 VITAMIN D 25 HYDROXY: CPT | Performed by: FAMILY MEDICINE

## 2023-05-10 PROCEDURE — 36415 COLL VENOUS BLD VENIPUNCTURE: CPT | Performed by: FAMILY MEDICINE

## 2023-05-10 RX ORDER — CITALOPRAM HYDROBROMIDE 20 MG/1
20 TABLET ORAL DAILY
Qty: 90 TABLET | Refills: 0 | Status: SHIPPED | OUTPATIENT
Start: 2023-05-10 | End: 2023-08-31

## 2023-05-10 ASSESSMENT — ENCOUNTER SYMPTOMS
SHORTNESS OF BREATH: 0
PARESTHESIAS: 0
EYE PAIN: 0
HEMATURIA: 0
PALPITATIONS: 0
COUGH: 0
JOINT SWELLING: 0
DIZZINESS: 0
SORE THROAT: 0
MYALGIAS: 0
ABDOMINAL PAIN: 1
BREAST MASS: 0
HEARTBURN: 1
HEADACHES: 0
DIARRHEA: 1
CHILLS: 0
NERVOUS/ANXIOUS: 0
WEAKNESS: 0
CONSTIPATION: 1
DYSURIA: 0
ARTHRALGIAS: 1
FREQUENCY: 0
HEMATOCHEZIA: 0
NAUSEA: 0
FEVER: 0

## 2023-05-10 ASSESSMENT — ACTIVITIES OF DAILY LIVING (ADL): CURRENT_FUNCTION: NO ASSISTANCE NEEDED

## 2023-05-10 ASSESSMENT — PATIENT HEALTH QUESTIONNAIRE - PHQ9
SUM OF ALL RESPONSES TO PHQ QUESTIONS 1-9: 5
10. IF YOU CHECKED OFF ANY PROBLEMS, HOW DIFFICULT HAVE THESE PROBLEMS MADE IT FOR YOU TO DO YOUR WORK, TAKE CARE OF THINGS AT HOME, OR GET ALONG WITH OTHER PEOPLE: SOMEWHAT DIFFICULT
SUM OF ALL RESPONSES TO PHQ QUESTIONS 1-9: 5

## 2023-05-10 ASSESSMENT — PAIN SCALES - GENERAL: PAINLEVEL: NO PAIN (0)

## 2023-05-10 NOTE — PROGRESS NOTES
"SUBJECTIVE:   Pat is a 77 year old who presents for Preventive Visit.      5/10/2023     9:14 AM   Additional Questions   Roomed by jj antonio cma     Patient has been advised of split billing requirements and indicates understanding: Yes  Are you in the first 12 months of your Medicare coverage?  No    Healthy Habits:     In general, how would you rate your overall health?  Excellent    Frequency of exercise:  1 day/week    Duration of exercise:  Less than 15 minutes    Do you usually eat at least 4 servings of fruit and vegetables a day, include whole grains    & fiber and avoid regularly eating high fat or \"junk\" foods?  No    Taking medications regularly:  Yes    Ability to successfully perform activities of daily living:  No assistance needed    Home Safety:  No safety concerns identified    Hearing Impairment:  Difficulty understanding speech on the telephone    In the past 6 months, have you been bothered by leaking of urine?  No    In general, how would you rate your overall mental or emotional health?  Excellent      PHQ-2 Total Score: 1    Additional concerns today:  Yes    Patient comes in today for physical exam.  Overall she is doing okay.  She does also have a history of anxiety and depression irritable bowel syndrome gastroesophageal reflux disease low magnesium levels as well as osteoporosis and neuropathy.  She also has swelling of her limbs.  She is going to undergo surgery for new big toe joint on 6/16/2023 due to arthritis as well.  She notes that overall things seem to be doing okay.  In terms of her anxiety and depression she is doing fine.  She is on Celexa.  She was on 40 mg of Celexa a day and cut that back to 20 mg a day because she was getting such significant hot flashes.  She notes that since she is cut back on that Celexa her hot flashes have decreased significantly and she is overall feeling well.  She feels like emotionally she is doing fine.  Please see PHQ-9 and DERIK which are both " completed in their entirety today.  She is not suicidal or homicidal.  She continues to have challenges but notes that that is pretty much with life is all about.  She is going to physical therapy for some balance issues.  This was ordered by Dr. Nava who she sees for her osteoporosis. She is on Prolia for osteoporosis and seems to be tolerating that without any problems.  Again she is following with Dr. Nogueira for that issue.  She does have a history of the bowel syndrome and continues to see the gastroenterologist on a regular basis.  She feels like that is relatively stable at this point.  She continues to use MiraLAX for her stools.  She did note that she was having difficulties with hearing but she had an evaluation a couple of years ago and they did not find that she had significant hearing loss.  She does not think that she needs any further evaluation at this time and feels like overall things are relatively stable.  She is on Lasix for swelling of her legs.  That overall seems to be fine as well.  She does need labs to follow-up on that but again seems like she is doing well.  She would like to lose about 20 pounds we talked about using some diet and exercise for that.  She is due for mammogram per our records in July however she is in a talk to Dr. Burleson regarding that.  She did have a mammogram in January of this past year and so she is not sure when her next one is due.  She had does have a history of breast cancer.  She does have gastroesophageal reflux disease but that overall seems to be relatively stable.  She also has had low magnesium in the past so we will recheck that today as well.  Dr. Nava did request that she have a vitamin D level drawn because of her osteoporosis.  She would like to get a COVID vaccination today as well.  She has had bilateral knee replacements and feels like she may need to get another one.  She continues to have issues with her knees but is seeing the  orthopedist on a regular basis for that.  They have said that perhaps she will need to get her knee revision in the near future but she is not ready to do that yet.  Last DEXA scan was 9/22/2022 and needs repeated in 2 years again.    Have you ever done Advance Care Planning? (For example, a Health Directive, POLST, or a discussion with a medical provider or your loved ones about your wishes): Yes, advance care planning is on file.      Fall risk      Cognitive Screening   1) Repeat 3 items (Leader, Season, Table)    2) Clock draw: NORMAL  3) 3 item recall: Recalls 2 objects   Results: NORMAL clock, 1-2 items recalled: COGNITIVE IMPAIRMENT LESS LIKELY    Mini-CogTM Copyright S Sue. Licensed by the author for use in [facility]; reprinted with permission (addis@[facility]). All rights reserved.      Do you have sleep apnea, excessive snoring or daytime drowsiness?: no    Reviewed and updated as needed this visit by clinical staff   Tobacco  Allergies  Meds  Problems  Med Hx  Surg Hx  Fam Hx          Reviewed and updated as needed this visit by Provider   Tobacco  Allergies  Meds  Problems  Med Hx  Surg Hx  Fam Hx         Social History     Tobacco Use     Smoking status: Never     Smokeless tobacco: Never   Vaping Use     Vaping status: Never Used   Substance Use Topics     Alcohol use: Yes     Alcohol/week: 3.0 standard drinks of alcohol     Types: 3 Standard drinks or equivalent per week     Comment: 3-4 per week             5/3/2023     5:53 PM   Alcohol Use   Prescreen: >3 drinks/day or >7 drinks/week? No     Do you have a current opioid prescription? No  Do you use any other controlled substances or medications that are not prescribed by a provider? None      Current providers sharing in care for this patient include:  Patient Care Team:  [facility] - [place] as PCP - General  Rajat Borges MBBS as Assigned Rheumatology Provider  Mone Blanchard MD as Physician  (Internal Medicine)  Zakia Burleson MD as Assigned Surgical Provider  Madeleine Strauss PA-C as Assigned Neuroscience Provider  Mone Blanchard MD as Assigned PCP    The following health maintenance items are reviewed in Epic and correct as of today:  Health Maintenance   Topic Date Due     DEPRESSION ACTION PLAN  Never done     ANNUAL REVIEW OF HM ORDERS  08/04/2022     PHQ-9  11/10/2023     MEDICARE ANNUAL WELLNESS VISIT  05/10/2024     FALL RISK ASSESSMENT  05/10/2024     MAMMO SCREENING  01/30/2025     LIPID  03/04/2025     ADVANCE CARE PLANNING  05/10/2028     DTAP/TDAP/TD IMMUNIZATION (3 - Td or Tdap) 07/07/2032     DEXA  09/22/2037     HEPATITIS C SCREENING  Completed     INFLUENZA VACCINE  Completed     ZOSTER IMMUNIZATION  Completed     COVID-19 Vaccine  Completed     IPV IMMUNIZATION  Aged Out     MENINGITIS IMMUNIZATION  Aged Out     Pneumococcal Vaccine: 65+ Years  Discontinued     COLORECTAL CANCER SCREENING  Discontinued       Patient does have a history of breast cancer and sees Dr. Burleson on a regular basis.  Her last mammogram was in January 2023. She will talk with Dr. Burleson about when her next one is due.  Pertinent mammograms are reviewed under the imaging tab.    Review of Systems   Constitutional: Negative for chills and fever.   HENT: Negative for congestion, ear pain, hearing loss and sore throat.    Eyes: Negative for pain and visual disturbance.   Respiratory: Negative for cough and shortness of breath.    Cardiovascular: Positive for peripheral edema. Negative for chest pain and palpitations.   Gastrointestinal: Positive for abdominal pain, constipation, diarrhea and heartburn. Negative for hematochezia and nausea.   Breasts:  Negative for tenderness, breast mass and discharge.   Genitourinary: Positive for urgency. Negative for dysuria, frequency, genital sores, hematuria, pelvic pain, vaginal bleeding and vaginal discharge.   Musculoskeletal: Positive for arthralgias. Negative for joint  "swelling and myalgias.   Skin: Negative for rash.   Neurological: Negative for dizziness, weakness, headaches and paresthesias.   Psychiatric/Behavioral: Negative for mood changes. The patient is not nervous/anxious.        OBJECTIVE:   /78   Pulse 82   Temp 98.1  F (36.7  C)   Resp 16   Ht 1.626 m (5' 4\")   Wt 79.4 kg (175 lb)   SpO2 96%   BMI 30.04 kg/m   Estimated body mass index is 30.04 kg/m  as calculated from the following:    Height as of this encounter: 1.626 m (5' 4\").    Weight as of this encounter: 79.4 kg (175 lb).  Physical Exam  REVIEW OF SYSTEMS:  Denies fever, chills, visual changes, fatigue, myalgias, nasal congestion, rhinorrhea, ear pain or discharge, sore throat, swollen glands, breast mass, nipple discharge, breast changes, abdominal pain, nausea, vomiting, diarrhea, constipation, cough, shortness of breath, chest pain, weight change, change in bowel habits, melena, rectal bleeding, dysuria, frequency, urgency, hematuria, polyuria, polydipsia, polyphagia, joint pain or swelling or erythema, edema, rash, weakness, paresthesias, vaginal discharge or bleeding or mood changes other than that mentioned above in HPI.   Remainder of review of systems was negative.      PHYSICAL EXAM:  On exam, patient is a WD, WN 77 year old female in [facility].  /78   Pulse 82   Temp 98.1  F (36.7  C)   Resp 16   Ht 1.626 m (5' 4\")   Wt 79.4 kg (175 lb)   SpO2 96%   BMI 30.04 kg/m    Head normocephalic, atraumatic.  Eyes PERRL, ears TM s clear bilaterally.  Throat without significant erythemia or exudate.  Neck was supple, full range of motion. No significant lymphadenopathy or thyromegaly was appreciated.  Lungs clear to auscultation  Heart regular rate and rhythm.  Patient declined breast exam today.  Abdomen was soft, nontender, nondistended. No masses or organomegaly were palpated. Positive bowel sounds were appreciated.  Extremities with full range of motion of all 4 extremities were noted.  " Deep tendon reflexes were equal and symmetrical. Motor and sensation were intact to both the upper and lower extremities.  Cranial nerves 2 through 12 were grossly intact.  EOM were intact.  Patient is fine pelvic exam today.  Pap smear is not indicated.  A&O x3, thought processes congruent, non-tangential. No hallucinations/delusions. Insight/judgment: intact. Denies suicidal/homicidal ideations.    PHQ-9:      5/10/2023     9:05 AM   Last PHQ-9   1.  Little interest or pleasure in doing things 1   2.  Feeling down, depressed, or hopeless 0   3.  Trouble falling or staying asleep, or sleeping too much 1   4.  Feeling tired or having little energy 2   5.  Poor appetite or overeating 0   6.  Feeling bad about yourself 0   7.  Trouble concentrating 0   8.  Moving slowly or restless 1   Q9: Thoughts of better off dead/self-harm past 2 weeks 0   PHQ-9 Total Score 5       GAD7:      5/3/2023     5:49 PM   DERIK-7    1. Feeling nervous, anxious, or on edge 1   2. Not being able to stop or control worrying 0   3. Worrying too much about different things 1   4. Trouble relaxing 1   5. Being so restless that it is hard to sit still 0   6. Becoming easily annoyed or irritable 0   7. Feeling afraid, as if something awful might happen 0   DERIK-7 Total Score 3   If you checked any problems, how difficult have they made it for you to do your work, take care of things at home, or get along with other people? Not difficult at all         LABS:    Recent Results (from the past 240 hour(s))   Comprehensive metabolic panel    Collection Time: 05/10/23 10:16 AM   Result Value Ref Range    Sodium 140 136 - 145 mmol/L    Potassium 5.3 3.4 - 5.3 mmol/L    Chloride 103 98 - 107 mmol/L    Carbon Dioxide (CO2) 27 22 - 29 mmol/L    Anion Gap 10 7 - 15 mmol/L    Urea Nitrogen 10.9 8.0 - 23.0 mg/dL    Creatinine 0.61 0.51 - 0.95 mg/dL    Calcium 9.9 8.8 - 10.2 mg/dL    Glucose 107 (H) 70 - 99 mg/dL    Alkaline Phosphatase 75 35 - 104 U/L    AST 29  10 - 35 U/L    ALT 22 10 - 35 U/L    Protein Total 7.9 6.4 - 8.3 g/dL    Albumin 4.6 3.5 - 5.2 g/dL    Bilirubin Total 0.9 <=1.2 mg/dL    GFR Estimate >90 >60 mL/min/1.73m2   CBC with platelets    Collection Time: 05/10/23 10:16 AM   Result Value Ref Range    WBC Count 6.3 4.0 - 11.0 10e3/uL    RBC Count 4.70 3.80 - 5.20 10e6/uL    Hemoglobin 14.4 11.7 - 15.7 g/dL    Hematocrit 44.3 35.0 - 47.0 %    MCV 94 78 - 100 fL    MCH 30.6 26.5 - 33.0 pg    MCHC 32.5 31.5 - 36.5 g/dL    RDW 12.3 10.0 - 15.0 %    Platelet Count 266 150 - 450 10e3/uL   Magnesium    Collection Time: 05/10/23 10:16 AM   Result Value Ref Range    Magnesium 2.2 1.7 - 2.3 mg/dL   Vitamin D deficiency screening    Collection Time: 05/10/23 10:16 AM   Result Value Ref Range    Vitamin D, Total (25-Hydroxy) 56 20 - 75 ug/L         ASSESSMENT / PLAN:   ASSESSMENT: 77 year old female physical exam.    PLAN:     Encounter for Medicare annual wellness exam [Z00.00]    1. Encounter for Medicare annual wellness exam    2. Anxiety  - citalopram (CELEXA) 20 MG tablet; Take 1 tablet (20 mg) by mouth daily  Dispense: 90 tablet; Refill: 0    3. Mild episode of recurrent major depressive disorder (H)  - citalopram (CELEXA) 20 MG tablet; Take 1 tablet (20 mg) by mouth daily  Dispense: 90 tablet; Refill: 0    4. Irritable bowel syndrome, unspecified type  - Comprehensive metabolic panel; Future  - CBC with platelets; Future  - Comprehensive metabolic panel  - CBC with platelets    5. Gastroesophageal reflux disease without esophagitis  - Comprehensive metabolic panel; Future  - Comprehensive metabolic panel    6. Hypomagnesemia  - Magnesium; Future  - Magnesium    7. Osteoporosis, senile  - Vitamin D deficiency screening; Future  - Vitamin D deficiency screening    8. Peripheral Neuropathy  - Comprehensive metabolic panel; Future  - CBC with platelets; Future  - Comprehensive metabolic panel  - CBC with platelets    9. Swelling of limb  - Comprehensive metabolic  panel; Future  - Comprehensive metabolic panel    10. Need for COVID-19 vaccine  - COVID-19 BIVALENT 12+ (PFIZER)      Patient is a 77 year old female who is overall doing well.  Patient is overall feeling well.  She does have a history of anxiety and depression.  She continues on Celexa 20 mg a day.  She had been on 40 mg a day of Celexa but was having lots of hot flashes.  She decrease that down to 20 mg a day and hot flashes have much improved.  She feels like overall she is doing well.  She is not suicidal or homicidal.  Please see PHQ-9 and DERIK which are both completed in their entirety today.  She does need a refill of the Celexa which was given today.  She also has a history of irritable bowel syndrome which has been longstanding.  We will go ahead and check metabolic panel and CBC to follow-up on that.  She continues to follow with the gastroenterologist on a regular basis for that.  She overall feels like her symptoms are relatively stable.  She uses MiraLAX to help with her stools.  She does have a history of gastroesophageal reflux disease as well and is on Prilosec.  Again that seems to be relatively stable.  She again is following up with a gastroenterologist on regular basis.  We will go ahead and check metabolic panel to follow-up on her Prilosec usage.  She does have a history of low magnesium in the past so we will go ahead and recheck that today.  She also has a history of osteoporosis.  She continues to see Dr. Nava and is on Prolia.  Dr. Nava has requested that she get a vitamin D level today and therefore we will draw that today as well.  She does have peripheral neuropathy probably related to her breast cancer treatment.  Overall this seems to be relatively stable.  We will check metabolic panel as well as a CBC to follow-up on that.  She also is on Lasix for swelling of her lab which again seems to be relatively stable.  We will check metabolic panel to follow-up on that as well.   "She did request a COVID vaccination which was given today.  We did discuss the fact that she should exercise on a more regular basis.  She also is probably not eating as much fruits vegetables that she should on a daily basis and we did discuss that as well.  She has had issues with hearing in the past and has had a evaluation in the past and does not feel like she needs additional testing at this time.  If she changes her mind in the future will certainly let me know.  She is having surgery on her big toe on 6/16/2023.  She does have an upcoming preop evaluation already scheduled.  She is having the surgery done due to arthritis in her big toe and after many cortisone shots she still has not had improvement in her pain so she is going to proceed with the surgery.  She will continue on physical therapy for balance issues.  This seems to be helping but she is very thankful for.  All of her questions and concerns were addressed today.  If she has additional problems or concerns should let me know.    Will contact her with the results of the labs when available.    Voice recognition software was used in the creation of this note. Any grammatical or nonsensical errors are due to inherent errors with the software and are not the intention of the writer.      Migdalia Delvalle MD      Patient has been advised of split billing requirements and indicates understanding: Yes      COUNSELING:  Reviewed preventive health counseling, as reflected in patient instructions       Regular exercise       Healthy diet/nutrition       Immunizations    Vaccinated for: Covid-19            BMI:   Estimated body mass index is 30.04 kg/m  as calculated from the following:    Height as of this encounter: 1.626 m (5' 4\").    Weight as of this encounter: 79.4 kg (175 lb).         She reports that she has never smoked. She has never used smokeless tobacco.      Appropriate preventive services were discussed with this patient, including " applicable screening as appropriate for cardiovascular disease, diabetes, osteopenia/osteoporosis, and glaucoma.  As appropriate for age/gender, discussed screening for colorectal cancer, prostate cancer, breast cancer, and cervical cancer. Checklist reviewing preventive services available has been given to the patient.    Reviewed patients plan of care and provided an AVS. The Basic Care Plan (routine screening as documented in Health Maintenance) for Adelina meets the Care Plan requirement. This Care Plan has been established and reviewed with the Patient.          Migdalia Delvalle MD  [facility]    Identified Health Risks:  No active controlled substances being prescribed to the patient  Answers for HPI/ROS submitted by the patient on 5/10/2023  If you checked off any problems, how difficult have these problems made it for you to do your work, take care of things at home, or get along with other people?: Somewhat difficult  PHQ9 TOTAL SCORE: 5  DERIK 7 TOTAL SCORE: 3

## 2023-05-10 NOTE — PATIENT INSTRUCTIONS
Patient Education   Personalized Prevention Plan  You are due for the preventive services outlined below.  Your care team is available to assist you in scheduling these services.  If you have already completed any of these items, please share that information with your care team to update in your medical record.  Health Maintenance Due   Topic Date Due    Depression Action Plan  Never done       Exercise for a Healthier Heart  You may wonder how you can improve the health of your heart. If you re thinking about exercise, you re on the right track. You don t need to become an athlete. But you do need a certain amount of brisk exercise to help strengthen your heart. If you have been diagnosed with a heart condition, your healthcare provider may advise exercise to help your condition. To help make exercise a habit, choose safe, fun activities.      Exercise with a friend. When activity is fun, you're more likely to stick with it.     Before you start  Check with your healthcare provider before starting an exercise program. This is especially important if you haven't been active for a while. It's also important if you have a long-term (chronic) health problem such as heart disease, diabetes, or obesity. Also check with your provider if you're at high risk for having these problems.   Why exercise?  Exercising regularly offers many healthy rewards. It can help you do all of these:   Improve your blood cholesterol level to help prevent further heart trouble.  Lower your blood pressure to help prevent a stroke or heart attack.  Control diabetes or reduce your risk of getting this disease.  Improve your heart and lung function.  Reach and stay at a healthy weight.  Make your muscles stronger so you can stay active.  Prevent falls and fractures by slowing the loss of bone mass (osteoporosis).  Manage stress better.  Improve your sense of self and your body image.  Exercise tips    Ease into your routine. Set small goals.  Then build on them. Talk with your healthcare provider first before starting an exercise routine if you're not sure what your activity level should be.  Exercise on most days. Aim for a total of at least 150 minutes (2 hours and 30 minutes) or more of moderate-intensity aerobic activity each week. You could also do 75 minutes (1 hour and 15 minutes) or more of vigorous-intensity aerobic activity each week. Or try for a combination of both. Moderate activity means that you breathe heavier and your heart rate increases, but you can still talk. Think about doing at least 30 minutes of moderate exercise, 5 times a week. It's OK to work up to the 30-minute period over time. Examples of moderate-intensity activity are brisk walking, gardening, and water aerobics.  Step up your daily activity level.  Along with your exercise program, try being more active the whole day. Walk instead of drive. Or park further away so that you take more steps each day. Do more household tasks or yard work. You may not be able to meet the advised amount of physical activity. But doing some moderate- or vigorous-intensity aerobic activity can help reduce your risk for heart disease. Your healthcare provider can help you figure out what is best for you.  Choose 1 or more activities you enjoy.  Walking is one of the easiest things you can do. You can also try swimming, riding a bike, dancing, or taking an exercise class.    Call 911  Call 911 right away if any of these occur:   Chest pain that doesn't go away quickly with rest  New burning, tightness, pressure, or heaviness in your chest, neck, shoulders, back, or arms  Abnormal or severe shortness of breath  A very fast or irregular heartbeat (palpitations)  Fainting  When to call your healthcare provider  Call your healthcare provider if you have any of these:   Dizziness or lightheadedness  Mild shortness of breath or chest pain  Increased or new joint or muscle pain    [employer] last reviewed  this educational content on 7/1/2022 2000-2022 [employer]. All rights reserved. This information is not intended as a substitute for professional medical care. Always follow your healthcare professional's instructions.          Understanding USDA MyPlate  The USDA has guidelines to help you make healthy food choices. These are called MyPlate. MyPlate shows the food groups that make up healthy meals using the image of a place setting. Before you eat, think about the healthiest choices for what to put on your plate or in your cup or bowl. To learn more about building a healthy plate, visit www.choosemyplate.gov.     The food groups  Fruits. Any fruit or 100% fruit juice counts as part of the Fruit Group. Fruits may be fresh, canned, frozen, or dried, and may be whole, cut-up, or pureed. Make 1/2 of your plate fruits and vegetables.  Vegetables. Any vegetable or 100% vegetable juice counts as a member of the Vegetable Group. Vegetables may be fresh, frozen, canned, or dried. They can be served raw or cooked and may be whole, cut-up, or mashed. Make 1/2 of your plate fruits and vegetables.  Grains. All foods made from grains are part of the Grains Group. These include wheat, rice, oats, cornmeal, and barley. Grains are often used to make foods such as bread, pasta, oatmeal, cereal, tortillas, and grits. Grains should be no more than 1/4 of your plate. At least half of your grains should be whole grains.  Protein. This group includes meat, poultry, seafood, beans and peas, eggs, processed soy products (such as tofu), nuts (including nut butters), and seeds. Make protein choices no more than 1/4 of your plate. Meat and poultry choices should be lean or low fat.  Dairy. The Dairy Group includes all fluid milk products and foods made from milk that contain calcium, such as yogurt and cheese. (Foods that have little calcium, such as cream, butter, and cream cheese, are not part of this group.) Most dairy  choices should be low-fat or fat-free.  Oils. Oils aren't a food group, but they do contain essential nutrients. However it's important to watch your intake of oils. These are fats that are liquid at room temperature. They include canola, corn, olive, soybean, vegetable, and sunflower oil. Foods that are mainly oil include mayonnaise, certain salad dressings, and soft margarines. You likely already get your daily oil allowance from the foods you eat.  Things to limit  Eating healthy also means limiting these things in your diet:  Salt (sodium). Many processed foods have a lot of sodium. To keep sodium intake down, eat fresh vegetables, meats, poultry, and seafood when possible. Purchase low-sodium, reduced-sodium, or no-salt-added food products at the store. And don't add salt to your meals at home. Instead, season them with herbs and spices such as dill, oregano, cumin, and paprika. Or try adding flavor with lemon or lime zest and juice.  Saturated fat. Saturated fats are most often found in animal products such as beef, pork, and chicken. They are often solid at room temperature, such as butter. To reduce your saturated fat intake, choose leaner cuts of meat and poultry. And try healthier cooking methods such as grilling, broiling, roasting, or baking. For a simple lower-fat swap, use plain nonfat yogurt instead of mayonnaise when making potato salad or macaroni salad.  Added sugars. These are sugars added to foods. They are in foods such as ice cream, candy, soda, fruit drinks, sports drinks, energy drinks, cookies, pastries, jams, and syrups. Cut down on added sugars by sharing sweet treats with a family member or friend. You can also choose fruit for dessert, and drink water or other unsweetened beverages.  [employer] last reviewed this educational content on 6/1/2020 2000-2022 [employer]. All rights reserved. This information is not intended as a substitute for professional medical care. Always  follow your healthcare professional's instructions.          Signs of Hearing Loss  Hearing loss is a problem shared by many people. In fact, it's one of the most common health problems, particularly as people age. Most people aged 65 and older have some hearing loss. By age 80, almost everyone does. Hearing loss often occurs slowly over the years. So, you may not realize your hearing has gotten worse.   When sudden hearing loss occurs, it's important to contact your healthcare provider right away. Your provider will do a medical exam and a hearing exam as soon as possible. This is to help find the cause and type of your sudden hearing loss. Based on your diagnosis, your healthcare provider will discuss possible treatments.      Hearing much better with one ear can be a sign of hearing loss.     Have your hearing checked  Call your healthcare provider if you:   Have to strain to hear normal conversation  Have to watch other people s faces very carefully to follow what they re saying  Need to ask people to repeat what they ve said  Often misunderstand what people are saying  Turn the volume of the television or radio up so high that others complain  Feel that people are mumbling when they re talking to you  Find that the effort to hear leaves you feeling tired and irritated  Notice, when using the phone, that you hear better with one ear than the other  [employer] last reviewed this educational content on 6/1/2022 2000-2022 [employer]. All rights reserved. This information is not intended as a substitute for professional medical care. Always follow your healthcare professional's instructions.          Depression and Suicide in Older Adults  Nearly 2 million older adults in the [place] have some type of depression. Some of them even take their own lives. Yet depression among older adults is often ignored. Learning about the warning signs of depression may help spare a loved one needless pain. You may also  "save a life.   What is depression?  Depression is a common and serious illness. It affects the way you think and feel. It is not a normal part of aging. It is not a sign of weakness, a character flaw, or something you can \"snap out of.\" Most people with depression need treatment to get better. The most common symptom is a feeling of deep sadness. People who are depressed also may seem tired and listless. And nothing seems to give them pleasure. It s normal to grieve or be sad sometimes. But sadness lessens or passes with time. Depression rarely goes away or improves on its own. Other symptoms of depression are:   Sleeping more or less than normal  Eating more or less than normal  Having headaches, stomachaches, or other pains that don t go away  Feeling nervous,  empty,  or worthless  Crying a lot  Thinking or talking about suicide or death  Loss of interest in activities previously enjoyed  Social isolation  Feeling confused or forgetful  What causes it?  The causes of depression aren t fully known. But it is thought to result from a complex blend of these factors:   Biochemistry. Certain chemicals in the brain play a role.  Genes. Depression does run in families.  Life stress. Life stresses can also trigger depression in some people. Older adults often face many stressors. These may include isolation, the death of friends or a spouse, health problems, and financial concerns.  Chronic health conditions. This includes diabetes, heart disease, or cancer. These can cause symptoms of depression. Medicine side effects can cause changes in thoughts and behaviors.  Giving support    Depressed people often may not want to ask for help. When they do, they may be ignored. Or they may get the wrong treatment. You can help by showing parents and older friends love and support. If they seem depressed, don t lecture the person or ignore the symptoms. Don't discount the symptoms as a  normal  part of aging. They are not. Get " involved, listen, and show interest and support.   Help them understand that depression is a treatable illness. Tell them you can help them find the right treatment. Offer to go to their healthcare provider's appointment with them for support when the symptoms are discussed. With their approval, contact a local mental health center, social service agency, or hospital about services.   Helping at healthcare visits  You can be an advocate for them at healthcare appointments. Many older adults have chronic illnesses. Many of these can cause symptoms of depression. Medicine side effects can change thoughts and behaviors.   You can help make sure that the healthcare provider looks at all of these factors. They should refer your family member or friend to a mental healthcare provider when needed. In some cases, untreated depression can lead to a misdiagnosis. A person may be diagnosed with a brain disorder such as dementia. If the healthcare provider does not take the issue of depression seriously, help your family member or friend find another provider.   Asking about self-harm thoughts  If you think an older person you care about could be suicidal, ask,  Have you thought about suicide?  Most people will tell you the truth. If they say yes, they may already have a plan for how and when they will attempt it. Find out as much as you can. The more detailed the plan, and the easier it is to carry out, the more danger the person is in right now. Tell the person you are there for them and you do not want them to get harmed. Don't wait to get help for the person. Call the person's healthcare provider, local hospital, or emergency services.   Call 988 in a crisis   Never leave the person alone. A person who is actively suicidal needs crisis care right away. They need constant supervision. Never leave the person out of sight. Call 988 Tell the crisis counselor you need help for a person who is thinking about suicide. The  counselor will help you get the right level of crisis help. You may be advised to take the person to the nearest emergency room.   The National Suicide Prevention Lifeline is available at 988, or 519-585-ERVH (973-082-3228), or www.suicidepreventionCubeTreeline.org. When you call or text 988, you will be connected to trained counselors who are part of the National Suicide Prevention Lifeline network. An online chat option is also available. Lifeline is free and available 24/7.   To learn more  National Suicide Prevention Lifeline at www.suicidepreAviga Systemsline.org  or 963-138-TGRG (238-653-9666)  National [place] on Mental Illness at www.delroy.org  or 006-446-YMRB (772-056-1436)  [facility] at www.nmha.org  or 727-184-2509  National [place] of Mental Health at www.[facility].nih.gov  or 484-657-7707    [employer] last reviewed this educational content on 7/1/2022 2000-2022 [employer]. All rights reserved. This information is not intended as a substitute for professional medical care. Always follow your healthcare professional's instructions.

## 2023-05-10 NOTE — PROGRESS NOTES
"    She is at risk for lack of exercise and has been provided with information to increase physical activity for the benefit of her well-being.  The patient was counseled and encouraged to consider modifying their diet and eating habits. She was provided with information on recommended healthy diet options.  The patient was provided with written information regarding signs of hearing loss.  The patient's PHQ-9 score is consistent with mild depression. She was provided with information regarding depression.  Answers for HPI/ROS submitted by the patient on 5/10/2023  If you checked off any problems, how difficult have these problems made it for you to do your work, take care of things at home, or get along with other people?: Somewhat difficult  PHQ9 TOTAL SCORE: 5  DERIK 7 TOTAL SCORE: 3  In general, how would you rate your overall physical health?: excellent  Frequency of exercise:: 1 day/week  Do you usually eat at least 4 servings of fruit and vegetables a day, include whole grains & fiber, and avoid regularly eating high fat or \"junk\" foods? : No  Taking medications regularly:: Yes  Activities of Daily Living: no assistance needed  Home safety: no safety concerns identified  Hearing Impairment:: difficulty understanding speech on the telephone  In the past 6 months, have you been bothered by leaking of urine?: No  abdominal pain: Yes  Blood in stool: No  Blood in urine: No  chest pain: No  chills: No  congestion: No  constipation: Yes  cough: No  diarrhea: Yes  dizziness: No  ear pain: No  eye pain: No  nervous/anxious: No  fever: No  frequency: No  genital sores: No  headaches: No  hearing loss: No  heartburn: Yes  arthralgias: Yes  joint swelling: No  peripheral edema: Yes  mood changes: No  myalgias: No  nausea: No  dysuria: No  palpitations: No  Skin sensation changes: No  sore throat: No  urgency: Yes  rash: No  shortness of breath: No  visual disturbance: No  weakness: No  pelvic pain: No  vaginal bleeding: " No  vaginal discharge: No  tenderness: No  breast mass: No  breast discharge: No  In general, how would you rate your overall mental or emotional health?: excellent  Additional concerns today:: Yes  Duration of exercise:: Less than 15 minutes       No

## 2023-05-11 LAB — DEPRECATED CALCIDIOL+CALCIFEROL SERPL-MC: 56 UG/L (ref 20–75)

## 2023-05-18 ENCOUNTER — OFFICE VISIT (OUTPATIENT)
Dept: FAMILY MEDICINE | Facility: CLINIC | Age: 78
End: 2023-05-18
Payer: MEDICARE

## 2023-05-18 VITALS
HEIGHT: 64 IN | WEIGHT: 176 LBS | DIASTOLIC BLOOD PRESSURE: 72 MMHG | TEMPERATURE: 98.4 F | OXYGEN SATURATION: 98 % | BODY MASS INDEX: 30.05 KG/M2 | RESPIRATION RATE: 14 BRPM | SYSTOLIC BLOOD PRESSURE: 118 MMHG | HEART RATE: 86 BPM

## 2023-05-18 DIAGNOSIS — Z01.818 PREOPERATIVE EXAMINATION: Primary | ICD-10-CM

## 2023-05-18 DIAGNOSIS — F33.0 MILD EPISODE OF RECURRENT MAJOR DEPRESSIVE DISORDER (H): ICD-10-CM

## 2023-05-18 DIAGNOSIS — M20.5X1 HALLUX LIMITUS OF RIGHT FOOT: ICD-10-CM

## 2023-05-18 PROCEDURE — 99214 OFFICE O/P EST MOD 30 MIN: CPT | Performed by: NURSE PRACTITIONER

## 2023-05-18 NOTE — H&P (VIEW-ONLY)
YA [facility] [place]  [address],   [place] [facility] [address]  Phone: [phone]  Fax: [phone]  Primary Provider: Rebecca - [place], [facility]  Pre-op Performing Provider: WILLI BERUMEN    PREOPERATIVE EVALUATION:  Today's date: 5/18/2023    Adelina Espinoza is a 77 year old female who presents for a preoperative evaluation.      5/18/2023     3:13 PM   Additional Questions   Roomed by Janice Cowart   Accompanied by none     Surgical Information:  Surgery/Procedure: R foot repair  Surgery Location: [facility]  Surgeon: Dr. Sparks  Surgery Date: 6/16/23  Time of Surgery: TBD  Where patient plans to recover: At home with family  Fax number for surgical facility: [phone]      Subjective       HPI related to upcoming procedure: right foot/toe pain x 4 years- aching pain, sharp at times.  No radiation of pain.  Worse with weight bearing, better with rest.         5/11/2023    11:47 AM   Preop Questions   1. Have you ever had a heart attack or stroke? No   2. Have you ever had surgery on your heart or blood vessels, such as a stent placement, a coronary artery bypass, or surgery on an artery in your head, neck, heart, or legs? No   3. Do you have chest pain with activity? No   4. Do you have a history of  heart failure? No   5. Do you currently have a cold, bronchitis or symptoms of other infection? No   6. Do you have a cough, shortness of breath, or wheezing? No   7. Do you or anyone in your family have previous history of blood clots? No   8. Do you or does anyone in your family have a serious bleeding problem such as prolonged bleeding following surgeries or cuts? No   9. Have you ever had problems with anemia or been told to take iron pills? No   10. Have you had any abnormal blood loss such as black, tarry or bloody stools, or abnormal vaginal bleeding? No   11. Have you ever had a blood transfusion? No   12. Are  you willing to have a blood transfusion if it is medically needed before, during, or after your surgery? Yes   13. Have you or any of your relatives ever had problems with anesthesia? YES - nausea   14. Do you have sleep apnea, excessive snoring or daytime drowsiness? No   15. Do you have any artifical heart valves or other implanted medical devices like a pacemaker, defibrillator, or continuous glucose monitor? No   16. Do you have artificial joints? YES - knees   17. Are you allergic to latex? No       Health Care Directive:  Patient does not have a Health Care Directive or Living Will: Patient states has Advance Directive and will bring in a copy to clinic.    Preoperative Review of :  Status of Chronic Conditions: stable      Review of Systems  Constitutional, neuro, ENT, endocrine, pulmonary, cardiac, gastrointestinal, genitourinary, musculoskeletal, integument and psychiatric systems are negative, except as otherwise noted.    Patient Active Problem List    Diagnosis Date Noted     Hallux limitus, right 05/02/2023     Priority: Medium     Added automatically from request for surgery [id number]       Hypomagnesemia 08/03/2021     Priority: Medium     Lumbar stenosis with neurogenic claudication 01/29/2021     Priority: Medium     Spinal stenosis of lumbar region with neurogenic claudication 12/23/2020     Priority: Medium     Added automatically from request for surgery [id number]         Synovial cyst of lumbar facet joint 12/23/2020     Priority: Medium     Added automatically from request for surgery [id number]         Lumbar disc herniation 12/23/2020     Priority: Medium     Added automatically from request for surgery [id number]         Arthritis of first metatarsophalangeal (MTP) joint of right foot 03/05/2019     Priority: Medium     Esophageal reflux 02/14/2017     Priority: Medium     Osteoporosis, senile 09/13/2016     Priority: Medium     Peripheral Neuropathy      Priority: Medium     Created by  Conversion  Health [facility] Annotation: Jun 21 2009  9:51PM - Toya Charles: AFTER   CHEMO  Replacement Utility updated for latest IMO load         Major Depression, Recurrent      Priority: Medium     Created by Conversion  [facility] Annotation: Jun 6 2013 11:43AM - Macria Cisse: 1993, 2012  Replacement Utility updated for latest IMO load         Anxiety      Priority: Medium     Created by Conversion  Replacement Utility updated for latest IMO load         Primary osteoarthritis of knee, left 04/21/2016     Priority: Medium     Replacement Utility updated for latest IMO load         Primary osteoarthritis of right knee 10/29/2015     Priority: Medium     Urticaria      Priority: Medium     Created by Conversion         Irritable Bowel Syndrome      Priority: Medium     Created by Conversion          Past Medical History:   Diagnosis Date     Anxiety      Arthritis      Breast cancer (H) 2003     Depression      Esophageal reflux      H/O colonoscopy 03/2022    hyperplastic polyp noted, no further colonoscopy recommended per [facility]     History of anesthesia complications     nausea     Hx antineoplastic chemotherapy 2003    for breast cancer     Hx of radiation therapy 2004    for breast cancer     IBS (irritable bowel syndrome)      Lactose intolerance     lactose breath test positive at GI, 7/2019     Macular degeneration 08/2019    caught early, takes Areds 2 on a daily basis, seeing opthamology     Neuropathy     secondary to chemotherapy     Osteopenia      PONV (postoperative nausea and vomiting)      Past Surgical History:   Procedure Laterality Date     APPENDECTOMY  1967     BACK SURGERY  2000    cervical fusion     BIOPSY BREAST Right 2003     BIOPSY BREAST Right 07/09/2021    dense fibrotic tissue, Dr Burleson     CATARACT EXTRACTION, BILATERAL       CHOLECYSTECTOMY  1994     HYSTERECTOMY  1997    desmoid cyst     IR CVC TUNNEL PLACEMENT > 5 YRS OF AGE  01/21/2004     IR MISCELLANEOUS PROCEDURE   07/16/2004     LASER SURGERY OF EYE Bilateral 09/2022    cataracts     LUMPECTOMY BREAST  12/2003    breast cancer     NISSEN FUNDOPLICATION  2010     OOPHORECTOMY  1997     [facility] [place] THORAX SPINE FUSN,POST TECH N/A 01/29/2021    Procedure: BILATERAL LUMBAR 4-5 LAMINECTOMIES FOR RIGHT SYNOVIAL CYST RESECTION, LUMBAR 4-5, LUMBAR 4-5 POSTERIOR INSTRUMENTED FUSION AND ARTHRODESIS, USE OF ALLOGRAFT, USE OF AUTOGRAFT, STEALTH NAVIGATION;  Surgeon: Lisa Thomas MD;  Location: [facility] Main OR;  Service: Spine     [facility] [place] TOTAL KNEE ARTHROPLASTY Right 10/29/2015    Procedure: RIGHT TOTAL KNEE  ARTHROPLASTY;  Surgeon: Nitin White MD;  Location: [facility] Main OR;  Service: Orthopedics     [facility] [place] TOTAL KNEE ARTHROPLASTY Left 04/21/2016    Procedure: LEFT TOTAL KNEE ARTHROPLASTY;  Surgeon: Nitin White MD;  Location: [facility] Main OR;  Service: Orthopedics     Current Outpatient Medications   Medication Sig Dispense Refill     calcium citrate-vitamin D (CITRACAL+D) 315-200 mg-unit per tablet [CALCIUM CITRATE-VITAMIN D (CITRACAL+D) 315-200 MG-UNIT PER TABLET] Take 1 tablet by mouth 2 (two) times a day.       citalopram (CELEXA) 20 MG tablet Take 1 tablet (20 mg) by mouth daily 90 tablet 0     dicyclomine (BENTYL) 10 MG capsule Take 1 capsule by mouth as needed       furosemide (LASIX) 20 MG tablet TAKE 1 TABLET DAILY 90 tablet 3     lactase (LACTAID) 3000 UNIT tablet        Lactobacillus Rhamnosus, GG, ( PROBIOTIC DIGESTIVE CARE) CAPS Take 1 capsule by mouth every 24 hours       magnesium 250 mg Tab [MAGNESIUM 250 MG TAB] Take 250 mg by mouth daily.        multivit-min-iron-FA-lutein (CENTRUM SILVER WOMEN) 8 mg iron-400 mcg-300 mcg Tab [MULTIVIT-MIN-IRON-FA-LUTEIN (CENTRUM SILVER WOMEN) 8 MG IRON-400 MCG-300 MCG TAB] Take 1 tablet by mouth daily.        omeprazole (PRILOSEC) 20 MG DR capsule Take 1 capsule by mouth 2 times daily       polyethylene glycol (MIRALAX) 17 g packet Take 1 packet by mouth daily        "tetrahydroz-peg 400-hyprom-gly (VISINE MAX REDNESS RELIEF) 0.05-1-0.36-0.2 % Drop [TETRAHYDROZ--HYPROM-GLY (VISINE MAX REDNESS RELIEF) 0.05-1-0.36-0.2 % DROP] Administer 1 drop to both eyes 3 (three) times a day as needed.       vit C/E/Zn/coppr/lutein/zeaxan (PRESERVISION AREDS-2 ORAL) [VIT C/E/ZN/COPPR/LUTEIN/ZEAXAN (PRESERVISION AREDS-2 ORAL)] Take 1 tablet by mouth 2 (two) times a day.        denosumab (PROLIA) 60 MG/ML SOSY injection Inject 60 mg Subcutaneous         Allergies   Allergen Reactions     Adhesive Tape      Other reaction(s): Rash     Chocolate Flavor Diarrhea     Gabapentin Unknown     Has dizziness, spaced out when on it. Has been on it twice and both times had this type of reaction. Should not be on it in the future.      Green Pepper [Capsicum] Unknown     Stomach upset     Lactose Diarrhea     Monosodium Glutamate Unknown     diarrhea        Social History     Tobacco Use     Smoking status: Never     Passive exposure: Never     Smokeless tobacco: Never   Vaping Use     Vaping status: Never Used   Substance Use Topics     Alcohol use: Yes     Alcohol/week: 3.0 standard drinks of alcohol     Types: 3 Standard drinks or equivalent per week     Comment: 3-4 per week     Family History   Problem Relation Age of Onset     Breast Cancer Maternal Aunt 46     Kidney Cancer Mother 67     Hypertension Mother      Heart Disease Mother      Hyperlipidemia Mother      Alzheimer Disease Father      Atrial fibrillation Father      Aortic stenosis Father      Skin Cancer Father      Cerebrovascular Disease Father      Depression Father      Anxiety Disorder Father      Lung Cancer Paternal Grandfather      Substance Abuse Nephew      History   Drug Use No         Objective     /72 (BP Location: Left arm, Patient Position: Sitting, Cuff Size: Adult Regular)   Pulse 86   Temp 98.4  F (36.9  C) (Oral)   Resp 14   Ht 1.626 m (5' 4\")   Wt 79.8 kg (176 lb)   SpO2 98%   BMI 30.21 kg/m  "     Physical Exam    GENERAL APPEARANCE: healthy, alert and no distress     EYES: EOMI, PERRL     HENT: ear canals and TM's normal and nose and mouth without ulcers or lesions     NECK: no adenopathy, no asymmetry, masses, or scars and thyroid normal to palpation     RESP: lungs clear to auscultation - no rales, rhonchi or wheezes     CV: regular rates and rhythm, normal S1 S2, no S3 or S4 and no murmur, click or rub     ABDOMEN:  soft, nontender, no HSM or masses and bowel sounds normal     MS: extremities normal- no gross deformities noted, no evidence of inflammation in joints, FROM in all extremities.     SKIN: no suspicious lesions or rashes     NEURO: Normal strength and tone, sensory exam grossly normal, mentation intact and speech normal     PSYCH: mentation appears normal. and affect normal/bright     LYMPHATICS: No cervical adenopathy    Recent Labs   Lab Test 05/10/23  1016 11/21/22  1621 06/27/22  1611   HGB 14.4  --  14.0     --  274    141 140   POTASSIUM 5.3 4.9 4.3   CR 0.61 0.63 0.70        Diagnostics:  No labs were ordered during this visit.   No EKG required for low risk surgery (cataract, skin procedure, breast biopsy, etc).    Revised Cardiac Risk Index (RCRI):  The patient has the following serious cardiovascular risks for perioperative complications:   - No serious cardiac risks = 0 points     RCRI Interpretation: 1 point: Class II (low risk - 0.9% complication rate)    A/P:  (Z01.818) Preoperative examination  (primary encounter diagnosis)  Comment: Low risk  Plan: Cleared for surgery.    (M20.5X1) Hallux limitus of right foot  Comment:   Plan: Surgery as planned    (F33.0) Mild episode of recurrent major depressive disorder (H)  Comment: Stable  Plan:            Signed Electronically by: Kera Lao CNP  Copy of this evaluation report is provided to requesting physician.

## 2023-05-18 NOTE — PROGRESS NOTES
YA [facility] [place]  [address],   [place] [facility] [address]  Phone: [phone]  Fax: [phone]  Primary Provider: Rebecca - [place], [facility]  Pre-op Performing Provider: WILLI BERUMEN    PREOPERATIVE EVALUATION:  Today's date: 5/18/2023    Adelina Espinoza is a 77 year old female who presents for a preoperative evaluation.      5/18/2023     3:13 PM   Additional Questions   Roomed by Janice Cowart   Accompanied by none     Surgical Information:  Surgery/Procedure: R foot repair  Surgery Location: [facility]  Surgeon: Dr. Sparks  Surgery Date: 6/16/23  Time of Surgery: TBD  Where patient plans to recover: At home with family  Fax number for surgical facility: [phone]      Subjective       HPI related to upcoming procedure: right foot/toe pain x 4 years- aching pain, sharp at times.  No radiation of pain.  Worse with weight bearing, better with rest.         5/11/2023    11:47 AM   Preop Questions   1. Have you ever had a heart attack or stroke? No   2. Have you ever had surgery on your heart or blood vessels, such as a stent placement, a coronary artery bypass, or surgery on an artery in your head, neck, heart, or legs? No   3. Do you have chest pain with activity? No   4. Do you have a history of  heart failure? No   5. Do you currently have a cold, bronchitis or symptoms of other infection? No   6. Do you have a cough, shortness of breath, or wheezing? No   7. Do you or anyone in your family have previous history of blood clots? No   8. Do you or does anyone in your family have a serious bleeding problem such as prolonged bleeding following surgeries or cuts? No   9. Have you ever had problems with anemia or been told to take iron pills? No   10. Have you had any abnormal blood loss such as black, tarry or bloody stools, or abnormal vaginal bleeding? No   11. Have you ever had a blood transfusion? No   12. Are  you willing to have a blood transfusion if it is medically needed before, during, or after your surgery? Yes   13. Have you or any of your relatives ever had problems with anesthesia? YES - nausea   14. Do you have sleep apnea, excessive snoring or daytime drowsiness? No   15. Do you have any artifical heart valves or other implanted medical devices like a pacemaker, defibrillator, or continuous glucose monitor? No   16. Do you have artificial joints? YES - knees   17. Are you allergic to latex? No       Health Care Directive:  Patient does not have a Health Care Directive or Living Will: Patient states has Advance Directive and will bring in a copy to clinic.    Preoperative Review of :  Status of Chronic Conditions: stable      Review of Systems  Constitutional, neuro, ENT, endocrine, pulmonary, cardiac, gastrointestinal, genitourinary, musculoskeletal, integument and psychiatric systems are negative, except as otherwise noted.    Patient Active Problem List    Diagnosis Date Noted     Hallux limitus, right 05/02/2023     Priority: Medium     Added automatically from request for surgery [id number]       Hypomagnesemia 08/03/2021     Priority: Medium     Lumbar stenosis with neurogenic claudication 01/29/2021     Priority: Medium     Spinal stenosis of lumbar region with neurogenic claudication 12/23/2020     Priority: Medium     Added automatically from request for surgery [id number]         Synovial cyst of lumbar facet joint 12/23/2020     Priority: Medium     Added automatically from request for surgery [id number]         Lumbar disc herniation 12/23/2020     Priority: Medium     Added automatically from request for surgery [id number]         Arthritis of first metatarsophalangeal (MTP) joint of right foot 03/05/2019     Priority: Medium     Esophageal reflux 02/14/2017     Priority: Medium     Osteoporosis, senile 09/13/2016     Priority: Medium     Peripheral Neuropathy      Priority: Medium     Created by  Conversion  Health [facility] Annotation: Jun 21 2009  9:51PM - Toya Charles: AFTER   CHEMO  Replacement Utility updated for latest IMO load         Major Depression, Recurrent      Priority: Medium     Created by Conversion  [facility] Annotation: Jun 6 2013 11:43AM - Marcia Cisse: 1995, 2012  Replacement Utility updated for latest IMO load         Anxiety      Priority: Medium     Created by Conversion  Replacement Utility updated for latest IMO load         Primary osteoarthritis of knee, left 04/21/2016     Priority: Medium     Replacement Utility updated for latest IMO load         Primary osteoarthritis of right knee 10/29/2015     Priority: Medium     Urticaria      Priority: Medium     Created by Conversion         Irritable Bowel Syndrome      Priority: Medium     Created by Conversion          Past Medical History:   Diagnosis Date     Anxiety      Arthritis      Breast cancer (H) 2003     Depression      Esophageal reflux      H/O colonoscopy 03/2022    hyperplastic polyp noted, no further colonoscopy recommended per [facility]     History of anesthesia complications     nausea     Hx antineoplastic chemotherapy 2003    for breast cancer     Hx of radiation therapy 2004    for breast cancer     IBS (irritable bowel syndrome)      Lactose intolerance     lactose breath test positive at GI, 7/2019     Macular degeneration 08/2019    caught early, takes Areds 2 on a daily basis, seeing opthamology     Neuropathy     secondary to chemotherapy     Osteopenia      PONV (postoperative nausea and vomiting)      Past Surgical History:   Procedure Laterality Date     APPENDECTOMY  1967     BACK SURGERY  2000    cervical fusion     BIOPSY BREAST Right 2003     BIOPSY BREAST Right 07/09/2021    dense fibrotic tissue, Dr Burleson     CATARACT EXTRACTION, BILATERAL       CHOLECYSTECTOMY  1994     HYSTERECTOMY  1997    desmoid cyst     IR CVC TUNNEL PLACEMENT > 5 YRS OF AGE  01/21/2004     IR MISCELLANEOUS PROCEDURE   07/16/2004     LASER SURGERY OF EYE Bilateral 09/2022    cataracts     LUMPECTOMY BREAST  12/2003    breast cancer     NISSEN FUNDOPLICATION  2010     OOPHORECTOMY  1997     [facility] THORAX SPINE FUSN,POST TECH N/A 01/29/2021    Procedure: BILATERAL LUMBAR 4-5 LAMINECTOMIES FOR RIGHT SYNOVIAL CYST RESECTION, LUMBAR 4-5, LUMBAR 4-5 POSTERIOR INSTRUMENTED FUSION AND ARTHRODESIS, USE OF ALLOGRAFT, USE OF AUTOGRAFT, STEALTH NAVIGATION;  Surgeon: Lisa Thomas MD;  Location: [facility] Main OR;  Service: Spine     [facility] TOTAL KNEE ARTHROPLASTY Right 10/29/2015    Procedure: RIGHT TOTAL KNEE  ARTHROPLASTY;  Surgeon: Nitin White MD;  Location: [facility] Main OR;  Service: Orthopedics     [facility] TOTAL KNEE ARTHROPLASTY Left 04/21/2016    Procedure: LEFT TOTAL KNEE ARTHROPLASTY;  Surgeon: Nitin White MD;  Location: [facility] Main OR;  Service: Orthopedics     Current Outpatient Medications   Medication Sig Dispense Refill     calcium citrate-vitamin D (CITRACAL+D) 315-200 mg-unit per tablet [CALCIUM CITRATE-VITAMIN D (CITRACAL+D) 315-200 MG-UNIT PER TABLET] Take 1 tablet by mouth 2 (two) times a day.       citalopram (CELEXA) 20 MG tablet Take 1 tablet (20 mg) by mouth daily 90 tablet 0     dicyclomine (BENTYL) 10 MG capsule Take 1 capsule by mouth as needed       furosemide (LASIX) 20 MG tablet TAKE 1 TABLET DAILY 90 tablet 3     lactase (LACTAID) 3000 UNIT tablet        Lactobacillus Rhamnosus, GG, ( PROBIOTIC DIGESTIVE CARE) CAPS Take 1 capsule by mouth every 24 hours       magnesium 250 mg Tab [MAGNESIUM 250 MG TAB] Take 250 mg by mouth daily.        multivit-min-iron-FA-lutein (CENTRUM SILVER WOMEN) 8 mg iron-400 mcg-300 mcg Tab [MULTIVIT-MIN-IRON-FA-LUTEIN (CENTRUM SILVER WOMEN) 8 MG IRON-400 MCG-300 MCG TAB] Take 1 tablet by mouth daily.        omeprazole (PRILOSEC) 20 MG DR capsule Take 1 capsule by mouth 2 times daily       polyethylene glycol (MIRALAX) 17 g packet Take 1 packet by mouth daily        "tetrahydroz-peg 400-hyprom-gly (VISINE MAX REDNESS RELIEF) 0.05-1-0.36-0.2 % Drop [TETRAHYDROZ--HYPROM-GLY (VISINE MAX REDNESS RELIEF) 0.05-1-0.36-0.2 % DROP] Administer 1 drop to both eyes 3 (three) times a day as needed.       vit C/E/Zn/coppr/lutein/zeaxan (PRESERVISION AREDS-2 ORAL) [VIT C/E/ZN/COPPR/LUTEIN/ZEAXAN (PRESERVISION AREDS-2 ORAL)] Take 1 tablet by mouth 2 (two) times a day.        denosumab (PROLIA) 60 MG/ML SOSY injection Inject 60 mg Subcutaneous         Allergies   Allergen Reactions     Adhesive Tape      Other reaction(s): Rash     Chocolate Flavor Diarrhea     Gabapentin Unknown     Has dizziness, spaced out when on it. Has been on it twice and both times had this type of reaction. Should not be on it in the future.      Green Pepper [Capsicum] Unknown     Stomach upset     Lactose Diarrhea     Monosodium Glutamate Unknown     diarrhea        Social History     Tobacco Use     Smoking status: Never     Passive exposure: Never     Smokeless tobacco: Never   Vaping Use     Vaping status: Never Used   Substance Use Topics     Alcohol use: Yes     Alcohol/week: 3.0 standard drinks of alcohol     Types: 3 Standard drinks or equivalent per week     Comment: 3-4 per week     Family History   Problem Relation Age of Onset     Breast Cancer Maternal Aunt 46     Kidney Cancer Mother 67     Hypertension Mother      Heart Disease Mother      Hyperlipidemia Mother      Alzheimer Disease Father      Atrial fibrillation Father      Aortic stenosis Father      Skin Cancer Father      Cerebrovascular Disease Father      Depression Father      Anxiety Disorder Father      Lung Cancer Paternal Grandfather      Substance Abuse Nephew      History   Drug Use No         Objective     /72 (BP Location: Left arm, Patient Position: Sitting, Cuff Size: Adult Regular)   Pulse 86   Temp 98.4  F (36.9  C) (Oral)   Resp 14   Ht 1.626 m (5' 4\")   Wt 79.8 kg (176 lb)   SpO2 98%   BMI 30.21 kg/m  "     Physical Exam    GENERAL APPEARANCE: healthy, alert and no distress     EYES: EOMI, PERRL     HENT: ear canals and TM's normal and nose and mouth without ulcers or lesions     NECK: no adenopathy, no asymmetry, masses, or scars and thyroid normal to palpation     RESP: lungs clear to auscultation - no rales, rhonchi or wheezes     CV: regular rates and rhythm, normal S1 S2, no S3 or S4 and no murmur, click or rub     ABDOMEN:  soft, nontender, no HSM or masses and bowel sounds normal     MS: extremities normal- no gross deformities noted, no evidence of inflammation in joints, FROM in all extremities.     SKIN: no suspicious lesions or rashes     NEURO: Normal strength and tone, sensory exam grossly normal, mentation intact and speech normal     PSYCH: mentation appears normal. and affect normal/bright     LYMPHATICS: No cervical adenopathy    Recent Labs   Lab Test 05/10/23  1016 11/21/22  1621 06/27/22  1611   HGB 14.4  --  14.0     --  274    141 140   POTASSIUM 5.3 4.9 4.3   CR 0.61 0.63 0.70        Diagnostics:  No labs were ordered during this visit.   No EKG required for low risk surgery (cataract, skin procedure, breast biopsy, etc).    Revised Cardiac Risk Index (RCRI):  The patient has the following serious cardiovascular risks for perioperative complications:   - No serious cardiac risks = 0 points     RCRI Interpretation: 1 point: Class II (low risk - 0.9% complication rate)    A/P:  (Z01.818) Preoperative examination  (primary encounter diagnosis)  Comment: Low risk  Plan: Cleared for surgery.    (M20.5X1) Hallux limitus of right foot  Comment:   Plan: Surgery as planned    (F33.0) Mild episode of recurrent major depressive disorder (H)  Comment: Stable  Plan:            Signed Electronically by: Kera Lao CNP  Copy of this evaluation report is provided to requesting physician.

## 2023-05-19 DIAGNOSIS — F41.1 ANXIETY STATE: ICD-10-CM

## 2023-05-19 DIAGNOSIS — F33.0 MILD EPISODE OF RECURRENT MAJOR DEPRESSIVE DISORDER (H): ICD-10-CM

## 2023-05-20 RX ORDER — CITALOPRAM HYDROBROMIDE 20 MG/1
20 TABLET ORAL DAILY
Qty: 90 TABLET | Refills: 0 | OUTPATIENT
Start: 2023-05-20

## 2023-05-20 NOTE — TELEPHONE ENCOUNTER
"Routing refill request to provider for review/approval because:  PHQ9 is 5    Last Written Prescription Date:  5/10/2023  Last Fill Quantity: 90,  # refills: 0   Last office visit provider:  5/18/2023     Requested Prescriptions   Pending Prescriptions Disp Refills     citalopram (CELEXA) 20 MG tablet [Pharmacy Med Name: Citalopram Hydrobromide Oral Tablet 20 MG] 90 tablet 0     Sig: Take 1 tablet (20 mg) by mouth daily       SSRIs Protocol Failed - 5/19/2023  9:38 AM        Failed - PHQ-9 score less than 5 in past 6 months     Please review last PHQ-9 score.           Passed - Medication is active on med list        Passed - Patient is age 18 or older        Passed - No active pregnancy on record        Passed - No positive pregnancy test in last 12 months        Passed - Recent (6 mo) or future (30 days) visit within the authorizing provider's specialty     Patient had office visit in the last 6 months or has a visit in the next 30 days with authorizing provider or within the authorizing provider's specialty.  See \"Patient Info\" tab in inbasket, or \"Choose Columns\" in Meds & Orders section of the refill encounter.                 Marifer Brown RN 05/20/23 2:53 AM  "

## 2023-05-22 ENCOUNTER — THERAPY VISIT (OUTPATIENT)
Dept: PHYSICAL THERAPY | Facility: REHABILITATION | Age: 78
End: 2023-05-22
Attending: INTERNAL MEDICINE
Payer: MEDICARE

## 2023-05-22 DIAGNOSIS — M81.0 OSTEOPOROSIS, SENILE: Primary | ICD-10-CM

## 2023-05-22 DIAGNOSIS — R26.89 BALANCE PROBLEMS: ICD-10-CM

## 2023-05-22 DIAGNOSIS — M62.81 GENERALIZED MUSCLE WEAKNESS: ICD-10-CM

## 2023-05-22 DIAGNOSIS — Z91.81 RISK FOR FALLS: ICD-10-CM

## 2023-05-22 PROCEDURE — 97112 NEUROMUSCULAR REEDUCATION: CPT | Mod: GP | Performed by: PHYSICAL THERAPIST

## 2023-05-22 PROCEDURE — 97110 THERAPEUTIC EXERCISES: CPT | Mod: GP | Performed by: PHYSICAL THERAPIST

## 2023-06-05 ENCOUNTER — THERAPY VISIT (OUTPATIENT)
Dept: PHYSICAL THERAPY | Facility: REHABILITATION | Age: 78
End: 2023-06-05
Attending: INTERNAL MEDICINE
Payer: MEDICARE

## 2023-06-05 DIAGNOSIS — Z91.81 RISK FOR FALLS: ICD-10-CM

## 2023-06-05 DIAGNOSIS — M62.81 GENERALIZED MUSCLE WEAKNESS: ICD-10-CM

## 2023-06-05 DIAGNOSIS — R26.89 BALANCE PROBLEMS: Primary | ICD-10-CM

## 2023-06-05 DIAGNOSIS — M81.0 OSTEOPOROSIS, SENILE: ICD-10-CM

## 2023-06-05 PROCEDURE — 97110 THERAPEUTIC EXERCISES: CPT | Mod: GP | Performed by: PHYSICAL THERAPIST

## 2023-06-05 PROCEDURE — 97112 NEUROMUSCULAR REEDUCATION: CPT | Mod: GP | Performed by: PHYSICAL THERAPIST

## 2023-06-15 ENCOUNTER — ANESTHESIA EVENT (OUTPATIENT)
Dept: SURGERY | Facility: AMBULATORY SURGERY CENTER | Age: 78
End: 2023-06-15
Payer: MEDICARE

## 2023-06-15 NOTE — ANESTHESIA PREPROCEDURE EVALUATION
Anesthesia Pre-Procedure Evaluation    Patient: Adelina Espinoza   MRN: [id number] : 1945        Procedure : Procedure(s):  First metatarsal phalangeal joint implant arthroplasty right foot          Past Medical History:   Diagnosis Date     Anxiety      Arthritis      Breast cancer (H)      Depression      Esophageal reflux      H/O colonoscopy 2022    hyperplastic polyp noted, no further colonoscopy recommended per [facility]     History of anesthesia complications     nausea     Hx antineoplastic chemotherapy     for breast cancer     Hx of radiation therapy     for breast cancer     IBS (irritable bowel syndrome)      Lactose intolerance     lactose breath test positive at GI, 2019     Macular degeneration 2019    caught early, takes Areds 2 on a daily basis, seeing opthamology     Neuropathy     secondary to chemotherapy     Osteopenia      PONV (postoperative nausea and vomiting)       Past Surgical History:   Procedure Laterality Date     APPENDECTOMY  1967     BACK SURGERY      cervical fusion     BIOPSY BREAST Right      BIOPSY BREAST Right 2021    dense fibrotic tissue, Dr Burleson     CATARACT EXTRACTION, BILATERAL       CHOLECYSTECTOMY       HYSTERECTOMY      desmoid cyst     IR CVC TUNNEL PLACEMENT > 5 YRS OF AGE  2004     IR MISCELLANEOUS PROCEDURE  2004     LASER SURGERY OF EYE Bilateral 2022    cataracts     LUMPECTOMY BREAST  2003    breast cancer     NISSEN FUNDOPLICATION  2010     OOPHORECTOMY       [facility] THORAX SPINE FUSN,POST TECH N/A 2021    Procedure: BILATERAL LUMBAR 4-5 LAMINECTOMIES FOR RIGHT SYNOVIAL CYST RESECTION, LUMBAR 4-5, LUMBAR 4-5 POSTERIOR INSTRUMENTED FUSION AND ARTHRODESIS, USE OF ALLOGRAFT, USE OF AUTOGRAFT, STEALTH NAVIGATION;  Surgeon: Lisa Thomas MD;  [place] [facility] [place]     [facility] TOTAL KNEE ARTHROPLASTY Right 10/29/2015    Procedure: RIGHT TOTAL KNEE  ARTHROPLASTY;   Surgeon: Nitin White MD;  Location: [facility] Main OR;  Service: Orthopedics     [facility] TOTAL KNEE ARTHROPLASTY Left 04/21/2016    Procedure: LEFT TOTAL KNEE ARTHROPLASTY;  Surgeon: Nitin White MD;  Location: [facility] Main OR;  Service: Orthopedics      Allergies   Allergen Reactions     Adhesive Tape      Other reaction(s): Rash     Chocolate Flavor Diarrhea     Gabapentin Unknown     Has dizziness, spaced out when on it. Has been on it twice and both times had this type of reaction. Should not be on it in the future.      Green Pepper [Capsicum] Unknown     Stomach upset     Lactose Diarrhea     Monosodium Glutamate Unknown     diarrhea      Social History     Tobacco Use     Smoking status: Never     Passive exposure: Never     Smokeless tobacco: Never   Vaping Use     Vaping status: Never Used   Substance Use Topics     Alcohol use: Yes     Alcohol/week: 3.0 standard drinks of alcohol     Types: 3 Standard drinks or equivalent per week     Comment: 3-4 per week      Wt Readings from Last 1 Encounters:   05/18/23 79.8 kg (176 lb)        Anesthesia Evaluation   Pt has had prior anesthetic. Type: General and Regional.    History of anesthetic complications  - PONV.      ROS/MED HX  ENT/Pulmonary:  - neg pulmonary ROS     Neurologic:     (+) peripheral neuropathy,     Cardiovascular:  - neg cardiovascular ROS  (-) hypertension, CHF and dyslipidemia   METS/Exercise Tolerance: >4 METS    Hematologic:  - neg hematologic  ROS  (-) history of blood clots   Musculoskeletal: Comment: Lumbar stenosis  (+) arthritis,     GI/Hepatic: Comment: IBS  No sx of GERD this morning    (+) GERD, Symptomatic,  (-) liver disease   Renal/Genitourinary:  - neg Renal ROS  (-) renal disease   Endo:  - neg endo ROS  (-) Type I DM, Type II DM, thyroid disease and obesity   Psychiatric/Substance Use:     (+) psychiatric history anxiety     Infectious Disease:  - neg infectious disease ROS     Malignancy:   (+) Malignancy, History  of Breast.Breast CA status post Chemo and Radiation.        Other:  - neg other ROS          Physical Exam    Airway  airway exam normal      Mallampati: II   TM distance: > 3 FB   Neck ROM: full   Mouth opening: > 3 cm    Respiratory Devices and Support         Dental       (+) Modest Abnormalities - crowns, retainers, 1 or 2 missing teeth      Cardiovascular   cardiovascular exam normal       Rhythm and rate: regular and normal     Pulmonary   pulmonary exam normal        breath sounds clear to auscultation           OUTSIDE LABS:  CBC:   Lab Results   Component Value Date    WBC 6.3 05/10/2023    WBC 7.8 06/27/2022    HGB 14.4 05/10/2023    HGB 14.0 06/27/2022    HCT 44.3 05/10/2023    HCT 43.3 06/27/2022     05/10/2023     06/27/2022     BMP:   Lab Results   Component Value Date     05/10/2023     11/21/2022    POTASSIUM 5.3 05/10/2023    POTASSIUM 4.9 11/21/2022    CHLORIDE 103 05/10/2023    CHLORIDE 102 11/21/2022    CO2 27 05/10/2023    CO2 25 11/21/2022    BUN 10.9 05/10/2023    BUN 10.9 11/21/2022    CR 0.61 05/10/2023    CR 0.63 11/21/2022     (H) 05/10/2023    GLC 84 11/21/2022     COAGS:   Lab Results   Component Value Date    PTT 26 01/11/2021    INR 0.91 01/11/2021     POC: No results found for: BGM, HCG, HCGS  HEPATIC:   Lab Results   Component Value Date    ALBUMIN 4.6 05/10/2023    PROTTOTAL 7.9 05/10/2023    ALT 22 05/10/2023    AST 29 05/10/2023    ALKPHOS 75 05/10/2023    BILITOTAL 0.9 05/10/2023     OTHER:   Lab Results   Component Value Date    ESTEBAN 9.9 05/10/2023    MAG 2.2 05/10/2023    TSH 1.52 09/04/2020    CRP 2.8 05/12/2022    SED 14 05/12/2022       Anesthesia Plan    ASA Status:  2   NPO Status:  NPO Appropriate    Anesthesia Type: MAC.              Consents    Anesthesia Plan(s) and associated risks, benefits, and realistic alternatives discussed. Questions answered and patient/representative(s) expressed understanding.     - Discussed: Risks, Benefits  and Alternatives for BOTH SEDATION and the PROCEDURE were discussed     - Discussed with:  Patient, Spouse         Postoperative Care    Pain management: Oral pain medications, IV analgesics.   PONV prophylaxis: Ondansetron (or other 5HT-3), Dexamethasone or Solumedrol     Comments:                Candy Faustin MD

## 2023-06-16 ENCOUNTER — HOSPITAL ENCOUNTER (OUTPATIENT)
Facility: AMBULATORY SURGERY CENTER | Age: 78
Discharge: HOME | End: 2023-06-16
Attending: PODIATRIST
Payer: MEDICARE

## 2023-06-16 ENCOUNTER — ANESTHESIA (OUTPATIENT)
Dept: SURGERY | Facility: AMBULATORY SURGERY CENTER | Age: 78
End: 2023-06-16
Payer: MEDICARE

## 2023-06-16 VITALS
WEIGHT: 170 LBS | HEIGHT: 64 IN | SYSTOLIC BLOOD PRESSURE: 123 MMHG | TEMPERATURE: 97.6 F | DIASTOLIC BLOOD PRESSURE: 71 MMHG | OXYGEN SATURATION: 99 % | BODY MASS INDEX: 29.02 KG/M2 | RESPIRATION RATE: 16 BRPM | HEART RATE: 71 BPM

## 2023-06-16 DIAGNOSIS — M20.5X1 HALLUX LIMITUS, RIGHT: ICD-10-CM

## 2023-06-16 PROCEDURE — 28291 CORRJ HALUX RIGDUS W/IMPLT: CPT | Mod: RT | Performed by: PODIATRIST

## 2023-06-16 DEVICE — FLEXIBLE GREAT TOE IMPLANT, SURGICAL INSTRUMENTS
Type: IMPLANTABLE DEVICE | Site: TOE | Status: FUNCTIONAL
Brand: RTS SURGICAL INSTRUMENTS

## 2023-06-16 RX ORDER — OXYCODONE HYDROCHLORIDE 10 MG/1
10 TABLET ORAL
Status: DISCONTINUED | OUTPATIENT
Start: 2023-06-16 | End: 2023-06-17 | Stop reason: HOSPADM

## 2023-06-16 RX ORDER — ONDANSETRON 2 MG/ML
4 INJECTION INTRAMUSCULAR; INTRAVENOUS EVERY 30 MIN PRN
Status: DISCONTINUED | OUTPATIENT
Start: 2023-06-16 | End: 2023-06-17 | Stop reason: HOSPADM

## 2023-06-16 RX ORDER — DEXAMETHASONE SODIUM PHOSPHATE 4 MG/ML
INJECTION, SOLUTION INTRA-ARTICULAR; INTRALESIONAL; INTRAMUSCULAR; INTRAVENOUS; SOFT TISSUE PRN
Status: DISCONTINUED | OUTPATIENT
Start: 2023-06-16 | End: 2023-06-16

## 2023-06-16 RX ORDER — FENTANYL CITRATE 50 UG/ML
INJECTION, SOLUTION INTRAMUSCULAR; INTRAVENOUS PRN
Status: DISCONTINUED | OUTPATIENT
Start: 2023-06-16 | End: 2023-06-16

## 2023-06-16 RX ORDER — CEPHALEXIN 500 MG/1
500 CAPSULE ORAL 2 TIMES DAILY
Qty: 14 CAPSULE | Refills: 0 | Status: SHIPPED | OUTPATIENT
Start: 2023-06-16 | End: 2023-06-23

## 2023-06-16 RX ORDER — OXYCODONE HYDROCHLORIDE 5 MG/1
5 TABLET ORAL
Status: DISCONTINUED | OUTPATIENT
Start: 2023-06-16 | End: 2023-06-17 | Stop reason: HOSPADM

## 2023-06-16 RX ORDER — PROPOFOL 10 MG/ML
INJECTION, EMULSION INTRAVENOUS CONTINUOUS PRN
Status: DISCONTINUED | OUTPATIENT
Start: 2023-06-16 | End: 2023-06-16

## 2023-06-16 RX ORDER — LIDOCAINE HYDROCHLORIDE 20 MG/ML
INJECTION, SOLUTION INFILTRATION; PERINEURAL PRN
Status: DISCONTINUED | OUTPATIENT
Start: 2023-06-16 | End: 2023-06-16

## 2023-06-16 RX ORDER — ACETAMINOPHEN 325 MG/1
975 TABLET ORAL ONCE
Status: COMPLETED | OUTPATIENT
Start: 2023-06-16 | End: 2023-06-16

## 2023-06-16 RX ORDER — GLYCOPYRROLATE 0.2 MG/ML
INJECTION, SOLUTION INTRAMUSCULAR; INTRAVENOUS PRN
Status: DISCONTINUED | OUTPATIENT
Start: 2023-06-16 | End: 2023-06-16

## 2023-06-16 RX ORDER — LIDOCAINE 40 MG/G
CREAM TOPICAL
Status: DISCONTINUED | OUTPATIENT
Start: 2023-06-16 | End: 2023-06-17 | Stop reason: HOSPADM

## 2023-06-16 RX ORDER — ONDANSETRON 2 MG/ML
INJECTION INTRAMUSCULAR; INTRAVENOUS PRN
Status: DISCONTINUED | OUTPATIENT
Start: 2023-06-16 | End: 2023-06-16

## 2023-06-16 RX ORDER — CEFAZOLIN SODIUM 2 G/100ML
2 INJECTION, SOLUTION INTRAVENOUS
Status: COMPLETED | OUTPATIENT
Start: 2023-06-16 | End: 2023-06-16

## 2023-06-16 RX ORDER — SODIUM CHLORIDE, SODIUM LACTATE, POTASSIUM CHLORIDE, CALCIUM CHLORIDE 600; 310; 30; 20 MG/100ML; MG/100ML; MG/100ML; MG/100ML
INJECTION, SOLUTION INTRAVENOUS CONTINUOUS
Status: DISCONTINUED | OUTPATIENT
Start: 2023-06-16 | End: 2023-06-17 | Stop reason: HOSPADM

## 2023-06-16 RX ORDER — ONDANSETRON 4 MG/1
4 TABLET, ORALLY DISINTEGRATING ORAL EVERY 30 MIN PRN
Status: DISCONTINUED | OUTPATIENT
Start: 2023-06-16 | End: 2023-06-17 | Stop reason: HOSPADM

## 2023-06-16 RX ORDER — HYDROCODONE BITARTRATE AND ACETAMINOPHEN 5; 325 MG/1; MG/1
1-2 TABLET ORAL EVERY 6 HOURS PRN
Qty: 20 TABLET | Refills: 0 | Status: SHIPPED | OUTPATIENT
Start: 2023-06-16 | End: 2023-10-04

## 2023-06-16 RX ADMIN — GLYCOPYRROLATE 0.1 MG: 0.2 INJECTION, SOLUTION INTRAMUSCULAR; INTRAVENOUS at 09:11

## 2023-06-16 RX ADMIN — SODIUM CHLORIDE, SODIUM LACTATE, POTASSIUM CHLORIDE, CALCIUM CHLORIDE: 600; 310; 30; 20 INJECTION, SOLUTION INTRAVENOUS at 08:00

## 2023-06-16 RX ADMIN — PROPOFOL 140 MCG/KG/MIN: 10 INJECTION, EMULSION INTRAVENOUS at 09:09

## 2023-06-16 RX ADMIN — ACETAMINOPHEN 975 MG: 325 TABLET ORAL at 07:56

## 2023-06-16 RX ADMIN — DEXAMETHASONE SODIUM PHOSPHATE 8 MG: 4 INJECTION, SOLUTION INTRA-ARTICULAR; INTRALESIONAL; INTRAMUSCULAR; INTRAVENOUS; SOFT TISSUE at 09:24

## 2023-06-16 RX ADMIN — CEFAZOLIN SODIUM 2 G: 2 INJECTION, SOLUTION INTRAVENOUS at 09:07

## 2023-06-16 RX ADMIN — LIDOCAINE HYDROCHLORIDE 5 ML: 20 INJECTION, SOLUTION INFILTRATION; PERINEURAL at 09:09

## 2023-06-16 RX ADMIN — FENTANYL CITRATE 25 MCG: 50 INJECTION, SOLUTION INTRAMUSCULAR; INTRAVENOUS at 09:23

## 2023-06-16 RX ADMIN — ONDANSETRON 4 MG: 2 INJECTION INTRAMUSCULAR; INTRAVENOUS at 09:08

## 2023-06-16 RX ADMIN — FENTANYL CITRATE 25 MCG: 50 INJECTION, SOLUTION INTRAMUSCULAR; INTRAVENOUS at 09:13

## 2023-06-16 NOTE — DISCHARGE INSTRUCTIONS
You have received 975 mg of Acetaminophen (Tylenol) at 8am. Please do not take an additional dose of Tylenol until after 2pm.     Do not exceed 4,000 mg of acetaminophen during a 24 hour period and keep in mind that acetaminophen can also be found in many over-the-counter cold medications as well as narcotics that may be given for pain.

## 2023-06-16 NOTE — OP NOTE
Date of surgery: 6/16/2023    Surgeon: Manuel Sparks D.P.M.    Preoperative diagnosis: Hallux limitus right foot    Postoperative diagnosis: Same    Procedure: First metatarsal phalangeal joint implant arthroplasty right foot    Anesthesia: Mac    Hemostasis: Pneumatic ankle tourniquet 250 mmHg    Medications: 2 g of Ancef preoperatively    Blood loss: None    Specimens: None    Complications: None    Prescription: The patient was taken to the operating room and placed on the table in the supine position.  Under local anesthesia of 0.5% Marcaine plain and 2% Xylocaine plain in a 1:1 mixture along with IV sedation the right foot was prepped and draped in usual aseptic fashion. Following exsanguination of the right foot with a Abdiaziz's bandage the tourniquet was inflated and the following procedure was performed.    Attention was directed to the dorsal aspect of the right foot where a dorsal linear longitudinal skin incision was made approximately 4.0 cm in length. This incision was placed medial to the extensor hallucis longus tendon. The incision was then deepened via sharp and blunt dissection with care being taken to identify and retract all vital  structures.  Next a periosteal incision was made the entire length of the original skin incision and the periosteum was reflected medially and laterally.  Next a dorsal capsulotomy was performed at the first metatarsal phalangeal joint.  This was followed by resection of the medial and lateral collateral ligaments from the attachment at the head of the first metatarsal and the head was delivered into the surgical site.  Next utilizing a sagittal saw the hypertrophic bone noted at the medial and dorsal aspect of the first metatarsal was resected. Next an osteotomy was performed at the head of the first metatarsal. This osteotomy was perpendicular to the long axis of the first metatarsal and the effective articular cartilage of the head of the first metatarsal was  resected.  Next utilizing an oscillating saw an osteotomy was performed at the base of the proximal phalanx.  This osteotomy was also perpendicular to the long axis of the proximal phalanx and the base of the proximal phalanx was removed.  The wound was then flushed with copious amounts of Kantrex solution.  Next the joint was measured to determine the size of the [id number] implant to be utilized.  Once measured it was determined that the #2 implant would be satisfactory.  Utilizing the appropriate instrumentation the medullary canals of both the first metatarsal and proximal phalanx were reamed to accept the stems of the implant.  Once the canals were reamed the trial #2 implant was placed within the joint space.  The joint was then placed through his normal range of motion.  This was deemed to be satisfactory.  The trial implant was then removed and the permanent implant along with the grommets were placed within the joint.  The joint was once again placed through his normal range of motion and this was deemed to be satisfactory.  The wound was then flushed with copious amounts of sterile saline solution. Deep closure was then accomplished utilizing 2-0 and 3-0 Monocryl suture material.  Skin closure was accomplished utilizing surgical skin staples. A sterile dressing was applied to the right foot comprising of Betadine ointment,adaptic,4 x 4 gauze, three-inch Tonio and Coban.  The tourniquet was then deflated and normal color returned to all the digits of the right foot.  The patient appeared to tolerate the procedure and anesthesia well and was transported from the operating room to the recovery room with vital signs stable and neurovascular status intact.

## 2023-06-16 NOTE — ANESTHESIA POSTPROCEDURE EVALUATION
Patient: Adelina Espinoza    Procedure: Procedure(s):  First metatarsal phalangeal joint implant arthroplasty right foot       Anesthesia Type:  MAC    Note:  Disposition: Outpatient   Postop Pain Control: Uneventful            Sign Out: Well controlled pain   PONV: No   Neuro/Psych: Uneventful            Sign Out: Acceptable/Baseline neuro status   Airway/Respiratory: Uneventful            Sign Out: Acceptable/Baseline resp. status   CV/Hemodynamics: Uneventful            Sign Out: Acceptable CV status; No obvious hypovolemia; No obvious fluid overload   Other NRE: NONE   DID A NON-ROUTINE EVENT OCCUR? No           Last vitals:  Vitals Value Taken Time   /66 06/16/23 1000   Temp 97.6  F (36.4  C) 06/16/23 0958   Pulse 81 06/16/23 1004   Resp 16 06/16/23 0958   SpO2 97 % 06/16/23 1004   Vitals shown include unvalidated device data.    Electronically Signed By: Candy Faustin MD  June 16, 2023  10:07 AM

## 2023-06-16 NOTE — INTERVAL H&P NOTE
"I have reviewed the surgical (or preoperative) H&P that is linked to this encounter, and examined the patient. There are no significant changes    Clinical Conditions Present on Arrival:  Clinically Significant Risk Factors Present on Admission                  # Overweight: Estimated body mass index is 29.18 kg/m  as calculated from the following:    Height as of this encounter: 1.626 m (5' 4\").    Weight as of this encounter: 77.1 kg (170 lb).       "

## 2023-06-16 NOTE — ANESTHESIA CARE TRANSFER NOTE
Patient: Adelina Espinoza    Procedure: Procedure(s):  First metatarsal phalangeal joint implant arthroplasty right foot       Diagnosis: Hallux limitus, right [M20.5X1]  Diagnosis Additional Information: No value filed.    Anesthesia Type:   MAC     Note:    Oropharynx: oropharynx clear of all foreign objects  Level of Consciousness: awake  Oxygen Supplementation: room air    Independent Airway: airway patency satisfactory and stable  Dentition: dentition unchanged  Vital Signs Stable: post-procedure vital signs reviewed and stable  Report to RN Given: handoff report given  Patient transferred to: Phase II    Handoff Report: Identifed the Patient, Identified the Reponsible Provider, Reviewed the pertinent medical history, Discussed the surgical course, Reviewed Intra-OP anesthesia mangement and issues during anesthesia, Set expectations for post-procedure period and Allowed opportunity for questions and acknowledgement of understanding      Vitals:  Vitals Value Taken Time   /63 06/16/23 0958   Temp 36.4  C (97.6  F) 06/16/23 0958   Pulse 85 06/16/23 0958   Resp 16 06/16/23 0958   SpO2 96 % 06/16/23 0958       Electronically Signed By: ROSSANA Méndez CRNA  June 16, 2023  10:01 AM

## 2023-06-21 ENCOUNTER — OFFICE VISIT (OUTPATIENT)
Dept: PODIATRY | Facility: CLINIC | Age: 78
End: 2023-06-21
Payer: MEDICARE

## 2023-06-21 VITALS
HEIGHT: 64 IN | DIASTOLIC BLOOD PRESSURE: 82 MMHG | SYSTOLIC BLOOD PRESSURE: 123 MMHG | WEIGHT: 170 LBS | OXYGEN SATURATION: 95 % | HEART RATE: 90 BPM | BODY MASS INDEX: 29.02 KG/M2

## 2023-06-21 DIAGNOSIS — M20.5X1 HALLUX LIMITUS, RIGHT: Primary | ICD-10-CM

## 2023-06-21 PROCEDURE — 99024 POSTOP FOLLOW-UP VISIT: CPT | Performed by: PODIATRIST

## 2023-06-21 NOTE — PROGRESS NOTES
Subjective findings: The patient returns to the clinic today for postop visit #1, 1 week status post first metatarsal phalangeal joint implant arthroplasty of the right foot.  The patient is in good spirits and she had no complaints.    Objective findings: The dressings were removed and the wound margins are well coaptated and maintained.  There is no edema, erythema, cellulitis, drainage or bleeding noted.  Neurovascular status is intact.  Vital signs stable.    Assessment: Hallux limitus right foot    Plan: Applied a sterile dressing to the right foot.  The patient was asked to return to the clinic in 1 week for postop visit #2 at which time the sutures will be removed and a postop x-ray will be taken.

## 2023-06-21 NOTE — Clinical Note
6/21/2023         RE: Adelina Espinoza  [address]  [place] [address]        Dear Colleague,    Thank you for referring your patient, Adelina Espinoza, to the [facility]. Please see a copy of my visit note below.    Subjective findings: The patient returns to the clinic today for postop visit #1, 1 week status post first metatarsal phalangeal joint implant arthroplasty of the right foot.  The patient is in good spirits and she had no complaints.    Objective findings: The dressings were removed and the wound margins are well coaptated and maintained.  There is no edema, erythema, cellulitis, drainage or bleeding noted.  Neurovascular status is intact.  Vital signs stable.    Assessment: Hallux limitus right foot    Plan: Applied a sterile dressing to the right foot.  The patient was asked to return to the clinic in 1 week for postop visit #2 at which time the sutures will be removed and a postop x-ray will be taken.      Again, thank you for allowing me to participate in the care of your patient.        Sincerely,        Manuel Sparks DPM

## 2023-06-28 ENCOUNTER — OFFICE VISIT (OUTPATIENT)
Dept: PODIATRY | Facility: CLINIC | Age: 78
End: 2023-06-28
Payer: MEDICARE

## 2023-06-28 ENCOUNTER — ANCILLARY PROCEDURE (OUTPATIENT)
Dept: GENERAL RADIOLOGY | Facility: CLINIC | Age: 78
End: 2023-06-28
Attending: PODIATRIST
Payer: MEDICARE

## 2023-06-28 ENCOUNTER — THERAPY VISIT (OUTPATIENT)
Dept: PHYSICAL THERAPY | Facility: REHABILITATION | Age: 78
End: 2023-06-28
Payer: MEDICARE

## 2023-06-28 VITALS
SYSTOLIC BLOOD PRESSURE: 126 MMHG | OXYGEN SATURATION: 96 % | WEIGHT: 170 LBS | HEART RATE: 102 BPM | HEIGHT: 64 IN | BODY MASS INDEX: 29.02 KG/M2 | DIASTOLIC BLOOD PRESSURE: 83 MMHG

## 2023-06-28 DIAGNOSIS — Z91.81 RISK FOR FALLS: ICD-10-CM

## 2023-06-28 DIAGNOSIS — M62.81 GENERALIZED MUSCLE WEAKNESS: ICD-10-CM

## 2023-06-28 DIAGNOSIS — M81.0 OSTEOPOROSIS, SENILE: ICD-10-CM

## 2023-06-28 DIAGNOSIS — M20.5X1 HALLUX LIMITUS, RIGHT: ICD-10-CM

## 2023-06-28 DIAGNOSIS — M20.5X1 HALLUX LIMITUS, RIGHT: Primary | ICD-10-CM

## 2023-06-28 DIAGNOSIS — R26.89 BALANCE PROBLEMS: Primary | ICD-10-CM

## 2023-06-28 PROCEDURE — 97112 NEUROMUSCULAR REEDUCATION: CPT | Mod: GP | Performed by: PHYSICAL THERAPIST

## 2023-06-28 PROCEDURE — 73630 X-RAY EXAM OF FOOT: CPT | Mod: RT | Performed by: PODIATRIST

## 2023-06-28 PROCEDURE — 99024 POSTOP FOLLOW-UP VISIT: CPT | Performed by: PODIATRIST

## 2023-06-28 PROCEDURE — 97110 THERAPEUTIC EXERCISES: CPT | Mod: GP | Performed by: PHYSICAL THERAPIST

## 2023-06-28 NOTE — PROGRESS NOTES
Subjective findings: The patient returns to the clinic today for postop visit #2, 2 weeks status post first metatarsal phalangeal joint implant arthroplasty of the right foot.  The patient is in good spirits and she had no complaints.     Objective findings: The dressings were removed and the wound margins are well coaptated and maintained.  There is no edema, erythema, cellulitis, drainage or bleeding noted.  Neurovascular status is intact.  Vital signs stable.     Assessment: Hallux limitus right foot     Plan: All sutures removed today.  The patient was instructed to gradually return to normal activities.  She is to return to the clinic as needed.  A postop x-ray was taken of the right foot today.  The x-rays were read and interpreted by this physician.

## 2023-06-28 NOTE — Clinical Note
6/28/2023         RE: Adelina Espinoza  [address]  [place] [address]        Dear Colleague,    Thank you for referring your patient, Adelina Espinoza, to the [facility]. Please see a copy of my visit note below.    Subjective findings: The patient returns to the clinic today for postop visit #1, 1 week status post first metatarsal phalangeal joint implant arthroplasty of the right foot.  The patient is in good spirits and she had no complaints.     Objective findings: The dressings were removed and the wound margins are well coaptated and maintained.  There is no edema, erythema, cellulitis, drainage or bleeding noted.  Neurovascular status is intact.  Vital signs stable.     Assessment: Hallux limitus right foot     Plan: Applied a sterile dressing to the right foot.  The patient was asked to return to the clinic in 1 week for postop visit #2 at which time the sutures will be removed and a postop x-ray will be taken.      Again, thank you for allowing me to participate in the care of your patient.        Sincerely,        Manuel Sparks DPM

## 2023-07-10 ENCOUNTER — THERAPY VISIT (OUTPATIENT)
Dept: PHYSICAL THERAPY | Facility: REHABILITATION | Age: 78
End: 2023-07-10
Payer: MEDICARE

## 2023-07-10 DIAGNOSIS — M62.81 GENERALIZED MUSCLE WEAKNESS: ICD-10-CM

## 2023-07-10 DIAGNOSIS — Z91.81 RISK FOR FALLS: ICD-10-CM

## 2023-07-10 DIAGNOSIS — R26.89 BALANCE PROBLEMS: Primary | ICD-10-CM

## 2023-07-10 DIAGNOSIS — M81.0 OSTEOPOROSIS, SENILE: ICD-10-CM

## 2023-07-10 PROCEDURE — 97110 THERAPEUTIC EXERCISES: CPT | Mod: GP | Performed by: PHYSICAL THERAPIST

## 2023-07-19 ENCOUNTER — TRANSFERRED RECORDS (OUTPATIENT)
Dept: HEALTH INFORMATION MANAGEMENT | Facility: CLINIC | Age: 78
End: 2023-07-19
Payer: MEDICARE

## 2023-08-04 ENCOUNTER — OFFICE VISIT (OUTPATIENT)
Dept: FAMILY MEDICINE | Facility: CLINIC | Age: 78
End: 2023-08-04
Payer: MEDICARE

## 2023-08-04 VITALS
TEMPERATURE: 97.9 F | RESPIRATION RATE: 16 BRPM | HEART RATE: 103 BPM | DIASTOLIC BLOOD PRESSURE: 70 MMHG | SYSTOLIC BLOOD PRESSURE: 101 MMHG | OXYGEN SATURATION: 96 %

## 2023-08-04 DIAGNOSIS — L30.9 ECZEMA, UNSPECIFIED TYPE: Primary | ICD-10-CM

## 2023-08-04 PROCEDURE — 99213 OFFICE O/P EST LOW 20 MIN: CPT | Performed by: NURSE PRACTITIONER

## 2023-08-04 RX ORDER — TRIAMCINOLONE ACETONIDE 1 MG/G
CREAM TOPICAL 2 TIMES DAILY
Qty: 80 G | Refills: 0 | Status: SHIPPED | OUTPATIENT
Start: 2023-08-04 | End: 2023-08-18

## 2023-08-04 NOTE — PROGRESS NOTES
Chief Complaint   Patient presents with    Rash     Rash on both hands problem with fingers      SUBJECTIVE:  Adelina Espinoza is a 77 year old female who presents to the clinic today with a an itchy rash to her bilateral hands fingers and lips.  She has had this for about a month following a Medicare.  She notes red dry flaky patches that are inflamed and irritated throughout her cuticles and hands.  There are not 3 in a row or tunneling.  She has tried applying Vanicream and a balm to her lips that are dry and flaky.  No warmth fluctuance pus fevers limited range of motion.    Past Medical History:   Diagnosis Date    Anxiety     Arthritis     Breast cancer (H) 2003    Depression     Esophageal reflux     H/O colonoscopy 03/2022    hyperplastic polyp noted, no further colonoscopy recommended per [facility]    History of anesthesia complications     nausea    Hx antineoplastic chemotherapy 2003    for breast cancer    Hx of radiation therapy 2004    for breast cancer    IBS (irritable bowel syndrome)     Lactose intolerance     lactose breath test positive at GI, 7/2019    Macular degeneration 08/2019    caught early, takes Areds 2 on a daily basis, seeing opthamology    Neuropathy     secondary to chemotherapy    Osteopenia     PONV (postoperative nausea and vomiting)      calcium citrate-vitamin D (CITRACAL+D) 315-200 mg-unit per tablet, [CALCIUM CITRATE-VITAMIN D (CITRACAL+D) 315-200 MG-UNIT PER TABLET] Take 1 tablet by mouth 2 (two) times a day.  citalopram (CELEXA) 20 MG tablet, Take 1 tablet (20 mg) by mouth daily  dicyclomine (BENTYL) 10 MG capsule, Take 1 capsule by mouth as needed  furosemide (LASIX) 20 MG tablet, TAKE 1 TABLET DAILY  HYDROcodone-acetaminophen (NORCO) 5-325 MG tablet, Take 1-2 tablets by mouth every 6 hours as needed for moderate to severe pain  lactase (LACTAID) 3000 UNIT tablet,   Lactobacillus Rhamnosus, GG, ( PROBIOTIC DIGESTIVE CARE) CAPS, Take 1 capsule by mouth every 24 hours  magnesium  250 mg Tab, [MAGNESIUM 250 MG TAB] Take 250 mg by mouth daily.   multivit-min-iron-FA-lutein (CENTRUM SILVER WOMEN) 8 mg iron-400 mcg-300 mcg Tab, [MULTIVIT-MIN-IRON-FA-LUTEIN (CENTRUM SILVER WOMEN) 8 MG IRON-400 MCG-300 MCG TAB] Take 1 tablet by mouth daily.   omeprazole (PRILOSEC) 20 MG DR capsule, Take 1 capsule by mouth 2 times daily  polyethylene glycol (MIRALAX) 17 g packet, Take 1 packet by mouth daily  tetrahydroz-peg 400-hyprom-gly (VISINE MAX REDNESS RELIEF) 0.05-1-0.36-0.2 % Drop, [TETRAHYDROZ--HYPROM-GLY (VISINE MAX REDNESS RELIEF) 0.05-1-0.36-0.2 % DROP] Administer 1 drop to both eyes 3 (three) times a day as needed.  vit C/E/Zn/coppr/lutein/zeaxan (PRESERVISION AREDS-2 ORAL), [VIT C/E/ZN/COPPR/LUTEIN/ZEAXAN (PRESERVISION AREDS-2 ORAL)] Take 1 tablet by mouth 2 (two) times a day.   denosumab (PROLIA) 60 MG/ML SOSY injection, Inject 60 mg Subcutaneous    No current facility-administered medications on file prior to visit.    Social History     Tobacco Use    Smoking status: Never     Passive exposure: Never    Smokeless tobacco: Never   Substance Use Topics    Alcohol use: Yes     Alcohol/week: 3.0 standard drinks of alcohol     Types: 3 Standard drinks or equivalent per week     Comment: 3-4 per week     Allergies   Allergen Reactions    Adhesive Tape      Other reaction(s): Rash    Chocolate Flavor Diarrhea    Flavoring Agent Diarrhea    Gabapentin Unknown     Has dizziness, spaced out when on it. Has been on it twice and both times had this type of reaction. Should not be on it in the future.     Green Pepper [Capsicum] Unknown     Stomach upset    Lactose Diarrhea    Monosodium Glutamate Unknown     diarrhea       Review of Systems  All systems negative except for those listed above in HPI.    EXAM:   /70   Pulse 103   Temp 97.9  F (36.6  C) (Oral)   Resp 16   SpO2 96%     Physical Exam  Vitals reviewed.   Constitutional:       Appearance: Normal appearance.   HENT:      Head:  Normocephalic and atraumatic.   Cardiovascular:      Rate and Rhythm: Normal rate.      Pulses: Normal pulses.   Pulmonary:      Effort: Pulmonary effort is normal.   Musculoskeletal:         General: Normal range of motion.   Skin:     General: Skin is warm and dry.      Findings: Erythema and rash present.      Comments: Dry lips.  Hands with erythematous 1 cm white flaky dry patches.   Neurological:      General: No focal deficit present.      Mental Status: She is alert and oriented to person, place, and time.   Psychiatric:         Mood and Affect: Mood normal.         Behavior: Behavior normal.       ASSESSMENT:    ICD-10-CM    1. Eczema, unspecified type  L30.9 triamcinolone (KENALOG) 0.1 % external cream        PLAN:    This appears to be an eczema dermatitis following manicure  Applied triamcinolone twice daily for up to 2 weeks  Recommend switching to hypoallergenic soaps detergents such as Blanco brand  Can use occasional topical hydrocortisone ointment to the lips sparingly for no more than 2 weeks  Follow-up with Derm if lingering or worsening    Follow up with primary care provider with any problems, questions or concerns or if symptoms worsen or fail to improve. Patient agreed to plan and verbalized understanding.    [facility]

## 2023-08-10 NOTE — PROGRESS NOTES
[facility]   Physical Therapy Treatment Note        Name: Danisha Jones  Clinic Number: [id number]    Therapy Diagnosis:   Encounter Diagnoses   Name Primary?    Pain in left hip Yes    Stiffness of left hip joint     Decreased mobility and endurance     Muscle wasting and atrophy, not elsewhere classified, left thigh      Physician: Gladys Kessler PA*    Visit Date: 8/10/2023    Physician Orders: PT Eval and Treat  Medical Diagnosis from Referral: Strain of flexor muscle of left hip, subsequent encounter  Evaluation Date: 7/10/2023  Authorization Period Expiration: 10/17/2023  Plan of Care Expiration: 09/04/2023  Progress Note Due: 08/09/2023  Visit # / Visits authorized: 6/20 (+1 evaluation)   FOTO: 1/3 (last performed on 7/10/2023)     Precautions: Standard, anemia  PTA Visit #: 0/5     Time In: 9:47 am  Time Out: 10:32 am  Total Billable Time: 45 minutes (Billing reflects 1 on 1 treatment time spent with patient)  Billing reflects [place] medicaid guidelines, billing all therapy as therapeutic-exercise      Subjective     Patient reports: he went for a walk this morning that helped to loosen the hip before today's session. Patient states his pain levels have been lower but can still spike into previous levels of pain.     He/She was compliant with home exercise program.  Response to previous treatment: Mild Soreness  Functional change: performing home exercise plan     Pain: 2/10     Location: Left Hip     Objective      Objective Measures updated at progress report or POC update only unless otherwise noted.     RANGE OF MOTION:   Hip AROM/PROM Right Left Pain/Dysfunction with Movement Goal   Hip Flexion (120º) 120 120 Mild pinch in anterior hip No pain   Hip Extension (30º) 30 30 Stretch on left 30   Hip Abduction (45º)           Hip IR (45º) 30 30       Hip ER (45º) 60 60          STRENGTH:   L/E MMT Right  (spine) Left Pain/Dysfunction with Movement Goal   Hip Flexion   [facility] Daily Progress     Patient Name: Adelina Espinoza  Date: 2018  Visit #:    PTA visit #:0    Referral Diagnosis: Lumbar Radiculitis  Referring provider: Migdalia Delvalle MD  Visit Diagnosis:     ICD-10-CM    1. Chronic bilateral low back pain with bilateral sciatica  M54.42     M54.41     G89.29    2. Lumbosacral radiculitis  M54.17          Assessment:     Treatment today should help the LE sx as well as her buttock pain.     HEP/POC compliance is  good .  Patient demonstrates understanding/independence with home program.    Goal Status:  No data recorded  Pt will: be able to change sleep positions without pain  Pt will: beable to bend forward to dress , bath put on shoes and socks without pain  Pt will: Be able to reach above her head to wash, fix hair and reach into a shelf with decreased pain  Pt will: vianey be able to carry groceries, laundry with decreased pain      Plan / Patient Education:     Continue with initial plan of care.  Progress with home program as tolerated.    Subjective:     Pain Ratin/10 now but this morning was 10/10 prior to Advil.   She did feel pretty good for a few days after the last treatment. She is having more pain lately.   She is feeling it more under the buttocks on both sides today. It is also down the hip and down the leg on the RLE.   She is not really sure if the Gabapentin is doing much as she is on quite a bit of Advil or Tylenol.     Objective:     Neural, visc fascial tensions.     Treatment Today     TREATMENT MINUTES COMMENTS   Evaluation     Self-care/ Home management     Manual therapy 55 Fascial release using Strain-Counterstrain of BLE-N, B lower abd-V   Neuromuscular Re-education     Therapeutic Activity     Therapeutic Exercises     Gait training     Modality__________________                Total 55    Blank areas are intentional and mean the treatment did not include these items.       [place]  2020     4/5 4-/5 Pain 4+/5 B   Hip Extension  4+/5 4+/5   4+/5 B   Hip Abduction  4+/5 4+/5   4+/5 B   Knee Extension 4+/5 4+/5 Pain 5/5 B   Knee Flexion 4+/5 4+/5   5/5 B   Hip IR 5/5 4/5   4+/5 B   Hip ER 5/5 4+/5   4+/5 B   Ankle DF 5/5 5/5   5/5 B   Ankle PF 5/5 5/5   5/5 B   Ankle Inversion 5/5 5/5   5/5 B   Ankle Eversion 5/5 5/5   5/5 B         MUSCLE LENGTH:   Muscle Tested  Right Left  Limitation Goal   Hip Flexors [] Normal  [x] Limited [] Normal  [x] Limited   Normal B   ITB / TFL [] Normal  [x] Limited [] Normal  [x] Limited   Normal B   Quadriceps [x] Normal  [] Limited [] Normal  [x] Limited   Normal B   Hamstrings  [x] Normal  [] Limited [x] Normal  [] Limited   Normal B            JOINT MOBILITY:   Joint Motion Right Mobility  (spine) Left Mobility Goal   Hip Distraction [] Hypo     [x] Normal     [] Hyper [x] Hypo     [] Normal     [] Hyper Normal    Hip Inferior Glide [] Hypo     [x] Normal     [] Hyper [x] Hypo     [] Normal     [] Hyper Normal    Hip Medial Glide  [] Hypo     [x] Normal     [] Hyper [] Hypo     [x] Normal     [] Hyper Normal    Hip Lateral Glide  [] Hypo     [x] Normal     [] Hyper [] Hypo     [x] Normal     [] Hyper Normal    Knee:  [] Hypo     [x] Normal     [] Hyper [] Hypo     [x] Normal     [] Hyper Normal       SPECIAL TESTS:     Right  (spine) Left  Goal   Straight Leg Raise [] Positive    [x] Negative [x] Positive    [] Negative Negative B    Hip Distraction    Negative Positive     CHAD [] Positive    [x] Negative [] Positive    [x] Negative Negative B             SENSATION  [x] Intact to Light Touch                       [] Impaired:        PALPATION: Muscles: Increased tone and tenderness to palpation of: left , hip flexors. Structures: Increased tenderness to palpation of: left , LOWER STRUCTURES : ASIS       Functional Task:   - Squat: able to perform with good form, slightly excessive anterior tibial translation. Notes some hip pain after repetition.  - Single leg squat:  "able to perform with good control and technique.         Function:      Intake Outcome Measure for FOTO Hip Survey     Therapist reviewed FOTO scores for Danisha on 7/10/2023.   FOTO documents entered into [employer] - see Media section.     Intake Score: 58%         Treatment     Danisha received the treatments listed below:     THERAPEUTIC EXERCISES to develop strength, endurance, ROM, flexibility, posture, and core stabilization for (35) minutes including:     Intervention Performed Today     Bike  x 7 minutes, L2 upright   Hip flexion Isometric  x  10x10 second hold (increased pain after today)   Adductor Side Lunge Stretch x 3x30" bilateral    Kneeling Lunge Stretch or Hamilton stretch  x 2x1' bilateral (Hamilton stretch today)   Posterior Pelvic Tilt  x 10x10 second hold   Deadbugs  x 2x10    Knee Extensions  X30 with 75#   Hamstring Curls  X30 with 75#   Hip CARs x X10 clock wise and counter clock wise bilateral (with single hand support)   Bridges Progressed 3x15   Side Lying Hip Abduction  3x15 bilateral    Hip Thrust x 3x10 with 30# KB   Monster Walks x 2 laps Blue TB   Side Stepping x 2 laps Blue TB   Single Leg Foot Taps  X15 bilateral, 24 inch step in front and cone behind   Resisted Backwards Walking  X20 with 40#   Eccentric Hip Flexion Off Table   2x10 with 10# bilateral    Step Push  2 laps with [employer] step   Step Ups  x15 bilateral with 20 inch step, pause at top in runner's position   Reverse crunch   10x   Prone hip extension with knee flexed x 10x 2 set bilaterally        Objective Testing x Measurements, FOTO, education              Danisha received the following manual therapy techniques: Joint mobilizations, Myofacial release, Soft tissue Mobilization, and muscle energy techniques were applied to the: bilateral lower quadrant for 9 minutes, including:    Manual Intervention 8/10/2023     Soft Tissue Mobilization x Adductors and pectineus    Joint Mobilizations x Hip mobility - inferior and " "lateral glides with hip mobility belt   Mobilization with movement     Muscle energy techniques   2x isometric abduction/adduction "shotgun technique"   Functional Dry Needling            Plan for Next Visit: Assess irritability of hip flexor and progress strengthening as tolerated.       Patient Education and Home Exercises       Home Exercises Provided and Patient Education Provided     Education provided: time included in treatment time.   PURPOSE: Patient educated on the impairments noted above and the effects of physical therapy intervention to improve overall condition and QOL.   EXERCISE: Patient was educated on all the above exercise prior/during/after for proper posture, positioning, and execution for safe performance with home exercise program.   STRENGTH: Patient educated on the importance of improved core and extremity strength in order to improve alignment of the spine and extremities with static positions and dynamic movement.   POSTURE: Patient educated on postural awareness to reduce stress and maintain optimal alignment of the spine with static positions and dynamic movement     Written Home Exercises Provided: patient instructed to continue with prior issued home exercise program. Verbally issued 1/2 kneeling hip mobility with band and prone hip extension with knee extension if hip starts to feel tight.  Exercises were reviewed and Danisha was able to demonstrate them prior to the end of the session.  Danisha demonstrated good  understanding of the education provided. See EMR under Patient Instructions for exercises provided during therapy sessions.    Assessment     Patient demonstrates mild improvements with Physical therapy. Patient exhibit progress with objective testing of lower extremity strength with primary limitation being pain with activation, reported pain levels, hip range of motion into hip extension, and muscle length testing. Patient continues to have limitations in muscle " activation secondary to pain, tenderness to palpation in symptomatic region, and lack of functional improvement shown with FOTO testing. Patient would continue to benefit from skilled Physical therapy in order to address above mentioned deficits and further progress.     Danisha is progressing well towards his goals.   Patient prognosis is Excellent.     Patient will continue to benefit from skilled outpatient physical therapy to address the deficits listed in the problem list box on initial evaluation, provide pt/family education and to maximize patient's level of independence in the home and community environment.     Patient's spiritual, cultural and educational needs considered and pt agreeable to plan of care and goals.     Anticipated Barriers for therapy: co-morbidities       Short Term Goals:  4 weeks Status  Date Met   PAIN: Pt will report worst pain of 4/10 in order to progress toward max functional ability and improve quality of life. [] Progressing  [x] Met  [] Not Met 08/10/2023    FUNCTION: Patient will demonstrate improved function as indicated by a score of greater than or equal to 66 out of 100 on FOTO. [x] Progressing  [] Met  [] Not Met 08/10/2023    MOBILITY: Patient will improve AROM to 50% of stated goals, listed in objective measures above, in order to progress towards independence with functional activities.  [] Progressing  [x] Met  [] Not Met 08/10/2023    STRENGTH: Patient will improve strength to 50% of stated goals, listed in objective measures above, in order to progress towards independence with functional activities. [] Progressing  [x] Met  [] Not Met  08/10/2023   POSTURE: Patient will correct postural deviations in sitting and standing, to decrease pain and promote long term stability.  [] Progressing  [x] Met  [] Not Met  08/10/2023   HEP: Patient will demonstrate independence with HEP in order to progress toward functional independence. [] Progressing  [x] Met  [] Not Met  08/10/2023    Patient will bel able to return to working out without onset of anterior hip symptoms.  [x] Progressing  [] Met  [] Not Met 08/10/2023       Long Term Goals:  8 weeks Status Date Met   PAIN: Pt will report worst pain of 1/10 in order to progress toward max functional ability and improve quality of life [x] Progressing  [] Met  [] Not Met     FUNCTION: Patient will demonstrate improved function as indicated by a score of greater than or equal to 75 out of 100 on FOTO. [x] Progressing  [] Met  [] Not Met     MOBILITY: Patient will improve AROM to stated goals, listed in objective measures above, in order to return to maximal functional potential and improve quality of life.  [x] Progressing  [] Met  [] Not Met     STRENGTH: Patient will improve strength to stated goals, listed in objective measures above, in order to improve functional independence and quality of life.  [x] Progressing  [] Met  [] Not Met     GAIT: Patient will demonstrate normalized gait mechanics with minimal compensation in order to return to PLOF. [x] Progressing  [] Met  [] Not Met     Patient will return to normal ADL's, IADL's, community involvement, recreational activities, and work-related activities with less than or equal to 1/10 pain and maximal function.  [x] Progressing  [] Met  [] Not Met     Patient will be able to return to running more than 5 miles without onset of symptoms in anterior hip.  [x] Progressing  [] Met  [] Not Met          Plan     Continue Plan of Care (POC) and progress per patient tolerance. See treatment section for details on planned progressions next session.      Ted Fuentes, PT

## 2023-08-31 ENCOUNTER — TRANSFERRED RECORDS (OUTPATIENT)
Dept: HEALTH INFORMATION MANAGEMENT | Facility: CLINIC | Age: 78
End: 2023-08-31
Payer: MEDICARE

## 2023-08-31 DIAGNOSIS — F41.1 ANXIETY STATE: ICD-10-CM

## 2023-08-31 DIAGNOSIS — F33.0 MILD EPISODE OF RECURRENT MAJOR DEPRESSIVE DISORDER (H): ICD-10-CM

## 2023-08-31 RX ORDER — CITALOPRAM HYDROBROMIDE 20 MG/1
20 TABLET ORAL DAILY
Qty: 90 TABLET | Refills: 0 | Status: SHIPPED | OUTPATIENT
Start: 2023-08-31 | End: 2023-11-14

## 2023-08-31 NOTE — TELEPHONE ENCOUNTER
"Routing refill request to provider for review/approval because:  PHQ-9 out of range    Last Written Prescription Date:  05/10/2023  Last Fill Quantity: 90,  # refills: 0   Last office visit provider:  05/18/2023     Requested Prescriptions   Pending Prescriptions Disp Refills    citalopram (CELEXA) 20 MG tablet 90 tablet 0     Sig: Take 1 tablet (20 mg) by mouth daily       SSRIs Protocol Failed - 8/31/2023  1:41 PM        Failed - PHQ-9 score less than 5 in past 6 months     Please review last PHQ-9 score.           Passed - Medication is active on med list        Passed - Patient is age 18 or older        Passed - No active pregnancy on record        Passed - No positive pregnancy test in last 12 months        Passed - Recent (6 mo) or future (30 days) visit within the authorizing provider's specialty     Patient had office visit in the last 6 months or has a visit in the next 30 days with authorizing provider or within the authorizing provider's specialty.  See \"Patient Info\" tab in inbasket, or \"Choose Columns\" in Meds & Orders section of the refill encounter.                 Kaylyn Rodriguez RN 08/31/23 1:42 PM  "

## 2023-08-31 NOTE — TELEPHONE ENCOUNTER
Refill Request  Medication name: Pending Prescriptions:                       Disp   Refills    citalopram (CELEXA) 20 MG tablet          90 tab*0            Sig: Take 1 tablet (20 mg) by mouth daily    Requested Pharmacy:  [id number] - [facility] [address]

## 2023-09-23 ENCOUNTER — IMMUNIZATION (OUTPATIENT)
Dept: FAMILY MEDICINE | Facility: CLINIC | Age: 78
End: 2023-09-23
Payer: MEDICARE

## 2023-09-23 PROCEDURE — 90662 IIV NO PRSV INCREASED AG IM: CPT

## 2023-09-23 PROCEDURE — G0008 ADMIN INFLUENZA VIRUS VAC: HCPCS

## 2023-10-03 ENCOUNTER — NURSE TRIAGE (OUTPATIENT)
Dept: FAMILY MEDICINE | Facility: CLINIC | Age: 78
End: 2023-10-03
Payer: MEDICARE

## 2023-10-03 NOTE — TELEPHONE ENCOUNTER
Provider Response to 2nd Level Triage Request    I have reviewed the RN documentation. My recommendation is:  Appointment in clinic tomorrow as scheduled.  ER if worsening.

## 2023-10-03 NOTE — TELEPHONE ENCOUNTER
Nurse Triage SBAR    Is this a 2nd Level Triage? YES, LICENSED PRACTITIONER REVIEW IS REQUIRED    Situation: Patient has been experiencing vertigo since Friday.    Background: Patient has never had this before, did have a few transient instances of tinnitus in the past. Has no history of hypertension or any neurological conditions.      Assessment: Patient started experiencing vertigo on Friday - had it one time that day, then off and on since then.  Reports when leans head over gets dizzy and this is what started it. She can be fine, laying in bed and then turns her head and gets dizzy.  It quickly goes away.  Blood pressure on Sunday was 128/82.  Does not feel nauseated, no vision changes, no other symptoms.    Protocol Recommended Disposition:   Go To Office Now - no availability today and patient does not want to go to Urgent Care.  Was able to get appointment in clinic for tomorrow and patient is agreeable to this.    Recommendation: Advised patient to go in to ED if has vision changes, headache, chest pain or other symptoms or worsening vertigo.  Patient verbalized understanding.    NANCY Chambers RN  [facility]       Routed to provider    Does the patient meet one of the following criteria for ADS visit consideration? 16+ years old, with an [facility] PCP     TIP  Providers, please consider if this condition is appropriate for management at one of our Acute and Diagnostic Services sites.     If patient is a good candidate, please use dotphrase <dot>triageresponse and select Refer to ADS to document.     Reason for Disposition   Spinning or tilting sensation (vertigo) present now    Additional Information   Negative: SEVERE difficulty breathing (e.g., struggling for each breath, speaks in single words)   Negative: Shock suspected (e.g., cold/pale/clammy skin, too weak to stand, low BP, rapid pulse)   Negative: Difficult to awaken or acting confused (e.g., disoriented, slurred speech)    Negative: Fainted, and still feels dizzy afterwards   Negative: Overdose (accidental or intentional) of medications   Negative: New neurologic deficit that is present now: * Weakness of the face, arm, or leg on one side of the body * Numbness of the face, arm, or leg on one side of the body * Loss of speech or garbled speech   Negative: Heart beating < 50 beats per minute OR > 140 beats per minute   Negative: Sounds like a life-threatening emergency to the triager   Negative: Chest pain   Negative: Rectal bleeding, bloody stool, or tarry-black stool   Negative: Vomiting is main symptom   Negative: Diarrhea is main symptom   Negative: Headache is main symptom   Negative: Heat exhaustion suspected (i.e., dehydration from heat exposure)   Negative: Patient states that they are having an anxiety or panic attack   Negative: Dizziness from low blood sugar (i.e., < 60 mg/dl or 3.5 mmol/l)   Negative: SEVERE dizziness (e.g., unable to stand, requires support to walk, feels like passing out now)   Negative: SEVERE headache or neck pain   Negative: Spinning or tilting sensation (vertigo) present now and one or more stroke risk factors (i.e., hypertension, diabetes mellitus, prior stroke/TIA, heart attack, age over 60) (Exception: Prior physician evaluation for this AND no different/worse than usual.)   Negative: Neurologic deficit that was brief (now gone), ANY of the following:* Weakness of the face, arm, or leg on one side of the body* Numbness of the face, arm, or leg on one side of the body* Loss of speech or garbled speech   Negative: Loss of vision or double vision  (Exception: Similar to previous migraines.)   Negative: Extra heartbeats, irregular heart beating, or heart is beating very fast (i.e., 'palpitations')   Negative: Difficulty breathing   Negative: Drinking very little and dehydration suspected (e.g., no urine > 12 hours, very dry mouth, very lightheaded)   Negative: Follows bleeding (e.g., stomach, rectum,  "vagina)  (Exception: Became dizzy from sight of small amount blood.)   Negative: Patient sounds very sick or weak to the triager   Negative: Lightheadedness (dizziness) present now, after 2 hours of rest and fluids    Answer Assessment - Initial Assessment Questions  1. DESCRIPTION: \"Describe your dizziness.\"      Feels like may tip over when bends over - started on Friday  2. LIGHTHEADED: \"Do you feel lightheaded?\" (e.g., somewhat faint, woozy, weak upon standing)      no  3. VERTIGO: \"Do you feel like either you or the room is spinning or tilting?\" (i.e. vertigo)      Yes  4. SEVERITY: \"How bad is it?\"  \"Do you feel like you are going to faint?\" \"Can you stand and walk?\"    - MILD: Feels slightly dizzy, but walking normally.    - MODERATE: Feels unsteady when walking, but not falling; interferes with normal activities (e.g., school, work).    - SEVERE: Unable to walk without falling, or requires assistance to walk without falling; feels like passing out now.       Moderate  5. ONSET:  \"When did the dizziness begin?\"      Friday one time, Saturday frequently, Sunday - a lot, then yesterday in afternoon  6. AGGRAVATING FACTORS: \"Does anything make it worse?\" (e.g., standing, change in head position)      Stops at first when lays down, when turns head then the spinning starts.   7. HEART RATE: \"Can you tell me your heart rate?\" \"How many beats in 15 seconds?\"  (Note: not all patients can do this)        No   8. CAUSE: \"What do you think is causing the dizziness?\"      unsure  9. RECURRENT SYMPTOM: \"Have you had dizziness before?\" If Yes, ask: \"When was the last time?\" \"What happened that time?\"      no  10. OTHER SYMPTOMS: \"Do you have any other symptoms?\" (e.g., fever, chest pain, vomiting, diarrhea, bleeding)        In past has had some tinnitis in past - transient.    11. PREGNANCY: \"Is there any chance you are pregnant?\" \"When was your last menstrual period?\"        N/a    Protocols used: Dizziness-A-OH    "

## 2023-10-04 ENCOUNTER — OFFICE VISIT (OUTPATIENT)
Dept: FAMILY MEDICINE | Facility: CLINIC | Age: 78
End: 2023-10-04
Payer: MEDICARE

## 2023-10-04 VITALS
SYSTOLIC BLOOD PRESSURE: 102 MMHG | HEART RATE: 90 BPM | HEIGHT: 64 IN | DIASTOLIC BLOOD PRESSURE: 76 MMHG | OXYGEN SATURATION: 99 % | BODY MASS INDEX: 28 KG/M2 | TEMPERATURE: 97.8 F | WEIGHT: 164 LBS

## 2023-10-04 DIAGNOSIS — M81.0 OSTEOPOROSIS, SENILE: Primary | ICD-10-CM

## 2023-10-04 DIAGNOSIS — H93.11 TINNITUS OF RIGHT EAR: ICD-10-CM

## 2023-10-04 DIAGNOSIS — H81.13 BENIGN PAROXYSMAL POSITIONAL VERTIGO DUE TO BILATERAL VESTIBULAR DISORDER: Primary | ICD-10-CM

## 2023-10-04 DIAGNOSIS — Z92.29 PERSONAL HISTORY OF OTHER DRUG THERAPY: ICD-10-CM

## 2023-10-04 LAB
ERYTHROCYTE [DISTWIDTH] IN BLOOD BY AUTOMATED COUNT: 12.2 % (ref 10–15)
HCT VFR BLD AUTO: 45 % (ref 35–47)
HGB BLD-MCNC: 14.7 G/DL (ref 11.7–15.7)
MCH RBC QN AUTO: 31 PG (ref 26.5–33)
MCHC RBC AUTO-ENTMCNC: 32.7 G/DL (ref 31.5–36.5)
MCV RBC AUTO: 95 FL (ref 78–100)
PLATELET # BLD AUTO: 261 10E3/UL (ref 150–450)
RBC # BLD AUTO: 4.74 10E6/UL (ref 3.8–5.2)
WBC # BLD AUTO: 6.2 10E3/UL (ref 4–11)

## 2023-10-04 PROCEDURE — 99214 OFFICE O/P EST MOD 30 MIN: CPT | Performed by: FAMILY MEDICINE

## 2023-10-04 PROCEDURE — 36415 COLL VENOUS BLD VENIPUNCTURE: CPT | Performed by: FAMILY MEDICINE

## 2023-10-04 PROCEDURE — 80053 COMPREHEN METABOLIC PANEL: CPT | Performed by: FAMILY MEDICINE

## 2023-10-04 PROCEDURE — 85027 COMPLETE CBC AUTOMATED: CPT | Performed by: FAMILY MEDICINE

## 2023-10-04 RX ORDER — MECLIZINE HYDROCHLORIDE 25 MG/1
25 TABLET ORAL 3 TIMES DAILY PRN
Qty: 30 TABLET | Refills: 0 | Status: SHIPPED | OUTPATIENT
Start: 2023-10-04 | End: 2023-11-14

## 2023-10-04 NOTE — PROGRESS NOTES
Assessment & Plan     (H81.13) Benign paroxysmal positional vertigo due to bilateral vestibular disorder  (primary encounter diagnosis)  Comment: pt has dizziness and lightheaded for 3 days comes and goes. 2 days ago she was dizziness free. She feels room spinning. Changing head position: bend, turning around trigger the dizziness. Rest is much better with very mild light headed. She has nose congestion for several days. She has ear ringing for years. She had a trip at [place] to taste wine last week. She has not sleep well for 2 weeks.   No n/v/d/c, fever, headache, blurry vision, cp, dysuria, cough, hearing loss. No sick contact. No new medication. Pt state sometime her bp at home is low < 100/60. She drives ok. No previous episode. Non smoker.   Exam is not remarkable. Likely BPPV, low bp, meniere ?   Plan: CBC with platelets, Comprehensive metabolic         panel (BMP + Alb, Alk Phos, ALT, AST, Total.         Bili, TP), Physical Therapy Referral, meclizine        (ANTIVERT) 25 MG tablet        Advise rest and push water. Avoid drinking alcohol while she has symptoms. Advise to avoid driving when she has dizziness. Pt takes lasix for leg swelling. She has no legs swelling now. Advise to discontinue lasix that may cause low bp.   Will follow-up lab. Will have physical therapy. If symptoms worse she will call me and will consider head mri.     (H93.11) Tinnitus of right ear  Comment: pt has right ear ringing for years, stable.  No hearing loss. Last hearing test was 2 years ago which was normal.   Plan: consider hearing test .            See Patient Instructions    Ginger Andujar MD  [facility]    Subjective   Pat is a 77 year old, presenting for the following health issues:  Dizziness (X 1 week random bout of dizziness, light headedness, sporadic headaches, )        10/4/2023    10:09 AM   Additional Questions   Roomed by Gen CRISTIAN CMA       History of Present Illness       Reason for visit:  " Dizziness  Symptom onset:  3-7 days ago  Symptoms include:  Spinning, loss of balance  Symptom intensity:  Moderate  Symptom progression:  Staying the same  Had these symptoms before:  No  What makes it better:  Water    She eats 2-3 servings of fruits and vegetables daily.She consumes 0 sweetened beverage(s) daily.She exercises with enough effort to increase her heart rate 10 to 19 minutes per day.  She exercises with enough effort to increase her heart rate 5 days per week.   She is taking medications regularly.            Review of Systems   Constitutional, HEENT, cardiovascular, pulmonary, gi and gu systems are negative, except as otherwise noted.      Objective    /76   Pulse 90   Temp 97.8  F (36.6  C) (Oral)   Ht 1.613 m (5' 3.5\")   Wt 74.4 kg (164 lb)   SpO2 99%   BMI 28.60 kg/m    Body mass index is 28.6 kg/m .  Physical Exam   GENERAL: healthy, alert and no distress  NECK: no adenopathy, no asymmetry, masses, or scars and thyroid normal to palpation  RESP: lungs clear to auscultation - no rales, rhonchi or wheezes  CV: regular rate and rhythm, normal S1 S2, no S3 or S4, no murmur, click or rub, no peripheral edema and peripheral pulses strong  ABDOMEN: soft, nontender, no hepatosplenomegaly, no masses and bowel sounds normal  MS: no gross musculoskeletal defects noted, no edema.nexcns  Neuro: Cranial nerves and fundi are normal. TERRY. EOM's intact. No papilledema. Neck supple. No bruits. Normal deep tendon reflexes, sensation and strength. Normal gait and vision field. .                   "

## 2023-10-05 LAB
ALBUMIN SERPL BCG-MCNC: 4.6 G/DL (ref 3.5–5.2)
ALP SERPL-CCNC: 67 U/L (ref 35–104)
ALT SERPL W P-5'-P-CCNC: 21 U/L (ref 0–50)
ANION GAP SERPL CALCULATED.3IONS-SCNC: 13 MMOL/L (ref 7–15)
AST SERPL W P-5'-P-CCNC: 29 U/L (ref 0–45)
BILIRUB SERPL-MCNC: 1 MG/DL
BUN SERPL-MCNC: 13.7 MG/DL (ref 8–23)
CALCIUM SERPL-MCNC: 9.9 MG/DL (ref 8.8–10.2)
CHLORIDE SERPL-SCNC: 101 MMOL/L (ref 98–107)
CREAT SERPL-MCNC: 0.55 MG/DL (ref 0.51–0.95)
DEPRECATED HCO3 PLAS-SCNC: 25 MMOL/L (ref 22–29)
EGFRCR SERPLBLD CKD-EPI 2021: >90 ML/MIN/1.73M2
GLUCOSE SERPL-MCNC: 133 MG/DL (ref 70–99)
POTASSIUM SERPL-SCNC: 4.6 MMOL/L (ref 3.4–5.3)
PROT SERPL-MCNC: 7.6 G/DL (ref 6.4–8.3)
SODIUM SERPL-SCNC: 139 MMOL/L (ref 135–145)

## 2023-10-11 ENCOUNTER — ALLIED HEALTH/NURSE VISIT (OUTPATIENT)
Dept: FAMILY MEDICINE | Facility: CLINIC | Age: 78
End: 2023-10-11
Payer: MEDICARE

## 2023-10-11 DIAGNOSIS — M81.0 OSTEOPOROSIS, SENILE: Primary | ICD-10-CM

## 2023-10-11 PROCEDURE — 99207 PR NO CHARGE NURSE ONLY: CPT

## 2023-10-11 PROCEDURE — 96372 THER/PROPH/DIAG INJ SC/IM: CPT | Performed by: INTERNAL MEDICINE

## 2023-10-11 NOTE — PROGRESS NOTES
Indication: Prolia  (denosumab) is a prescription medicine used to treat osteoporosis in patients who:   Are at high risk for fracture, meaning patients who have had a fracture related to osteoporosis, or who have multiple risk factors for fracture   Cannot use another osteoporosis medicine or other osteoporosis medicines did not work well   The timeline for early/late injections would be 4 weeks early and any time after the 6 month anthony. If a patient receives their injection late, then the subsequent injection would be 6 months from the date that they actually received the injection    1.  When was the last injection?  04/11/2023  2.  Did they check with their insurance for this calendar year?  Yes  3.  Is there an order in the chart?  Yes  4.  Has the patient had dental work involving the bone in the past month or will have work in the next 6 months?  No  5.  Did you have the patient wait 15 minutes after the injection?  Yes  6.  Remember to use .injection under the medication notes    The following steps were completed to comply with the REMS program for Prolia:  Reviewed information in the Medication Guide and Patient Counseling Chart, including the serious risks of Prolia  and the symptoms of each risk.  Advised patient to seek prompt medical attention if they have signs or symptoms of any of the serious risks.  Provided each patient a copy of the Medication Guide and Patient Brochure.     Clinic Administered Medication Documentation      Prolia Documentation    Indication: Prolia  (denosumab) is a prescription medicine used to treat osteoporosis in patients who:   Are at high risk for fracture, meaning patients who have had a fracture related to osteoporosis, or who have multiple risk factors for fracture.  Cannot use another osteoporosis medicine or other osteoporosis medicines did not work well.  The timeline for early/late injections would be 4 weeks early and any time after the 6 month anthony. If a patient  receives their injection late, then the subsequent injection would be 6 months from the date that they actually received the injection.    When was the last injection?  2023  Was the last injection at least 6 months ago? Yes  Has the prior authorization been completed?  Yes  Is there an active order (written within the past 365 days, with administrations remaining, not ) in the chart?  Yes  Patient denies any dental work involving the bone (e.g. tooth extraction or dental implants) in the past 4 weeks?  Yes  Patient denies plans for any dental work involving the bone (e.g. tooth extraction or dental implants) in the next 4 weeks? Yes    The following steps were completed to comply with the REMS program for Prolia:  Reviewed information in the Medication Guide and Patient Counseling Chart, including the serious risks of Prolia  and the symptoms of each risk.  Advised patient to seek prompt medical attention if they have signs or symptoms of any of the serious risks.  Provided each patient a copy of the Medication Guide and Patient Brochure.    Prior to injection, verified patient identity using patient's name and date of birth. Medication was administered. Please see MAR and medication order for additional information. Patient instructed to remain in clinic for 15 minutes and report any adverse reaction to staff immediately.    Vial/Syringe: Syringe  Was this medication supplied by the patient? No  Patient has no refills remaining.  Pt scheduled a F/U with Dr. Blanchard in 2024.    Edyta Gupta RN

## 2023-10-16 ENCOUNTER — TRANSFERRED RECORDS (OUTPATIENT)
Dept: HEALTH INFORMATION MANAGEMENT | Facility: CLINIC | Age: 78
End: 2023-10-16
Payer: MEDICARE

## 2023-10-20 ENCOUNTER — TELEPHONE (OUTPATIENT)
Dept: FAMILY MEDICINE | Facility: CLINIC | Age: 78
End: 2023-10-20
Payer: MEDICARE

## 2023-10-20 NOTE — TELEPHONE ENCOUNTER
"Spoke with patient.     Whole hand inflamed, red and itchy after using \"dip\" for nails. Dermatologist recommended this be on allergy list.    Please add to allergy list.      "

## 2023-10-20 NOTE — TELEPHONE ENCOUNTER
Left message to call back for: patient  Information to relay to patient: what is her reaction to Acrylate?

## 2023-10-20 NOTE — TELEPHONE ENCOUNTER
General Call    Contacts         Type Contact Phone/Fax    10/20/2023 10:06 AM CDT Phone (Incoming) Elise Espinoza (Self) [phone] (M)          Reason for Call:  NEW ALLERGY-ACRYLATE    What are your questions or concerns:  pt states she has learned she has a new allergy and was told to be sure to add it to her Mychart.    Acrylate    She has not need for a call back. Has appt upcoming.

## 2023-11-07 ASSESSMENT — ANXIETY QUESTIONNAIRES
6. BECOMING EASILY ANNOYED OR IRRITABLE: SEVERAL DAYS
7. FEELING AFRAID AS IF SOMETHING AWFUL MIGHT HAPPEN: NOT AT ALL
2. NOT BEING ABLE TO STOP OR CONTROL WORRYING: SEVERAL DAYS
5. BEING SO RESTLESS THAT IT IS HARD TO SIT STILL: NOT AT ALL
GAD7 TOTAL SCORE: 5
4. TROUBLE RELAXING: SEVERAL DAYS
1. FEELING NERVOUS, ANXIOUS, OR ON EDGE: SEVERAL DAYS
IF YOU CHECKED OFF ANY PROBLEMS ON THIS QUESTIONNAIRE, HOW DIFFICULT HAVE THESE PROBLEMS MADE IT FOR YOU TO DO YOUR WORK, TAKE CARE OF THINGS AT HOME, OR GET ALONG WITH OTHER PEOPLE: NOT DIFFICULT AT ALL
3. WORRYING TOO MUCH ABOUT DIFFERENT THINGS: SEVERAL DAYS
GAD7 TOTAL SCORE: 5

## 2023-11-14 ENCOUNTER — OFFICE VISIT (OUTPATIENT)
Dept: FAMILY MEDICINE | Facility: CLINIC | Age: 78
End: 2023-11-14
Payer: MEDICARE

## 2023-11-14 VITALS
WEIGHT: 175.9 LBS | RESPIRATION RATE: 16 BRPM | SYSTOLIC BLOOD PRESSURE: 114 MMHG | TEMPERATURE: 97 F | BODY MASS INDEX: 30.03 KG/M2 | HEIGHT: 64 IN | HEART RATE: 98 BPM | OXYGEN SATURATION: 95 % | DIASTOLIC BLOOD PRESSURE: 78 MMHG

## 2023-11-14 DIAGNOSIS — F33.0 MILD EPISODE OF RECURRENT MAJOR DEPRESSIVE DISORDER (H): ICD-10-CM

## 2023-11-14 DIAGNOSIS — Z13.220 LIPID SCREENING: ICD-10-CM

## 2023-11-14 DIAGNOSIS — F41.1 ANXIETY STATE: Primary | ICD-10-CM

## 2023-11-14 DIAGNOSIS — G60.9 HEREDITARY AND IDIOPATHIC PERIPHERAL NEUROPATHY: ICD-10-CM

## 2023-11-14 DIAGNOSIS — R73.09 ELEVATED GLUCOSE: ICD-10-CM

## 2023-11-14 PROBLEM — R19.8 IRREGULAR BOWEL HABITS: Status: ACTIVE | Noted: 2018-04-09

## 2023-11-14 PROBLEM — K63.5 POLYP OF COLON: Status: ACTIVE | Noted: 2022-03-22

## 2023-11-14 PROBLEM — M48.062 LUMBAR STENOSIS WITH NEUROGENIC CLAUDICATION: Status: RESOLVED | Noted: 2021-01-29 | Resolved: 2023-11-14

## 2023-11-14 PROBLEM — R14.0 BLOATING SYMPTOM: Status: ACTIVE | Noted: 2023-11-14

## 2023-11-14 PROBLEM — M51.26 LUMBAR DISC HERNIATION: Status: RESOLVED | Noted: 2020-12-23 | Resolved: 2023-11-14

## 2023-11-14 PROBLEM — E83.42 HYPOMAGNESEMIA: Status: RESOLVED | Noted: 2021-08-03 | Resolved: 2023-11-14

## 2023-11-14 PROBLEM — M71.38 SYNOVIAL CYST OF LUMBAR FACET JOINT: Status: RESOLVED | Noted: 2020-12-23 | Resolved: 2023-11-14

## 2023-11-14 PROBLEM — M48.062 SPINAL STENOSIS OF LUMBAR REGION WITH NEUROGENIC CLAUDICATION: Status: RESOLVED | Noted: 2020-12-23 | Resolved: 2023-11-14

## 2023-11-14 PROBLEM — M20.5X1 HALLUX LIMITUS, RIGHT: Status: RESOLVED | Noted: 2023-05-02 | Resolved: 2023-11-14

## 2023-11-14 PROBLEM — R15.9 INCONTINENCE OF FECES: Status: ACTIVE | Noted: 2017-06-09

## 2023-11-14 PROBLEM — M19.071 ARTHRITIS OF FIRST METATARSOPHALANGEAL (MTP) JOINT OF RIGHT FOOT: Status: RESOLVED | Noted: 2019-03-05 | Resolved: 2023-11-14

## 2023-11-14 LAB
CHOLEST SERPL-MCNC: 203 MG/DL
HBA1C MFR BLD: 5.4 % (ref 0–5.6)
HDLC SERPL-MCNC: 73 MG/DL
LDLC SERPL CALC-MCNC: 113 MG/DL
NONHDLC SERPL-MCNC: 130 MG/DL
TRIGL SERPL-MCNC: 87 MG/DL
VIT B12 SERPL-MCNC: 679 PG/ML (ref 232–1245)

## 2023-11-14 PROCEDURE — 36415 COLL VENOUS BLD VENIPUNCTURE: CPT | Performed by: NURSE PRACTITIONER

## 2023-11-14 PROCEDURE — 83036 HEMOGLOBIN GLYCOSYLATED A1C: CPT | Performed by: NURSE PRACTITIONER

## 2023-11-14 PROCEDURE — 80061 LIPID PANEL: CPT | Performed by: NURSE PRACTITIONER

## 2023-11-14 PROCEDURE — 82607 VITAMIN B-12: CPT | Performed by: NURSE PRACTITIONER

## 2023-11-14 PROCEDURE — 99214 OFFICE O/P EST MOD 30 MIN: CPT | Performed by: NURSE PRACTITIONER

## 2023-11-14 RX ORDER — NIRMATRELVIR AND RITONAVIR 300-100 MG
KIT ORAL
COMMUNITY
Start: 2022-08-22 | End: 2023-11-14

## 2023-11-14 RX ORDER — RESPIRATORY SYNCYTIAL VIRUS VACCINE 120MCG/0.5
0.5 KIT INTRAMUSCULAR ONCE
Qty: 1 EACH | Refills: 0 | Status: SHIPPED | OUTPATIENT
Start: 2023-11-14 | End: 2023-11-14

## 2023-11-14 RX ORDER — CITALOPRAM HYDROBROMIDE 20 MG/1
20 TABLET ORAL DAILY
Qty: 90 TABLET | Refills: 3 | Status: SHIPPED | OUTPATIENT
Start: 2023-11-14

## 2023-11-14 ASSESSMENT — PAIN SCALES - GENERAL: PAINLEVEL: NO PAIN (0)

## 2023-11-14 NOTE — PROGRESS NOTES
Subjective   Pat is a 77 year old, presenting for the following health issues:  Health Maintenance (Medication check. RSV and covid. R foot-ball of foot pain. Subtle head shaking. )        11/14/2023     9:39 AM   Additional Questions   Roomed by Haley SLAUGHTER LPN       History of Present Illness       Mental Health Follow-up:  Patient presents to follow-up on Depression & Anxiety.Patient's depression since last visit has been:  Medium  The patient is having other symptoms associated with depression.  Patient's anxiety since last visit has been:  No change  The patient is not having other symptoms associated with anxiety.  Any significant life events: No  Patient is feeling anxious or having panic attacks.  Patient has no concerns about alcohol or drug use.    Reason for visit:  Med check and rsv and covid shot,    She eats 2-3 servings of fruits and vegetables daily.She consumes 0 sweetened beverage(s) daily.She exercises with enough effort to increase her heart rate 10 to 19 minutes per day.  She exercises with enough effort to increase her heart rate 3 or less days per week.   She is taking medications regularly.       Depression and Anxiety Follow-Up  How are you doing with your depression since your last visit? No change  How are you doing with your anxiety since your last visit?  No change  Are you having other symptoms that might be associated with depression or anxiety? No  Have you had a significant life event? No   Do you have any concerns with your use of alcohol or other drugs? No    Social History     Tobacco Use    Smoking status: Never     Passive exposure: Never    Smokeless tobacco: Never   Vaping Use    Vaping Use: Never used   Substance Use Topics    Alcohol use: Yes     Alcohol/week: 3.0 standard drinks of alcohol     Types: 3 Standard drinks or equivalent per week     Comment: 3-4 per week    Drug use: No         11/21/2022     3:47 PM 5/10/2023     9:05 AM 11/14/2023     9:34 AM   PHQ   PHQ-9  "Total Score 2 5 4   Q9: Thoughts of better off dead/self-harm past 2 weeks Not at all Not at all Not at all         11/21/2022     3:47 PM 5/3/2023     5:49 PM 11/7/2023     9:49 AM   DERIK-7 SCORE   Total Score  3 (minimal anxiety) 5 (mild anxiety)   Total Score 2 3 5         11/14/2023     9:34 AM   Last PHQ-9   1.  Little interest or pleasure in doing things 1   2.  Feeling down, depressed, or hopeless 0   3.  Trouble falling or staying asleep, or sleeping too much 1   4.  Feeling tired or having little energy 1   5.  Poor appetite or overeating 1   6.  Feeling bad about yourself 0   7.  Trouble concentrating 0   8.  Moving slowly or restless 0   Q9: Thoughts of better off dead/self-harm past 2 weeks 0   PHQ-9 Total Score 4         11/7/2023     9:49 AM   DERIK-7    1. Feeling nervous, anxious, or on edge 1   2. Not being able to stop or control worrying 1   3. Worrying too much about different things 1   4. Trouble relaxing 1   5. Being so restless that it is hard to sit still 0   6. Becoming easily annoyed or irritable 1   7. Feeling afraid, as if something awful might happen 0   DERIK-7 Total Score 5   If you checked any problems, how difficult have they made it for you to do your work, take care of things at home, or get along with other people? Not difficult at all       Suicide Assessment Five-step Evaluation and Treatment (SAFE-T)          Review of Systems         Objective    /78   Pulse 98   Temp 97  F (36.1  C) (Oral)   Resp 16   Ht 1.626 m (5' 4\")   Wt 79.8 kg (175 lb 14.4 oz)   SpO2 95%   Breastfeeding No   BMI 30.19 kg/m    Body mass index is 30.19 kg/m .  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

## 2023-11-17 ENCOUNTER — MYC MEDICAL ADVICE (OUTPATIENT)
Dept: FAMILY MEDICINE | Facility: CLINIC | Age: 78
End: 2023-11-17
Payer: MEDICARE

## 2023-11-17 DIAGNOSIS — E78.2 MIXED HYPERLIPIDEMIA: Primary | ICD-10-CM

## 2023-11-17 NOTE — TELEPHONE ENCOUNTER
Dunia Clayton RN called Pat on 11/17 and left a message to return call to clinic. If patient calls back, please route to any available RN to triage.    NANCY Chambers RN  [facility]

## 2023-11-20 RX ORDER — ATORVASTATIN CALCIUM 10 MG/1
10 TABLET, FILM COATED ORAL DAILY
Qty: 90 TABLET | Refills: 3 | Status: SHIPPED | OUTPATIENT
Start: 2023-11-20 | End: 2024-11-14

## 2023-11-20 NOTE — TELEPHONE ENCOUNTER
Writer spoke to patient and is requesting a statin to be sent in per Kera's recommendation.  Also requesting diabetic diet information to be sent to her to help with losing weight.    CAT ChambersN RN  [facility]

## 2023-12-06 ENCOUNTER — OFFICE VISIT (OUTPATIENT)
Dept: PODIATRY | Facility: CLINIC | Age: 78
End: 2023-12-06
Payer: MEDICARE

## 2023-12-06 VITALS
DIASTOLIC BLOOD PRESSURE: 82 MMHG | OXYGEN SATURATION: 95 % | SYSTOLIC BLOOD PRESSURE: 128 MMHG | RESPIRATION RATE: 18 BRPM | HEART RATE: 92 BPM

## 2023-12-06 DIAGNOSIS — M21.6X1 PLANTAR FLEXED METATARSAL BONE OF RIGHT FOOT: Primary | ICD-10-CM

## 2023-12-06 DIAGNOSIS — M21.6X2 PRONATION DEFORMITY OF BOTH FEET: ICD-10-CM

## 2023-12-06 DIAGNOSIS — M21.6X1 PRONATION DEFORMITY OF BOTH FEET: ICD-10-CM

## 2023-12-06 PROCEDURE — 99213 OFFICE O/P EST LOW 20 MIN: CPT | Performed by: PODIATRIST

## 2023-12-06 ASSESSMENT — PAIN SCALES - GENERAL: PAINLEVEL: MODERATE PAIN (4)

## 2023-12-06 NOTE — Clinical Note
12/6/2023         RE: Adelina Espinoza  [address]  [place] [address]        Dear Colleague,    Thank you for referring your patient, Adelina Espinoza, to the [facility]. Please see a copy of my visit note below.    FOOT AND ANKLE SURGERY/PODIATRY Progress Note        ASSESSMENT:   ***    HPI: Adelina Espinoza was seen again today  ***.      Past Medical History:   Diagnosis Date     Anxiety      Arthritis      Breast cancer (H) 2003     Depression      Esophageal reflux      H/O colonoscopy 03/2022    hyperplastic polyp noted, no further colonoscopy recommended per [facility]     History of anesthesia complications     nausea     Hx antineoplastic chemotherapy 2003    for breast cancer     Hx of radiation therapy 2004    for breast cancer     IBS (irritable bowel syndrome)      Lactose intolerance     lactose breath test positive at GI, 7/2019     Lumbar disc herniation 12/23/2020    Added automatically from request for surgery [id number]        Lumbar stenosis with neurogenic claudication 01/29/2021     Macular degeneration 08/2019    caught early, takes Areds 2 on a daily basis, seeing opthamology     Neuropathy     secondary to chemotherapy     Osteopenia      PONV (postoperative nausea and vomiting)      Primary osteoarthritis of knee, left 04/21/2016    Replacement Utility updated for latest IMO load        Primary osteoarthritis of right knee 10/29/2015     Spinal stenosis of lumbar region with neurogenic claudication 12/23/2020    Added automatically from request for surgery [id number]        Synovial cyst of lumbar facet joint 12/23/2020    Added automatically from request for surgery [id number]           Past Surgical History:   Procedure Laterality Date     APPENDECTOMY  1967     ARTHROPLASTY TOE(S) Right 6/16/2023    Procedure: First metatarsal phalangeal joint implant arthroplasty right foot;  Surgeon: Manuel Sparks DPM;  Location: [facility] OR     [facility] SURGERY   "2000    cervical fusion     BIOPSY BREAST Right 2003     BIOPSY BREAST Right 07/09/2021    dense fibrotic tissue, Dr Burleson     CATARACT EXTRACTION, BILATERAL       CHOLECYSTECTOMY  1994     HYSTERECTOMY  1997    desmoid cyst     IR CVC TUNNEL PLACEMENT > 5 YRS OF AGE  01/21/2004     IR MISCELLANEOUS PROCEDURE  07/16/2004     LASER SURGERY OF EYE Bilateral 09/2022    cataracts     LUMPECTOMY BREAST  12/2003    breast cancer     NISSEN FUNDOPLICATION  2010     OOPHORECTOMY  1997     [facility] THORAX SPINE FUSN,POST TECH N/A 01/29/2021    Procedure: BILATERAL LUMBAR 4-5 LAMINECTOMIES FOR RIGHT SYNOVIAL CYST RESECTION, LUMBAR 4-5, LUMBAR 4-5 POSTERIOR INSTRUMENTED FUSION AND ARTHRODESIS, USE OF ALLOGRAFT, USE OF AUTOGRAFT, STEALTH NAVIGATION;  Surgeon: Lisa Thomas MD;  Location: [facility] Main OR;  Service: Spine     [facility] TOTAL KNEE ARTHROPLASTY Right 10/29/2015    Procedure: RIGHT TOTAL KNEE  ARTHROPLASTY;  Surgeon: Nitin White MD;  Location: [facility] Main OR;  Service: Orthopedics     [facility] TOTAL KNEE ARTHROPLASTY Left 04/21/2016    Procedure: LEFT TOTAL KNEE ARTHROPLASTY;  Surgeon: Nitin White MD;  Location: [facility] Main OR;  Service: Orthopedics       Allergies   Allergen Reactions     Adhesive Tape      Other reaction(s): Rash     Chocolate Flavor Diarrhea     Flavoring Agent Diarrhea     Gabapentin Unknown     Has dizziness, spaced out when on it. Has been on it twice and both times had this type of reaction. Should not be on it in the future.      Green Pepper [Capsicum] Unknown     Stomach upset     Lactose Diarrhea     Monosodium Glutamate Unknown     diarrhea     Acrylate Copolymer Rash     Whole hand inflamed, red and itchy after using \"dip\" for nails         Current Outpatient Medications:      atorvastatin (LIPITOR) 10 MG tablet, Take 1 tablet (10 mg) by mouth daily for 360 doses, Disp: 90 tablet, Rfl: 3     calcium citrate-vitamin D (CITRACAL+D) 315-200 mg-unit per tablet, [CALCIUM " CITRATE-VITAMIN D (CITRACAL+D) 315-200 MG-UNIT PER TABLET] Take 1 tablet by mouth 2 (two) times a day., Disp: , Rfl:      citalopram (CELEXA) 20 MG tablet, Take 1 tablet (20 mg) by mouth daily, Disp: 90 tablet, Rfl: 3     denosumab (PROLIA) 60 MG/ML SOSY injection, Inject 60 mg Subcutaneous, Disp: , Rfl:      dicyclomine (BENTYL) 10 MG capsule, Take 1 capsule by mouth as needed, Disp: , Rfl:      lactase (LACTAID) 3000 UNIT tablet, , Disp: , Rfl:      Lactobacillus Rhamnosus, GG, (RA PROBIOTIC DIGESTIVE CARE) CAPS, Take 1 capsule by mouth every 24 hours, Disp: , Rfl:      magnesium 250 mg Tab, [MAGNESIUM 250 MG TAB] Take 250 mg by mouth daily. , Disp: , Rfl:      multivit-min-iron-FA-lutein (CENTRUM SILVER WOMEN) 8 mg iron-400 mcg-300 mcg Tab, [MULTIVIT-MIN-IRON-FA-LUTEIN (CENTRUM SILVER WOMEN) 8 MG IRON-400 MCG-300 MCG TAB] Take 1 tablet by mouth daily. , Disp: , Rfl:      omeprazole (PRILOSEC) 20 MG DR capsule, Take 1 capsule by mouth 2 times daily, Disp: , Rfl:      polyethylene glycol (MIRALAX) 17 g packet, Take 1 packet by mouth daily, Disp: , Rfl:      tetrahydroz-peg 400-hyprom-gly (VISINE MAX REDNESS RELIEF) 0.05-1-0.36-0.2 % Drop, [TETRAHYDROZ--HYPROM-GLY (VISINE MAX REDNESS RELIEF) 0.05-1-0.36-0.2 % DROP] Administer 1 drop to both eyes 3 (three) times a day as needed., Disp: , Rfl:      vit C/E/Zn/coppr/lutein/zeaxan (PRESERVISION AREDS-2 ORAL), [VIT C/E/ZN/COPPR/LUTEIN/ZEAXAN (PRESERVISION AREDS-2 ORAL)] Take 1 tablet by mouth 2 (two) times a day. , Disp: , Rfl:     Current Facility-Administered Medications:      denosumab (PROLIA) injection 60 mg, 60 mg, Subcutaneous, Q6 Months, Mone Blanchard MD, 60 mg at 10/11/23 1008    Family History   Problem Relation Age of Onset     Breast Cancer Maternal Aunt 46     Kidney Cancer Mother 67     Hypertension Mother      Heart Disease Mother      Hyperlipidemia Mother      Alzheimer Disease Father      Atrial fibrillation Father      Aortic stenosis Father       Skin Cancer Father      Cerebrovascular Disease Father      Depression Father      Anxiety Disorder Father      Lung Cancer Paternal Grandfather      Substance Abuse Nephew        Social History     Socioeconomic History     Marital status:      Spouse name: Figueroa     Number of children: 2     Years of education: Not on file     Highest education level: Not on file   Occupational History     Occupation: retired teacher   Tobacco Use     Smoking status: Never     Passive exposure: Never     Smokeless tobacco: Never   Vaping Use     Vaping Use: Never used   Substance and Sexual Activity     Alcohol use: Yes     Alcohol/week: 3.0 standard drinks of alcohol     Types: 3 Standard drinks or equivalent per week     Comment: 3-4 per week     Drug use: No     Sexual activity: Not Currently     Partners: Male     Birth control/protection: Post-menopausal   Other Topics Concern     Not on file   Social History Narrative     Not on file     Social Determinants of Health     Financial Resource Strain: Low Risk  (10/3/2023)    Financial Resource Strain      Within the past 12 months, have you or your family members you live with been unable to get utilities (heat, electricity) when it was really needed?: No   Food Insecurity: Low Risk  (10/3/2023)    Food Insecurity      Within the past 12 months, did you worry that your food would run out before you got money to buy more?: No      Within the past 12 months, did the food you bought just not last and you didn t have money to get more?: No   Transportation Needs: Low Risk  (10/3/2023)    Transportation Needs      Within the past 12 months, has lack of transportation kept you from medical appointments, getting your medicines, non-medical meetings or appointments, work, or from getting things that you need?: No   Physical Activity: Not on file   Stress: Not on file   Social Connections: Not on file   Interpersonal Safety: Low Risk  (10/4/2023)    Interpersonal Safety       Do you feel physically and emotionally safe where you currently live?: Yes      Within the past 12 months, have you been hit, slapped, kicked or otherwise physically hurt by someone?: No      Within the past 12 months, have you been humiliated or emotionally abused in other ways by your partner or ex-partner?: No   Housing Stability: Low Risk  (10/3/2023)    Housing Stability      Do you have housing? : Yes      Are you worried about losing your housing?: No       10 point Review of Systems is negative except for ***.     There were no vitals taken for this visit.    BMI= There is no height or weight on file to calculate BMI.    OBJECTIVE:  General appearance: Patient is alert and fully cooperative with history & exam.  No sign of distress is noted during the visit.  Vascular: Dorsalis pedis and posterior tibial pulses are palpable. There is pedal hair growth ***.  CFT < 3 sec from anterior tibial surface to distal digits ***. There is no appreciable edema noted.  Dermatologic: Turgor and texture are within normal limits. No coloration or temperature changes. No primary or secondary lesions noted.  Neurologic: All epicritic and proprioceptive sensations are grossly intact ***.  Musculoskeletal: All active and passive ankle, subtalar, midtarsal, and 1st MPJ range of motion are grossly intact without pain or crepitus, with the exception of ***. Manual muscle strength is ***. All dorsiflexors, plantarflexors, invertors, evertors are intact ***. Tenderness present to *** on palpation. Tenderness to *** with range of motion. Calf is soft/non-tender with/without warmth/induration    Imaging:     {Imaging order/result:[id number]}    No results found.         TREATMENT:  ***        Manuel Sparks; TETE  [facility] Foot & Ankle Surgery/Podiatry         Again, thank you for allowing me to participate in the care of your patient.        Sincerely,        Manuel Sparks DPM

## 2023-12-06 NOTE — PROGRESS NOTES
FOOT AND ANKLE SURGERY/PODIATRY Progress Note        ASSESSMENT:   Plantarflexed second metatarsal right foot  Pronation deformity bilateral feet    HPI: Adelina Espinoza was seen again today complaining of pain on the bottom of her right foot near the second toe.  The patient stated it is an aching type pain which is aggravated with weightbearing and ambulation.  She stated that the pain can also be sharp at times.  Pain is more pronounced when she is wearing shoes.  When she goes without shoes the pain is quite minimal.  She denies any recent trauma to her foot.  She has not had any associated redness or swelling.  The pain is moderate to severe.  The patient is status post first metatarsal phalangeal joint implant arthroplasty of the right foot.    Past Medical History:   Diagnosis Date    Anxiety     Arthritis     Breast cancer (H) 2003    Depression     Esophageal reflux     H/O colonoscopy 03/2022    hyperplastic polyp noted, no further colonoscopy recommended per [facility]    History of anesthesia complications     nausea    Hx antineoplastic chemotherapy 2003    for breast cancer    Hx of radiation therapy 2004    for breast cancer    IBS (irritable bowel syndrome)     Lactose intolerance     lactose breath test positive at GI, 7/2019    Lumbar disc herniation 12/23/2020    Added automatically from request for surgery [id number]       Lumbar stenosis with neurogenic claudication 01/29/2021    Macular degeneration 08/2019    caught early, takes Areds 2 on a daily basis, seeing opthamology    Neuropathy     secondary to chemotherapy    Osteopenia     PONV (postoperative nausea and vomiting)     Primary osteoarthritis of knee, left 04/21/2016    Replacement Utility updated for latest IMO load       Primary osteoarthritis of right knee 10/29/2015    Spinal stenosis of lumbar region with neurogenic claudication 12/23/2020    Added automatically from request for surgery [id number]       Synovial cyst of lumbar facet joint  12/23/2020    Added automatically from request for surgery [id number]           Past Surgical History:   Procedure Laterality Date    APPENDECTOMY  1967    ARTHROPLASTY TOE(S) Right 6/16/2023    Procedure: First metatarsal phalangeal joint implant arthroplasty right foot;  Surgeon: Manuel Sparks DPM;  Location: [facility]    BACK SURGERY  2000    cervical fusion    BIOPSY BREAST Right 2003    BIOPSY BREAST Right 07/09/2021    dense fibrotic tissue, Dr Burleson    CATARACT EXTRACTION, BILATERAL      CHOLECYSTECTOMY  1994    HYSTERECTOMY  1997    desmoid cyst    IR CVC TUNNEL PLACEMENT > 5 YRS OF AGE  01/21/2004    IR MISCELLANEOUS PROCEDURE  07/16/2004    LASER SURGERY OF EYE Bilateral 09/2022    cataracts    LUMPECTOMY BREAST  12/2003    breast cancer    NISSEN FUNDOPLICATION  2010    OOPHORECTOMY  1997    [facility] THORAX SPINE FUSN,POST TECH N/A 01/29/2021    Procedure: BILATERAL LUMBAR 4-5 LAMINECTOMIES FOR RIGHT SYNOVIAL CYST RESECTION, LUMBAR 4-5, LUMBAR 4-5 POSTERIOR INSTRUMENTED FUSION AND ARTHRODESIS, USE OF ALLOGRAFT, USE OF AUTOGRAFT, STEALTH NAVIGATION;  Surgeon: Lisa Thomas MD;  Location: [facility];  Service: Spine    [facility] TOTAL KNEE ARTHROPLASTY Right 10/29/2015    Procedure: RIGHT TOTAL KNEE  ARTHROPLASTY;  Surgeon: Nitin White MD;  Location: [facility];  Service: Orthopedics    [facility] TOTAL KNEE ARTHROPLASTY Left 04/21/2016    Procedure: LEFT TOTAL KNEE ARTHROPLASTY;  Surgeon: Nitin White MD;  Location: [facility];  Service: Orthopedics       Allergies   Allergen Reactions    Adhesive Tape      Other reaction(s): Rash    Chocolate Flavor Diarrhea    Flavoring Agent Diarrhea    Gabapentin Unknown     Has dizziness, spaced out when on it. Has been on it twice and both times had this type of reaction. Should not be on it in the future.     Green Pepper [Capsicum] Unknown     Stomach upset    Lactose Diarrhea    Monosodium Glutamate Unknown     diarrhea    Acrylate  "Copolymer Rash     Whole hand inflamed, red and itchy after using \"dip\" for nails         Current Outpatient Medications:     atorvastatin (LIPITOR) 10 MG tablet, Take 1 tablet (10 mg) by mouth daily for 360 doses, Disp: 90 tablet, Rfl: 3    calcium citrate-vitamin D (CITRACAL+D) 315-200 mg-unit per tablet, [CALCIUM CITRATE-VITAMIN D (CITRACAL+D) 315-200 MG-UNIT PER TABLET] Take 1 tablet by mouth 2 (two) times a day., Disp: , Rfl:     citalopram (CELEXA) 20 MG tablet, Take 1 tablet (20 mg) by mouth daily, Disp: 90 tablet, Rfl: 3    denosumab (PROLIA) 60 MG/ML SOSY injection, Inject 60 mg Subcutaneous, Disp: , Rfl:     dicyclomine (BENTYL) 10 MG capsule, Take 1 capsule by mouth as needed, Disp: , Rfl:     lactase (LACTAID) 3000 UNIT tablet, , Disp: , Rfl:     Lactobacillus Rhamnosus, GG, (RA PROBIOTIC DIGESTIVE CARE) CAPS, Take 1 capsule by mouth every 24 hours, Disp: , Rfl:     magnesium 250 mg Tab, [MAGNESIUM 250 MG TAB] Take 250 mg by mouth daily. , Disp: , Rfl:     multivit-min-iron-FA-lutein (CENTRUM SILVER WOMEN) 8 mg iron-400 mcg-300 mcg Tab, [MULTIVIT-MIN-IRON-FA-LUTEIN (CENTRUM SILVER WOMEN) 8 MG IRON-400 MCG-300 MCG TAB] Take 1 tablet by mouth daily. , Disp: , Rfl:     omeprazole (PRILOSEC) 20 MG DR capsule, Take 1 capsule by mouth 2 times daily, Disp: , Rfl:     polyethylene glycol (MIRALAX) 17 g packet, Take 1 packet by mouth daily, Disp: , Rfl:     tetrahydroz-peg 400-hyprom-gly (VISINE MAX REDNESS RELIEF) 0.05-1-0.36-0.2 % Drop, [TETRAHYDROZ--HYPROM-GLY (VISINE MAX REDNESS RELIEF) 0.05-1-0.36-0.2 % DROP] Administer 1 drop to both eyes 3 (three) times a day as needed., Disp: , Rfl:     vit C/E/Zn/coppr/lutein/zeaxan (PRESERVISION AREDS-2 ORAL), [VIT C/E/ZN/COPPR/LUTEIN/ZEAXAN (PRESERVISION AREDS-2 ORAL)] Take 1 tablet by mouth 2 (two) times a day. , Disp: , Rfl:     Current Facility-Administered Medications:     denosumab (PROLIA) injection 60 mg, 60 mg, Subcutaneous, Q6 Months, Mone Blanchard, " MD, 60 mg at 10/11/23 1008    Family History   Problem Relation Age of Onset    Breast Cancer Maternal Aunt 46    Kidney Cancer Mother 67    Hypertension Mother     Heart Disease Mother     Hyperlipidemia Mother     Alzheimer Disease Father     Atrial fibrillation Father     Aortic stenosis Father     Skin Cancer Father     Cerebrovascular Disease Father     Depression Father     Anxiety Disorder Father     Lung Cancer Paternal Grandfather     Substance Abuse Nephew        Social History     Socioeconomic History    Marital status:      Spouse name: Figueroa    Number of children: 2    Years of education: Not on file    Highest education level: Not on file   Occupational History    Occupation: retired teacher   Tobacco Use    Smoking status: Never     Passive exposure: Never    Smokeless tobacco: Never   Vaping Use    Vaping Use: Never used   Substance and Sexual Activity    Alcohol use: Yes     Alcohol/week: 3.0 standard drinks of alcohol     Types: 3 Standard drinks or equivalent per week     Comment: 3-4 per week    Drug use: No    Sexual activity: Not Currently     Partners: Male     Birth control/protection: Post-menopausal   Other Topics Concern    Not on file   Social History Narrative    Not on file     Social Determinants of Health     Financial Resource Strain: Low Risk  (10/3/2023)    Financial Resource Strain     Within the past 12 months, have you or your family members you live with been unable to get utilities (heat, electricity) when it was really needed?: No   Food Insecurity: Low Risk  (10/3/2023)    Food Insecurity     Within the past 12 months, did you worry that your food would run out before you got money to buy more?: No     Within the past 12 months, did the food you bought just not last and you didn t have money to get more?: No   Transportation Needs: Low Risk  (10/3/2023)    Transportation Needs     Within the past 12 months, has lack of transportation kept you from medical  appointments, getting your medicines, non-medical meetings or appointments, work, or from getting things that you need?: No   Physical Activity: Not on file   Stress: Not on file   Social Connections: Not on file   Interpersonal Safety: Low Risk  (10/4/2023)    Interpersonal Safety     Do you feel physically and emotionally safe where you currently live?: Yes     Within the past 12 months, have you been hit, slapped, kicked or otherwise physically hurt by someone?: No     Within the past 12 months, have you been humiliated or emotionally abused in other ways by your partner or ex-partner?: No   Housing Stability: Low Risk  (10/3/2023)    Housing Stability     Do you have housing? : Yes     Are you worried about losing your housing?: No       10 point Review of Systems is negative     There were no vitals taken for this visit.    BMI= There is no height or weight on file to calculate BMI.    OBJECTIVE:  General appearance: Patient is alert and fully cooperative with history & exam.  No sign of distress is noted during the visit.  Vascular: Dorsalis pedis and posterior tibial pulses are palpable. There is no pedal hair growth bilaterally.  CFT < 3 sec from anterior tibial surface to distal digits bilaterally. There is no appreciable edema noted.  Dermatologic: Turgor and texture are within normal limits. No coloration or temperature changes. No primary or secondary lesions noted.  Neurologic: All epicritic and proprioceptive sensations are grossly intact bilaterally.  Negative Ervin sign noted second and third intermetatarsal space right foot.  Musculoskeletal: All active and passive ankle, subtalar, midtarsal, and 1st MPJ range of motion are grossly intact without pain or crepitus, with the exception of none. Manual muscle strength is within normal limits bilaterally. All dorsiflexors, plantarflexors, invertors, evertors are intact bilaterally. Tenderness present to subsecond metatarsal head right foot on palpation.   No tenderness to second MPJ right foot with range of motion.  Flattening of the medial longitudinal arch noted bilaterally.  Calf is soft/non-tender without warmth/induration    Imaging:         No results found.         TREATMENT:  I have recommended orthotics with a metatarsal raise.  After wearing her orthotics consistently for 2 to 3 months if her pain persist I recommended the patient return to the clinic for follow-up visit.  X-rays of the right foot will be taken during her next visit.  I told the patient she may require a second metatarsal head resection if her pain does not improve.        Manuel Sparks; TETE  [facility] Foot & [facility]le [facility]/Podiatry

## 2023-12-06 NOTE — PATIENT INSTRUCTIONS
What are Prescription Custom Orthotics?  Custom orthotics are specially-made devices designed to support and comfort your feet. Prescription orthotics are crafted for you and no one else. They match the contours of your feet precisely and are designed for the way you move. Orthotics are only manufactured after a podiatrist has conducted a complete evaluation of your feet, ankles, and legs, so the orthotic can accommodate your unique foot structure and pathology.  Prescription orthotics are divided into two categories:  Functional orthotics are designed to control abnormal motion. They may be used to treat foot pain caused by abnormal motion; they can also be used to treat injuries such as shin splints or tendinitis. Functional orthotics are usually crafted of a semi-rigid material such as plastic or graphite.  Accommodative orthotics are softer and meant to provide additional cushioning and support. They can be used to treat diabetic foot ulcers, painful calluses on the bottom of the foot, and other uncomfortable conditions.  Podiatrists use orthotics to treat foot problems such as plantar fasciitis, bursitis, tendinitis, diabetic foot ulcers, and foot, ankle, and heel pain. Clinical research studies have shown that podiatrist-prescribed foot orthotics decrease foot pain and improve function.  Orthotics typically cost more than shoe inserts purchased in a retail store, but the additional cost is usually well worth it. Unlike shoe inserts, orthotics are molded to fit each individual foot, so you can be sure that your orthotics fit and do what they're supposed to do. Prescription orthotics are also made of top-notch materials and last many years when cared for properly. Insurance often helps pay for prescription orthotics.  What are Shoe Inserts?   You've seen them at the grocery store and at the mall. You've probably even seen them on TV and online. Shoe inserts are any kind of non-prescription foot support designed  to be worn inside a shoe. Pre-packaged, mass produced, arch supports are shoe inserts. So are the  custom-made  insoles and foot supports that you can order online or at retail stores. Unless the device has been prescribed by a doctor and crafted for your specific foot, it's a shoe insert, not a custom orthotic device--despite what the ads might say.  Shoe inserts can be very helpful for a variety of foot ailments, including flat arches and foot and leg pain. They can cushion your feet, provide comfort, and support your arches, but they can't correct biomechanical foot problems or cure long-standing foot issues.  The most common types of shoe inserts are:  Arch supports: Some people have high arches. Others have low arches or flat feet. Arch supports generally have a  bumped-up  appearance and are designed to support the foot's natural arch.   Insoles: Insoles slip into your shoe to provide extra cushioning and support. Insoles are often made of gel, foam, or plastic.   Heel liners: Heel liners, sometimes called heel pads or heel cups, provide extra cushioning in the heel region. They may be especially useful for patients who have foot pain caused by age-related thinning of the heels' natural fat pads.   Foot cushions: Do your shoes rub against your heel or your toes? Foot cushions come in many different shapes and sizes and can be used as a barrier between you and your shoe.  Choosing an Over-the-Counter Shoe Insert  Selecting a shoe insert from the wide variety of devices on the market can be overwhelming. Here are some podiatrist-tested tips to help you find the insert that best meets your needs:  Consider your health. Do you have diabetes? Problems with circulation? An over-the-counter insert may not be your best bet. Diabetes and poor circulation increase your risk of foot ulcers and infections, so schedule an appointment with a podiatrist. He or she can help you select a solution that won't cause additional  health problems.   Think about the purpose. Are you planning to run a marathon, or do you just need a little arch support in your work shoes? Look for a product that fits your planned level of activity.   Bring your shoes. For the insert to be effective, it has to fit into your shoes. So bring your sneakers, dress shoes, or work boots--whatever you plan to wear with your insert. Look for an insert that will fit the contours of your shoe.   Try them on. If all possible, slip the insert into your shoe and try it out. Walk around a little. How does it feel? Don't assume that feelings of pressure will go away with continued wear. (If you can't try the inserts at the store, ask about the store's return policy and hold on to your receipt.)    Please call one of the [place] locations below to schedule an appointment. If you received a prescription please bring it with you to your appointment. Some locations are limited to what they carry.    Office Locations    [facility]  [address] [place] [address]  Home Medical Equipment, [address]   Phone: [phone]   Orthotics and Prosthetics, [address]   Phone: [phone]    [facility]   Home Medical Equipment   [address] [address]  Phone :[phone]  Orthotics and Prosthetics   [address], [address], [facility] [address]  Phone:[phone]          [facility]  [address]   [place] [address]   Phone: [phone]    [facility] Professional [facility]ldg[facility]  [address]  [place] [address]  Phone: [phone]    [facility] Medical [facility]ld[facility]   [address]  [place] [address]  Phone: [phone]    [facility]  [address]  [place] [address]  Phone:  [phone]    [facility]  [address]  [place] [address]  Phone: [phone]    [place]   [address] [place] [address]   Phone: [phone]    WEARING YOUR CUSTOM FOOT ORTHOTICS   Most insurance plans cover one pair of orthotics per year. You must check with your   insurance plan to see what your payment responsibility will be. Please call your   insurance company by calling the number on the back of your insurance card.   Orthotic's are non-refundable and non-returnable.   Orthotics are made of various designs. Some orthotics are covered with material that extends beyond your toes. If your orthotic is of this design, you will likely need to trim the toe end to get a proper fit. The insole from your shoe can be used as a template. Simply overlay the shoe insert on top of the custom orthotic. Align the heel end while tracing the length of the insert onto the custom orthotic. Use a large scissor to trim the toe end until you get a proper fit in the shoe.   The orthotic needs to be pushed as far back in the shoe as possible. The heel portion should not ride forward so as not to irritate your heel.   Orthotics are designed to work with socks. Excessive perspiration will shorten the life span of the orthotics. Remove the orthotic from the shoe frequently for proper drying.   The break-in period lasts for weeks. People new to orthotics will likely experience new aches and pains. The orthotic is forcing your foot into a new position. Arch, foot and leg muscle aches and fatigue are common during these weeks. Minor discomfort can be considered normal break in phenomenon. Start wearing your orthotic around your home your first day. Limited activity for one to two hours is recommended. You can increase one or two additional hours each day provided the aches and pains are subsiding. The degree of discomfort, fatigue and problems will dictate the speed of break  in. You may require multiple weeks to work up to full time use.   Do not continue wearing your orthotics if they are creating problems such as blisters or sores. Do not hesitate to call the clinic to speak with a nurse regarding orthotic   break in, fit, trimming, etc. You may also need to see the doctor if the orthotics are   simply not working out. Adjustments are sometimes made to improve orthotic   function.     Orthotics will only work in certain styles and types of shoes. Orthotics rarely work in dress shoes. Slip-ons, clogs, sandals and heels are particularly troublesome. Specially designed orthotics may be necessary for these types of shoes. Your custom orthotic was designed for activities that require appropriate walking or running shoes. Lace up athletic shoes, walking shoes or work boots should work appropriately. You may need a wider or longer shoe. Shoes with a removable  or insert work best. In general, you want to remove an insert from the shoe before placing the orthotic into the shoe. Shoes without a removable liner may not work as well.     When purchasing new shoes, bring your orthotics along to get a proper fit. Shop at stores that are familiar with orthotics.   Frequent washing of the orthotic may shorten the life span of the top cover. The top cover can be replaced but will generally last one to five years depending on use and foot perspiration.

## 2023-12-19 ENCOUNTER — OFFICE VISIT (OUTPATIENT)
Dept: FAMILY MEDICINE | Facility: CLINIC | Age: 78
End: 2023-12-19
Payer: MEDICARE

## 2023-12-19 ENCOUNTER — NURSE TRIAGE (OUTPATIENT)
Dept: FAMILY MEDICINE | Facility: CLINIC | Age: 78
End: 2023-12-19
Payer: MEDICARE

## 2023-12-19 VITALS
RESPIRATION RATE: 16 BRPM | DIASTOLIC BLOOD PRESSURE: 75 MMHG | HEART RATE: 80 BPM | TEMPERATURE: 97.7 F | SYSTOLIC BLOOD PRESSURE: 108 MMHG | OXYGEN SATURATION: 97 %

## 2023-12-19 DIAGNOSIS — R42 DIZZINESS: Primary | ICD-10-CM

## 2023-12-19 LAB
ALBUMIN UR-MCNC: NEGATIVE MG/DL
APPEARANCE UR: CLEAR
ATRIAL RATE - MUSE: 81 BPM
BACTERIA #/AREA URNS HPF: ABNORMAL /HPF
BASOPHILS # BLD AUTO: 0.1 10E3/UL (ref 0–0.2)
BASOPHILS NFR BLD AUTO: 1 %
BILIRUB UR QL STRIP: NEGATIVE
COLOR UR AUTO: YELLOW
DIASTOLIC BLOOD PRESSURE - MUSE: NORMAL MMHG
EOSINOPHIL # BLD AUTO: 0.2 10E3/UL (ref 0–0.7)
EOSINOPHIL NFR BLD AUTO: 2 %
ERYTHROCYTE [DISTWIDTH] IN BLOOD BY AUTOMATED COUNT: 12.3 % (ref 10–15)
GLUCOSE BLD-MCNC: 85 MG/DL (ref 60–99)
GLUCOSE UR STRIP-MCNC: NEGATIVE MG/DL
HCT VFR BLD AUTO: 45.9 % (ref 35–47)
HGB BLD-MCNC: 15.1 G/DL (ref 11.7–15.7)
HGB UR QL STRIP: NEGATIVE
IMM GRANULOCYTES # BLD: 0 10E3/UL
IMM GRANULOCYTES NFR BLD: 0 %
INTERPRETATION ECG - MUSE: NORMAL
KETONES UR STRIP-MCNC: ABNORMAL MG/DL
LEUKOCYTE ESTERASE UR QL STRIP: ABNORMAL
LYMPHOCYTES # BLD AUTO: 2.1 10E3/UL (ref 0.8–5.3)
LYMPHOCYTES NFR BLD AUTO: 27 %
MCH RBC QN AUTO: 30.3 PG (ref 26.5–33)
MCHC RBC AUTO-ENTMCNC: 32.9 G/DL (ref 31.5–36.5)
MCV RBC AUTO: 92 FL (ref 78–100)
MONOCYTES # BLD AUTO: 0.5 10E3/UL (ref 0–1.3)
MONOCYTES NFR BLD AUTO: 7 %
NEUTROPHILS # BLD AUTO: 5 10E3/UL (ref 1.6–8.3)
NEUTROPHILS NFR BLD AUTO: 64 %
NITRATE UR QL: NEGATIVE
P AXIS - MUSE: 9 DEGREES
PH UR STRIP: 7 [PH] (ref 5–8)
PLATELET # BLD AUTO: 288 10E3/UL (ref 150–450)
PR INTERVAL - MUSE: 142 MS
QRS DURATION - MUSE: 82 MS
QT - MUSE: 392 MS
QTC - MUSE: 455 MS
R AXIS - MUSE: -12 DEGREES
RBC # BLD AUTO: 4.99 10E6/UL (ref 3.8–5.2)
RBC #/AREA URNS AUTO: ABNORMAL /HPF
SP GR UR STRIP: 1.02 (ref 1–1.03)
SQUAMOUS #/AREA URNS AUTO: ABNORMAL /LPF
SYSTOLIC BLOOD PRESSURE - MUSE: NORMAL MMHG
T AXIS - MUSE: 46 DEGREES
UROBILINOGEN UR STRIP-ACNC: 0.2 E.U./DL
VENTRICULAR RATE- MUSE: 81 BPM
WBC # BLD AUTO: 7.9 10E3/UL (ref 4–11)
WBC #/AREA URNS AUTO: ABNORMAL /HPF

## 2023-12-19 PROCEDURE — 81001 URINALYSIS AUTO W/SCOPE: CPT | Performed by: PHYSICIAN ASSISTANT

## 2023-12-19 PROCEDURE — 36415 COLL VENOUS BLD VENIPUNCTURE: CPT | Performed by: PHYSICIAN ASSISTANT

## 2023-12-19 PROCEDURE — 93010 ELECTROCARDIOGRAM REPORT: CPT | Mod: OFF | Performed by: INTERNAL MEDICINE

## 2023-12-19 PROCEDURE — 85025 COMPLETE CBC W/AUTO DIFF WBC: CPT | Performed by: PHYSICIAN ASSISTANT

## 2023-12-19 PROCEDURE — 99215 OFFICE O/P EST HI 40 MIN: CPT | Mod: 25 | Performed by: PHYSICIAN ASSISTANT

## 2023-12-19 PROCEDURE — 93005 ELECTROCARDIOGRAM TRACING: CPT | Performed by: PHYSICIAN ASSISTANT

## 2023-12-19 PROCEDURE — 82947 ASSAY GLUCOSE BLOOD QUANT: CPT | Performed by: PHYSICIAN ASSISTANT

## 2023-12-19 RX ORDER — FLUTICASONE PROPIONATE 50 MCG
2 SPRAY, SUSPENSION (ML) NASAL DAILY
Qty: 9.9 ML | Refills: 0 | Status: SHIPPED | OUTPATIENT
Start: 2023-12-19 | End: 2024-01-02

## 2023-12-19 NOTE — TELEPHONE ENCOUNTER
Called and spoke to patient and she is feeling somewhat better, but still dizzy, so will go to walk in clinic in [place] accompanied by her .    NANCY Chambers RN  [facility]

## 2023-12-19 NOTE — TELEPHONE ENCOUNTER
"Nurse Triage SBAR    Is this a 2nd Level Triage? NO    Situation: Dizziness    Background: Patient started on atorvastatin on about 10 days ago and started having headache at that time.  Yesterday became dizzy, but that improved.  This morning is very dizzy and headache remains slightly.    Assessment: Patient is in good spirits, not confused, denies fever, numbness or tingling, stated \"feel like whole body is dizzy.\"  Took blood pressure during the call.  142/88, pulse 70.  Patient has not eaten anything today yet and did not take her atorvastatin today.  She takes this in the morning.  Advised to eat something and drink, remain sitting down and will call back in 1 1/2 hours.  Patient is at home with .  Advised that there are no appointments available in clinic today, so if not better when calls back, will need to go in to walk in care.     Protocol Recommended Disposition:   See in Office Today    Recommendation: Will advise to go in if continues to be dizzy when returns call in 1 1/2 hours.    NANCY Chambers RN  [facility]       Does the patient meet one of the following criteria for ADS visit consideration? 16+ years old, with an [facility] PCP     TIP  Providers, please consider if this condition is appropriate for management at one of our Acute and Diagnostic Services sites.     If patient is a good candidate, please use dotphrase <dot>triageresponse and select Refer to ADS to document.  Reason for Disposition   MODERATE dizziness (e.g., interferes with normal activities)  (Exception: Dizziness caused by heat exposure, sudden standing, or poor fluid intake.)    Additional Information   Negative: SEVERE difficulty breathing (e.g., struggling for each breath, speaks in single words)   Negative: Shock suspected (e.g., cold/pale/clammy skin, too weak to stand, low BP, rapid pulse)   Negative: Difficult to awaken or acting confused (e.g., disoriented, slurred speech)   Negative: " Fainted, and still feels dizzy afterwards   Negative: Overdose (accidental or intentional) of medications   Negative: New neurologic deficit that is present now: * Weakness of the face, arm, or leg on one side of the body * Numbness of the face, arm, or leg on one side of the body * Loss of speech or garbled speech   Negative: Heart beating < 50 beats per minute OR > 140 beats per minute   Negative: Sounds like a life-threatening emergency to the triager   Negative: Chest pain   Negative: Rectal bleeding, bloody stool, or tarry-black stool   Negative: Vomiting is main symptom   Negative: Diarrhea is main symptom   Negative: Headache is main symptom   Negative: Heat exhaustion suspected (i.e., dehydration from heat exposure)   Negative: Patient states that they are having an anxiety or panic attack   Negative: Dizziness from low blood sugar (i.e., < 60 mg/dl or 3.5 mmol/l)   Negative: SEVERE dizziness (e.g., unable to stand, requires support to walk, feels like passing out now)   Negative: SEVERE headache or neck pain   Negative: Spinning or tilting sensation (vertigo) present now and one or more stroke risk factors (i.e., hypertension, diabetes mellitus, prior stroke/TIA, heart attack, age over 60) (Exception: Prior physician evaluation for this AND no different/worse than usual.)   Negative: Neurologic deficit that was brief (now gone), ANY of the following:* Weakness of the face, arm, or leg on one side of the body* Numbness of the face, arm, or leg on one side of the body* Loss of speech or garbled speech   Negative: Loss of vision or double vision  (Exception: Similar to previous migraines.)   Negative: Extra heartbeats, irregular heart beating, or heart is beating very fast (i.e., 'palpitations')   Negative: Difficulty breathing   Negative: Drinking very little and dehydration suspected (e.g., no urine > 12 hours, very dry mouth, very lightheaded)   Negative: Follows bleeding (e.g., stomach, rectum, vagina)   "(Exception: Became dizzy from sight of small amount blood.)   Negative: Patient sounds very sick or weak to the triager   Negative: Lightheadedness (dizziness) present now, after 2 hours of rest and fluids   Negative: Spinning or tilting sensation (vertigo) present now   Negative: Fever > 103 F (39.4 C)   Negative: Fever > 100.0 F (37.8 C) and has diabetes mellitus or a weak immune system (e.g., HIV positive, cancer chemotherapy, organ transplant, splenectomy, chronic steroids)    Answer Assessment - Initial Assessment Questions  1. DESCRIPTION: \"Describe your dizziness.\"      Feels like whole body is dizzy.  Had a headache for past week  2. LIGHTHEADED: \"Do you feel lightheaded?\" (e.g., somewhat faint, woozy, weak upon standing)      Just dizzy  3. VERTIGO: \"Do you feel like either you or the room is spinning or tilting?\" (i.e. vertigo)      Not vertigo - has had this  4. SEVERITY: \"How bad is it?\"  \"Do you feel like you are going to faint?\" \"Can you stand and walk?\"    - MILD: Feels slightly dizzy, but walking normally.    - MODERATE: Feels unsteady when walking, but not falling; interferes with normal activities (e.g., school, work).    - SEVERE: Unable to walk without falling, or requires assistance to walk without falling; feels like passing out now.       moderate  5. ONSET:  \"When did the dizziness begin?\"      Last week  6. AGGRAVATING FACTORS: \"Does anything make it worse?\" (e.g., standing, change in head position)      Suspect it is the atorvastatin  7. HEART RATE: \"Can you tell me your heart rate?\" \"How many beats in 15 seconds?\"  (Note: not all patients can do this)        BP during call is  142/88 heart rate is 70  8. CAUSE: \"What do you think is causing the dizziness?\"      atorvastatin  9. RECURRENT SYMPTOM: \"Have you had dizziness before?\" If Yes, ask: \"When was the last time?\" \"What happened that time?\"      Not like this -   10. OTHER SYMPTOMS: \"Do you have any other symptoms?\" (e.g., fever, chest " "pain, vomiting, diarrhea, bleeding)        no  11. PREGNANCY: \"Is there any chance you are pregnant?\" \"When was your last menstrual period?\"        no    Protocols used: Dizziness-A-OH    "

## 2023-12-19 NOTE — PROGRESS NOTES
Assessment & Plan:      Problem List Items Addressed This Visit    None  Visit Diagnoses       Dizziness    -  Primary    Relevant Medications    fluticasone (FLONASE) 50 MCG/ACT nasal spray    Other Relevant Orders    UA Macroscopic with reflex to Microscopic and Culture - Clinic Collect (Completed)    CBC with platelets and differential (Completed)    Glucose, whole blood (Completed)    EKG 12-lead, tracing only (Completed)    UA Microscopic with Reflex to Culture (Completed)    Physical Therapy Referral          Medical Decision Making  Patient presents with a flareup of dizziness described as imbalance for 24 hours after a worse spell of dizziness that occurred 1 month ago.  Symptoms appear consistent with positional vertigo.  Recommend continuing to use meclizine as needed and recommend trial of nasal steroid spray.  On physical exam, patient has noted to have moderate ear effusion bilaterally.  Feel that this could be contributing to the positional vertigo.  Otherwise, CBC shows normal white blood cell count.  Blood glucose levels are normal.  EKG shows normal sinus rhythm.  Placed referral to physical therapy if patient continues to have reoccurring symptoms.  Discussed treatment and symptomatic care.  Allergies and medication interactions reviewed.  Discussed signs of worsening symptoms and when to follow-up with PCP if no symptom improvement.    40 minutes spent in total for charting, chart review, patient examination, and discussion with patient on labs, counseling, and coordination of care as listed above.     Subjective:      Adelina Espinoza is a 78 year old female here for evaluation of dizziness.  Patient had similar symptoms, but more severe 1 month ago.  Patient was seen at that time and diagnosed with positional vertigo.  She was given meclizine and symptoms improved over the course of 1 week.  Symptoms again flared last night.  Patient does not have as much room spinning sensation, but does get  "sensation of imbalance.  Imbalance is triggered by movement of the head and then quickly resolves after lying still.  No fevers.     The following portions of the patient's history were reviewed and updated as appropriate: allergies, current medications, and problem list.     Review of Systems  Pertinent items are noted in HPI.    Allergies  Allergies   Allergen Reactions    Adhesive Tape      Other reaction(s): Rash    Chocolate Flavor Diarrhea    Flavoring Agent Diarrhea    Gabapentin Unknown     Has dizziness, spaced out when on it. Has been on it twice and both times had this type of reaction. Should not be on it in the future.     Green Pepper [Capsicum] Unknown     Stomach upset    Lactose Diarrhea    Monosodium Glutamate Unknown     diarrhea    Acrylate Copolymer Rash     Whole hand inflamed, red and itchy after using \"dip\" for nails       Family History   Problem Relation Age of Onset    Breast Cancer Maternal Aunt 46    Kidney Cancer Mother 67    Hypertension Mother     Heart Disease Mother     Hyperlipidemia Mother     Alzheimer Disease Father     Atrial fibrillation Father     Aortic stenosis Father     Skin Cancer Father     Cerebrovascular Disease Father     Depression Father     Anxiety Disorder Father     Lung Cancer Paternal Grandfather     Substance Abuse Nephew        Social History     Tobacco Use    Smoking status: Never     Passive exposure: Never    Smokeless tobacco: Never   Substance Use Topics    Alcohol use: Yes     Alcohol/week: 3.0 standard drinks of alcohol     Types: 3 Standard drinks or equivalent per week     Comment: 3-4 per week        Objective:      /75   Pulse 80   Temp 97.7  F (36.5  C) (Oral)   Resp 16   SpO2 97%   General appearance - alert, well appearing, and in no distress and non-toxic  Ears -TMs intact with moderate mucoid fluid and bulging bilaterally, no erythema  Nose - slight tenderness to percussion of the left maxillary sinus  Mouth - mucous membranes " moist, pharynx normal without lesions  Neck - supple, no significant adenopathy  Chest - clear to auscultation, no wheezes, rales or rhonchi, symmetric air entry  Heart - normal rate, regular rhythm, normal S1, S2, no murmurs, rubs, clicks or gallops     Lab & Imaging Results    Results for orders placed or performed in visit on 12/19/23   UA Macroscopic with reflex to Microscopic and Culture - Clinic Collect     Status: Abnormal    Specimen: Urine, Clean Catch   Result Value Ref Range    Color Urine Yellow Colorless, Straw, Light Yellow, Yellow    Appearance Urine Clear Clear    Glucose Urine Negative Negative mg/dL    Bilirubin Urine Negative Negative    Ketones Urine Trace (A) Negative mg/dL    Specific Gravity Urine 1.020 1.005 - 1.030    Blood Urine Negative Negative    pH Urine 7.0 5.0 - 8.0    Protein Albumin Urine Negative Negative mg/dL    Urobilinogen Urine 0.2 0.2, 1.0 E.U./dL    Nitrite Urine Negative Negative    Leukocyte Esterase Urine Trace (A) Negative   Glucose, whole blood     Status: Normal   Result Value Ref Range    Glucose Whole Blood 85 60 - 99 mg/dL   CBC with platelets and differential     Status: None   Result Value Ref Range    WBC Count 7.9 4.0 - 11.0 10e3/uL    RBC Count 4.99 3.80 - 5.20 10e6/uL    Hemoglobin 15.1 11.7 - 15.7 g/dL    Hematocrit 45.9 35.0 - 47.0 %    MCV 92 78 - 100 fL    MCH 30.3 26.5 - 33.0 pg    MCHC 32.9 31.5 - 36.5 g/dL    RDW 12.3 10.0 - 15.0 %    Platelet Count 288 150 - 450 10e3/uL    % Neutrophils 64 %    % Lymphocytes 27 %    % Monocytes 7 %    % Eosinophils 2 %    % Basophils 1 %    % Immature Granulocytes 0 %    Absolute Neutrophils 5.0 1.6 - 8.3 10e3/uL    Absolute Lymphocytes 2.1 0.8 - 5.3 10e3/uL    Absolute Monocytes 0.5 0.0 - 1.3 10e3/uL    Absolute Eosinophils 0.2 0.0 - 0.7 10e3/uL    Absolute Basophils 0.1 0.0 - 0.2 10e3/uL    Absolute Immature Granulocytes 0.0 <=0.4 10e3/uL   UA Microscopic with Reflex to Culture     Status: Abnormal   Result Value Ref  Range    Bacteria Urine Few (A) None Seen /HPF    RBC Urine 0-2 0-2 /HPF /HPF    WBC Urine 0-5 0-5 /HPF /HPF    Squamous Epithelials Urine Few (A) None Seen /LPF    Narrative    Urine Culture not indicated   EKG 12-lead, tracing only     Status: None (Preliminary result)   Result Value Ref Range    Systolic Blood Pressure  mmHg    Diastolic Blood Pressure  mmHg    Ventricular Rate 81 BPM    Atrial Rate 81 BPM    OH Interval 142 ms    QRS Duration 82 ms     ms    QTc 455 ms    P Axis 9 degrees    R AXIS -12 degrees    T Axis 46 degrees    Interpretation ECG       Sinus rhythm  Normal ECG  When compared with ECG of 11-JAN-2021 11:07,  No significant change was found     CBC with platelets and differential     Status: None    Narrative    The following orders were created for panel order CBC with platelets and differential.  Procedure                               Abnormality         Status                     ---------                               -----------         ------                     CBC with platelets and d...[[id number]]                      Final result                 Please view results for these tests on the individual orders.       I personally reviewed these results and discussed findings with the patient.    The use of Dragon/ShiftPlanningation services was used to construct the content of this note; any grammatical errors are non-intentional. Please contact the author directly if you are in need of any clarification.

## 2023-12-28 ENCOUNTER — TELEPHONE (OUTPATIENT)
Dept: FAMILY MEDICINE | Facility: CLINIC | Age: 78
End: 2023-12-28
Payer: MEDICARE

## 2023-12-28 DIAGNOSIS — U07.1 INFECTION DUE TO 2019 NOVEL CORONAVIRUS: Primary | ICD-10-CM

## 2023-12-28 NOTE — TELEPHONE ENCOUNTER
RN COVID TREATMENT VISIT  12/28/23      The patient has been triaged and does not require a higher level of care.    Adelina Espinoza  78 year old  Current weight? 79.8 kg    Has the patient been seen by a primary care provider at an [facility] or [facility] within the past two years? Yes.   Have you been in close proximity to/do you have a known exposure to a person with a confirmed case of influenza? No.     General treatment eligibility:  Date of positive COVID test (PCR or at home)?  12/28/23    Are you or have you been hospitalized for this COVID-19 infection? No.   Have you received monoclonal antibodies or antiviral treatment for COVID-19 since this positive test? No.   Do you have any of the following conditions that place you at risk of being very sick from COVID-19?   - Age 50 years or older  - Mental health disorders including mood disorders, depression, schizophrenia spectrum disorders   Yes, patient has at least one high risk condition as noted above.     Current COVID symptoms:   - cough  - fatigue  - headache  - sore throat  - congestion or runny nose  Yes. Patient has at least one symptom as selected.     How many days since symptoms started? 5 days or less. Established patient, 12 years or older weighing at least 88.2 lbs, who has symptoms that started in the past 5 days, has not been hospitalized nor received treatment already, and is at risk for being very sick from COVID-19.     Treatment eligibility by RN:  Are you currently pregnant or nursing? No  Do you have a clinically significant hypersensitivity to nirmatrelvir or ritonavir, or toxic epidermal necrolysis (TEN) or Morris-Jin Syndrome? No  Do you have a history of hepatitis, any hepatic impairment on the Problem List (such as Child-Barillas Class C, cirrhosis, fatty liver disease, alcoholic liver disease), or was the last liver lab (hepatic panel, ALT, AST, ALK Phos, bilirubin) elevated in the past 6 months?  No  Do you have any history of severe renal impairment (eGFR < 30mL/min)? No    Is patient eligible to continue? Yes, patient meets all eligibility requirements for the RN COVID treatment (as denoted by all no responses above).     Current Outpatient Medications   Medication Sig Dispense Refill    atorvastatin (LIPITOR) 10 MG tablet Take 1 tablet (10 mg) by mouth daily for 360 doses (Patient not taking: Reported on 12/19/2023) 90 tablet 3    calcium citrate-vitamin D (CITRACAL+D) 315-200 mg-unit per tablet [CALCIUM CITRATE-VITAMIN D (CITRACAL+D) 315-200 MG-UNIT PER TABLET] Take 1 tablet by mouth 2 (two) times a day.      citalopram (CELEXA) 20 MG tablet Take 1 tablet (20 mg) by mouth daily 90 tablet 3    denosumab (PROLIA) 60 MG/ML SOSY injection Inject 60 mg Subcutaneous      dicyclomine (BENTYL) 10 MG capsule Take 1 capsule by mouth as needed      fluticasone (FLONASE) 50 MCG/ACT nasal spray Spray 2 sprays into both nostrils daily for 14 days 9.9 mL 0    lactase (LACTAID) 3000 UNIT tablet       Lactobacillus Rhamnosus, GG, (RA PROBIOTIC DIGESTIVE CARE) CAPS Take 1 capsule by mouth every 24 hours      magnesium 250 mg Tab [MAGNESIUM 250 MG TAB] Take 250 mg by mouth daily.       multivit-min-iron-FA-lutein (CENTRUM SILVER WOMEN) 8 mg iron-400 mcg-300 mcg Tab [MULTIVIT-MIN-IRON-FA-LUTEIN (CENTRUM SILVER WOMEN) 8 MG IRON-400 MCG-300 MCG TAB] Take 1 tablet by mouth daily.       omeprazole (PRILOSEC) 20 MG DR capsule Take 1 capsule by mouth 2 times daily      polyethylene glycol (MIRALAX) 17 g packet Take 1 packet by mouth daily      tetrahydroz-peg 400-hyprom-gly (VISINE MAX REDNESS RELIEF) 0.05-1-0.36-0.2 % Drop [TETRAHYDROZ--HYPROM-GLY (VISINE MAX REDNESS RELIEF) 0.05-1-0.36-0.2 % DROP] Administer 1 drop to both eyes 3 (three) times a day as needed.      vit C/E/Zn/coppr/lutein/zeaxan (PRESERVISION AREDS-2 ORAL) [VIT C/E/ZN/COPPR/LUTEIN/ZEAXAN (PRESERVISION AREDS-2 ORAL)] Take 1 tablet by mouth 2 (two) times a  day.          Medications from List 1 of the standing order (on medications that exclude the use of Paxlovid) that patient is taking: NONE. Is patient taking Loki's Wort? No  Is patient taking Loki's Wort or any meds from List 1? No.   Medications from List 2 of the standing order (on meds that provider needs to adjust) that patient is taking: NONE. Is patient on any of the meds from List 2? No.   Medications from List 3 of standing order (on meds that a RN needs to adjust) that patient is taking: atorvastatin (Lipitor): Instructed patient to stop atorvastatin while taking Paxlovid and restart atorvastatin 1 day after the completion of Paxlovid.  Is patient on any meds from List 3? Yes. Patient is on meds from list 3. No meds require a provider visit and at least one med required RN to adjust.     Paxlovid has an approximate 90% reduction in hospitalization. Paxlovid can possibly cause altered sense of taste, diarrhea (loose, watery stools), high blood pressure, muscle aches.     Would patient like a Paxlovid prescription?   Yes.   Lab Results   Component Value Date    GFRESTIMATED >90 10/04/2023       Was last eGFR reduced? No, eGFR 60 or greater/ No Result on record. Patient can receive the normal renal function dose. Paxlovid Rx sent to [facility]    Temporary change to home medications: Patient will stop atorvastatin while taking Paxlovid and restart one day after finishing Paxlovid.    All medication adjustments (holds, etc) were discussed with the patient and patient was asked to repeat back (teachback) their med adjustment.  Did patient understand med adjustment? Yes, patient repeated back and understood correctly.        Reviewed the following instructions with the patient:    Paxlovid (nimatrelvir and ritonavir)    How it works  Two medicines (nirmatrelvir and ritonavir) are taken together. They stop the virus from growing. Less amount of virus is easier for your body to  fight.    How to take  Medicine comes in a daily container with both medicine tablets. Take by mouth twice daily (once in the morning, once at night) for 5 days.  The number of tablets to take varies by patient.  Don't chew or break capsules. Swallow whole.    When to take  Take as soon as possible after positive COVID-19 test result, and within 5 days of your first symptoms.    Possible side effects  Can cause altered sense of taste, diarrhea (loose, watery stools), high blood pressure, muscle aches.    Emperatriz Gutierrez RN

## 2023-12-28 NOTE — TELEPHONE ENCOUNTER
COVID Positive/Requesting COVID treatment    Patient is positive for COVID and requesting treatment options.    Date of positive COVID test (PCR or at home)? Today at home   Current COVID symptoms: cough, fatigue, headache, sore throat, and congestion or runny nose  Date COVID symptoms began: Tuesday afternoon    Message should be routed to clinic RN pool. Best phone number to use for call back: [phone]

## 2024-01-09 ENCOUNTER — THERAPY VISIT (OUTPATIENT)
Dept: PHYSICAL THERAPY | Facility: REHABILITATION | Age: 79
End: 2024-01-09
Payer: MEDICARE

## 2024-01-09 DIAGNOSIS — R42 DIZZINESS: ICD-10-CM

## 2024-01-09 DIAGNOSIS — H81.12 BENIGN PAROXYSMAL POSITIONAL VERTIGO, LEFT: Primary | ICD-10-CM

## 2024-01-09 PROCEDURE — 95992 CANALITH REPOSITIONING PROC: CPT | Mod: GP | Performed by: PHYSICAL THERAPIST

## 2024-01-09 PROCEDURE — 97161 PT EVAL LOW COMPLEX 20 MIN: CPT | Mod: GP | Performed by: PHYSICAL THERAPIST

## 2024-01-09 NOTE — PROGRESS NOTES
PHYSICAL THERAPY EVALUATION  Type of Visit: Evaluation    See electronic medical record for Abuse and Falls Screening details.    Subjective       Presenting condition or subjective complaint:    Date of onset: 09/01/23    Relevant medical history:       Dates & types of surgery: Appendectomy 1967, gall bladder 1996, [id number], [id number], knee replacement 2016 & 2017, jount replacement big toe, 2023, back surgery 2022, neck fusion 2000    Prior diagnostic imaging/testing results:       Prior therapy history for the same diagnosis, illness or injury: No      Prior Level of Function  Transfers: Independent  Ambulation: Independent  ADL: Independent    Living Environment  Social support: With a significant other or spouse   Type of home: House; 2-story   Stairs to enter the home: Yes 2 Is there a railing: No   Ramp: No   Stairs inside the home: Yes 13 Is there a railing: Yes   Help at home: None  Equipment owned: Straight Cane; Walker; Walker with wheels     Employment: No    Hobbies/Interests: Cooking reading    Patient goals for therapy: Vertigo symptoms    Pain assessment: Pain denied     Objective     Cognitive Status Examination  Orientation: Oriented to person, place and time   Level of Consciousness: Alert  Follows Commands and Answers Questions: 100% of the time  Personal Safety and Judgement: Intact  Memory: Intact        POSTURE: Standing Posture: Rounded shoulders, Forward head, Thoracic kyphosis increased      STRENGTH:     GAIT:   Level of [place]: WFL, Independent  Assistive Device(s): None  Gait Deviations: Base of support increased  Annemarie decreased      BALANCE:          VESTIBULAR EVALUATION  ADDITIONAL HISTORY:  Description of symptoms: Off balance; I feel like the room is spinning  Dizzy attacks:   Start: September 2023   Last attack: Ongoing   Frequency of occurrences: In bed turning to the left getting up to quickly   Length of attack: Less than a minute  Difficulty  hearing:    Noise in ears? Yes Buzzing  Alleviates symptoms: Get up slowly  Worsens symptoms: Moving quickly  Activities that bring on symptoms: Reaching up; Turning while walking       Pertinent visual history: wears glasses  Pertinent history of current vestibular problem:   DHI:      Cervicogenic Screen    Neck ROM Flexion WNL no dizziness, Extension 50 degrees (reports sometimes symptomatice, rotation 60 dizziness with fast movement   Vertebral Artery Test    Alar Ligament Test    Transverse Ligament Test    Distraction    Neck Torsion Test (head still, body rotating)    Neck Torsion Test (head and body rotating)         Oculomotor Screen    Ocular ROM Normal   Smooth Pursuit Normal   Saccades Normal   VOR If she moves fast she feels dizzy   VOR Cancellation Normal   Head Impulse Test Normal   Convergence Testing Normal        Infrared Goggle Exam Vestibular Suppressant in Last 24 Hours? No  Exam Completed With: Room light   Spontaneous Nystagmus Negative   Gaze Evoked Nystagmus Negative   Head Shake Horizontal Nystagmus    Positional Testing    Supine Head-Hanging Test     Left Right   Griffin-Hallpike Negative Negative   Sidelying Test Horizontal L Negative   HSCC Supine Roll Test     HSCC Forward Roll Test     Boswell and Lean Test -  Sitting Erect    Boswell and Lean Test - Seated, Head Bent 60 Degrees Forward    Boswell and Lean Test - Seated, Head Bent Backwards       BPPV Canal(s): L Horizontal  BPPV Type: Canalithasis    Dynamic Visual Acuity (DVA)    Static Acuity (LogMar)    Horizontal Head Movement at 1 Hz (LogMar) NT   Horizontal Head Movement at 2 Hz (LogMar)             Assessment & Plan   CLINICAL IMPRESSIONS  Medical Diagnosis: Dizziness, BPPV left    Treatment Diagnosis: Dizziness, BPPV left   Impression/Assessment: Patient is a 78 year old female with dizziness complaints consistent with left horizontal canalithiasis/ left BPPV.  The following significant findings have been identified: Impaired balance,  Impaired muscle performance, Decreased activity tolerance, Impaired posture, Dizziness, and Disequilibrium . These impairments interfere with their ability to perform self care tasks, recreational activities, household chores, and household mobility as compared to previous level of function.     Clinical Decision Making (Complexity):  Clinical Presentation: Stable/Uncomplicated  Clinical Presentation Rationale: based on medical and personal factors listed in PT evaluation  Clinical Decision Making (Complexity): Low complexity    PLAN OF CARE  Treatment Interventions:  Interventions: Gait Training, Manual Therapy, Neuromuscular Re-education, Therapeutic Activity, Therapeutic Exercise, Self-Care/Home Management, Canalith Repositioning    Long Term Goals     PT Goal 1  Goal Identifier: HEP  Goal Description: Patient will be independent with HEP and self management of symptoms  Rationale: to maximize safety and independence with performance of ADLs and functional tasks  Goal Progress: initial  Target Date: 04/08/24  PT Goal 2  Goal Identifier: bed mobility  Goal Description: Patient will report no dizziness or vertigo with sit to supine, rolling, or supine to sit for one week.  Rationale: to maximize safety and independence with performance of ADLs and functional tasks  Goal Progress: initial  Target Date: 04/08/24  PT Goal 3  Goal Identifier: FGA  Goal Description: Patient will demonstrate decreased risk for fall by scoring 23/30 or higher on the Functional Gait Assessment (FGA).  Rationale: to maximize safety and independence within the home;to maximize safety and independence with performance of ADLs and functional tasks  Goal Progress: initial  Target Date: 04/08/24      Frequency of Treatment: 1x/week  Duration of Treatment: 90 days    Recommended Referrals to Other Professionals: not at this time  Education Assessment:   Learner/Method: Patient;No Barriers to Learning    Risks and benefits of evaluation/treatment  have been explained.   Patient/Family/caregiver agrees with Plan of Care.     Evaluation Time:     PT Eval, Low Complexity Minutes (97950): 30       Signing Clinician: EVANS Zhao [facility]                                                                                   OUTPATIENT PHYSICAL THERAPY      PLAN OF TREATMENT FOR OUTPATIENT REHABILITATION   Patient's Last Name, First Name, Adelina De La Garza YOB: 1945   Provider's Name   [facility]   Medical Record No.  [id number]     Onset Date: 09/01/23  Start of Care Date: 01/09/24     Medical Diagnosis:  Dizziness, BPPV left      PT Treatment Diagnosis:  Dizziness, BPPV left Plan of Treatment  Frequency/Duration: 1x/week/ 90 days    Certification date from 01/09/24 to 04/08/24         See note for plan of treatment details and functional goals     Carson Abbott, PT                         I CERTIFY THE NEED FOR THESE SERVICES FURNISHED UNDER        THIS PLAN OF TREATMENT AND WHILE UNDER MY CARE     (Physician attestation of this document indicates review and certification of the therapy plan).              Referring Provider:  Fab Guerrero    Initial Assessment  See Epic Evaluation- Start of Care Date: 01/09/24

## 2024-01-17 ENCOUNTER — THERAPY VISIT (OUTPATIENT)
Dept: PHYSICAL THERAPY | Facility: REHABILITATION | Age: 79
End: 2024-01-17
Payer: MEDICARE

## 2024-01-17 DIAGNOSIS — R42 DIZZINESS: ICD-10-CM

## 2024-01-17 DIAGNOSIS — H81.12 BENIGN PAROXYSMAL POSITIONAL VERTIGO, LEFT: Primary | ICD-10-CM

## 2024-01-17 PROCEDURE — 97535 SELF CARE MNGMENT TRAINING: CPT | Mod: GP | Performed by: PHYSICAL THERAPIST

## 2024-02-06 ENCOUNTER — HOSPITAL ENCOUNTER (OUTPATIENT)
Dept: MAMMOGRAPHY | Facility: CLINIC | Age: 79
Discharge: HOME | End: 2024-02-06
Attending: NURSE PRACTITIONER | Admitting: NURSE PRACTITIONER
Payer: MEDICARE

## 2024-02-06 DIAGNOSIS — Z12.31 VISIT FOR SCREENING MAMMOGRAM: ICD-10-CM

## 2024-02-06 PROCEDURE — 77063 BREAST TOMOSYNTHESIS BI: CPT

## 2024-03-18 PROBLEM — H81.12 BENIGN PAROXYSMAL POSITIONAL VERTIGO, LEFT: Status: RESOLVED | Noted: 2024-01-09 | Resolved: 2024-03-18

## 2024-03-18 NOTE — PROGRESS NOTES
DISCHARGE  Reason for Discharge: Patient chooses to discontinue therapy.    Equipment Issued: NA    Discharge Plan: Patient to continue home program.    Referring Provider:  Fab Guerrero       01/17/24 0500   Appointment Info   Signing clinician's name / credentials Carson Abbott, PT   Visits Used 2   Medical Diagnosis Dizziness, BPPV left   PT Tx Diagnosis Dizziness, BPPV left   Other pertinent information Eval 1/9/24 should be supine roll test that it is positive not sidelying test.   Progress Note/Certification   Start of Care Date 01/09/24   Onset of illness/injury or Date of Surgery 09/01/23   Therapy Frequency 1x/week   Predicted Duration 90 days   Certification date from 01/09/24   Certification date to 04/08/24   Progress Note Due Date 02/23/24   Progress Note Completed Date 01/09/24   PT Goal 1   Goal Identifier HEP   Goal Description Patient will be independent with HEP and self management of symptoms   Rationale to maximize safety and independence with performance of ADLs and functional tasks   Goal Progress trial of independent self management started   Target Date 04/08/24   PT Goal 2   Goal Identifier bed mobility   Goal Description Patient will report no dizziness or vertigo with sit to supine, rolling, or supine to sit for one week.   Rationale to maximize safety and independence with performance of ADLs and functional tasks   Goal Progress Met   Target Date 04/08/24   Date Met 01/17/24   PT Goal 3   Goal Identifier FGA   Goal Description Patient will demonstrate decreased risk for fall by scoring 23/30 or higher on the Functional Gait Assessment (FGA).   Rationale to maximize safety and independence within the home;to maximize safety and independence with performance of ADLs and functional tasks   Goal Progress patient declined testing.   Target Date 04/08/24   Subjective Report   Subjective Report Pat returns and reports significant improvement of symptoms.  No spinning vertigo since  first visit.  She has returned to doing her balance HEP.   Objective Measure 1   Objective Measure supine head roll left negative   Objective Measure 2   Objective Measure supine head roll right negative   Self Care/home Management   ADL/Home Mgmt Training (71689) 25   Self Care 1 Education regarding BPPV, anatomy of the vestibular system.  Instruction for home maneuver of BBQ roll for left horizontal canalithiasis, indications, frequency and duration.   Patient Response/Progress patient demonstrated/verbalized understanding.   Education   Learner/Method Patient;No Barriers to Learning   Plan   Plan for next session re-testing, canalith repositioning as appropriate   Comments   Comments Patient feels very good..  Trial of independent self management started hold chart 60 days   Total Session Time   Timed Code Treatment Minutes 25   Total Treatment Time (sum of timed and untimed services) 25

## 2024-04-16 ENCOUNTER — OFFICE VISIT (OUTPATIENT)
Dept: INTERNAL MEDICINE | Facility: CLINIC | Age: 79
End: 2024-04-16
Payer: MEDICARE

## 2024-04-16 VITALS
SYSTOLIC BLOOD PRESSURE: 120 MMHG | HEART RATE: 86 BPM | OXYGEN SATURATION: 98 % | TEMPERATURE: 97.1 F | BODY MASS INDEX: 30.48 KG/M2 | HEIGHT: 64 IN | RESPIRATION RATE: 16 BRPM | WEIGHT: 178.5 LBS | DIASTOLIC BLOOD PRESSURE: 80 MMHG

## 2024-04-16 DIAGNOSIS — K21.9 GASTROESOPHAGEAL REFLUX DISEASE WITHOUT ESOPHAGITIS: ICD-10-CM

## 2024-04-16 DIAGNOSIS — M81.0 OSTEOPOROSIS, SENILE: Primary | ICD-10-CM

## 2024-04-16 DIAGNOSIS — F41.1 ANXIETY STATE: ICD-10-CM

## 2024-04-16 LAB — CA-I BLD-MCNC: 5.1 MG/DL (ref 4.4–5.2)

## 2024-04-16 PROCEDURE — 36415 COLL VENOUS BLD VENIPUNCTURE: CPT | Performed by: INTERNAL MEDICINE

## 2024-04-16 PROCEDURE — 99214 OFFICE O/P EST MOD 30 MIN: CPT | Mod: 25 | Performed by: INTERNAL MEDICINE

## 2024-04-16 PROCEDURE — 80048 BASIC METABOLIC PNL TOTAL CA: CPT | Performed by: INTERNAL MEDICINE

## 2024-04-16 PROCEDURE — 82306 VITAMIN D 25 HYDROXY: CPT | Performed by: INTERNAL MEDICINE

## 2024-04-16 PROCEDURE — 96372 THER/PROPH/DIAG INJ SC/IM: CPT | Performed by: INTERNAL MEDICINE

## 2024-04-16 PROCEDURE — 82330 ASSAY OF CALCIUM: CPT | Performed by: INTERNAL MEDICINE

## 2024-04-16 NOTE — PROGRESS NOTES
(M81.0) Osteoporosis, senile  (primary encounter diagnosis)  Comment: She did use alendronate in the past and then had zoledronic acid in the form of Reclast and then Zometa after her breast cancer.  She tells me that she used the Zometa every 6 months for 1 year.  On Prolia since 2015, tolerated it well.  Last DXA was done in 9/2022.  Component      Latest Ref Rng 10/4/2023  11:02 AM   Sodium      135 - 145 mmol/L 139    Potassium      3.4 - 5.3 mmol/L 4.6    Carbon Dioxide (CO2)      22 - 29 mmol/L 25    Anion Gap      7 - 15 mmol/L 13    Urea Nitrogen      8.0 - 23.0 mg/dL 13.7    Creatinine      0.51 - 0.95 mg/dL 0.55    GFR Estimate      >60 mL/min/1.73m2 >90    Calcium      8.8 - 10.2 mg/dL 9.9    Chloride      98 - 107 mmol/L 101    Glucose      70 - 99 mg/dL 133 (H)    Alkaline Phosphatase      35 - 104 U/L 67    AST      0 - 45 U/L 29    ALT      0 - 50 U/L 21    Protein Total      6.4 - 8.3 g/dL 7.6    Albumin      3.5 - 5.2 g/dL 4.6    Bilirubin Total      <=1.2 mg/dL 1.0         Plan: DX Bone Density, Basic metabolic panel, Vitamin        D Deficiency, Ionized Calcium            (K21.9) Gastroesophageal reflux disease without esophagitis  Stable on omeprazole.    Acid blocking medications, such as Aciphex, Nexium, Prevacid, Protonix, Prilosec and Nexium are commonly used for heartburn. These medications can increase osteoporosis and fracture risk. A large study of more than 13,000 patients concluded that hip fracture risk increases by 22% after one year of taking the medication and by 54% after 3 years.     (F41.1) Anxiety  Stable on citalopram    Taking SSRI s is associated with lower bone mineral density in children and adults. In a study of adults over the age of 50, use of SSRIs not only lowered bone mineral density but they also increased the odds of falling and doubled the risk of osteoporosis fractures.    The longitudinal plan of care for the diagnosis(es)/condition(s) as documented were  "addressed during this visit. Due to the added complexity in care, I will continue to support Pat in the subsequent management and with ongoing continuity of care.     Patient was educated on safety of Prolia utilizing Patient Counseling Chart for Healthcare Providers, as outlined by the Prolia REMS progam.     Return in about 6 months (around 10/16/2024) for Prolia with CSS.    Patient Instructions   Prolia 18th today. Labs today.  Prolia 19th in 6 months with my nurse. I will see you in 1 year.    DXA in 9/2024 .   Phone number to schedule [phone].    Daily calcium need is 1214-0937 mg a day from the diet and supplements.  Calcium citrate is easier to digest.  Vitamin D 2000 IU daily recommended.    Risk of rebound vertebral fractures is higher when Prolia suddenly stopped or dose was missed.      Prolia and Covid vaccine should be  for at least a week.           /80   Pulse 86   Temp 97.1  F (36.2  C)   Resp 16   Ht 1.626 m (5' 4\")   Wt 81 kg (178 lb 8 oz)   LMP  (LMP Unknown)   SpO2 98%   BMI 30.64 kg/m        Did you experience any problems with previous Prolia injection? no  Any medication change in the last 6 months? no  Did you take prednisone or other immunosupressant drugs in the last 6 months   (chemo, transplant, rheum, dermatology conditions)? no  Did you have any serious infection in the last 6 months?no  Any recent hospitalizations?no  Do you plan any dental work in the next 2-3 months?no  How much calcium do you take daily from the diet and supplements?1200 mg  How much vit D do you take daily? 2000 IU  Last DXA? 9/2022, Reviewed and discussed      Patient is here today for the Prolia injection. Patient tolerated previous injections well.   We discussed calcium and vit D daily needs today.       We discussed high risk of rebound vertebral fractures when Prolia suddenly stopped.    Next Prolia injection will be in 6 months.           This note has been dictated using voice " recognition software. Any grammatical or context distortions are unintentional and inherent to the software      Patient Active Problem List   Diagnosis    Peripheral Neuropathy    Urticaria    Major Depression, Recurrent    Anxiety    Irritable Bowel Syndrome    Osteoporosis, senile    Esophageal reflux    Adjustment disorder with mixed anxiety and depressed mood    Bloating symptom    Incontinence of feces    Irregular bowel habits    Polyp of colon       Current Outpatient Medications   Medication Sig Dispense Refill    atorvastatin (LIPITOR) 10 MG tablet Take 1 tablet (10 mg) by mouth daily for 360 doses 90 tablet 3    calcium citrate-vitamin D (CITRACAL+D) 315-200 mg-unit per tablet [CALCIUM CITRATE-VITAMIN D (CITRACAL+D) 315-200 MG-UNIT PER TABLET] Take 1 tablet by mouth 2 (two) times a day.      citalopram (CELEXA) 20 MG tablet Take 1 tablet (20 mg) by mouth daily 90 tablet 3    dicyclomine (BENTYL) 10 MG capsule Take 1 capsule by mouth as needed      lactase (LACTAID) 3000 UNIT tablet       Lactobacillus Rhamnosus, GG, (RA PROBIOTIC DIGESTIVE CARE) CAPS Take 1 capsule by mouth every 24 hours      magnesium 250 mg Tab [MAGNESIUM 250 MG TAB] Take 250 mg by mouth daily.       multivit-min-iron-FA-lutein (CENTRUM SILVER WOMEN) 8 mg iron-400 mcg-300 mcg Tab [MULTIVIT-MIN-IRON-FA-LUTEIN (CENTRUM SILVER WOMEN) 8 MG IRON-400 MCG-300 MCG TAB] Take 1 tablet by mouth daily.       omeprazole (PRILOSEC) 20 MG DR capsule Take 1 capsule by mouth 2 times daily      polyethylene glycol (MIRALAX) 17 g packet Take 1 packet by mouth daily      tetrahydroz-peg 400-hyprom-gly (VISINE MAX REDNESS RELIEF) 0.05-1-0.36-0.2 % Drop [TETRAHYDROZ--HYPROM-GLY (VISINE MAX REDNESS RELIEF) 0.05-1-0.36-0.2 % DROP] Administer 1 drop to both eyes 3 (three) times a day as needed.      vit C/E/Zn/coppr/lutein/zeaxan (PRESERVISION AREDS-2 ORAL) [VIT C/E/ZN/COPPR/LUTEIN/ZEAXAN (PRESERVISION AREDS-2 ORAL)] Take 1 tablet by mouth 2 (two) times  a day.       denosumab (PROLIA) 60 MG/ML SOSY injection Inject 60 mg Subcutaneous       No current facility-administered medications for this visit.

## 2024-04-16 NOTE — PATIENT INSTRUCTIONS
Prolia 18th today. Labs today.  Prolia 19th in 6 months with my nurse. I will see you in 1 year.    DXA in 9/2024 .   Phone number to schedule [phone].    Daily calcium need is 0206-0395 mg a day from the diet and supplements.  Calcium citrate is easier to digest.  Vitamin D 2000 IU daily recommended.    Risk of rebound vertebral fractures is higher when Prolia suddenly stopped or dose was missed.      Prolia and Covid vaccine should be  for at least a week.

## 2024-04-17 LAB
ANION GAP SERPL CALCULATED.3IONS-SCNC: 10 MMOL/L (ref 7–15)
BUN SERPL-MCNC: 10.8 MG/DL (ref 8–23)
CALCIUM SERPL-MCNC: 10.1 MG/DL (ref 8.8–10.2)
CHLORIDE SERPL-SCNC: 99 MMOL/L (ref 98–107)
CREAT SERPL-MCNC: 0.63 MG/DL (ref 0.51–0.95)
DEPRECATED HCO3 PLAS-SCNC: 29 MMOL/L (ref 22–29)
EGFRCR SERPLBLD CKD-EPI 2021: >90 ML/MIN/1.73M2
GLUCOSE SERPL-MCNC: 113 MG/DL (ref 70–99)
POTASSIUM SERPL-SCNC: 4.9 MMOL/L (ref 3.4–5.3)
SODIUM SERPL-SCNC: 138 MMOL/L (ref 135–145)
VIT D+METAB SERPL-MCNC: 43 NG/ML (ref 20–50)

## 2024-05-23 SDOH — HEALTH STABILITY: PHYSICAL HEALTH: ON AVERAGE, HOW MANY DAYS PER WEEK DO YOU ENGAGE IN MODERATE TO STRENUOUS EXERCISE (LIKE A BRISK WALK)?: 1 DAY

## 2024-05-23 SDOH — HEALTH STABILITY: PHYSICAL HEALTH: ON AVERAGE, HOW MANY MINUTES DO YOU ENGAGE IN EXERCISE AT THIS LEVEL?: 20 MIN

## 2024-05-23 ASSESSMENT — SOCIAL DETERMINANTS OF HEALTH (SDOH): HOW OFTEN DO YOU GET TOGETHER WITH FRIENDS OR RELATIVES?: TWICE A WEEK

## 2024-05-27 ASSESSMENT — PATIENT HEALTH QUESTIONNAIRE - PHQ9
SUM OF ALL RESPONSES TO PHQ QUESTIONS 1-9: 4
10. IF YOU CHECKED OFF ANY PROBLEMS, HOW DIFFICULT HAVE THESE PROBLEMS MADE IT FOR YOU TO DO YOUR WORK, TAKE CARE OF THINGS AT HOME, OR GET ALONG WITH OTHER PEOPLE: SOMEWHAT DIFFICULT
SUM OF ALL RESPONSES TO PHQ QUESTIONS 1-9: 4

## 2024-05-28 ENCOUNTER — OFFICE VISIT (OUTPATIENT)
Dept: FAMILY MEDICINE | Facility: CLINIC | Age: 79
End: 2024-05-28
Payer: MEDICARE

## 2024-05-28 VITALS
WEIGHT: 173.4 LBS | BODY MASS INDEX: 30.72 KG/M2 | TEMPERATURE: 98 F | HEART RATE: 94 BPM | DIASTOLIC BLOOD PRESSURE: 82 MMHG | OXYGEN SATURATION: 95 % | SYSTOLIC BLOOD PRESSURE: 110 MMHG | RESPIRATION RATE: 16 BRPM | HEIGHT: 63 IN

## 2024-05-28 DIAGNOSIS — E78.2 MIXED HYPERLIPIDEMIA: ICD-10-CM

## 2024-05-28 DIAGNOSIS — L60.3 BRITTLE NAILS: ICD-10-CM

## 2024-05-28 DIAGNOSIS — Z00.00 ENCOUNTER FOR MEDICARE ANNUAL WELLNESS EXAM: Primary | ICD-10-CM

## 2024-05-28 DIAGNOSIS — F33.0 MILD EPISODE OF RECURRENT MAJOR DEPRESSIVE DISORDER (H): ICD-10-CM

## 2024-05-28 DIAGNOSIS — Z01.818 PREOPERATIVE EXAMINATION: ICD-10-CM

## 2024-05-28 DIAGNOSIS — R20.9 COLD HANDS AND FEET: ICD-10-CM

## 2024-05-28 DIAGNOSIS — K58.0 IRRITABLE BOWEL SYNDROME WITH DIARRHEA: ICD-10-CM

## 2024-05-28 DIAGNOSIS — H43.392 FLOATER, VITREOUS, LEFT: ICD-10-CM

## 2024-05-28 DIAGNOSIS — Z23 COVID-19 VACCINE ADMINISTERED: ICD-10-CM

## 2024-05-28 PROBLEM — R15.9 INCONTINENCE OF FECES: Status: RESOLVED | Noted: 2017-06-09 | Resolved: 2024-05-28

## 2024-05-28 PROBLEM — R19.8 IRREGULAR BOWEL HABITS: Status: RESOLVED | Noted: 2018-04-09 | Resolved: 2024-05-28

## 2024-05-28 PROBLEM — R14.0 BLOATING SYMPTOM: Status: RESOLVED | Noted: 2023-11-14 | Resolved: 2024-05-28

## 2024-05-28 LAB
ERYTHROCYTE [DISTWIDTH] IN BLOOD BY AUTOMATED COUNT: 12 % (ref 10–15)
FOLATE SERPL-MCNC: 30.6 NG/ML (ref 4.6–34.8)
HCT VFR BLD AUTO: 44.1 % (ref 35–47)
HGB BLD-MCNC: 14.3 G/DL (ref 11.7–15.7)
MCH RBC QN AUTO: 30.7 PG (ref 26.5–33)
MCHC RBC AUTO-ENTMCNC: 32.4 G/DL (ref 31.5–36.5)
MCV RBC AUTO: 95 FL (ref 78–100)
PLATELET # BLD AUTO: 263 10E3/UL (ref 150–450)
RBC # BLD AUTO: 4.66 10E6/UL (ref 3.8–5.2)
WBC # BLD AUTO: 6.1 10E3/UL (ref 4–11)

## 2024-05-28 PROCEDURE — 80053 COMPREHEN METABOLIC PANEL: CPT | Performed by: NURSE PRACTITIONER

## 2024-05-28 PROCEDURE — 99214 OFFICE O/P EST MOD 30 MIN: CPT | Mod: 25 | Performed by: NURSE PRACTITIONER

## 2024-05-28 PROCEDURE — 91320 SARSCV2 VAC 30MCG TRS-SUC IM: CPT | Performed by: NURSE PRACTITIONER

## 2024-05-28 PROCEDURE — 90480 ADMN SARSCOV2 VAC 1/ONLY CMP: CPT | Performed by: NURSE PRACTITIONER

## 2024-05-28 PROCEDURE — 80061 LIPID PANEL: CPT | Performed by: NURSE PRACTITIONER

## 2024-05-28 PROCEDURE — G0439 PPPS, SUBSEQ VISIT: HCPCS | Performed by: NURSE PRACTITIONER

## 2024-05-28 PROCEDURE — 36415 COLL VENOUS BLD VENIPUNCTURE: CPT | Performed by: NURSE PRACTITIONER

## 2024-05-28 PROCEDURE — 82607 VITAMIN B-12: CPT | Performed by: NURSE PRACTITIONER

## 2024-05-28 PROCEDURE — 85027 COMPLETE CBC AUTOMATED: CPT | Performed by: NURSE PRACTITIONER

## 2024-05-28 PROCEDURE — 82746 ASSAY OF FOLIC ACID SERUM: CPT | Performed by: NURSE PRACTITIONER

## 2024-05-28 PROCEDURE — 84443 ASSAY THYROID STIM HORMONE: CPT | Performed by: NURSE PRACTITIONER

## 2024-05-28 RX ORDER — FLUTICASONE PROPIONATE 50 MCG
SPRAY, SUSPENSION (ML) NASAL
COMMUNITY
Start: 2023-12-19 | End: 2024-09-19

## 2024-05-28 RX ORDER — NIRMATRELVIR AND RITONAVIR 300-100 MG
KIT ORAL
COMMUNITY
Start: 2023-12-28 | End: 2024-05-28

## 2024-05-28 ASSESSMENT — PAIN SCALES - GENERAL: PAINLEVEL: NO PAIN (0)

## 2024-05-28 NOTE — PATIENT INSTRUCTIONS
"Preventive Care Advice   This is general advice we often give to help people stay healthy. Your care team may have specific advice just for you. Please talk to your care team about your own preventive care needs.  Lifestyle  Exercise at least 150 minutes each week (30 minutes a day, 5 days a week).  Do muscle strengthening activities 2 days a week. These help control your weight and prevent disease.  No smoking.  Wear sunscreen to prevent skin cancer.  Have your home tested for radon every 2 to 5 years. Radon is a colorless, odorless gas that can harm your lungs. To learn more, go to www.health.[facility]. and search for \"Radon in Homes.\"  Keep guns unloaded and locked up in a safe place like a safe or gun vault, or, use a gun lock and hide the keys. Always lock away bullets separately. To learn more, visit [employer]One.[place].gov and search for \"safe gun storage.\"  Nutrition  Eat 5 or more servings of fruits and vegetables each day.  Try wheat bread, brown rice and whole grain pasta (instead of white bread, rice, and pasta).  Get enough calcium and vitamin D. Check the label on foods and aim for 100% of the RDA (recommended daily allowance).  Regular exams  Have a dental exam and cleaning every 6 months.  See your health care team every year to talk about:  Any changes in your health.  Any medicines your care team has prescribed.  Preventive care, family planning, and ways to prevent chronic diseases.  Shots (vaccines)   HPV shots (up to age 26), if you've never had them before.  Hepatitis B shots (up to age 59), if you've never had them before.  COVID-19 shot: Get this shot when it's due.  Flu shot: Get a flu shot every year.  Tetanus shot: Get a tetanus shot every 10 years.  Pneumococcal, hepatitis A, and RSV shots: Ask your care team if you need these based on your risk.  Shingles shot (for age 50 and up).  General health tests  Diabetes screening:  Starting at age 35, Get screened for diabetes at least every 3 years.  If " you are younger than age 35, ask your care team if you should be screened for diabetes.  Cholesterol test: At age 39, start having a cholesterol test every 5 years, or more often if advised.  Bone density scan (DEXA): At age 50, ask your care team if you should have this scan for osteoporosis (brittle bones).  Hepatitis C: Get tested at least once in your life.  Abdominal aortic aneurysm screening: Talk to your doctor about having this screening if you:  Have ever smoked; and  Are biologically male; and  Are between the ages of 65 and 75.  Cancer screening tests  Cervical cancer screening: If you have a cervix, begin getting regular cervical cancer screening tests at age 21. Most people who have regular screenings with normal results can stop after age 65. Talk about this with your provider.  Breast cancer scan (mammogram): If you've ever had breasts, begin having regular mammograms starting at age 40. This is a scan to check for breast cancer.  Colon cancer screening: It is important to start screening for colon cancer at age 45.  Have a colonoscopy test every 10 years (or more often if you're at risk) Or, ask your provider about stool tests like a FIT test every year or Cologuard test every 3 years.  To learn more about your testing options, visit: www.[employer]/458881.pdf.  For help making a decision, visit: yuniel/[email or web address].  Prostate cancer screening test: If you have a prostate and are age 55 to 69, ask your provider if you would benefit from a yearly prostate cancer screening test.  Lung cancer screening: If you are a current or former smoker age 50 to 80, ask your care team if ongoing lung cancer screenings are right for you.  For informational purposes only. Not to replace the advice of your health care provider. Copyright   2023 [employer]. All rights reserved. Clinically reviewed by the [facility]. Link[employer] 516273 - REV 04/24.    Preventing Falls: Care  Instructions  Injuries and health problems such as trouble walking or poor eyesight can increase your risk of falling. So can some medicines. But there are things you can do to help prevent falls. You can exercise to get stronger. You can also arrange your home to make it safer.    Talk to your doctor about the medicines you take. Ask if any of them increase the risk of falls and whether they can be changed or stopped.   Try to exercise regularly. It can help improve your strength and balance. This can help lower your risk of falling.     Practice fall safety and prevention.    Wear low-heeled shoes that fit well and give your feet good support. Talk to your doctor if you have foot problems that make this hard.  Carry a cellphone or wear a medical alert device that you can use to call for help.  Use stepladders instead of chairs to reach high objects. Don't climb if you're at risk for falls. Ask for help, if needed.  Wear the correct eyeglasses, if you need them.    Make your home safer.    Remove rugs, cords, clutter, and furniture from walkways.  Keep your house well lit. Use night-lights in hallways and bathrooms.  Install and use sturdy handrails on stairways.  Wear nonskid footwear, even inside. Don't walk barefoot or in socks without shoes.    Be safe outside.    Use handrails, curb cuts, and ramps whenever possible.  Keep your hands free by using a shoulder bag or backpack.  Try to walk in well-lit areas. Watch out for uneven ground, changes in pavement, and debris.  Be careful in the winter. Walk on the grass or gravel when sidewalks are slippery. Use de-icer on steps and walkways. Add non-slip devices to shoes.    Put grab bars and nonskid mats in your shower or tub and near the toilet. Try to use a shower chair or bath bench when bathing.   Get into a tub or shower by putting in your weaker leg first. Get out with your strong side first. Have a phone or medical alert device in the bathroom with you.  "  Where can you learn more?  Go to https://www.[employer]ber[employer].net/patiented  Enter G117 in the search box to learn more about \"Preventing Falls: Care Instructions.\"  Current as of: July 17, 2023               Content Version: 14.0    4470-5444 [employer].   Care instructions adapted under license by your healthcare professional. If you have questions about a medical condition or this instruction, always ask your healthcare professional. [employer] disclaims any warranty or liability for your use of this information.    CoQ 10- take 100 mg/day      How to Take Your Medication Before Surgery  Preoperative Medication Instructions   Antiplatelet or Anticoagulation Medication Instructions   - Patient is on no antiplatelet or anticoagulation medications.   - Bleeding risk is low for this procedure (e.g. dental, skin, cataract).    Additional Medication Instructions  Take all scheduled medications on the day of surgery       Patient Education   Preparing for Your Surgery  Getting started  A nurse will call you to review your health history and instructions. They will give you an arrival time based on your scheduled surgery time. Please be ready to share:  Your doctor's clinic name and phone number  Your medical, surgical, and anesthesia history  A list of allergies and sensitivities  A list of medicines, including herbal treatments and over-the-counter drugs  Whether the patient has a legal guardian (ask how to send us the papers in advance)  Please tell us if you're pregnant--or if there's any chance you might be pregnant. Some surgeries may injure a fetus (unborn baby), so they require a pregnancy test. Surgeries that are safe for a fetus don't always need a test, and you can choose whether to have one.   If you have a child who's having surgery, please ask for a copy of Preparing for Your Child's Surgery.    Preparing for surgery  Within 10 to 30 days of surgery: Have a pre-op exam (sometimes " called an H&P, or History and Physical). This can be done at a clinic or pre-operative center.  If you're having a , you may not need this exam. Talk to your care team.  At your pre-op exam, talk to your care team about all medicines you take. If you need to stop any medicines before surgery, ask when to start taking them again.  We do this for your safety. Many medicines can make you bleed too much during surgery. Some change how well surgery (anesthesia) drugs work.  Call your insurance company to let them know you're having surgery. (If you don't have insurance, call [phone].)  Call your clinic if there's any change in your health. This includes signs of a cold or flu (sore throat, runny nose, cough, rash, fever). It also includes a scrape or scratch near the surgery site.  If you have questions on the day of surgery, call your hospital or surgery center.  Eating and drinking guidelines  For your safety: Unless your surgeon tells you otherwise, follow the guidelines below.  Eat and drink as usual until 8 hours before you arrive for surgery. After that, no food or milk.  Drink clear liquids until 2 hours before you arrive. These are liquids you can see through, like water, Gatorade, and Propel Water. They also include plain black coffee and tea (no cream or milk), candy, and breath mints. You can spit out gum when you arrive.  If you drink alcohol: Stop drinking it the night before surgery.  If your care team tells you to take medicine on the morning of surgery, it's okay to take it with a sip of water.  Preventing infection  Shower or bathe the night before and morning of your surgery. Follow the instructions your clinic gave you. (If no instructions, use regular soap.)  Don't shave or clip hair near your surgery site. We'll remove the hair if needed.  Don't smoke or vape the morning of surgery. You may chew nicotine gum up to 2 hours before surgery. A nicotine patch is okay.  Note: Some surgeries  require you to completely quit smoking and nicotine. Check with your surgeon.  Your care team will make every effort to keep you safe from infection. We will:  Clean our hands often with soap and water (or an alcohol-based hand rub).  Clean the skin at your surgery site with a special soap that kills germs.  Give you a special gown to keep you warm. (Cold raises the risk of infection.)  Wear special hair covers, masks, gowns and gloves during surgery.  Give antibiotic medicine, if prescribed. Not all surgeries need antibiotics.  What to bring on the day of surgery  Photo ID and insurance card  Copy of your health care directive, if you have one  Glasses and hearing aids (bring cases)  You can't wear contacts during surgery  Inhaler and eye drops, if you use them (tell us about these when you arrive)  CPAP machine or breathing device, if you use them  A few personal items, if spending the night  If you have . . .  A pacemaker, ICD (cardiac defibrillator) or other implant: Bring the ID card.  An implanted stimulator: Bring the remote control.  A legal guardian: Bring a copy of the certified (court-stamped) guardianship papers.  Please remove any jewelry, including body piercings. Leave jewelry and other valuables at home.  If you're going home the day of surgery  You must have a responsible adult drive you home. They should stay with you overnight as well.  If you don't have someone to stay with you, and you aren't safe to go home alone, we may keep you overnight. Insurance often won't pay for this.  After surgery  If it's hard to control your pain or you need more pain medicine, please call your surgeon's office.  Questions?   If you have any questions for your care team, list them here: _________________________________________________________________________________________________________________________________________________________________________ ____________________________________  ____________________________________ ____________________________________  For informational purposes only. Not to replace the advice of your health care provider. Copyright   2003, 2019 [facility]. All rights reserved. Clinically reviewed by Amaya Koch MD. SM[employer] [id number] - REV 12/22.

## 2024-05-28 NOTE — PROGRESS NOTES
Preventive Care Visit  [facility]  Kera Lao CNP, Nurse Practitioner Primary Care  May 28, 2024        Subjective   Pat is a 78 year old, presenting for the following:  Physical (AWV. Pt is fasting. ) and Pre-Op Exam (Retina surgery in L eye 06/11/2024 Dr. Alcala [facility] FAX: [phone])        5/28/2024     9:41 AM   Additional Questions   Roomed by Haley SLAUGHTER LPN         5/28/2024   Forms   Any forms needing to be completed Yes        Health Care Directive  Patient does not have a Health Care Directive or Living Will: Patient states has Advance Directive and will bring in a copy to clinic.    HPI  Overall feeling well.  Will be having surgery for large floater in left eye.  IBS flare x 3 weeks with prolonged diarrhea.      Hyperlipidemia Follow-Up    Are you regularly taking any medication or supplement to lower your cholesterol?   Yes-    Are you having muscle aches or other side effects that you think could be caused by your cholesterol lowering medication?  Yes- a little aching      5/23/2024   General Health   How would you rate your overall physical health? Excellent   Feel stress (tense, anxious, or unable to sleep) To some extent   (!) STRESS CONCERN      5/23/2024   Nutrition   Diet: Other   If other, please elaborate: Lactose free         5/23/2024   Exercise   Days per week of moderate/strenuous exercise 1 day   Average minutes spent exercising at this level 20 min   (!) EXERCISE CONCERN      5/23/2024   Social Factors   Frequency of gathering with friends or relatives Twice a week   Worry food won't last until get money to buy more No   Food not last or not have enough money for food? No   Do you have housing?  Yes   Are you worried about losing your housing? Patient declined   Lack of transportation? No   Unable to get utilities (heat,electricity)? No         5/28/2024   Fall Risk   Gait Speed Test (Document in seconds) 3   Gait Speed  Test Interpretation Less than or equal to 5.00 seconds - PASS          5/23/2024   Activities of Daily Living- Home Safety   Needs help with the following daily activities None of the above   Safety concerns in the home None of the above         5/23/2024   Dental   Dentist two times every year? Yes         5/23/2024   Hearing Screening   Hearing concerns? None of the above         5/23/2024   Driving Risk Screening   Patient/family members have concerns about driving (!) YES due to vision change- having surgery to correct         5/23/2024   General Alertness/Fatigue Screening   Have you been more tired than usual lately? No         5/23/2024   Urinary Incontinence Screening   Bothered by leaking urine in past 6 months Yes         5/23/2024   TB Screening   Were you born outside of the [place]? No       Today's PHQ-9 Score:       5/27/2024     7:10 PM   PHQ-9 SCORE   PHQ-9 Total Score MyChart 4 (Minimal depression)   PHQ-9 Total Score 4         5/23/2024   Substance Use   Alcohol more than 3/day or more than 7/wk No   Do you have a current opioid prescription? No   How severe/bad is pain from 1 to 10? 5/10   Do you use any other substances recreationally? No     Social History     Tobacco Use    Smoking status: Never     Passive exposure: Never    Smokeless tobacco: Never   Vaping Use    Vaping status: Never Used   Substance Use Topics    Alcohol use: Yes     Alcohol/week: 3.0 standard drinks of alcohol     Types: 3 Standard drinks or equivalent per week     Comment: 3-4 per week    Drug use: No           2/6/2024   LAST FHS-7 RESULTS   1st degree relative breast or ovarian cancer No   Any relative bilateral breast cancer No   Any male have breast cancer No   Any ONE woman have BOTH breast AND ovarian cancer No   Any woman with breast cancer before 50yrs Yes   2 or more relatives with breast AND/OR ovarian cancer No   2 or more relatives with breast AND/OR bowel cancer No        Mammogram Screening - Mammogram every 1-2  years updated in Health Maintenance based on mutual decision making    ASCVD Risk   The 10-year ASCVD risk score (Luz MARTIN, et al., 2019) is: 13.8%    Values used to calculate the score:      Age: 78 years      Sex: Female      Is Non- : No      Diabetic: No      Tobacco smoker: No      Systolic Blood Pressure: 110 mmHg      Is BP treated: No      HDL Cholesterol: 73 mg/dL      Total Cholesterol: 203 mg/dL            Reviewed and updated as needed this visit by Provider                    Past Medical History:   Diagnosis Date    Anxiety     Arthritis     Breast cancer (H) 2003    Depression     Esophageal reflux     H/O colonoscopy 03/2022    hyperplastic polyp noted, no further colonoscopy recommended per [facility]    History of anesthesia complications     nausea    Hx antineoplastic chemotherapy 2003    for breast cancer    Hx of radiation therapy 2004    for breast cancer    IBS (irritable bowel syndrome)     Lactose intolerance     lactose breath test positive at GI, 7/2019    Lumbar disc herniation 12/23/2020    Added automatically from request for surgery [id number]       Lumbar stenosis with neurogenic claudication 01/29/2021    Macular degeneration 08/2019    caught early, takes Areds 2 on a daily basis, seeing opthamology    Neuropathy     secondary to chemotherapy    Osteopenia     PONV (postoperative nausea and vomiting)     Primary osteoarthritis of knee, left 04/21/2016    Replacement Utility updated for latest IMO load       Primary osteoarthritis of right knee 10/29/2015    Spinal stenosis of lumbar region with neurogenic claudication 12/23/2020    Added automatically from request for surgery [id number]       Synovial cyst of lumbar facet joint 12/23/2020    Added automatically from request for surgery [id number]        Past Surgical History:   Procedure Laterality Date    APPENDECTOMY  1967    ARTHROPLASTY TOE(S) Right 06/16/2023    Procedure: First metatarsal phalangeal joint  implant arthroplasty right foot;  Surgeon: Manuel Sparks DPM;  Location: [facility] OR    BACK SURGERY  2000    cervical fusion    BIOPSY BREAST Right 2003    BIOPSY BREAST Right 07/09/2021    dense fibrotic tissue, Dr Burleson    CATARACT EXTRACTION, BILATERAL      CHOLECYSTECTOMY  1994    COLONOSCOPY  2023    Small polys temovrd    GI SURGERY      [facility]    HEAD & NECK SURGERY  2000    Neck fusion    HYSTERECTOMY  1997    desmoid cyst    IR CVC TUNNEL PLACEMENT > 5 YRS OF AGE  01/21/2004    IR MISCELLANEOUS PROCEDURE  07/16/2004    LASER SURGERY OF EYE Bilateral 09/2022    cataracts    LUMPECTOMY BREAST  12/2003    breast cancer    NISSEN FUNDOPLICATION  2010    OOPHORECTOMY  1997    [facility] THORAX SPINE FUSN,POST TECH N/A 01/29/2021    Procedure: BILATERAL LUMBAR 4-5 LAMINECTOMIES FOR RIGHT SYNOVIAL CYST RESECTION, LUMBAR 4-5, LUMBAR 4-5 POSTERIOR INSTRUMENTED FUSION AND ARTHRODESIS, USE OF ALLOGRAFT, USE OF AUTOGRAFT, STEALTH NAVIGATION;  Surgeon: Lisa Thomas MD;  Location: [facility] Main OR;  Service: Spine    [facility] TOTAL KNEE ARTHROPLASTY Right 10/29/2015    Procedure: RIGHT TOTAL KNEE  ARTHROPLASTY;  Surgeon: Nitin White MD;  Location: [facility] Main OR;  Service: Orthopedics    [facility] TOTAL KNEE ARTHROPLASTY Left 04/21/2016    Procedure: LEFT TOTAL KNEE ARTHROPLASTY;  Surgeon: Nitin White MD;  Location: [facility] OR;  Service: Orthopedics     Current providers sharing in care for this patient include:  Patient Care Team:  Kera Lao CNP as PCP - General (Nurse Practitioner Primary Care)  Rajat Borges MBBS as Assigned Rheumatology Provider  Mone Blanchard MD as Physician (Internal Medicine)  Manuel Sparks DPM as Assigned Surgical Provider  Kera Lao CNP as Assigned PCP    The following health maintenance items are reviewed in Epic and correct as of today:  Health Maintenance   Topic Date Due    DEPRESSION ACTION PLAN  Never done    COVID-19 Vaccine (7 - 2023-24  "season) 09/01/2023    ANNUAL REVIEW OF HM ORDERS  05/18/2024    MEDICARE ANNUAL WELLNESS VISIT  05/10/2024    LIPID  11/14/2024    PHQ-9  11/28/2024    MAMMO SCREENING  02/06/2025    FALL RISK ASSESSMENT  05/28/2025    GLUCOSE  04/16/2027    ADVANCE CARE PLANNING  05/18/2028    DTAP/TDAP/TD IMMUNIZATION (3 - Td or Tdap) 07/07/2032    DEXA  09/22/2037    HEPATITIS C SCREENING  Completed    INFLUENZA VACCINE  Completed    ZOSTER IMMUNIZATION  Completed    RSV VACCINE (Pregnancy & 60+)  Completed    IPV IMMUNIZATION  Aged Out    HPV IMMUNIZATION  Aged Out    MENINGITIS IMMUNIZATION  Aged Out    RSV MONOCLONAL ANTIBODY  Aged Out    Pneumococcal Vaccine: 65+ Years  Discontinued    COLORECTAL CANCER SCREENING  Discontinued         Review of Systems  Constitutional, HEENT, cardiovascular, pulmonary, GI, , musculoskeletal, neuro, skin, endocrine and psych systems are negative, except as otherwise noted.  Nails brittle.  Hands and feet are cold.       Objective    Exam  /82   Pulse 94   Temp 98  F (36.7  C) (Oral)   Resp 16   Ht 1.607 m (5' 3.25\")   Wt 78.7 kg (173 lb 6.4 oz)   LMP  (LMP Unknown)   SpO2 95%   BMI 30.47 kg/m     Estimated body mass index is 30.47 kg/m  as calculated from the following:    Height as of this encounter: 1.607 m (5' 3.25\").    Weight as of this encounter: 78.7 kg (173 lb 6.4 oz).    Physical Exam  GENERAL: alert and no distress  EYES: Eyes grossly normal to inspection, PERRL and conjunctivae and sclerae normal  HEENT: ear canals and TM's normal, nose and mouth without ulcers or lesions  NECK: no adenopathy, no asymmetry, masses, or scars  RESP: lungs clear to auscultation - no rales, rhonchi or wheezes  BREAST: normal without masses, tenderness or nipple discharge and no palpable axillary masses or adenopathy  CV: regular rate and rhythm, normal S1 S2, no S3 or S4, no murmur, click or rub, no peripheral edema  ABDOMEN: soft, nontender, no hepatosplenomegaly, no masses and bowel " sounds normal  MS: no gross musculoskeletal defects noted, no edema  SKIN: no suspicious lesions or rashes  NEURO: Normal strength and tone, mentation intact and speech normal  PSYCH: mentation appears normal, affect normal/bright         5/28/2024   Mini Cog   Clock Draw Score 2 Normal   3 Item Recall 2 objects recalled   Mini Cog Total Score 4         Vision Screen  Reason Vision Screen Not Completed: Other (Not applicable. Pt see's eye doctor.)    A/P:  1. Encounter for Medicare annual wellness exam      2. Preoperative examination  Cleared for surgery    3. Floater, vitreous, left  Surgery as scheduled    4. Mixed hyperlipidemia  Stable  - CBC with platelets; Future  - Comprehensive metabolic panel (BMP + Alb, Alk Phos, ALT, AST, Total. Bili, TP); Future  - Lipid panel reflex to direct LDL Fasting; Future  - CBC with platelets  - Comprehensive metabolic panel (BMP + Alb, Alk Phos, ALT, AST, Total. Bili, TP)  - Lipid panel reflex to direct LDL Fasting    5. Cold hands and feet  - TSH with free T4 reflex; Future  - TSH with free T4 reflex    6. Brittle nails  - Vitamin B12; Future  - Folate; Future  - TSH with free T4 reflex; Future  - Vitamin B12  - Folate  - TSH with free T4 reflex    7. COVID-19 vaccine administered  - COVID-19 12+ (2023-24) (PFIZER)    8. Mild episode of recurrent major depressive disorder (H24)    9. Irritable bowel syndrome with diarrhea  - CBC with platelets; Future  - Comprehensive metabolic panel (BMP + Alb, Alk Phos, ALT, AST, Total. Bili, TP); Future  - CBC with platelets  - Comprehensive metabolic panel (BMP + Alb, Alk Phos, ALT, AST, Total. Bili, TP)      Signed Electronically by: Kera Lao CNP    Answers submitted by the patient for this visit:  Patient Health Questionnaire (Submitted on 5/27/2024)  If you checked off any problems, how difficult have these problems made it for you to do your work, take care of things at home, or get along with other people?: Somewhat  difficult  PHQ9 TOTAL SCORE: 4

## 2024-05-28 NOTE — LETTER
My Depression Action Plan  Name: Adelina Espinoza   Date of Birth 1945  Date: 5/28/2024    My doctor: Kera Lao   My clinic: [facility] JORDEN [facility]  [address]  [address]  [place] [address]  [phone]            GREEN    ZONE   Good Control    What it looks like:   Things are going generally well. You have normal ups and downs. You may even feel depressed from time to time, but bad moods usually last less than a day.   What you need to do:  Continue to care for yourself (see self care plan)  Check your depression survival kit and update it as needed  Follow your physician s recommendations including any medication.  Do not stop taking medication unless you consult with your physician first.             YELLOW         ZONE Getting Worse    What it looks like:   Depression is starting to interfere with your life.   It may be hard to get out of bed; you may be starting to isolate yourself from others.  Symptoms of depression are starting to last most all day and this has happened for several days.   You may have suicidal thoughts but they are not constant.   What you need to do:     Call your care team. Your response to treatment will improve if you keep your care team informed of your progress. Yellow periods are signs an adjustment may need to be made.     Continue your self-care.  Just get dressed and ready for the day.  Don't give yourself time to talk yourself out of it.    Talk to someone in your support network.    Open up your Depression Self-Care Plan/Wellness Kit.             RED    ZONE Medical Alert - Get Help    What it looks like:   Depression is seriously interfering with your life.   You may experience these or other symptoms: You can t get out of bed most days, can t work or engage in other necessary activities, you have trouble taking care of basic hygiene, or basic responsibilities, thoughts of suicide or death  that will not go away, self-injurious behavior.     What you need to do:  Call your care team and request a same-day appointment. If they are not available (weekends or after hours) call your local crisis line, emergency room or 911.          Depression Self-Care Plan / Wellness Kit    Many people find that medication and therapy are helpful treatments for managing depression. In addition, making small changes to your everyday life can help to boost your mood and improve your wellbeing. Below are some tips for you to consider. Be sure to talk with your medical provider and/or behavioral health consultant if your symptoms are worsening or not improving.     Sleep   Sleep hygiene  means all of the habits that support good, restful sleep. It includes maintaining a consistent bedtime and wake time, using your bedroom only for sleeping or sex, and keeping the bedroom dark and free of distractions like a computer, smartphone, or television.     Develop a Healthy Routine  Maintain good hygiene. Get out of bed in the morning, make your bed, brush your teeth, take a shower, and get dressed. Don t spend too much time viewing media that makes you feel stressed. Find time to relax each day.    Exercise  Get some form of exercise every day. This will help reduce pain and release endorphins, the  feel good  chemicals in your brain. It can be as simple as just going for a walk or doing some gardening, anything that will get you moving.      Diet  Strive to eat healthy foods, including fruits and vegetables. Drink plenty of water. Avoid excessive sugar, caffeine, alcohol, and other mood-altering substances.     Stay Connected with Others  Stay in touch with friends and family members.    Manage Your Mood  Try deep breathing, massage therapy, biofeedback, or meditation. Take part in fun activities when you can. Try to find something to smile about each day.     Psychotherapy  Be open to working with a therapist if your provider  recommends it.     Medication  Be sure to take your medication as prescribed. Most anti-depressants need to be taken every day. It usually takes several weeks for medications to work. Not all medicines work for all people. It is important to follow-up with your provider to make sure you have a treatment plan that is working for you. Do not stop your medication abruptly without first discussing it with your provider.    Crisis Resources   These hotlines are for both adults and children. They and are open 24 hours a day, 7 days a week unless noted otherwise.    National Suicide Prevention Lifeline   988 or 4-560-674-RYTU (1810)    Crisis Text Line    www.crisistextline.org  Text HOME to 644783 from anywhere in the [place], anytime, about any type of crisis. A live, trained crisis counselor will receive the text and respond quickly.    [facility] Lifeline for LGBTQ Youth  A national crisis intervention and suicide lifeline for LGBTQ youth under 25. Provides a safe place to talk without judgement. Call 1-365.862.7787; text START to 416500 or visit www.thetrevorproject.org to talk to a trained counselor.    For Critical access hospital crisis numbers, visit the [facility] website at:  https://[place].gov/dhs/people-we-serve/adults/health-care/mental-health/resources/crisis-contacts.jsp

## 2024-05-29 LAB
ALBUMIN SERPL BCG-MCNC: 4.6 G/DL (ref 3.5–5.2)
ALP SERPL-CCNC: 63 U/L (ref 40–150)
ALT SERPL W P-5'-P-CCNC: 25 U/L (ref 0–50)
ANION GAP SERPL CALCULATED.3IONS-SCNC: 10 MMOL/L (ref 7–15)
AST SERPL W P-5'-P-CCNC: 31 U/L (ref 0–45)
BILIRUB SERPL-MCNC: 1 MG/DL
BUN SERPL-MCNC: 11.1 MG/DL (ref 8–23)
CALCIUM SERPL-MCNC: 9.9 MG/DL (ref 8.8–10.2)
CHLORIDE SERPL-SCNC: 101 MMOL/L (ref 98–107)
CHOLEST SERPL-MCNC: 155 MG/DL
CREAT SERPL-MCNC: 0.61 MG/DL (ref 0.51–0.95)
DEPRECATED HCO3 PLAS-SCNC: 28 MMOL/L (ref 22–29)
EGFRCR SERPLBLD CKD-EPI 2021: >90 ML/MIN/1.73M2
FASTING STATUS PATIENT QL REPORTED: YES
FASTING STATUS PATIENT QL REPORTED: YES
GLUCOSE SERPL-MCNC: 95 MG/DL (ref 70–99)
HDLC SERPL-MCNC: 68 MG/DL
LDLC SERPL CALC-MCNC: 64 MG/DL
NONHDLC SERPL-MCNC: 87 MG/DL
POTASSIUM SERPL-SCNC: 5.2 MMOL/L (ref 3.4–5.3)
PROT SERPL-MCNC: 7.6 G/DL (ref 6.4–8.3)
SODIUM SERPL-SCNC: 139 MMOL/L (ref 135–145)
TRIGL SERPL-MCNC: 114 MG/DL
TSH SERPL DL<=0.005 MIU/L-ACNC: 1.76 UIU/ML (ref 0.3–4.2)
VIT B12 SERPL-MCNC: 640 PG/ML (ref 232–1245)

## 2024-06-20 ENCOUNTER — TRANSFERRED RECORDS (OUTPATIENT)
Dept: HEALTH INFORMATION MANAGEMENT | Facility: CLINIC | Age: 79
End: 2024-06-20
Payer: MEDICARE

## 2024-06-24 ENCOUNTER — ANCILLARY PROCEDURE (OUTPATIENT)
Dept: GENERAL RADIOLOGY | Facility: CLINIC | Age: 79
End: 2024-06-24
Attending: STUDENT IN AN ORGANIZED HEALTH CARE EDUCATION/TRAINING PROGRAM
Payer: MEDICARE

## 2024-06-24 DIAGNOSIS — R19.4 CHANGE IN BOWEL HABITS: ICD-10-CM

## 2024-06-24 DIAGNOSIS — R10.9 ABDOMINAL CRAMPING: ICD-10-CM

## 2024-06-24 PROCEDURE — 74018 RADEX ABDOMEN 1 VIEW: CPT | Mod: TC | Performed by: RADIOLOGY

## 2024-06-25 ENCOUNTER — TRANSFERRED RECORDS (OUTPATIENT)
Dept: HEALTH INFORMATION MANAGEMENT | Facility: CLINIC | Age: 79
End: 2024-06-25
Payer: MEDICARE

## 2024-09-05 ENCOUNTER — TRANSFERRED RECORDS (OUTPATIENT)
Dept: HEALTH INFORMATION MANAGEMENT | Facility: CLINIC | Age: 79
End: 2024-09-05
Payer: MEDICARE

## 2024-09-19 ENCOUNTER — OFFICE VISIT (OUTPATIENT)
Dept: FAMILY MEDICINE | Facility: CLINIC | Age: 79
End: 2024-09-19
Payer: MEDICARE

## 2024-09-19 VITALS
RESPIRATION RATE: 16 BRPM | TEMPERATURE: 98.1 F | WEIGHT: 173 LBS | BODY MASS INDEX: 30.65 KG/M2 | DIASTOLIC BLOOD PRESSURE: 80 MMHG | HEIGHT: 63 IN | OXYGEN SATURATION: 98 % | SYSTOLIC BLOOD PRESSURE: 114 MMHG | HEART RATE: 106 BPM

## 2024-09-19 DIAGNOSIS — G56.02 CARPAL TUNNEL SYNDROME OF LEFT WRIST: Primary | ICD-10-CM

## 2024-09-19 PROCEDURE — 99213 OFFICE O/P EST LOW 20 MIN: CPT | Mod: 25 | Performed by: FAMILY MEDICINE

## 2024-09-19 PROCEDURE — G0008 ADMIN INFLUENZA VIRUS VAC: HCPCS | Performed by: FAMILY MEDICINE

## 2024-09-19 PROCEDURE — 90662 IIV NO PRSV INCREASED AG IM: CPT | Performed by: FAMILY MEDICINE

## 2024-09-19 NOTE — PROGRESS NOTES
"  Assessment & Plan     (G56.02) Carpal tunnel syndrome of left wrist  (primary encounter diagnosis)  Comment: pt has hand / finger numbness and tingling at sleep, then she shakes the hand and symptoms resolve. Occasionally the numbness goes to arm and occasionally wrist pain. Denies injury and repetitive movement. No weakness. Non smoker. Exam: not remarkable. Likely she has carpal tunnel syndrome.   Plan: advise to avoid repetitive movement. Advise to wear the wrist brace at sleep. Consider referral to ortho if symptoms get worse     The longitudinal plan of care for the diagnosis(es)/condition(s) as documented were addressed during this visit. Due to the added complexity in care, I will continue to support Pat in the subsequent management and with ongoing continuity of care.        BMI  Estimated body mass index is 30.4 kg/m  as calculated from the following:    Height as of this encounter: 1.607 m (5' 3.25\").    Weight as of this encounter: 78.5 kg (173 lb).         See Patient Instructions    Subjective   Elise is a 78 year old, presenting for the following health issues:  Musculoskeletal Problem (X 2 weeks tingling sensation in left wrist that sometimes moves with into her arm, sometimes painful at night ans causing her to wake up. Did tear tendon while skiing 50 years ago. )        9/19/2024     1:16 PM   Additional Questions   Roomed by Gen CRISTIAN CMA     Musculoskeletal Problem    History of Present Illness       Reason for visit:  Tingling in left hand for 2 weeks  Symptom onset:  1-2 weeks ago  Symptoms include:  Tngling in left hand  Symptom intensity:  Moderate  Symptom progression:  Staying the same  Had these symptoms before:  No  What makes it better:  Massaging and advil   She is taking medications regularly.          Review of Systems  Constitutional, HEENT, cardiovascular, pulmonary, gi and gu systems are negative, except as otherwise noted.      Objective    /80 (BP Location: Right arm, Patient " "Position: Sitting, Cuff Size: Adult Regular)   Pulse 106   Temp 98.1  F (36.7  C) (Oral)   Resp 16   Ht 1.607 m (5' 3.25\")   Wt 78.5 kg (173 lb)   LMP  (LMP Unknown)   SpO2 98%   BMI 30.40 kg/m    Body mass index is 30.4 kg/m .  Physical Exam   GENERAL: alert and no distress  MS: no gross musculoskeletal defects noted, no edema   Wrist exam - both sides normal, full range of motion, no swelling, tenderness or deformities. Normal radial pulse. Negative Phalen/Tinel signs.      Signed Electronically by: Ginger Andujar MD    "

## 2024-09-23 ENCOUNTER — ANCILLARY PROCEDURE (OUTPATIENT)
Dept: BONE DENSITY | Facility: CLINIC | Age: 79
End: 2024-09-23
Attending: INTERNAL MEDICINE
Payer: MEDICARE

## 2024-09-23 DIAGNOSIS — M81.0 OSTEOPOROSIS, SENILE: ICD-10-CM

## 2024-09-23 PROCEDURE — 77080 DXA BONE DENSITY AXIAL: CPT | Mod: TC | Performed by: PHYSICIAN ASSISTANT

## 2024-09-23 PROCEDURE — 77089 TBS DXA CAL W/I&R FX RISK: CPT | Performed by: PHYSICIAN ASSISTANT

## 2024-09-25 DIAGNOSIS — Z92.29 PERSONAL HISTORY OF OTHER DRUG THERAPY: ICD-10-CM

## 2024-09-25 DIAGNOSIS — M81.0 OSTEOPOROSIS, SENILE: Primary | ICD-10-CM

## 2024-09-25 NOTE — TELEPHONE ENCOUNTER
LMTCB.  Rose Mary Bravo RN     Subjective     Jony Panda is a 68 y.o. male who presents for the following: Skin Check.   Established patient in for full body skin exam last seen 09/2023.     Review of Systems:  No other skin or systemic complaints other than what is documented elsewhere in the note.    The following portions of the chart were reviewed this encounter and updated as appropriate:       Skin Cancer History  No skin cancer on file.    Specialty Problems    None    Past Medical History:  Jony Panda  has a past medical history of Acute bronchiolitis, unspecified (12/10/2013), Acute sinusitis (06/22/2023), Anemia, unspecified (12/10/2013), Arthritis of knee (06/22/2023), Atopic dermatitis, unspecified (12/10/2013), Conjunctivitis, left eye (06/22/2023), Dizziness (06/22/2023), Encounter for general adult medical examination without abnormal findings (12/10/2013), Fatigue (10/16/2020), Owen sign present (06/22/2023), Impacted cerumen (06/22/2023), Impingement syndrome of right shoulder (06/22/2023), Left medial knee pain (06/22/2023), Other conditions influencing health status (12/10/2013), Otitis media, acute (06/22/2023), Pain in leg, unspecified (12/10/2013), Pain in unspecified hip (12/10/2013), Personal history of other diseases of the musculoskeletal system and connective tissue (12/10/2013), PSP (progressive supranuclear palsy) (Multi), Right sided weakness (06/22/2023), and Spasm of muscle of lower back (06/22/2023).    Past Surgical History:  Jony Panda  has a past surgical history that includes Laminectomy (08/01/2022).    Family History:  Patient family history is not on file.    Social History:  Jony Panda  reports that he has never smoked. He has never used smokeless tobacco. He reports that he does not currently use alcohol. No history on file for drug use.    Allergies:  Patient has no known allergies.    Current Medications / CAM's:    Current Outpatient Medications:     acetaminophen (Tylenol 8 Hour) 650 mg ER  tablet, Take by mouth every 8 hours if needed., Disp: , Rfl:     acetaminophen (Tylenol) 500 mg tablet, Take 2 tablets (1,000 mg) by mouth 3 times a day., Disp: , Rfl:     ascorbic acid (Vitamin C) 1,000 mg tablet, Take by mouth., Disp: , Rfl:     ascorbic acid (Vitamin C) 1,000 mg tablet, Take 1,000 mg by mouth once daily. Indications: inadequate vitamin C, Disp: , Rfl:     ATROPINE SULFATE, PF, OPHT, Place 1 drop under the tongue every other day. Indications: a change in saliva secretion, Disp: , Rfl:     B1/B2/niacin/B12/protease (B-COMPLEX WITH B-12 ORAL), Take by mouth., Disp: , Rfl:     B1/B2/niacin/B12/protease (B-COMPLEX WITH B-12 ORAL), Take 1 tablet by mouth early in the morning. Indications: vitamin B deficiency, Disp: , Rfl:     cetirizine (ZyrTEC) 10 mg tablet, Take 1 tablet (10 mg) by mouth once daily., Disp: , Rfl:     cholecalciferol, vitD3,/vit K2 (VITAMIN D3-VITAMIN K2 ORAL), Take by mouth., Disp: , Rfl:     clobetasol (Temovate) 0.05 % external solution, Apply topically 2 times a day for 14 days., Disp: 50 mL, Rfl: 3    gabapentin (Neurontin) 300 mg capsule, Take 1 capsule (300 mg) by mouth once daily at bedtime., Disp: 90 capsule, Rfl: 3    hyoscyamine (Anaspaz, Levsin) 0.125 mg tablet, Take 1 tablet (0.125 mg) by mouth 2 times a day., Disp: 20 tablet, Rfl: 3    ketoconazole (NIZOral) 2 % shampoo, Apply topically every other day., Disp: 120 mL, Rfl: 3    omeprazole (PriLOSEC) 40 mg DR capsule, TAKE 1 CAPSULE BY MOUTH ONCE  DAILY, Disp: 90 capsule, Rfl: 3    trospium (Sanctura) 20 mg tablet, TAKE 1 TABLET BY MOUTH TWICE A DAY, Disp: 180 tablet, Rfl: 3     Objective   Well appearing patient in no apparent distress; mood and affect are within normal limits.      Assessment/Plan   1. Nevus  Uniform pigmented macule(s)/papule(s) with reassuring findings on dermoscopy    -Discussed nature of condition  -Reassurance, benign-appearing features on examination today  -Recommend continued observation    2.  "Lentigo  Tan macules    -Benign appearing on exam  -Reassurance, recommend observation    3. Seborrheic keratosis  Stuck on, waxy macule(s)/papule(s)/plaque(s) with comedo-like openings and milia like cysts    -Discussed nature of condition  -Reassurance, recommend continued observation    4. Screening exam for skin cancer    5. Seborrheic dermatitis  Scalp  Erythematous macule(s)/patch(es) with greasy scale    -Discussed the nature of the diagnosis  -There is no \"cure\" for this condition. Treatments will need to be continued long term to treat the condition  -Recommend:    ketoconazole (NIZOral) 2 % shampoo - Scalp  Apply topically every other day.    clobetasol (Temovate) 0.05 % external solution - Scalp  Apply topically 2 times a day for 14 days.    6. Neoplasm of uncertain behavior of skin  Mid Parietal Scalp  1cm non healing pink/tan nodule               Lesion biopsy  Type of biopsy: tangential    Informed consent: discussed and consent obtained    Timeout: patient name, date of birth, surgical site, and procedure verified    Procedure prep:  Patient was prepped and draped  Anesthesia: the lesion was anesthetized in a standard fashion    Anesthetic:  1% lidocaine w/ epinephrine 1-100,000 local infiltration  Instrument used: DermaBlade    Hemostasis achieved with: aluminum chloride    Outcome: patient tolerated procedure well    Post-procedure details: sterile dressing applied and wound care instructions given    Dressing type: petrolatum and bandage      Staff Communication: Dermatology Local Anesthesia: 1 % Lidocaine / Epinephrine - Amount: 1ml    Specimen 1 - Dermatopathology- DERM LAB  Differential Diagnosis: NMSC vs other  Check Margins Yes/No?:    Comments:    Dermpath Lab: Routine Histopathology (formalin-fixed tissue)    - Discussed differential with patient in clinic today.   - Given uncertainty in clinical diagnosis, biopsy is recommended in clinic today.  - The patient expressed understanding, is in " agreement with this plan, and wishes to proceed with biopsy.  - Oral and written wound care instructions provided.  - Advised patient that the office will call within 2 weeks to discuss biopsy results.

## 2024-10-14 ENCOUNTER — TELEPHONE (OUTPATIENT)
Dept: FAMILY MEDICINE | Facility: CLINIC | Age: 79
End: 2024-10-14

## 2024-10-14 NOTE — TELEPHONE ENCOUNTER
Reason for Call:  Appointment Request    Patient requesting this type of appt:  Nurse Visit    Requested provider: RN Visit    Reason patient unable to be scheduled: Needs to be scheduled by clinic    When does patient want to be seen/preferred time: 3-7 days    Comments: Needs to reschedule a Polia shot that she had to cancel last week    Could we send this information to you in [facility] or would you prefer to receive a phone call?:   Patient would prefer a phone call   Okay to leave a detailed message?: Yes at Home number on file [phone] (home)    Call taken on 10/14/2024 at 9:10 AM by Cindy Brito

## 2024-10-14 NOTE — TELEPHONE ENCOUNTER
Left message for patient to return call. If patient calls back, please route to [facility].     TC please route to RN.    Tanya Blum RN  [facility]

## 2024-10-14 NOTE — TELEPHONE ENCOUNTER
Spoke to patient.     Patient reports she had to cancel appointment last week for Prolia injection due to an emergency with her .     Requesting to reschedule.     Routing to [facility] as that is where patient gets Prolia injections.     Tanya Blum RN  [facility]

## 2024-10-18 ENCOUNTER — ALLIED HEALTH/NURSE VISIT (OUTPATIENT)
Dept: FAMILY MEDICINE | Facility: CLINIC | Age: 79
End: 2024-10-18
Payer: MEDICARE

## 2024-10-18 DIAGNOSIS — M81.0 OSTEOPOROSIS, SENILE: Primary | ICD-10-CM

## 2024-10-18 PROCEDURE — 96372 THER/PROPH/DIAG INJ SC/IM: CPT | Performed by: INTERNAL MEDICINE

## 2024-10-18 PROCEDURE — 99207 PR NO CHARGE NURSE ONLY: CPT

## 2024-10-18 NOTE — PROGRESS NOTES
Clinic Administered Medication Documentation      Prolia Documentation    Indication: Prolia  (denosumab) is a prescription medicine used to treat osteoporosis in patients who:   Are at high risk for fracture, meaning patients who have had a fracture related to osteoporosis, or who have multiple risk factors for fracture.  Cannot use another osteoporosis medicine or other osteoporosis medicines did not work well.  The timeline for early/late injections would be 4 weeks early and any time after the 6 month anthony. If a patient receives their injection late, then the subsequent injection would be 6 months from the date that they actually received the injection.    When was the last injection?  24  Was the last injection at least 6 months ago? Yes  Has the prior authorization been completed?  Yes  Is there an active order (written within the past 365 days, with administrations remaining, not ) in the chart?  Yes   GFR Estimate   Date Value Ref Range Status   2024 >90 >60 mL/min/1.73m2 Final   2021 >60 >60 mL/min/1.73m2 Final     Has patient had a GFR within the last 12 months? Yes   Is GFR under 30, or patient has a diagnosis of CKD4 or CKD5? No   Patient denies gastric bypass or parathyroid surgery in past 6 months? Yes - patient denies.   Patient denies dental work in the past two months involving drilling into the bone, such as implants/extractions, oral surgery or a tooth extraction that has not healed yet?  Yes  Patient denies plans for an emergency tooth extraction within the next week? Yes    The following steps were completed to comply with the REMS program for Prolia:  Reviewed information in the Medication Guide, including the serious risks of Prolia  and the symptoms of each risk.  Advised patient to seek prompt medical attention if they have signs or symptoms of any of the serious risks.  Provided each patient a copy of the Medication Guide and Patient Guide.    Prior to injection,  verified patient identity using patient's name and date of birth. Medication was administered. Please see MAR and medication order for additional information. Patient instructed to remain in clinic for 15 minutes and report any adverse reaction to staff immediately.    Vial/Syringe: Syringe  Was this medication supplied by the patient? No  Verified that the patient has refills remaining in their prescription.

## 2024-10-22 ENCOUNTER — TRANSFERRED RECORDS (OUTPATIENT)
Dept: HEALTH INFORMATION MANAGEMENT | Facility: CLINIC | Age: 79
End: 2024-10-22
Payer: MEDICARE

## 2024-11-05 ENCOUNTER — OFFICE VISIT (OUTPATIENT)
Dept: FAMILY MEDICINE | Facility: CLINIC | Age: 79
End: 2024-11-05
Payer: MEDICARE

## 2024-11-05 VITALS
TEMPERATURE: 97.5 F | SYSTOLIC BLOOD PRESSURE: 120 MMHG | BODY MASS INDEX: 31.64 KG/M2 | HEIGHT: 63 IN | HEART RATE: 93 BPM | DIASTOLIC BLOOD PRESSURE: 74 MMHG | OXYGEN SATURATION: 96 % | RESPIRATION RATE: 14 BRPM | WEIGHT: 178.6 LBS

## 2024-11-05 DIAGNOSIS — F41.1 ANXIETY STATE: ICD-10-CM

## 2024-11-05 DIAGNOSIS — E78.2 MIXED HYPERLIPIDEMIA: Primary | ICD-10-CM

## 2024-11-05 DIAGNOSIS — F33.0 MILD EPISODE OF RECURRENT MAJOR DEPRESSIVE DISORDER (H): ICD-10-CM

## 2024-11-05 LAB
ALT SERPL W P-5'-P-CCNC: 28 U/L (ref 0–50)
CHOLEST SERPL-MCNC: 157 MG/DL
FASTING STATUS PATIENT QL REPORTED: NO
HDLC SERPL-MCNC: 67 MG/DL
LDLC SERPL CALC-MCNC: 66 MG/DL
NONHDLC SERPL-MCNC: 90 MG/DL
TRIGL SERPL-MCNC: 118 MG/DL

## 2024-11-05 PROCEDURE — 80061 LIPID PANEL: CPT | Performed by: NURSE PRACTITIONER

## 2024-11-05 PROCEDURE — 99214 OFFICE O/P EST MOD 30 MIN: CPT | Performed by: NURSE PRACTITIONER

## 2024-11-05 PROCEDURE — 36415 COLL VENOUS BLD VENIPUNCTURE: CPT | Performed by: NURSE PRACTITIONER

## 2024-11-05 PROCEDURE — 90480 ADMN SARSCOV2 VAC 1/ONLY CMP: CPT | Performed by: NURSE PRACTITIONER

## 2024-11-05 PROCEDURE — 91320 SARSCV2 VAC 30MCG TRS-SUC IM: CPT | Performed by: NURSE PRACTITIONER

## 2024-11-05 PROCEDURE — 84460 ALANINE AMINO (ALT) (SGPT): CPT | Performed by: NURSE PRACTITIONER

## 2024-11-05 RX ORDER — CITALOPRAM HYDROBROMIDE 20 MG/1
20 TABLET ORAL DAILY
Qty: 90 TABLET | Refills: 3 | Status: SHIPPED | OUTPATIENT
Start: 2024-11-05

## 2024-11-05 RX ORDER — ATORVASTATIN CALCIUM 10 MG/1
10 TABLET, FILM COATED ORAL DAILY
Qty: 90 TABLET | Refills: 3 | Status: SHIPPED | OUTPATIENT
Start: 2024-11-05

## 2024-11-05 ASSESSMENT — ANXIETY QUESTIONNAIRES
1. FEELING NERVOUS, ANXIOUS, OR ON EDGE: MORE THAN HALF THE DAYS
GAD7 TOTAL SCORE: 8
4. TROUBLE RELAXING: NEARLY EVERY DAY
IF YOU CHECKED OFF ANY PROBLEMS ON THIS QUESTIONNAIRE, HOW DIFFICULT HAVE THESE PROBLEMS MADE IT FOR YOU TO DO YOUR WORK, TAKE CARE OF THINGS AT HOME, OR GET ALONG WITH OTHER PEOPLE: SOMEWHAT DIFFICULT
7. FEELING AFRAID AS IF SOMETHING AWFUL MIGHT HAPPEN: NOT AT ALL
7. FEELING AFRAID AS IF SOMETHING AWFUL MIGHT HAPPEN: NOT AT ALL
GAD7 TOTAL SCORE: 8
6. BECOMING EASILY ANNOYED OR IRRITABLE: SEVERAL DAYS
5. BEING SO RESTLESS THAT IT IS HARD TO SIT STILL: NOT AT ALL
3. WORRYING TOO MUCH ABOUT DIFFERENT THINGS: SEVERAL DAYS
GAD7 TOTAL SCORE: 8
8. IF YOU CHECKED OFF ANY PROBLEMS, HOW DIFFICULT HAVE THESE MADE IT FOR YOU TO DO YOUR WORK, TAKE CARE OF THINGS AT HOME, OR GET ALONG WITH OTHER PEOPLE?: SOMEWHAT DIFFICULT
2. NOT BEING ABLE TO STOP OR CONTROL WORRYING: SEVERAL DAYS

## 2024-11-05 ASSESSMENT — PATIENT HEALTH QUESTIONNAIRE - PHQ9: SUM OF ALL RESPONSES TO PHQ QUESTIONS 1-9: 8

## 2024-11-05 ASSESSMENT — PAIN SCALES - GENERAL: PAINLEVEL_OUTOF10: NO PAIN (1)

## 2024-11-05 NOTE — PROGRESS NOTES
Subjective   Elise is a 78 year old, presenting for the following health issues:  Recheck Medication        11/5/2024     9:32 AM   Additional Questions   Roomed by DAMI TAYLOR     History of Present Illness       Reason for visit:  Med check   She is taking medications regularly.       Hyperlipidemia Follow-Up    Are you regularly taking any medication or supplement to lower your cholesterol?   Yes- Lipitor  Are you having muscle aches or other side effects that you think could be caused by your cholesterol lowering medication?  No    Depression and Anxiety   How are you doing with your depression since your last visit? No change  How are you doing with your anxiety since your last visit?  No change  Are you having other symptoms that might be associated with depression or anxiety? No  Have you had a significant life event? No   Do you have any concerns with your use of alcohol or other drugs? No    Social History     Tobacco Use    Smoking status: Never     Passive exposure: Never    Smokeless tobacco: Never   Vaping Use    Vaping status: Never Used   Substance Use Topics    Alcohol use: Yes     Alcohol/week: 3.0 standard drinks of alcohol     Types: 3 Standard drinks or equivalent per week     Comment: 3-4 per week    Drug use: No         11/14/2023     9:34 AM 5/27/2024     7:10 PM 11/5/2024     9:42 AM   PHQ   PHQ-9 Total Score 4 4 8   Q9: Thoughts of better off dead/self-harm past 2 weeks Not at all  Not at all  Not at all       Patient-reported         5/3/2023     5:49 PM 11/7/2023     9:49 AM 11/5/2024     9:44 AM   DERIK-7 SCORE   Total Score 3 (minimal anxiety) 5 (mild anxiety) 8 (mild anxiety)   Total Score 3 5 8        Patient-reported         11/5/2024     9:42 AM   Last PHQ-9   1.  Little interest or pleasure in doing things 2   2.  Feeling down, depressed, or hopeless 1   3.  Trouble falling or staying asleep, or sleeping too much 1   4.  Feeling tired or having little energy 1   5.  Poor appetite or  "overeating 1   6.  Feeling bad about yourself 0   7.  Trouble concentrating 2   8.  Moving slowly or restless 0   Q9: Thoughts of better off dead/self-harm past 2 weeks 0   PHQ-9 Total Score 8   Difficulty at work, home, or with people Somewhat difficult         11/5/2024     9:44 AM   DERIK-7    1. Feeling nervous, anxious, or on edge 2    2. Not being able to stop or control worrying 1    3. Worrying too much about different things 1    4. Trouble relaxing 3    5. Being so restless that it is hard to sit still 0    6. Becoming easily annoyed or irritable 1    7. Feeling afraid, as if something awful might happen 0    DERIK-7 Total Score 8    If you checked any problems, how difficult have they made it for you to do your work, take care of things at home, or get along with other people? Somewhat difficult        Patient-reported       Review of Systems  Constitutional, neuro, ENT, endocrine, pulmonary, cardiac, gastrointestinal, genitourinary, musculoskeletal, integument and psychiatric systems are negative, except as otherwise noted.      Objective    /74 (BP Location: Left arm, Patient Position: Sitting, Cuff Size: Adult Regular)   Pulse 93   Temp 97.5  F (36.4  C) (Oral)   Resp 14   Ht 1.607 m (5' 3.25\")   Wt 81 kg (178 lb 9.6 oz)   LMP  (LMP Unknown)   SpO2 96%   Breastfeeding No   BMI 31.39 kg/m    Body mass index is 31.39 kg/m .  Physical Exam   GENERAL: alert and no distress  RESP: lungs clear to auscultation - no rales, rhonchi or wheezes  CV: regular rate and rhythm, normal S1 S2, no S3 or S4, no murmur, click or rub, no peripheral edema   MS: no gross musculoskeletal defects noted, no edema  NEURO: Normal strength and tone, mentation intact and speech normal  PSYCH: mentation appears normal, affect normal/bright    Office Visit on 05/28/2024   Component Date Value Ref Range Status    WBC Count 05/28/2024 6.1  4.0 - 11.0 10e3/uL Final    RBC Count 05/28/2024 4.66  3.80 - 5.20 10e6/uL Final    " Hemoglobin 05/28/2024 14.3  11.7 - 15.7 g/dL Final    Hematocrit 05/28/2024 44.1  35.0 - 47.0 % Final    MCV 05/28/2024 95  78 - 100 fL Final    MCH 05/28/2024 30.7  26.5 - 33.0 pg Final    MCHC 05/28/2024 32.4  31.5 - 36.5 g/dL Final    RDW 05/28/2024 12.0  10.0 - 15.0 % Final    Platelet Count 05/28/2024 263  150 - 450 10e3/uL Final    Sodium 05/28/2024 139  135 - 145 mmol/L Final    Reference intervals for this test were updated on 09/26/2023 to more accurately reflect our healthy population. There may be differences in the flagging of prior results with similar values performed with this method. Interpretation of those prior results can be made in the context of the updated reference intervals.     Potassium 05/28/2024 5.2  3.4 - 5.3 mmol/L Final    Carbon Dioxide (CO2) 05/28/2024 28  22 - 29 mmol/L Final    Anion Gap 05/28/2024 10  7 - 15 mmol/L Final    Urea Nitrogen 05/28/2024 11.1  8.0 - 23.0 mg/dL Final    Creatinine 05/28/2024 0.61  0.51 - 0.95 mg/dL Final    GFR Estimate 05/28/2024 >90  >60 mL/min/1.73m2 Final    Calcium 05/28/2024 9.9  8.8 - 10.2 mg/dL Final    Chloride 05/28/2024 101  98 - 107 mmol/L Final    Glucose 05/28/2024 95  70 - 99 mg/dL Final    Alkaline Phosphatase 05/28/2024 63  40 - 150 U/L Final    AST 05/28/2024 31  0 - 45 U/L Final    Reference intervals for this test were updated on 6/12/2023 to more accurately reflect our healthy population. There may be differences in the flagging of prior results with similar values performed with this method. Interpretation of those prior results can be made in the context of the updated reference intervals.    ALT 05/28/2024 25  0 - 50 U/L Final    Reference intervals for this test were updated on 6/12/2023 to more accurately reflect our healthy population. There may be differences in the flagging of prior results with similar values performed with this method. Interpretation of those prior results can be made in the context of the updated reference  intervals.      Protein Total 05/28/2024 7.6  6.4 - 8.3 g/dL Final    Albumin 05/28/2024 4.6  3.5 - 5.2 g/dL Final    Bilirubin Total 05/28/2024 1.0  <=1.2 mg/dL Final    Patient Fasting > 8hrs? 05/28/2024 Yes   Final    Cholesterol 05/28/2024 155  <200 mg/dL Final    Triglycerides 05/28/2024 114  <150 mg/dL Final    Direct Measure HDL 05/28/2024 68  >=50 mg/dL Final    LDL Cholesterol Calculated 05/28/2024 64  <=100 mg/dL Final    Non HDL Cholesterol 05/28/2024 87  <130 mg/dL Final    Patient Fasting > 8hrs? 05/28/2024 Yes   Final    Vitamin B12 05/28/2024 640  232 - 1,245 pg/mL Final    Folic Acid 05/28/2024 30.6  4.6 - 34.8 ng/mL Final    TSH 05/28/2024 1.76  0.30 - 4.20 uIU/mL Final         A/P:  1. Mixed hyperlipidemia (Primary)  - atorvastatin (LIPITOR) 10 MG tablet; Take 1 tablet (10 mg) by mouth daily.  Dispense: 90 tablet; Refill: 3  - Lipid panel reflex to direct LDL Fasting; Future  - ALT; Future  - Lipid panel reflex to direct LDL Fasting  - ALT    2. Anxiety  - citalopram (CELEXA) 20 MG tablet; Take 1 tablet (20 mg) by mouth daily.  Dispense: 90 tablet; Refill: 3    3. Mild episode of recurrent major depressive disorder (H)  - citalopram (CELEXA) 20 MG tablet; Take 1 tablet (20 mg) by mouth daily.  Dispense: 90 tablet; Refill: 3    Signed Electronically by: Kera Lee, CNP

## 2025-02-07 ENCOUNTER — HOSPITAL ENCOUNTER (OUTPATIENT)
Dept: MAMMOGRAPHY | Facility: CLINIC | Age: 80
Discharge: HOME | End: 2025-02-07
Attending: NURSE PRACTITIONER | Admitting: NURSE PRACTITIONER
Payer: MEDICARE

## 2025-02-07 DIAGNOSIS — Z12.31 SCREENING MAMMOGRAM, ENCOUNTER FOR: ICD-10-CM

## 2025-02-07 PROCEDURE — 77063 BREAST TOMOSYNTHESIS BI: CPT

## 2025-02-07 PROCEDURE — 77067 SCR MAMMO BI INCL CAD: CPT

## 2025-02-17 DIAGNOSIS — F33.0 MILD EPISODE OF RECURRENT MAJOR DEPRESSIVE DISORDER: ICD-10-CM

## 2025-02-17 DIAGNOSIS — F41.1 ANXIETY STATE: ICD-10-CM

## 2025-02-17 RX ORDER — CITALOPRAM HYDROBROMIDE 20 MG/1
20 TABLET ORAL DAILY
Qty: 90 TABLET | Refills: 0 | Status: SHIPPED | OUTPATIENT
Start: 2025-02-17

## 2025-02-17 NOTE — TELEPHONE ENCOUNTER
Medication Question or Refill    Contacts       Contact Date/Time Type Contact Phone/Fax    02/17/2025 09:19 AM CST Phone (Incoming) Elise Espinoza (Self) [phone] (M)            What medication are you calling about (include dose and sig)?: CITALOPRAM 20MG    Preferred Pharmacy:   [id number] - [place], [place] - [address]  [address] [place] [address]  Phone: [phone] Fax: [phone]      Controlled Substance Agreement on file:   CSA -- Patient Level:     [Media Unavailable] Controlled Substance Agreement - Opioid - Scan on 5/23/2018: [facility]       Who prescribed the medication?: cutler    Do you need a refill? Yes    When did you use the medication last? daily    Patient offered an appointment? No    Do you have any questions or concerns?  Yes: [facility]      Could we [facility] this information to you [facility] or would you prefer to receive a phone call?:   Patient would prefer a phone call   Okay to leave a detailed message?: Yes at Cell number on file:    Telephone Information:   Mobile [phone]

## 2025-03-13 ENCOUNTER — TELEPHONE (OUTPATIENT)
Dept: INTERNAL MEDICINE | Facility: CLINIC | Age: 80
End: 2025-03-13
Payer: MEDICARE

## 2025-03-13 NOTE — TELEPHONE ENCOUNTER
Reason for Call:  Appointment Request    Patient requesting this type of appt:  Prolia #[id number]     Requested provider: Mone Blanchard     Reason patient unable to be scheduled: Needs to be scheduled by clinic    When does patient want to be seen/preferred time:  would like to reschedule appointment on 4/22/25     Comments: will be out of town from 4/22/25-4/29/25, would like to reschedule for after her trip    Could we send this information to you in [facility] or would you prefer to receive a phone call?:   Patient would prefer a phone call   Okay to leave a detailed message?: Yes at Cell number on file:    Telephone Information:   Mobile [phone]       Call taken on 3/13/2025 at 8:35 AM by Kenzie Hill

## 2025-04-01 ENCOUNTER — OFFICE VISIT (OUTPATIENT)
Dept: FAMILY MEDICINE | Facility: CLINIC | Age: 80
End: 2025-04-01
Payer: MEDICARE

## 2025-04-01 VITALS
BODY MASS INDEX: 31.25 KG/M2 | TEMPERATURE: 97.7 F | HEIGHT: 63 IN | WEIGHT: 176.4 LBS | DIASTOLIC BLOOD PRESSURE: 64 MMHG | SYSTOLIC BLOOD PRESSURE: 110 MMHG | HEART RATE: 94 BPM | OXYGEN SATURATION: 96 % | RESPIRATION RATE: 14 BRPM

## 2025-04-01 DIAGNOSIS — M25.532 PAIN IN BOTH WRISTS: ICD-10-CM

## 2025-04-01 DIAGNOSIS — M79.641 BILATERAL HAND PAIN: Primary | ICD-10-CM

## 2025-04-01 DIAGNOSIS — M79.642 BILATERAL HAND PAIN: Primary | ICD-10-CM

## 2025-04-01 DIAGNOSIS — M25.531 PAIN IN BOTH WRISTS: ICD-10-CM

## 2025-04-01 PROCEDURE — 1126F AMNT PAIN NOTED NONE PRSNT: CPT | Performed by: NURSE PRACTITIONER

## 2025-04-01 PROCEDURE — 99213 OFFICE O/P EST LOW 20 MIN: CPT | Performed by: NURSE PRACTITIONER

## 2025-04-01 PROCEDURE — 3078F DIAST BP <80 MM HG: CPT | Performed by: NURSE PRACTITIONER

## 2025-04-01 PROCEDURE — 3074F SYST BP LT 130 MM HG: CPT | Performed by: NURSE PRACTITIONER

## 2025-04-01 ASSESSMENT — PATIENT HEALTH QUESTIONNAIRE - PHQ9
10. IF YOU CHECKED OFF ANY PROBLEMS, HOW DIFFICULT HAVE THESE PROBLEMS MADE IT FOR YOU TO DO YOUR WORK, TAKE CARE OF THINGS AT HOME, OR GET ALONG WITH OTHER PEOPLE: SOMEWHAT DIFFICULT
SUM OF ALL RESPONSES TO PHQ QUESTIONS 1-9: 6
SUM OF ALL RESPONSES TO PHQ QUESTIONS 1-9: 6

## 2025-04-01 ASSESSMENT — PAIN SCALES - GENERAL: PAINLEVEL_OUTOF10: NO PAIN (0)

## 2025-04-01 NOTE — PROGRESS NOTES
"    Subjective   Pat is a 79 year old, presenting for the following health issues:  Musculoskeletal Problem (Pt is having trouble with both hands. Right hand has been 6 weeks but left hand just started. Pt has been wearing a brace at night. )        4/1/2025    11:11 AM   Additional Questions   Roomed by Giulia DEE CMA     Musculoskeletal Problem    History of Present Illness       Reason for visit:  Right hand injury   She is taking medications regularly.        Bilateral hand pain- R>L.  Aching pain, swelling right hand.  [place] tingling.  Decreased strength right hand.         Objective    /64 (BP Location: Left arm, Patient Position: Sitting, Cuff Size: Adult Regular)   Pulse 94   Temp 97.7  F (36.5  C) (Oral)   Resp 14   Ht 1.6 m (5' 3\")   Wt 80 kg (176 lb 6.4 oz)   LMP  (LMP Unknown)   SpO2 96%   Breastfeeding No   BMI 31.25 kg/m    Body mass index is 31.25 kg/m .  Physical Exam   GENERAL: alert and no distress  RESP: respirations even, non-labored   MS: right hand- swollen/inflamed tendon  NEURO: Normal strength and tone, mentation intact and speech normal  PSYCH: mentation appears normal, affect normal/bright    A/P:  1. Bilateral hand pain (Primary)  - Orthopedic  Referral; Future    2. Pain in both wrists  - Orthopedic  Referral; Future          Signed Electronically by: Kera Lee CNP    "

## 2025-04-03 ENCOUNTER — TRANSFERRED RECORDS (OUTPATIENT)
Dept: HEALTH INFORMATION MANAGEMENT | Facility: CLINIC | Age: 80
End: 2025-04-03
Payer: MEDICARE

## 2025-04-30 ENCOUNTER — OFFICE VISIT (OUTPATIENT)
Dept: INTERNAL MEDICINE | Facility: CLINIC | Age: 80
End: 2025-04-30
Payer: MEDICARE

## 2025-04-30 VITALS
OXYGEN SATURATION: 96 % | WEIGHT: 180 LBS | BODY MASS INDEX: 31.89 KG/M2 | TEMPERATURE: 98 F | DIASTOLIC BLOOD PRESSURE: 70 MMHG | HEIGHT: 63 IN | HEART RATE: 91 BPM | SYSTOLIC BLOOD PRESSURE: 124 MMHG | RESPIRATION RATE: 14 BRPM

## 2025-04-30 DIAGNOSIS — K21.00 GASTROESOPHAGEAL REFLUX DISEASE WITH ESOPHAGITIS WITHOUT HEMORRHAGE: ICD-10-CM

## 2025-04-30 DIAGNOSIS — F33.0 MILD EPISODE OF RECURRENT MAJOR DEPRESSIVE DISORDER: ICD-10-CM

## 2025-04-30 DIAGNOSIS — M81.0 OSTEOPOROSIS, SENILE: Primary | ICD-10-CM

## 2025-04-30 LAB
ANION GAP SERPL CALCULATED.3IONS-SCNC: 9 MMOL/L (ref 7–15)
BUN SERPL-MCNC: 16.9 MG/DL (ref 8–23)
CALCIUM SERPL-MCNC: 9.9 MG/DL (ref 8.8–10.4)
CHLORIDE SERPL-SCNC: 102 MMOL/L (ref 98–107)
CREAT SERPL-MCNC: 0.61 MG/DL (ref 0.51–0.95)
EGFRCR SERPLBLD CKD-EPI 2021: >90 ML/MIN/1.73M2
GLUCOSE SERPL-MCNC: 96 MG/DL (ref 70–99)
HCO3 SERPL-SCNC: 29 MMOL/L (ref 22–29)
POTASSIUM SERPL-SCNC: 4.7 MMOL/L (ref 3.4–5.3)
SODIUM SERPL-SCNC: 140 MMOL/L (ref 135–145)
VIT D+METAB SERPL-MCNC: 43 NG/ML (ref 20–50)

## 2025-04-30 PROCEDURE — 36415 COLL VENOUS BLD VENIPUNCTURE: CPT | Performed by: INTERNAL MEDICINE

## 2025-04-30 PROCEDURE — 96372 THER/PROPH/DIAG INJ SC/IM: CPT | Performed by: INTERNAL MEDICINE

## 2025-04-30 PROCEDURE — 3074F SYST BP LT 130 MM HG: CPT | Performed by: INTERNAL MEDICINE

## 2025-04-30 PROCEDURE — 99214 OFFICE O/P EST MOD 30 MIN: CPT | Mod: 25 | Performed by: INTERNAL MEDICINE

## 2025-04-30 PROCEDURE — G2211 COMPLEX E/M VISIT ADD ON: HCPCS | Performed by: INTERNAL MEDICINE

## 2025-04-30 PROCEDURE — 82306 VITAMIN D 25 HYDROXY: CPT | Performed by: INTERNAL MEDICINE

## 2025-04-30 PROCEDURE — 80048 BASIC METABOLIC PNL TOTAL CA: CPT | Performed by: INTERNAL MEDICINE

## 2025-04-30 PROCEDURE — 3078F DIAST BP <80 MM HG: CPT | Performed by: INTERNAL MEDICINE

## 2025-04-30 NOTE — PROGRESS NOTES
Patient is here today for the Prolia injection. Patient tolerated previous injections well. No recent falls, new fractures, medication change, hospitalizations or surgeries. We discussed calcium and vit D daily needs today.   We discussed high risk of rebound vertebral fractures when Prolia suddenly stopped.      (M81.0) Osteoporosis, senile  (primary encounter diagnosis)  She did use alendronate in the past and then had zoledronic acid in the form of Reclast and then Zometa after her breast cancer.  She tells me that she used the Zometa every 6 months for 1 year.  On Prolia since 2015, tolerated it well.  Last DXA was done in 9/2024. We reviewed it today.  -Lumbar Spine: L1-L2: BMD: 1.083 g/cm2. T-score: -0.7. Z-score: 1.1. Degenerative change may artifactually increase BMD.  -RIGHT Hip Total: BMD: 0.880 g/cm2. T-score: -1.0. Z-score: 0.8.  -RIGHT Hip Femoral neck: BMD: 0.760 g/cm2. T-score: -2.0. Z-score: 0.0.  -LEFT Hip Total: BMD: 0.844 g/cm2. T-score: -1.3. Z-score: 0.5.  -LEFT Hip Femoral neck: BMD: 0.799 g/cm2. T-score: -1.7. Z-score: 0.3.    INTERVAL CHANGE  -There has been a 9.5% increase in lumbar spine BMD.  -There has been a 2.0% increase in bilateral hip BMD.    We will do the labs today.  Plan: Basic metabolic panel, Vitamin D Deficiency            (F33.0) Mild episode of recurrent major depressive disorder  Stable on citalopram.  Taking selective serotonin reuptake inhibitor is associated with lower bone density, increased the odds of falling and double the risk of fractures.    (K21.00) Gastroesophageal reflux disease with esophagitis without hemorrhage  Stable on omeprazole.  PPIs are commonly used for GERD and they can increase the risk of osteoporosis and fracture risk.    The longitudinal plan of care for the diagnosis(es)/condition(s) as documented were addressed during this visit. Due to the added complexity in care, I will continue to support Pat in the subsequent management and with ongoing  continuity of care.    Patient was educated on safety of Prolia utilizing Patient Counseling Chart for Healthcare Providers, as outlined by the Prolia REMS progam.     Return in about 6 months (around 10/30/2025) for Prolia with CSS.    Patient Instructions   Prolia 20th today. Labs today.  Prolia 21st in 6 months with my nurse. I will see you in 1 year.    DXA in 9/2026 .   Phone number to schedule [phone].    Daily calcium need is 6782-2794 mg a day from the diet and supplements.  Calcium citrate is easier to digest.  Vitamin D 2000 IU daily recommended.    Risk of rebound vertebral fractures is higher when Prolia suddenly stopped or dose was missed.      Prolia and Covid vaccine should be  for at least a week.    Patient education:  Patients advised to maintain good oral hygiene during treatment, because of the risk for osteonecrosis of the jaw.   The risk of osteonecrosis of the jaw with Prolia therapy is extremely low.   If a patient is late for Prolia therapy (>3 wks) a rapid rebound of bone loss can occur which is highly concerning and important to try to prevent to optimize bone health.   Therefore if an invasive dental procedure is required, primarily extraction or implant, while on Prolia therapy, the recommendation is to time the dental procedure optimally 4 months after the most recent dose of Prolia allowing 6-8 weeks for healing prior to next dose of Prolia.   This is based on the updated 2022 Recommendations from the American Association of Oral and Maxillofacial Surgeons.   We also recommend discussing with dentist or oral surgeon if pretreatment prophylactic oral rinses and antibiotics would be beneficial.   Optimizing oral hygiene before any invasive dental procedure significantly lowers ONJ risk.     There is a greater risk of severe hypocalcemia following denosumab administration and patient is advised that we will have to monitor calcium level.  Risk of infection is increased with  "denosumab use, so patient will inform me if has more frequent infections.     Atypical femur fracture  has been reported in patients receiving denosumab. The fractures may occur anywhere along the femoral shaft (may be bilateral) and commonly occur with minimal to no trauma to the area. Some patients experience prodromal thigh or groin pain weeks or months before the fracture occurs. Contralateral limb should be assessed if AFF occurs.     Multiple vertebral column fracture has been reported following discontinuation of therapy. Hypertension and increased cholesterol were reported with denosumab use.      Discussed 10-year data of Prolia. Discussed the importance of being on time and consistent with Prolia use to prevent rapid bone of osteoclastic activity.  Discussed that if Prolia is discontinued we will likely need to utilize an antiresorptive, such as Reclast, to help prevent rapid rebound of osteoclast activity. Often more than 1 dose is required.  Labs yearly to ensure calcium and vitamin D optimized while patient on Prolia.            /70 (BP Location: Left arm, Patient Position: Sitting, Cuff Size: Adult Regular)   Pulse 91   Temp 98  F (36.7  C) (Oral)   Resp 14   Ht 1.6 m (5' 3\")   Wt 81.6 kg (180 lb)   LMP  (LMP Unknown)   SpO2 96%   BMI 31.89 kg/m        Did you experience any problems with previous Prolia injection? no  Any medication change in the last 6 months? no  Did you take prednisone or other immunosupressant drugs in the last 6 months   (chemo, transplant, rheum, dermatology conditions)? no  Did you have any serious infection in the last 6 months?no  Any recent hospitalizations?no  Do you plan any dental work in the next 2-3 months?no  How much calcium do you take daily from the diet and supplements?1200 mg  How much vit D do you take daily? 2000 IU  Last DXA? 9/2024  Reviewed and discussed            This note has been dictated using voice recognition software. Any grammatical or " context distortions are unintentional and inherent to the software      Patient Active Problem List   Diagnosis    Peripheral Neuropathy    Urticaria    Major Depression, Recurrent    Anxiety    Irritable Bowel Syndrome    Osteoporosis, senile    Esophageal reflux    Adjustment disorder with mixed anxiety and depressed mood    Polyp of colon    Gastroesophageal reflux disease with esophagitis without hemorrhage       Current Outpatient Medications   Medication Sig Dispense Refill    atorvastatin (LIPITOR) 10 MG tablet Take 1 tablet (10 mg) by mouth daily. 90 tablet 3    calcium citrate-vitamin D (CITRACAL+D) 315-200 mg-unit per tablet [CALCIUM CITRATE-VITAMIN D (CITRACAL+D) 315-200 MG-UNIT PER TABLET] Take 1 tablet by mouth 2 (two) times a day.      citalopram (CELEXA) 20 MG tablet Take 1 tablet (20 mg) by mouth daily. 90 tablet 0    dicyclomine (BENTYL) 10 MG capsule Take 1 capsule by mouth as needed      diphenhydrAMINE HCl (ALERTAB PO) Take by mouth daily (before lunch).      lactase (LACTAID) 3000 UNIT tablet       Misc Natural Products (IBEROGAST PO) Take by mouth.      multivit-min-iron-FA-lutein (CENTRUM SILVER WOMEN) 8 mg iron-400 mcg-300 mcg Tab [MULTIVIT-MIN-IRON-FA-LUTEIN (CENTRUM SILVER WOMEN) 8 MG IRON-400 MCG-300 MCG TAB] Take 1 tablet by mouth daily.       omeprazole (PRILOSEC) 20 MG DR capsule Take 1 capsule by mouth 2 times daily      polyethylene glycol (MIRALAX) 17 g packet Take 1 packet by mouth daily      tetrahydroz-peg 400-hyprom-gly (VISINE MAX REDNESS RELIEF) 0.05-1-0.36-0.2 % Drop [TETRAHYDROZ--HYPROM-GLY (VISINE MAX REDNESS RELIEF) 0.05-1-0.36-0.2 % DROP] Administer 1 drop to both eyes 3 (three) times a day as needed.      vit C/E/Zn/coppr/lutein/zeaxan (PRESERVISION AREDS-2 ORAL) [VIT C/E/ZN/COPPR/LUTEIN/ZEAXAN (PRESERVISION AREDS-2 ORAL)] Take 1 tablet by mouth 2 (two) times a day.       denosumab (PROLIA) 60 MG/ML SOSY injection Inject 60 mg Subcutaneous       Current  Facility-Administered Medications   Medication Dose Route Frequency Provider Last Rate Last Admin    denosumab (PROLIA) injection 60 mg  60 mg Subcutaneous Q6 Months    60 mg at 10/18/24 3859

## 2025-04-30 NOTE — PATIENT INSTRUCTIONS
Prolia 20th today. Labs today.  Prolia 21st in 6 months with my nurse. I will see you in 1 year.    DXA in 9/2026 .   Phone number to schedule [phone].    Daily calcium need is 2449-6039 mg a day from the diet and supplements.  Calcium citrate is easier to digest.  Vitamin D 2000 IU daily recommended.    Risk of rebound vertebral fractures is higher when Prolia suddenly stopped or dose was missed.      Prolia and Covid vaccine should be  for at least a week.    Patient education:  Patients advised to maintain good oral hygiene during treatment, because of the risk for osteonecrosis of the jaw.   The risk of osteonecrosis of the jaw with Prolia therapy is extremely low.   If a patient is late for Prolia therapy (>3 wks) a rapid rebound of bone loss can occur which is highly concerning and important to try to prevent to optimize bone health.   Therefore if an invasive dental procedure is required, primarily extraction or implant, while on Prolia therapy, the recommendation is to time the dental procedure optimally 4 months after the most recent dose of Prolia allowing 6-8 weeks for healing prior to next dose of Prolia.   This is based on the updated 2022 Recommendations from the American Association of Oral and Maxillofacial Surgeons.   We also recommend discussing with dentist or oral surgeon if pretreatment prophylactic oral rinses and antibiotics would be beneficial.   Optimizing oral hygiene before any invasive dental procedure significantly lowers ONJ risk.     There is a greater risk of severe hypocalcemia following denosumab administration and patient is advised that we will have to monitor calcium level.  Risk of infection is increased with denosumab use, so patient will inform me if has more frequent infections.     Atypical femur fracture  has been reported in patients receiving denosumab. The fractures may occur anywhere along the femoral shaft (may be bilateral) and commonly occur with  minimal to no trauma to the area. Some patients experience prodromal thigh or groin pain weeks or months before the fracture occurs. Contralateral limb should be assessed if AFF occurs.     Multiple vertebral column fracture has been reported following discontinuation of therapy. Hypertension and increased cholesterol were reported with denosumab use.      Discussed 10-year data of Prolia. Discussed the importance of being on time and consistent with Prolia use to prevent rapid bone of osteoclastic activity.  Discussed that if Prolia is discontinued we will likely need to utilize an antiresorptive, such as Reclast, to help prevent rapid rebound of osteoclast activity. Often more than 1 dose is required.  Labs yearly to ensure calcium and vitamin D optimized while patient on Prolia.

## 2025-05-05 DIAGNOSIS — F41.1 ANXIETY STATE: ICD-10-CM

## 2025-05-05 DIAGNOSIS — F33.0 MILD EPISODE OF RECURRENT MAJOR DEPRESSIVE DISORDER: ICD-10-CM

## 2025-05-05 RX ORDER — CITALOPRAM HYDROBROMIDE 20 MG/1
20 TABLET ORAL DAILY
Qty: 90 TABLET | Refills: 0 | Status: SHIPPED | OUTPATIENT
Start: 2025-05-05

## 2025-06-10 SDOH — HEALTH STABILITY: PHYSICAL HEALTH: ON AVERAGE, HOW MANY MINUTES DO YOU ENGAGE IN EXERCISE AT THIS LEVEL?: 10 MIN

## 2025-06-10 SDOH — HEALTH STABILITY: PHYSICAL HEALTH: ON AVERAGE, HOW MANY DAYS PER WEEK DO YOU ENGAGE IN MODERATE TO STRENUOUS EXERCISE (LIKE A BRISK WALK)?: 1 DAY

## 2025-06-10 ASSESSMENT — PATIENT HEALTH QUESTIONNAIRE - PHQ9
SUM OF ALL RESPONSES TO PHQ QUESTIONS 1-9: 7
10. IF YOU CHECKED OFF ANY PROBLEMS, HOW DIFFICULT HAVE THESE PROBLEMS MADE IT FOR YOU TO DO YOUR WORK, TAKE CARE OF THINGS AT HOME, OR GET ALONG WITH OTHER PEOPLE: NOT DIFFICULT AT ALL
SUM OF ALL RESPONSES TO PHQ QUESTIONS 1-9: 7

## 2025-06-10 ASSESSMENT — SOCIAL DETERMINANTS OF HEALTH (SDOH): HOW OFTEN DO YOU GET TOGETHER WITH FRIENDS OR RELATIVES?: TWICE A WEEK

## 2025-06-11 ENCOUNTER — OFFICE VISIT (OUTPATIENT)
Dept: FAMILY MEDICINE | Facility: CLINIC | Age: 80
End: 2025-06-11
Attending: NURSE PRACTITIONER
Payer: MEDICARE

## 2025-06-11 VITALS
WEIGHT: 176.2 LBS | TEMPERATURE: 97.3 F | BODY MASS INDEX: 31.22 KG/M2 | HEART RATE: 112 BPM | RESPIRATION RATE: 14 BRPM | HEIGHT: 63 IN | SYSTOLIC BLOOD PRESSURE: 116 MMHG | DIASTOLIC BLOOD PRESSURE: 77 MMHG | OXYGEN SATURATION: 97 %

## 2025-06-11 DIAGNOSIS — K08.89 TOOTH PAIN: ICD-10-CM

## 2025-06-11 DIAGNOSIS — J34.89 SINUS PRESSURE: ICD-10-CM

## 2025-06-11 DIAGNOSIS — Z85.3 HX: BREAST CANCER: ICD-10-CM

## 2025-06-11 DIAGNOSIS — R63.5 WEIGHT GAIN: ICD-10-CM

## 2025-06-11 DIAGNOSIS — K58.9 IRRITABLE BOWEL SYNDROME, UNSPECIFIED TYPE: ICD-10-CM

## 2025-06-11 DIAGNOSIS — F41.1 ANXIETY STATE: ICD-10-CM

## 2025-06-11 DIAGNOSIS — E78.2 MIXED HYPERLIPIDEMIA: ICD-10-CM

## 2025-06-11 DIAGNOSIS — M85.89 OSTEOPENIA OF MULTIPLE SITES: ICD-10-CM

## 2025-06-11 DIAGNOSIS — F33.0 MILD EPISODE OF RECURRENT MAJOR DEPRESSIVE DISORDER: ICD-10-CM

## 2025-06-11 DIAGNOSIS — K21.00 GASTROESOPHAGEAL REFLUX DISEASE WITH ESOPHAGITIS WITHOUT HEMORRHAGE: ICD-10-CM

## 2025-06-11 DIAGNOSIS — R53.83 FATIGUE, UNSPECIFIED TYPE: ICD-10-CM

## 2025-06-11 DIAGNOSIS — Z00.00 ENCOUNTER FOR MEDICARE ANNUAL WELLNESS EXAM: Primary | ICD-10-CM

## 2025-06-11 LAB
ERYTHROCYTE [DISTWIDTH] IN BLOOD BY AUTOMATED COUNT: 12.6 % (ref 10–15)
HCT VFR BLD AUTO: 46.1 % (ref 35–47)
HGB BLD-MCNC: 14.6 G/DL (ref 11.7–15.7)
MCH RBC QN AUTO: 29.4 PG (ref 26.5–33)
MCHC RBC AUTO-ENTMCNC: 31.7 G/DL (ref 31.5–36.5)
MCV RBC AUTO: 93 FL (ref 78–100)
PLATELET # BLD AUTO: 283 10E3/UL (ref 150–450)
RBC # BLD AUTO: 4.96 10E6/UL (ref 3.8–5.2)
WBC # BLD AUTO: 7.1 10E3/UL (ref 4–11)

## 2025-06-11 PROCEDURE — 36415 COLL VENOUS BLD VENIPUNCTURE: CPT | Performed by: NURSE PRACTITIONER

## 2025-06-11 PROCEDURE — 85027 COMPLETE CBC AUTOMATED: CPT | Performed by: NURSE PRACTITIONER

## 2025-06-11 RX ORDER — CITALOPRAM HYDROBROMIDE 20 MG/1
20 TABLET ORAL DAILY
Qty: 90 TABLET | Refills: 3 | Status: SHIPPED | OUTPATIENT
Start: 2025-06-11

## 2025-06-11 RX ORDER — ATORVASTATIN CALCIUM 10 MG/1
10 TABLET, FILM COATED ORAL DAILY
Qty: 90 TABLET | Refills: 3 | Status: SHIPPED | OUTPATIENT
Start: 2025-06-11

## 2025-06-11 ASSESSMENT — ANXIETY QUESTIONNAIRES
GAD7 TOTAL SCORE: 4
GAD7 TOTAL SCORE: 4
3. WORRYING TOO MUCH ABOUT DIFFERENT THINGS: SEVERAL DAYS
2. NOT BEING ABLE TO STOP OR CONTROL WORRYING: NOT AT ALL
6. BECOMING EASILY ANNOYED OR IRRITABLE: SEVERAL DAYS
1. FEELING NERVOUS, ANXIOUS, OR ON EDGE: SEVERAL DAYS
GAD7 TOTAL SCORE: 4
5. BEING SO RESTLESS THAT IT IS HARD TO SIT STILL: NOT AT ALL
4. TROUBLE RELAXING: SEVERAL DAYS
7. FEELING AFRAID AS IF SOMETHING AWFUL MIGHT HAPPEN: NOT AT ALL
7. FEELING AFRAID AS IF SOMETHING AWFUL MIGHT HAPPEN: NOT AT ALL
IF YOU CHECKED OFF ANY PROBLEMS ON THIS QUESTIONNAIRE, HOW DIFFICULT HAVE THESE PROBLEMS MADE IT FOR YOU TO DO YOUR WORK, TAKE CARE OF THINGS AT HOME, OR GET ALONG WITH OTHER PEOPLE: NOT DIFFICULT AT ALL
8. IF YOU CHECKED OFF ANY PROBLEMS, HOW DIFFICULT HAVE THESE MADE IT FOR YOU TO DO YOUR WORK, TAKE CARE OF THINGS AT HOME, OR GET ALONG WITH OTHER PEOPLE?: NOT DIFFICULT AT ALL

## 2025-06-11 ASSESSMENT — PAIN SCALES - GENERAL: PAINLEVEL_OUTOF10: NO PAIN (0)

## 2025-06-11 NOTE — PROGRESS NOTES
Preventive Care Visit  [facility]  Kera Lee, JOSELYN, Nurse Practitioner Primary Care  Jun 11, 2025        Subjective   Pat is a 79 year old, presenting for the following:  Physical (Pt is not fasting. Cold and tired. Weight gain. Covid vaccine. Harjinder measles diagnosis as a child-immunity? Tooth ache-dentist suggesting sinus/nerve pain-ibuprofen helps when needed. )        6/11/2025    10:44 AM   Additional Questions   Roomed by Haley SLAUGHTER LPN          [facility]     Hyperlipidemia Follow-Up    Are you regularly taking any medication or supplement to lower your cholesterol?   Yes- Lipitor  Are you having muscle aches or other side effects that you think could be caused by your cholesterol lowering medication?  No    Advance Care Planning    Patient states has Health Care Directive and will send to [facility].        6/10/2025   General Health   How would you rate your overall physical health? Excellent   Feel stress (tense, anxious, or unable to sleep) To some extent   (!) STRESS CONCERN      6/10/2025   Nutrition   Diet: Regular (no restrictions)    Breakfast skipped    Other   If other, please elaborate: Lactose intolerance, GI issues, weight gain       Multiple values from one day are sorted in reverse-chronological order         6/10/2025   Exercise   Days per week of moderate/strenous exercise 1 day   Average minutes spent exercising at this level 10 min   (!) EXERCISE CONCERN      6/10/2025   Social Factors   Frequency of gathering with friends or relatives Twice a week   Worry food won't last until get money to buy more No   Food not last or not have enough money for food? No   Do you have housing? (Housing is defined as stable permanent housing and does not include staying outside in a car, in a tent, in an abandoned building, in an overnight shelter, or couch-surfing.) Yes   Are you worried about losing your housing? No   Lack of transportation? No   Unable to get utilities  (heat,electricity)? No         6/11/2025   Fall Risk   Gait Speed Test (Document in seconds) 3   Gait Speed Test Interpretation Less than or equal to 5.00 seconds - PASS          6/10/2025   Activities of Daily Living- Home Safety   Needs help with the following daily activites None of the above   Safety concerns in the home None of the above         6/10/2025   Dental   Dentist two times every year? Yes         6/10/2025   Hearing Screening   Hearing concerns? (!) IT'S HARD TO FOLLOW A CONVERSATION IN A NOISY RESTAURANT OR CROWDED ROOM.    (!) TROUBLE UNDERSTANDING SPEECH ON THE TELEPHONE       Multiple values from one day are sorted in reverse-chronological order         6/10/2025   Driving Risk Screening   Patient/family members have concerns about driving No         6/10/2025   General Alertness/Fatigue Screening   Have you been more tired than usual lately? (!) YES         6/10/2025   Urinary Incontinence Screening   Bothered by leaking urine in past 6 months Yes       Today's PHQ-9 Score:       6/10/2025     4:23 PM   PHQ-9 SCORE   PHQ-9 Total Score MyChart 7 (Mild depression)   PHQ-9 Total Score 7        Patient-reported         6/10/2025   Substance Use   Alcohol more than 3/day or more than 7/wk No   Do you have a current opioid prescription? No   How severe/bad is pain from 1 to 10? 3/10   Do you use any other substances recreationally? No     Social History     Tobacco Use    Smoking status: Never     Passive exposure: Never    Smokeless tobacco: Never   Vaping Use    Vaping status: Never Used   Substance Use Topics    Alcohol use: Yes     Alcohol/week: 3.0 standard drinks of alcohol     Types: 3 Standard drinks or equivalent per week     Comment: 3-4 per week    Drug use: No           2/6/2024   LAST FHS-7 RESULTS   1st degree relative breast or ovarian cancer No   Any relative bilateral breast cancer No   Any male have breast cancer No   Any ONE woman have BOTH breast AND ovarian cancer No   Any woman  with breast cancer before 50yrs Yes   2 or more relatives with breast AND/OR ovarian cancer No   2 or more relatives with breast AND/OR bowel cancer No        Mammogram Screening - After age 74- determine frequency with patient based on health status, life expectancy and patient goals    ASCVD Risk   The 10-year ASCVD risk score (Luz MARTIN, et al., 2019) is: 16.8%    Values used to calculate the score:      Age: 79 years      Sex: Female      Is Non- : No      Diabetic: No      Tobacco smoker: No      Systolic Blood Pressure: 116 mmHg      Is BP treated: No      HDL Cholesterol: 67 mg/dL      Total Cholesterol: 157 mg/dL          Reviewed and updated as needed this visit by Provider                    Past Medical History:   Diagnosis Date    Anxiety     Arthritis     Breast cancer (H) 2003    Depression     Esophageal reflux     H/O colonoscopy 03/2022    hyperplastic polyp noted, no further colonoscopy recommended per [facility]    History of anesthesia complications     nausea    Hx antineoplastic chemotherapy 2003    for breast cancer    Hx of radiation therapy 2004    for breast cancer    IBS (irritable bowel syndrome)     Lactose intolerance     lactose breath test positive at GI, 7/2019    Lumbar disc herniation 12/23/2020    Added automatically from request for surgery [id number]       Lumbar stenosis with neurogenic claudication 01/29/2021    Macular degeneration 08/2019    caught early, takes Areds 2 on a daily basis, seeing opthamology    Neuropathy     secondary to chemotherapy    Osteopenia     PONV (postoperative nausea and vomiting)     Primary osteoarthritis of knee, left 04/21/2016    Replacement Utility updated for latest IMO load       Primary osteoarthritis of right knee 10/29/2015    Spinal stenosis of lumbar region with neurogenic claudication 12/23/2020    Added automatically from request for surgery [id number]       Synovial cyst of lumbar facet joint 12/23/2020    Added  automatically from request for surgery [id number]        Past Surgical History:   Procedure Laterality Date    APPENDECTOMY  1967    ARTHROPLASTY TOE(S) Right 06/16/2023    Procedure: First metatarsal phalangeal joint implant arthroplasty right foot;  Surgeon: Manuel Sparks DPM;  Location: [facility]    BACK SURGERY  2000    cervical fusion    BIOPSY BREAST Right 2003    BIOPSY BREAST Right 07/09/2021    dense fibrotic tissue, Dr Burleson    CATARACT EXTRACTION, BILATERAL      CHOLECYSTECTOMY  1994    COLONOSCOPY  2023    Small polys temovrd    GI SURGERY      [facility]    HEAD & NECK SURGERY  2000    Neck fusion    HYSTERECTOMY  1997    desmoid cyst    IR CVC TUNNEL PLACEMENT > 5 YRS OF AGE  01/21/2004    IR MISCELLANEOUS PROCEDURE  07/16/2004    LASER SURGERY OF EYE Bilateral 09/2022    cataracts    LUMPECTOMY BREAST  12/2003    breast cancer    NISSEN FUNDOPLICATION  2010    OOPHORECTOMY  1997    [facility] THORAX SPINE FUSN,POST TECH N/A 01/29/2021    Procedure: BILATERAL LUMBAR 4-5 LAMINECTOMIES FOR RIGHT SYNOVIAL CYST RESECTION, LUMBAR 4-5, LUMBAR 4-5 POSTERIOR INSTRUMENTED FUSION AND ARTHRODESIS, USE OF ALLOGRAFT, USE OF AUTOGRAFT, STEALTH NAVIGATION;  Surgeon: Lisa Thomas MD;  Location: [facility] OR;  Service: Spine    [facility] TOTAL KNEE ARTHROPLASTY Right 10/29/2015    Procedure: RIGHT TOTAL KNEE  ARTHROPLASTY;  Surgeon: Nitin White MD;  Location: [facility] OR;  Service: Orthopedics    [facility] TOTAL KNEE ARTHROPLASTY Left 04/21/2016    Procedure: LEFT TOTAL KNEE ARTHROPLASTY;  Surgeon: Nitin White MD;  Location: [facility] OR;  Service: Orthopedics     Lab work is in process  Labs reviewed in EPIC  Current providers sharing in care for this patient include:  Patient Care Team:  Kera Lee CNP as PCP - General (Nurse Practitioner Primary Care)  Mone Blanchard MD as Physician (Internal Medicine)  Manuel Sparks DPM as Assigned Surgical Provider  Kera Lee CNP as  "Assigned PCP    The following health maintenance items are reviewed in Epic and correct as of today:  Health Maintenance   Topic Date Due    COVID-19 VACCINE (9 - 2024-25 season) 05/05/2025    ANNUAL REVIEW OF HM ORDERS  05/28/2025    MEDICARE ANNUAL WELLNESS VISIT  05/28/2025    LIPID  11/05/2025    PHQ-9  12/11/2025    MAMMO SCREENING  02/07/2026    FALL RISK ASSESSMENT  06/11/2026    DIABETES SCREENING  04/30/2028    ADVANCE CARE PLANNING  05/28/2029    DTAP/TDAP/TD VACCINE (3 - Td or Tdap) 07/07/2032    DEXA  09/23/2039    HEPATITIS C SCREENING  Completed    DEPRESSION ACTION PLAN  Completed    INFLUENZA VACCINE  Completed    ZOSTER VACCINE  Completed    RSV VACCINE  Completed    HPV VACCINE  Aged Out    MENINGITIS VACCINE  Aged Out    PNEUMOCOCCAL VACCINE 50+ YEARS  Discontinued    COLORECTAL CANCER SCREENING  Discontinued         Review of Systems  Constitutional, HEENT, cardiovascular, pulmonary, GI, , musculoskeletal, neuro, skin, endocrine and psych systems are negative, except as otherwise noted.  Feeling fatigued, cold and gaining weight.  Problems with right sided tooth pain and facial pressure- dentist advised looking into possible sinus pathology.  Mood stable.      Objective    Exam  /77 (BP Location: Left arm, Patient Position: Sitting, Cuff Size: Adult Regular)   Pulse 112   Temp 97.3  F (36.3  C) (Oral)   Resp 14   Ht 1.605 m (5' 3.2\")   Wt 79.9 kg (176 lb 3.2 oz)   LMP  (LMP Unknown)   SpO2 97%   BMI 31.02 kg/m     Estimated body mass index is 31.02 kg/m  as calculated from the following:    Height as of this encounter: 1.605 m (5' 3.2\").    Weight as of this encounter: 79.9 kg (176 lb 3.2 oz).    Physical Exam  GENERAL: alert and no distress  EYES: Eyes grossly normal to inspection, PERRL and conjunctivae and sclerae normal  HENT: ear canals and TM's normal, nose and mouth without ulcers or lesions  NECK: no adenopathy, no asymmetry, masses, or scars  RESP: lungs clear to " auscultation - no rales, rhonchi or wheezes  BREAST: normal without masses, tenderness or nipple discharge and no palpable axillary masses or adenopathy.  Right breast- surgical scar  CV: regular rate and rhythm, normal S1 S2, no S3 or S4, no murmur, click or rub, no peripheral edema  ABDOMEN: soft, nontender, no hepatosplenomegaly, no masses and bowel sounds normal  MS: no gross musculoskeletal defects noted, no edema  SKIN: no suspicious lesions or rashes  NEURO: Normal strength and tone, mentation intact and speech normal  PSYCH: mentation appears normal, affect normal/bright  Gait and balance assessed per Gait Speed Test.  Result as above.        6/11/2025   Mini Cog   Clock Draw Score 2 Normal   3 Item Recall 3 objects recalled   Mini Cog Total Score 5             5/28/2024   Vision Screen   Reason Vision Screen Not Completed Other        Data saved with a previous flowsheet row definition     A/P:  1. Encounter for Medicare annual wellness exam (Primary)    2. Mixed hyperlipidemia  Stable  - Lipid panel reflex to direct LDL Non-fasting; Future  - ALT; Future  - CBC with platelets; Future  - Basic metabolic panel  (Ca, Cl, CO2, Creat, Gluc, K, Na, BUN); Future  - atorvastatin (LIPITOR) 10 MG tablet; Take 1 tablet (10 mg) by mouth daily.  Dispense: 90 tablet; Refill: 3  - Lipid panel reflex to direct LDL Non-fasting  - ALT  - CBC with platelets  - Basic metabolic panel  (Ca, Cl, CO2, Creat, Gluc, K, Na, BUN)    3. Gastroesophageal reflux disease with esophagitis without hemorrhage  Stable  - CBC with platelets; Future  - Basic metabolic panel  (Ca, Cl, CO2, Creat, Gluc, K, Na, BUN); Future  - CBC with platelets  - Basic metabolic panel  (Ca, Cl, CO2, Creat, Gluc, K, Na, BUN)    4. Irritable bowel syndrome, unspecified type  Stable  - CBC with platelets; Future  - Basic metabolic panel  (Ca, Cl, CO2, Creat, Gluc, K, Na, BUN); Future  - CBC with platelets  - Basic metabolic panel  (Ca, Cl, CO2, Creat, Gluc, K, Na,  BUN)    5. Osteopenia of multiple sites  Stable    6. Anxiety  Stable  - citalopram (CELEXA) 20 MG tablet; Take 1 tablet (20 mg) by mouth daily.  Dispense: 90 tablet; Refill: 3    7. Mild episode of recurrent major depressive disorder  Stable  - citalopram (CELEXA) 20 MG tablet; Take 1 tablet (20 mg) by mouth daily.  Dispense: 90 tablet; Refill: 3    8. Fatigue, unspecified type  - TSH with free T4 reflex; Future  - TSH with free T4 reflex    9. Weight gain  - TSH with free T4 reflex; Future  - TSH with free T4 reflex    10. Sinus pressure  - CT Sinus w/o Contrast; Future    11. Tooth pain  - CT Sinus w/o Contrast; Future    Counseling  Appropriate preventive services were addressed with this patient via screening, questionnaire, or discussion as appropriate for fall prevention, nutrition, physical activity, Tobacco-use cessation, social engagement, weight loss and cognition.  Checklist reviewing preventive services available has been given to the patient.  Reviewed patient's diet, addressing concerns and/or questions.   The patient was instructed to see the dentist every 6 months.      The longitudinal plan of care for the diagnosis(es)/condition(s) as documented were addressed during this visit. Due to the added complexity in care, I will continue to support Pat in the subsequent management and with ongoing continuity of care.   Signed Electronically by: Kera Lee CNP    Answers submitted by the patient for this visit:  Patient Health Questionnaire (Submitted on 6/10/2025)  If you checked off any problems, how difficult have these problems made it for you to do your work, take care of things at home, or get along with other people?: Not difficult at all  PHQ9 TOTAL SCORE: 7  Patient Health Questionnaire (G7) (Submitted on 6/11/2025)  DERIK 7 TOTAL SCORE: 4

## 2025-06-11 NOTE — PATIENT INSTRUCTIONS
Patient Education   Preventive Care Advice   This is general advice given by our system to help you stay healthy. However, your care team may have specific advice just for you. Please talk to your care team about your preventive care needs.  Nutrition  Eat 5 or more servings of fruits and vegetables each day.  Try wheat bread, brown rice and whole grain pasta (instead of white bread, rice, and pasta).  Get enough calcium and vitamin D. Check the label on foods and aim for 100% of the RDA (recommended daily allowance).  Lifestyle  Exercise at least 150 minutes each week  (30 minutes a day, 5 days a week).  Do muscle strengthening activities 2 days a week. These help control your weight and prevent disease.  No smoking.  Wear sunscreen to prevent skin cancer.  Have a dental exam and cleaning every 6 months.  Yearly exams  See your health care team every year to talk about:  Any changes in your health.  Any medicines your care team has prescribed.  Preventive care, family planning, and ways to prevent chronic diseases.  Shots (vaccines)   HPV shots (up to age 26), if you've never had them before.  Hepatitis B shots (up to age 59), if you've never had them before.  COVID-19 shot: Get this shot when it's due.  Flu shot: Get a flu shot every year.  Tetanus shot: Get a tetanus shot every 10 years.  Pneumococcal, hepatitis A, and RSV shots: Ask your care team if you need these based on your risk.  Shingles shot (for age 50 and up)  General health tests  Diabetes screening:  Starting at age 35, Get screened for diabetes at least every 3 years.  If you are younger than age 35, ask your care team if you should be screened for diabetes.  Cholesterol test: At age 39, start having a cholesterol test every 5 years, or more often if advised.  Bone density scan (DEXA): At age 50, ask your care team if you should have this scan for osteoporosis (brittle bones).  Hepatitis C: Get tested at least once in your life.  STIs (sexually  transmitted infections)  Before age 24: Ask your care team if you should be screened for STIs.  After age 24: Get screened for STIs if you're at risk. You are at risk for STIs (including HIV) if:  You are sexually active with more than one person.  You don't use condoms every time.  You or a partner was diagnosed with a sexually transmitted infection.  If you are at risk for HIV, ask about PrEP medicine to prevent HIV.  Get tested for HIV at least once in your life, whether you are at risk for HIV or not.  Cancer screening tests  Cervical cancer screening: If you have a cervix, begin getting regular cervical cancer screening tests starting at age 21.  Breast cancer scan (mammogram): If you've ever had breasts, begin having regular mammograms starting at age 40. This is a scan to check for breast cancer.  Colon cancer screening: It is important to start screening for colon cancer at age 45.  Have a colonoscopy test every 10 years (or more often if you're at risk) Or, ask your provider about stool tests like a FIT test every year or Cologuard test every 3 years.  To learn more about your testing options, visit:   .  For help making a decision, visit:   https://bit.ly/or98216.  Prostate cancer screening test: If you have a prostate, ask your care team if a prostate cancer screening test (PSA) at age 55 is right for you.  Lung cancer screening: If you are a current or former smoker ages 50 to 80, ask your care team if ongoing lung cancer screenings are right for you.  For informational purposes only. Not to replace the advice of your health care provider. Copyright   2023 [employer]. All rights reserved. Clinically reviewed by the [facility] Transitions Program. Closely 269829 - REV 01/24.

## 2025-06-12 ENCOUNTER — RESULTS FOLLOW-UP (OUTPATIENT)
Dept: FAMILY MEDICINE | Facility: CLINIC | Age: 80
End: 2025-06-12
Payer: MEDICARE

## 2025-06-12 DIAGNOSIS — E83.52 SERUM CALCIUM ELEVATED: Primary | ICD-10-CM

## 2025-06-12 LAB
ALT SERPL W P-5'-P-CCNC: 33 U/L (ref 0–50)
ANION GAP SERPL CALCULATED.3IONS-SCNC: 12 MMOL/L (ref 7–15)
BUN SERPL-MCNC: 16.6 MG/DL (ref 8–23)
CALCIUM SERPL-MCNC: 10.6 MG/DL (ref 8.8–10.4)
CHLORIDE SERPL-SCNC: 100 MMOL/L (ref 98–107)
CHOLEST SERPL-MCNC: 168 MG/DL
CREAT SERPL-MCNC: 0.66 MG/DL (ref 0.51–0.95)
EGFRCR SERPLBLD CKD-EPI 2021: 90 ML/MIN/1.73M2
FASTING STATUS PATIENT QL REPORTED: NO
FASTING STATUS PATIENT QL REPORTED: NO
GLUCOSE SERPL-MCNC: 78 MG/DL (ref 70–99)
HCO3 SERPL-SCNC: 26 MMOL/L (ref 22–29)
HDLC SERPL-MCNC: 70 MG/DL
LDLC SERPL CALC-MCNC: 71 MG/DL
NONHDLC SERPL-MCNC: 98 MG/DL
POTASSIUM SERPL-SCNC: 4.8 MMOL/L (ref 3.4–5.3)
SODIUM SERPL-SCNC: 138 MMOL/L (ref 135–145)
TRIGL SERPL-MCNC: 133 MG/DL
TSH SERPL DL<=0.005 MIU/L-ACNC: 1.58 UIU/ML (ref 0.3–4.2)

## 2025-06-26 DIAGNOSIS — J01.90 ACUTE SINUSITIS WITH SYMPTOMS > 10 DAYS: Primary | ICD-10-CM

## 2025-06-26 RX ORDER — DOXYCYCLINE 100 MG/1
100 CAPSULE ORAL 2 TIMES DAILY
Qty: 20 CAPSULE | Refills: 0 | Status: SHIPPED | OUTPATIENT
Start: 2025-06-26 | End: 2025-07-06

## 2025-08-25 ENCOUNTER — OFFICE VISIT (OUTPATIENT)
Dept: URGENT CARE | Facility: URGENT CARE | Age: 80
End: 2025-08-25
Payer: MEDICARE

## 2025-08-25 ENCOUNTER — ANCILLARY PROCEDURE (OUTPATIENT)
Dept: GENERAL RADIOLOGY | Facility: CLINIC | Age: 80
End: 2025-08-25
Attending: PHYSICIAN ASSISTANT
Payer: MEDICARE

## 2025-08-25 VITALS
TEMPERATURE: 97.9 F | SYSTOLIC BLOOD PRESSURE: 125 MMHG | BODY MASS INDEX: 30.82 KG/M2 | DIASTOLIC BLOOD PRESSURE: 88 MMHG | RESPIRATION RATE: 16 BRPM | HEART RATE: 76 BPM | WEIGHT: 174 LBS | OXYGEN SATURATION: 99 %

## 2025-08-25 DIAGNOSIS — M79.671 RIGHT FOOT PAIN: ICD-10-CM

## 2025-08-25 DIAGNOSIS — M79.671 RIGHT FOOT PAIN: Primary | ICD-10-CM

## 2025-08-25 LAB
BASOPHILS # BLD AUTO: 0.04 10E3/UL (ref 0–0.2)
BASOPHILS NFR BLD AUTO: 0.6 %
EOSINOPHIL # BLD AUTO: 0.22 10E3/UL (ref 0–0.7)
EOSINOPHIL NFR BLD AUTO: 3.1 %
ERYTHROCYTE [DISTWIDTH] IN BLOOD BY AUTOMATED COUNT: 12 % (ref 10–15)
HCT VFR BLD AUTO: 43.6 % (ref 35–47)
HGB BLD-MCNC: 14.4 G/DL (ref 11.7–15.7)
IMM GRANULOCYTES # BLD: <0.04 10E3/UL
IMM GRANULOCYTES NFR BLD: 0.1 %
LYMPHOCYTES # BLD AUTO: 2.13 10E3/UL (ref 0.8–5.3)
LYMPHOCYTES NFR BLD AUTO: 30.2 %
MCH RBC QN AUTO: 31.1 PG (ref 26.5–33)
MCHC RBC AUTO-ENTMCNC: 33 G/DL (ref 31.5–36.5)
MCV RBC AUTO: 94.2 FL (ref 78–100)
MONOCYTES # BLD AUTO: 0.47 10E3/UL (ref 0–1.3)
MONOCYTES NFR BLD AUTO: 6.7 %
NEUTROPHILS # BLD AUTO: 4.18 10E3/UL (ref 1.6–8.3)
NEUTROPHILS NFR BLD AUTO: 59.3 %
PLATELET # BLD AUTO: 256 10E3/UL (ref 150–450)
RBC # BLD AUTO: 4.63 10E6/UL (ref 3.8–5.2)
URATE SERPL-MCNC: 3.8 MG/DL (ref 2.4–5.7)
WBC # BLD AUTO: 7.05 10E3/UL (ref 4–11)

## 2025-08-25 PROCEDURE — 3074F SYST BP LT 130 MM HG: CPT | Performed by: PHYSICIAN ASSISTANT

## 2025-08-25 PROCEDURE — 73630 X-RAY EXAM OF FOOT: CPT | Mod: TC | Performed by: STUDENT IN AN ORGANIZED HEALTH CARE EDUCATION/TRAINING PROGRAM

## 2025-08-25 PROCEDURE — 85025 COMPLETE CBC W/AUTO DIFF WBC: CPT | Performed by: PHYSICIAN ASSISTANT

## 2025-08-25 PROCEDURE — 3079F DIAST BP 80-89 MM HG: CPT | Performed by: PHYSICIAN ASSISTANT

## 2025-08-25 PROCEDURE — 84550 ASSAY OF BLOOD/URIC ACID: CPT | Performed by: PHYSICIAN ASSISTANT

## 2025-08-25 PROCEDURE — 99214 OFFICE O/P EST MOD 30 MIN: CPT | Performed by: PHYSICIAN ASSISTANT

## 2025-08-25 PROCEDURE — 36415 COLL VENOUS BLD VENIPUNCTURE: CPT | Performed by: PHYSICIAN ASSISTANT
